# Patient Record
Sex: FEMALE | Race: WHITE | NOT HISPANIC OR LATINO | Employment: OTHER | URBAN - METROPOLITAN AREA
[De-identification: names, ages, dates, MRNs, and addresses within clinical notes are randomized per-mention and may not be internally consistent; named-entity substitution may affect disease eponyms.]

---

## 2017-03-15 ENCOUNTER — HOSPITAL ENCOUNTER (OUTPATIENT)
Dept: SLEEP MEDICINE | Facility: HOSPITAL | Age: 67
Discharge: HOME | End: 2017-03-15
Attending: INTERNAL MEDICINE | Admitting: INTERNAL MEDICINE

## 2017-04-20 ENCOUNTER — HOSPITAL ENCOUNTER (OUTPATIENT)
Dept: OTHER | Facility: HOSPITAL | Age: 67
Setting detail: SPECIMEN
Discharge: HOME | End: 2017-04-20
Attending: INTERNAL MEDICINE | Admitting: INTERNAL MEDICINE

## 2017-09-13 ENCOUNTER — HOSPITAL ENCOUNTER (OUTPATIENT)
Dept: SLEEP MEDICINE | Facility: HOSPITAL | Age: 67
Discharge: HOME | End: 2017-09-13
Attending: INTERNAL MEDICINE | Admitting: INTERNAL MEDICINE

## 2018-09-07 ENCOUNTER — HOSPITAL ENCOUNTER (OUTPATIENT)
Dept: HOME HEALTH SERVICES | Facility: HOME HEALTHCARE | Age: 68
Setting detail: SPECIMEN
Discharge: HOME | End: 2018-09-07
Attending: INTERNAL MEDICINE | Admitting: INTERNAL MEDICINE

## 2018-09-07 LAB
ALBUMIN SERPL-MCNC: 3.6 G/DL (ref 3.5–4.8)
ALBUMIN/GLOB SERPL: 1.2 {RATIO} (ref 1–1.7)
ALP SERPL-CCNC: 70 IU/L (ref 32–91)
ALT SERPL-CCNC: 12 IU/L (ref 14–54)
ANION GAP SERPL CALC-SCNC: 12.7 MMOL/L (ref 10–20)
AST SERPL-CCNC: 19 IU/L (ref 15–41)
BASOPHILS # BLD AUTO: 0 10*3/UL (ref 0–0.2)
BASOPHILS NFR BLD AUTO: 1 % (ref 0–2)
BILIRUB SERPL-MCNC: 0.5 MG/DL (ref 0.3–1.2)
BUN SERPL-MCNC: 8 MG/DL (ref 8–20)
BUN/CREAT SERPL: 10 (ref 5.4–26.2)
CALCIUM SERPL-MCNC: 9.1 MG/DL (ref 8.9–10.3)
CHLORIDE SERPL-SCNC: 104 MMOL/L (ref 101–111)
CONV CO2: 26 MMOL/L (ref 22–32)
CONV TOTAL PROTEIN: 6.7 G/DL (ref 6.1–7.9)
CREAT UR-MCNC: 0.8 MG/DL (ref 0.4–1)
DIFFERENTIAL METHOD BLD: (no result)
EOSINOPHIL # BLD AUTO: 0.1 10*3/UL (ref 0–0.3)
EOSINOPHIL # BLD AUTO: 2 % (ref 0–3)
ERYTHROCYTE [DISTWIDTH] IN BLOOD BY AUTOMATED COUNT: 16.5 % (ref 11.5–14.5)
GLOBULIN UR ELPH-MCNC: 3.1 G/DL (ref 2.5–3.8)
GLUCOSE SERPL-MCNC: 106 MG/DL (ref 65–99)
HCT VFR BLD AUTO: 36.4 % (ref 35–49)
HGB BLD-MCNC: 12 G/DL (ref 12–15)
LYMPHOCYTES # BLD AUTO: 3 10*3/UL (ref 0.8–4.8)
LYMPHOCYTES NFR BLD AUTO: 37 % (ref 18–42)
MAGNESIUM SERPL-MCNC: 1.6 MG/DL (ref 1.8–2.5)
MCH RBC QN AUTO: 28.5 PG (ref 26–32)
MCHC RBC AUTO-ENTMCNC: 33 G/DL (ref 32–36)
MCV RBC AUTO: 86.4 FL (ref 80–94)
MONOCYTES # BLD AUTO: 0.5 10*3/UL (ref 0.1–1.3)
MONOCYTES NFR BLD AUTO: 7 % (ref 2–11)
NEUTROPHILS # BLD AUTO: 4.5 10*3/UL (ref 2.3–8.6)
NEUTROPHILS NFR BLD AUTO: 53 % (ref 50–75)
NRBC BLD AUTO-RTO: 0 /100{WBCS}
NRBC/RBC NFR BLD MANUAL: 0 10*3/UL
PLATELET # BLD AUTO: 170 10*3/UL (ref 150–450)
PMV BLD AUTO: 8.1 FL (ref 7.4–10.4)
POTASSIUM SERPL-SCNC: 3.7 MMOL/L (ref 3.6–5.1)
RBC # BLD AUTO: 4.21 10*6/UL (ref 4–5.4)
SODIUM SERPL-SCNC: 139 MMOL/L (ref 136–144)
THEOPHYLLINE SERPL-MCNC: 5.4 UG/ML (ref 10–20)
WBC # BLD AUTO: 8.2 10*3/UL (ref 4.5–11.5)

## 2019-11-11 ENCOUNTER — APPOINTMENT (OUTPATIENT)
Dept: GENERAL RADIOLOGY | Facility: HOSPITAL | Age: 69
End: 2019-11-11

## 2019-11-11 ENCOUNTER — HOSPITAL ENCOUNTER (INPATIENT)
Facility: HOSPITAL | Age: 69
LOS: 7 days | Discharge: HOME HEALTH | End: 2019-11-18
Attending: EMERGENCY MEDICINE | Admitting: INTERNAL MEDICINE

## 2019-11-11 DIAGNOSIS — R50.9 FEVER AND CHILLS: ICD-10-CM

## 2019-11-11 DIAGNOSIS — J96.01 ACUTE RESPIRATORY FAILURE WITH HYPOXIA (HCC): Primary | ICD-10-CM

## 2019-11-11 DIAGNOSIS — A41.9 SEPSIS, DUE TO UNSPECIFIED ORGANISM, UNSPECIFIED WHETHER ACUTE ORGAN DYSFUNCTION PRESENT (HCC): ICD-10-CM

## 2019-11-11 LAB
ALBUMIN SERPL-MCNC: 4.2 G/DL (ref 3.5–5.2)
ALBUMIN/GLOB SERPL: 1.3 G/DL
ALP SERPL-CCNC: 74 U/L (ref 39–117)
ALT SERPL W P-5'-P-CCNC: 9 U/L (ref 1–33)
ANION GAP SERPL CALCULATED.3IONS-SCNC: 15 MMOL/L (ref 5–15)
APTT PPP: 24.6 SECONDS (ref 24–31)
ARTERIAL PATENCY WRIST A: POSITIVE
ARTERIAL PATENCY WRIST A: POSITIVE
AST SERPL-CCNC: 13 U/L (ref 1–32)
ATMOSPHERIC PRESS: ABNORMAL MM[HG]
ATMOSPHERIC PRESS: ABNORMAL MM[HG]
B PERT DNA SPEC QL NAA+PROBE: NOT DETECTED
BASE EXCESS BLDA CALC-SCNC: 2 MMOL/L (ref 0–3)
BASE EXCESS BLDA CALC-SCNC: 2.4 MMOL/L (ref 0–3)
BASOPHILS # BLD AUTO: 0.1 10*3/MM3 (ref 0–0.2)
BASOPHILS NFR BLD AUTO: 1.1 % (ref 0–1.5)
BDY SITE: ABNORMAL
BDY SITE: ABNORMAL
BILIRUB SERPL-MCNC: 0.2 MG/DL (ref 0.2–1.2)
BUN BLD-MCNC: 21 MG/DL (ref 8–23)
BUN/CREAT SERPL: 19.1 (ref 7–25)
C PNEUM DNA NPH QL NAA+NON-PROBE: NOT DETECTED
CALCIUM SPEC-SCNC: 9.7 MG/DL (ref 8.6–10.5)
CHLORIDE SERPL-SCNC: 98 MMOL/L (ref 98–107)
CO2 BLDA-SCNC: 32.1 MMOL/L (ref 22–29)
CO2 BLDA-SCNC: 33.9 MMOL/L (ref 22–29)
CO2 SERPL-SCNC: 25 MMOL/L (ref 22–29)
CREAT BLD-MCNC: 1.1 MG/DL (ref 0.57–1)
D-LACTATE SERPL-SCNC: 0.9 MMOL/L (ref 0.5–2)
DEPRECATED RDW RBC AUTO: 48.6 FL (ref 37–54)
EOSINOPHIL # BLD AUTO: 0 10*3/MM3 (ref 0–0.4)
EOSINOPHIL NFR BLD AUTO: 0.1 % (ref 0.3–6.2)
ERYTHROCYTE [DISTWIDTH] IN BLOOD BY AUTOMATED COUNT: 15.9 % (ref 12.3–15.4)
FLUAV H1 2009 PAND RNA NPH QL NAA+PROBE: NOT DETECTED
FLUAV H1 HA GENE NPH QL NAA+PROBE: NOT DETECTED
FLUAV H3 RNA NPH QL NAA+PROBE: NOT DETECTED
FLUAV SUBTYP SPEC NAA+PROBE: NOT DETECTED
FLUBV RNA ISLT QL NAA+PROBE: NOT DETECTED
GFR SERPL CREATININE-BSD FRML MDRD: 49 ML/MIN/1.73
GLOBULIN UR ELPH-MCNC: 3.2 GM/DL
GLUCOSE BLD-MCNC: 131 MG/DL (ref 65–99)
HADV DNA SPEC NAA+PROBE: NOT DETECTED
HCO3 BLDA-SCNC: 30.3 MMOL/L (ref 21–28)
HCO3 BLDA-SCNC: 31.8 MMOL/L (ref 21–28)
HCOV 229E RNA SPEC QL NAA+PROBE: NOT DETECTED
HCOV HKU1 RNA SPEC QL NAA+PROBE: NOT DETECTED
HCOV NL63 RNA SPEC QL NAA+PROBE: NOT DETECTED
HCOV OC43 RNA SPEC QL NAA+PROBE: NOT DETECTED
HCT VFR BLD AUTO: 42.4 % (ref 34–46.6)
HEMODILUTION: NO
HEMODILUTION: NO
HGB BLD-MCNC: 14 G/DL (ref 12–15.9)
HMPV RNA NPH QL NAA+NON-PROBE: NOT DETECTED
HOLD SPECIMEN: NORMAL
HOLD SPECIMEN: NORMAL
HOROWITZ INDEX BLD+IHG-RTO: 100 %
HOROWITZ INDEX BLD+IHG-RTO: 100 %
HPIV1 RNA SPEC QL NAA+PROBE: NOT DETECTED
HPIV2 RNA SPEC QL NAA+PROBE: NOT DETECTED
HPIV3 RNA NPH QL NAA+PROBE: NOT DETECTED
HPIV4 P GENE NPH QL NAA+PROBE: NOT DETECTED
INR PPP: 1.03 (ref 0.9–1.1)
LYMPHOCYTES # BLD AUTO: 2 10*3/MM3 (ref 0.7–3.1)
LYMPHOCYTES NFR BLD AUTO: 17.9 % (ref 19.6–45.3)
M PNEUMO IGG SER IA-ACNC: NOT DETECTED
MCH RBC QN AUTO: 28.8 PG (ref 26.6–33)
MCHC RBC AUTO-ENTMCNC: 33 G/DL (ref 31.5–35.7)
MCV RBC AUTO: 87.2 FL (ref 79–97)
MODALITY: ABNORMAL
MODALITY: ABNORMAL
MONOCYTES # BLD AUTO: 1.1 10*3/MM3 (ref 0.1–0.9)
MONOCYTES NFR BLD AUTO: 9.9 % (ref 5–12)
NEUTROPHILS # BLD AUTO: 8.1 10*3/MM3 (ref 1.7–7)
NEUTROPHILS NFR BLD AUTO: 71 % (ref 42.7–76)
NRBC BLD AUTO-RTO: 0.1 /100 WBC (ref 0–0.2)
NT-PROBNP SERPL-MCNC: 263.1 PG/ML (ref 5–900)
PCO2 BLDA: 61.1 MM HG (ref 35–48)
PCO2 BLDA: 67.7 MM HG (ref 35–48)
PEEP RESPIRATORY: 6 CM[H2O]
PH BLDA: 7.28 PH UNITS (ref 7.35–7.45)
PH BLDA: 7.3 PH UNITS (ref 7.35–7.45)
PLATELET # BLD AUTO: 142 10*3/MM3 (ref 140–450)
PMV BLD AUTO: 8.5 FL (ref 6–12)
PO2 BLDA: 115.9 MM HG (ref 83–108)
PO2 BLDA: 301.2 MM HG (ref 83–108)
POTASSIUM BLD-SCNC: 4.4 MMOL/L (ref 3.5–5.2)
PROCALCITONIN SERPL-MCNC: 0.09 NG/ML (ref 0.1–0.25)
PROT SERPL-MCNC: 7.4 G/DL (ref 6–8.5)
PROTHROMBIN TIME: 10.7 SECONDS (ref 9.6–11.7)
PSV: 6 CMH2O
RBC # BLD AUTO: 4.87 10*6/MM3 (ref 3.77–5.28)
RESPIRATORY RATE: 20
RHINOVIRUS RNA SPEC NAA+PROBE: NOT DETECTED
RSV RNA NPH QL NAA+NON-PROBE: DETECTED
SAO2 % BLDCOA: 97.9 % (ref 94–98)
SAO2 % BLDCOA: 99.9 % (ref 94–98)
SODIUM BLD-SCNC: 138 MMOL/L (ref 136–145)
TROPONIN T SERPL-MCNC: <0.01 NG/ML (ref 0–0.03)
VT ON VENT VENT: 600 ML
WBC NRBC COR # BLD: 11.4 10*3/MM3 (ref 3.4–10.8)
WHOLE BLOOD HOLD SPECIMEN: NORMAL
WHOLE BLOOD HOLD SPECIMEN: NORMAL

## 2019-11-11 PROCEDURE — 25010000002 FUROSEMIDE PER 20 MG: Performed by: INTERNAL MEDICINE

## 2019-11-11 PROCEDURE — 84484 ASSAY OF TROPONIN QUANT: CPT | Performed by: EMERGENCY MEDICINE

## 2019-11-11 PROCEDURE — 85610 PROTHROMBIN TIME: CPT | Performed by: EMERGENCY MEDICINE

## 2019-11-11 PROCEDURE — 93005 ELECTROCARDIOGRAM TRACING: CPT

## 2019-11-11 PROCEDURE — 83605 ASSAY OF LACTIC ACID: CPT

## 2019-11-11 PROCEDURE — 83880 ASSAY OF NATRIURETIC PEPTIDE: CPT | Performed by: EMERGENCY MEDICINE

## 2019-11-11 PROCEDURE — 25010000002 METHYLPREDNISOLONE PER 40 MG: Performed by: INTERNAL MEDICINE

## 2019-11-11 PROCEDURE — 84145 PROCALCITONIN (PCT): CPT | Performed by: INTERNAL MEDICINE

## 2019-11-11 PROCEDURE — 71045 X-RAY EXAM CHEST 1 VIEW: CPT

## 2019-11-11 PROCEDURE — 94799 UNLISTED PULMONARY SVC/PX: CPT

## 2019-11-11 PROCEDURE — 99285 EMERGENCY DEPT VISIT HI MDM: CPT

## 2019-11-11 PROCEDURE — 85025 COMPLETE CBC W/AUTO DIFF WBC: CPT | Performed by: EMERGENCY MEDICINE

## 2019-11-11 PROCEDURE — 25010000002 CEFTRIAXONE PER 250 MG: Performed by: EMERGENCY MEDICINE

## 2019-11-11 PROCEDURE — 81332 SERPINA1 GENE: CPT | Performed by: INTERNAL MEDICINE

## 2019-11-11 PROCEDURE — 25010000002 ACETAZOLAMIDE PER 500 MG: Performed by: INTERNAL MEDICINE

## 2019-11-11 PROCEDURE — 94760 N-INVAS EAR/PLS OXIMETRY 1: CPT

## 2019-11-11 PROCEDURE — 87040 BLOOD CULTURE FOR BACTERIA: CPT | Performed by: EMERGENCY MEDICINE

## 2019-11-11 PROCEDURE — 25010000002 LORAZEPAM PER 2 MG

## 2019-11-11 PROCEDURE — 94660 CPAP INITIATION&MGMT: CPT

## 2019-11-11 PROCEDURE — 80053 COMPREHEN METABOLIC PANEL: CPT | Performed by: EMERGENCY MEDICINE

## 2019-11-11 PROCEDURE — 82803 BLOOD GASES ANY COMBINATION: CPT

## 2019-11-11 PROCEDURE — 94640 AIRWAY INHALATION TREATMENT: CPT

## 2019-11-11 PROCEDURE — 93005 ELECTROCARDIOGRAM TRACING: CPT | Performed by: EMERGENCY MEDICINE

## 2019-11-11 PROCEDURE — 85730 THROMBOPLASTIN TIME PARTIAL: CPT | Performed by: EMERGENCY MEDICINE

## 2019-11-11 PROCEDURE — 0099U HC BIOFIRE FILMARRAY RESP PANEL 1: CPT | Performed by: INTERNAL MEDICINE

## 2019-11-11 PROCEDURE — 36600 WITHDRAWAL OF ARTERIAL BLOOD: CPT

## 2019-11-11 RX ORDER — LORAZEPAM 2 MG/ML
INJECTION INTRAMUSCULAR
Status: COMPLETED
Start: 2019-11-11 | End: 2019-11-11

## 2019-11-11 RX ORDER — ALBUTEROL SULFATE 2.5 MG/3ML
10 SOLUTION RESPIRATORY (INHALATION) CONTINUOUS
Status: ACTIVE | OUTPATIENT
Start: 2019-11-11 | End: 2019-11-11

## 2019-11-11 RX ORDER — ACETAMINOPHEN 500 MG
1000 TABLET ORAL ONCE
Status: COMPLETED | OUTPATIENT
Start: 2019-11-11 | End: 2019-11-11

## 2019-11-11 RX ORDER — POTASSIUM CHLORIDE 750 MG/1
10 TABLET, FILM COATED, EXTENDED RELEASE ORAL DAILY
COMMUNITY
End: 2020-01-23 | Stop reason: SDUPTHER

## 2019-11-11 RX ORDER — IPRATROPIUM BROMIDE AND ALBUTEROL SULFATE 2.5; .5 MG/3ML; MG/3ML
3 SOLUTION RESPIRATORY (INHALATION)
Status: DISCONTINUED | OUTPATIENT
Start: 2019-11-11 | End: 2019-11-18 | Stop reason: HOSPADM

## 2019-11-11 RX ORDER — MAGNESIUM 200 MG
1 TABLET ORAL DAILY
COMMUNITY
End: 2020-07-23

## 2019-11-11 RX ORDER — ESOMEPRAZOLE MAGNESIUM 40 MG/1
40 CAPSULE, DELAYED RELEASE ORAL
COMMUNITY
End: 2019-12-05 | Stop reason: SDUPTHER

## 2019-11-11 RX ORDER — IPRATROPIUM BROMIDE AND ALBUTEROL SULFATE 2.5; .5 MG/3ML; MG/3ML
3 SOLUTION RESPIRATORY (INHALATION) EVERY 4 HOURS PRN
Status: DISCONTINUED | OUTPATIENT
Start: 2019-11-11 | End: 2019-11-18 | Stop reason: HOSPADM

## 2019-11-11 RX ORDER — IPRATROPIUM BROMIDE AND ALBUTEROL SULFATE 2.5; .5 MG/3ML; MG/3ML
3 SOLUTION RESPIRATORY (INHALATION)
Status: DISCONTINUED | OUTPATIENT
Start: 2019-11-11 | End: 2019-11-17

## 2019-11-11 RX ORDER — BUDESONIDE 1 MG/2ML
1 INHALANT ORAL 2 TIMES DAILY
Status: ON HOLD | COMMUNITY
End: 2021-12-27

## 2019-11-11 RX ORDER — LORAZEPAM 2 MG/ML
0.5 INJECTION INTRAMUSCULAR ONCE
Status: COMPLETED | OUTPATIENT
Start: 2019-11-11 | End: 2019-11-11

## 2019-11-11 RX ORDER — MELATONIN
2000 2 TIMES DAILY
COMMUNITY
End: 2020-07-23

## 2019-11-11 RX ORDER — ROSUVASTATIN CALCIUM 20 MG/1
20 TABLET, COATED ORAL DAILY
COMMUNITY

## 2019-11-11 RX ORDER — FUROSEMIDE 10 MG/ML
40 INJECTION INTRAMUSCULAR; INTRAVENOUS
Status: DISCONTINUED | OUTPATIENT
Start: 2019-11-11 | End: 2019-11-12

## 2019-11-11 RX ORDER — FUROSEMIDE 40 MG/1
20 TABLET ORAL NIGHTLY
COMMUNITY
End: 2020-01-23

## 2019-11-11 RX ORDER — SODIUM CHLORIDE 0.9 % (FLUSH) 0.9 %
10 SYRINGE (ML) INJECTION AS NEEDED
Status: DISCONTINUED | OUTPATIENT
Start: 2019-11-11 | End: 2019-11-18 | Stop reason: HOSPADM

## 2019-11-11 RX ORDER — PHENOL 1.4 %
600 AEROSOL, SPRAY (ML) MUCOUS MEMBRANE 2 TIMES DAILY WITH MEALS
COMMUNITY
End: 2020-07-23

## 2019-11-11 RX ORDER — THEOPHYLLINE 400 MG/1
400 TABLET, EXTENDED RELEASE ORAL DAILY
COMMUNITY

## 2019-11-11 RX ORDER — GABAPENTIN 300 MG/1
900 CAPSULE ORAL NIGHTLY
COMMUNITY

## 2019-11-11 RX ORDER — LOSARTAN POTASSIUM 50 MG/1
50 TABLET ORAL DAILY
COMMUNITY
End: 2019-11-18 | Stop reason: HOSPADM

## 2019-11-11 RX ORDER — BUPROPION HYDROCHLORIDE 150 MG/1
150 TABLET, EXTENDED RELEASE ORAL 2 TIMES DAILY
COMMUNITY
End: 2020-07-23

## 2019-11-11 RX ORDER — ROPINIROLE 1 MG/1
1 TABLET, FILM COATED ORAL 2 TIMES DAILY
COMMUNITY

## 2019-11-11 RX ORDER — NITROGLYCERIN 20 MG/100ML
10-50 INJECTION INTRAVENOUS
Status: DISCONTINUED | OUTPATIENT
Start: 2019-11-11 | End: 2019-11-18 | Stop reason: HOSPADM

## 2019-11-11 RX ORDER — ASPIRIN 325 MG
325 TABLET ORAL ONCE
Status: COMPLETED | OUTPATIENT
Start: 2019-11-11 | End: 2019-11-11

## 2019-11-11 RX ORDER — IPRATROPIUM BROMIDE AND ALBUTEROL SULFATE 2.5; .5 MG/3ML; MG/3ML
3 SOLUTION RESPIRATORY (INHALATION) EVERY 4 HOURS PRN
COMMUNITY

## 2019-11-11 RX ORDER — BUDESONIDE 0.5 MG/2ML
0.5 INHALANT ORAL
Status: DISCONTINUED | OUTPATIENT
Start: 2019-11-11 | End: 2019-11-18 | Stop reason: HOSPADM

## 2019-11-11 RX ORDER — METHYLPREDNISOLONE SODIUM SUCCINATE 40 MG/ML
40 INJECTION, POWDER, LYOPHILIZED, FOR SOLUTION INTRAMUSCULAR; INTRAVENOUS EVERY 8 HOURS
Status: DISCONTINUED | OUTPATIENT
Start: 2019-11-11 | End: 2019-11-12

## 2019-11-11 RX ORDER — DOXYCYCLINE HYCLATE 100 MG/1
100 CAPSULE ORAL 2 TIMES DAILY
COMMUNITY
Start: 2019-11-04 | End: 2019-11-18 | Stop reason: HOSPADM

## 2019-11-11 RX ORDER — ACETAMINOPHEN 325 MG/1
TABLET ORAL
Status: COMPLETED
Start: 2019-11-11 | End: 2019-11-11

## 2019-11-11 RX ORDER — METOPROLOL SUCCINATE 50 MG/1
50 TABLET, EXTENDED RELEASE ORAL DAILY
COMMUNITY
End: 2020-07-23 | Stop reason: SDUPTHER

## 2019-11-11 RX ORDER — ASPIRIN 81 MG/1
81 TABLET, CHEWABLE ORAL DAILY
COMMUNITY

## 2019-11-11 RX ORDER — RISEDRONATE SODIUM 35 MG/1
35 TABLET, FILM COATED ORAL
COMMUNITY

## 2019-11-11 RX ORDER — ALBUTEROL SULFATE 90 UG/1
1 AEROSOL, METERED RESPIRATORY (INHALATION) EVERY 4 HOURS PRN
COMMUNITY

## 2019-11-11 RX ADMIN — METHYLPREDNISOLONE SODIUM SUCCINATE 40 MG: 40 INJECTION, POWDER, FOR SOLUTION INTRAMUSCULAR; INTRAVENOUS at 08:20

## 2019-11-11 RX ADMIN — Medication 650 MG: at 06:07

## 2019-11-11 RX ADMIN — IPRATROPIUM BROMIDE AND ALBUTEROL SULFATE 3 ML: .5; 3 SOLUTION RESPIRATORY (INHALATION) at 13:26

## 2019-11-11 RX ADMIN — FUROSEMIDE 40 MG: 10 INJECTION, SOLUTION INTRAMUSCULAR; INTRAVENOUS at 17:56

## 2019-11-11 RX ADMIN — BUDESONIDE 0.5 MG: 0.5 INHALANT RESPIRATORY (INHALATION) at 19:13

## 2019-11-11 RX ADMIN — ACETAMINOPHEN 650 MG: 325 TABLET ORAL at 06:07

## 2019-11-11 RX ADMIN — ALBUTEROL SULFATE 10 MG: 2.5 SOLUTION RESPIRATORY (INHALATION) at 05:55

## 2019-11-11 RX ADMIN — NITROGLYCERIN 5 MCG/MIN: 20 INJECTION INTRAVENOUS at 05:45

## 2019-11-11 RX ADMIN — CEFTRIAXONE SODIUM 2 G: 10 INJECTION, POWDER, FOR SOLUTION INTRAVENOUS at 05:49

## 2019-11-11 RX ADMIN — METHYLPREDNISOLONE SODIUM SUCCINATE 40 MG: 40 INJECTION, POWDER, FOR SOLUTION INTRAMUSCULAR; INTRAVENOUS at 16:16

## 2019-11-11 RX ADMIN — IPRATROPIUM BROMIDE AND ALBUTEROL SULFATE 3 ML: .5; 3 SOLUTION RESPIRATORY (INHALATION) at 23:29

## 2019-11-11 RX ADMIN — IPRATROPIUM BROMIDE AND ALBUTEROL SULFATE 3 ML: .5; 3 SOLUTION RESPIRATORY (INHALATION) at 23:31

## 2019-11-11 RX ADMIN — THEOPHYLLINE ANHYDROUS 300 MG: 300 CAPSULE, EXTENDED RELEASE ORAL at 16:16

## 2019-11-11 RX ADMIN — DOXYCYCLINE 100 MG: 100 INJECTION, POWDER, LYOPHILIZED, FOR SOLUTION INTRAVENOUS at 17:54

## 2019-11-11 RX ADMIN — LORAZEPAM 0.5 MG: 2 INJECTION, SOLUTION INTRAMUSCULAR; INTRAVENOUS at 06:59

## 2019-11-11 RX ADMIN — IPRATROPIUM BROMIDE AND ALBUTEROL SULFATE 3 ML: .5; 3 SOLUTION RESPIRATORY (INHALATION) at 19:13

## 2019-11-11 RX ADMIN — LORAZEPAM 0.5 MG: 2 INJECTION INTRAMUSCULAR at 06:59

## 2019-11-11 RX ADMIN — DOXYCYCLINE 100 MG: 100 INJECTION, POWDER, LYOPHILIZED, FOR SOLUTION INTRAVENOUS at 05:48

## 2019-11-11 RX ADMIN — ACETAZOLAMIDE 250 MG: 500 INJECTION, POWDER, LYOPHILIZED, FOR SOLUTION INTRAVENOUS at 09:57

## 2019-11-11 RX ADMIN — FUROSEMIDE 40 MG: 10 INJECTION, SOLUTION INTRAMUSCULAR; INTRAVENOUS at 08:20

## 2019-11-11 RX ADMIN — IPRATROPIUM BROMIDE AND ALBUTEROL SULFATE 3 ML: .5; 3 SOLUTION RESPIRATORY (INHALATION) at 10:56

## 2019-11-11 RX ADMIN — ASPIRIN 325 MG ORAL TABLET 325 MG: 325 PILL ORAL at 06:01

## 2019-11-11 NOTE — PROGRESS NOTES
Discharge Planning Assessment  [facility]     Patient Name: Michelle Francis  MRN: [id number]  Today's Date: 11/11/2019    Admit Date: 11/11/2019    Discharge Needs Assessment     Row Name 11/11/19 1435       Living Environment    Lives With  spouse    Current Living Arrangements  home/apartment/condo    Primary Care Provided by  self    Family Caregiver if Needed  none    Able to Return to Prior Arrangements  yes       Resource/Environmental Concerns    Resource/Environmental Concerns  none    Transportation Concerns  car, none       Transition Planning    Patient/Family Anticipates Transition to  home    Patient/Family Anticipated Services at Transition  none    Transportation Anticipated  family or friend will provide       Discharge Needs Assessment    Readmission Within the Last 30 Days  no previous admission in last 30 days    Concerns to be Addressed  no discharge needs identified    Equipment Currently Used at Home  oxygen;bipap/cpap;nebulizer Uses oxygen at 3 L cont from [employer]         Discharge Plan     Row Name 11/11/19 1437       Plan    Plan  Routine d/c to home               Demographic Summary     Row Name 11/11/19 1434       General Information    Admission Type  inpatient    Arrived From  emergency department    Referral Source  admission list    Reason for Consult  discharge planning    Preferred Language  English        Functional Status     Row Name 11/11/19 1435       Functional Status, IADL    Medications  assistive person    Meal Preparation  assistive person    Housekeeping  assistive person    Laundry  assistive person    Shopping  assistive person           DC Barriers - Patient requiring AVAP for respiratory distress     Payton Bateman RN, CM  Office Phone [phone]  Cell [phone]

## 2019-11-11 NOTE — ED NOTES
Dr Sifuentes at bedside.  Dr Sifuentes changed settings from Bipap to Avap.  Pt tolerating well.  Family at bedside. Awaiting bed.  Pt currently assigned to ICU, Dr Sifuentes states that pt may go to PCU if needed.  Will cont to monitor.      Ria Car RN  11/11/19 0873

## 2019-11-11 NOTE — ED PROVIDER NOTES
Subjective   Chief complaint shortness of breath.  This is a 69-year-old who presents with severe shortness of breath is progressive worsened since Friday.  The patient denies any fevers or chills she has had no nausea or vomiting she states any exertion at all worsens her shortness of breath the patient history is somewhat limited due to her severe respiratory distress        History provided by:  Patient  History limited by:  Severe respiratory distress      Review of Systems   Unable to perform ROS: Severe respiratory distress       Past Medical History:   Diagnosis Date   • Arthritis    • Colon polyps     Dr Bates   • COPD (chronic obstructive pulmonary disease) (CMS/[facility])    • History of emphysema    • Hyperlipidemia    • Hypertension    • Kidney disease    • Osteoporosis    • Restless leg syndrome     sees Dr Seipel   • Sleep apnea     npsg [facility] 2014, ahi 5.9, on auto bipap min 8 max, PS 2-8-Alcaraz's, nasal mask       No Known Allergies    Past Surgical History:   Procedure Laterality Date   • CATARACT EXTRACTION      Dr Moyer   • CEREBRAL ANEURYSM REPAIR      Brain Aneurysm approx 2009, treated with stents and coils, Dr. Fraga   • COLON SURGERY      partial colectomy-2013 Dr Valladares- due to multiple large polyps   • EXPLORATORY LAPAROTOMY      lysis of intra abdominal adhesions, primary ventral hernia repair 08/01/2018 Dr West   • WRIST SURGERY      wrist fracture - Dr Patton       Family History   Problem Relation Age of Onset   • Diabetes Mother    • Hypertension Mother    • Colon cancer Father    • Colon cancer Sister    • Arthritis Brother    • Hypertension Brother    • Allergies Other        Social History     Socioeconomic History   • Marital status:      Spouse name: Not on file   • Number of children: Not on file   • Years of education: Not on file   • Highest education level: Not on file   Tobacco Use   • Smoking status: Former Smoker   Substance and Sexual Activity   • Drug use: No                      Objective   Physical Exam   Constitutional: She is oriented to person, place, and time. She appears well-developed and well-nourished. No distress.   HENT:   Head: Normocephalic and atraumatic.   Right Ear: External ear normal.   Left Ear: External ear normal.   Nose: Nose normal.   Eyes: Conjunctivae and EOM are normal. Pupils are equal, round, and reactive to light.   Neck: Normal range of motion. Neck supple. No JVD present.   Cardiovascular: Regular rhythm, normal heart sounds and intact distal pulses. Tachycardia present.   Pulmonary/Chest: No stridor. Tachypnea noted. She is in respiratory distress. She has wheezes. She has no rales.   Abdominal: Soft. Bowel sounds are normal. She exhibits no mass. There is no tenderness. There is no rebound and no guarding. No hernia.   Musculoskeletal: Normal range of motion.   Lymphadenopathy:     She has no cervical adenopathy.   Neurological: She is alert and oriented to person, place, and time. No cranial nerve deficit.   Skin: Skin is warm and dry. Capillary refill takes less than 2 seconds. No rash noted.   Psychiatric: She has a normal mood and affect.   Nursing note and vitals reviewed.      Procedures           ED Course      XR Chest 1 View   ED Interpretation   COPD/right basilar infiltrate        Lab Results (last 72 hours)     Procedure Component Value Units Date/Time    Blood Culture - Blood, Hand, Left [[id number]] Collected:  11/11/19 0537    Specimen:  Blood from Hand, Left Updated:  11/11/19 0543    Blood Culture - Blood, Arm, Right [[id number]] Collected:  11/11/19 0537    Specimen:  Blood from Arm, Right Updated:  11/11/19 0543    POC Lactate [[id number]]  (Normal) Collected:  11/11/19 0537    Specimen:  Blood Updated:  11/11/19 0538     Lactate 0.9 mmol/L      Comment: Serial Number: [id number]:  [id number]       Protime-INR [[id number]]  (Normal) Collected:  11/11/19 0537    Specimen:  Blood Updated:  11/11/19 0554     Protime 10.7 Seconds       INR 1.03    aPTT [[id number]]  (Normal) Collected:  11/11/19 0537    Specimen:  Blood Updated:  11/11/19 0554     PTT 24.6 seconds     CBC & Differential [[id number]] Collected:  11/11/19 0538    Specimen:  Blood Updated:  11/11/19 0547    Narrative:       The following orders were created for panel order CBC & Differential.  Procedure                               Abnormality         Status                     ---------                               -----------         ------                     CBC Auto Differential[[id number]]        Abnormal            Final result                 Please view results for these tests on the individual orders.    Comprehensive Metabolic Panel [[id number]]  (Abnormal) Collected:  11/11/19 0538    Specimen:  Blood Updated:  11/11/19 0611     Glucose 131 mg/dL      BUN 21 mg/dL      Creatinine 1.10 mg/dL      Sodium 138 mmol/L      Potassium 4.4 mmol/L      Chloride 98 mmol/L      CO2 25.0 mmol/L      Calcium 9.7 mg/dL      Total Protein 7.4 g/dL      Albumin 4.20 g/dL      ALT (SGPT) 9 U/L      AST (SGOT) 13 U/L      Alkaline Phosphatase 74 U/L      Total Bilirubin 0.2 mg/dL      eGFR Non African Amer 49 mL/min/1.73      Globulin 3.2 gm/dL      A/G Ratio 1.3 g/dL      BUN/Creatinine Ratio 19.1     Anion Gap 15.0 mmol/L     Narrative:       GFR Normal >60  Chronic Kidney Disease <60  Kidney Failure <15    CBC Auto Differential [[id number]]  (Abnormal) Collected:  11/11/19 0538    Specimen:  Blood Updated:  11/11/19 0547     WBC 11.40 10*3/mm3      RBC 4.87 10*6/mm3      Hemoglobin 14.0 g/dL      Hematocrit 42.4 %      MCV 87.2 fL      MCH 28.8 pg      MCHC 33.0 g/dL      RDW 15.9 %      RDW-SD 48.6 fl      MPV 8.5 fL      Platelets 142 10*3/mm3      Neutrophil % 71.0 %      Lymphocyte % 17.9 %      Monocyte % 9.9 %      Eosinophil % 0.1 %      Basophil % 1.1 %      Neutrophils, Absolute 8.10 10*3/mm3      Lymphocytes, Absolute 2.00 10*3/mm3      Monocytes, Absolute 1.10 10*3/mm3       Eosinophils, Absolute 0.00 10*3/mm3      Basophils, Absolute 0.10 10*3/mm3      nRBC 0.1 /100 WBC     Troponin [[id number]]  (Normal) Collected:  11/11/19 0538    Specimen:  Blood Updated:  11/11/19 0628     Troponin T <0.010 ng/mL     Narrative:       Troponin T Reference Range:  <= 0.03 ng/mL-   Negative for AMI  >0.03 ng/mL-     Abnormal for myocardial necrosis.  Clinicians would have to utilize clinical acumen, EKG, Troponin and serial changes to determine if it is an Acute Myocardial Infarction or myocardial injury due to an underlying chronic condition.     BNP [[id number]]  (Normal) Collected:  11/11/19 0538    Specimen:  Blood Updated:  11/11/19 0628     proBNP 263.1 pg/mL     Narrative:       Among patients with dyspnea, NT-proBNP is highly sensitive for the detection of acute congestive heart failure. In addition NT-proBNP of <300 pg/ml effectively rules out acute congestive heart failure with 99% negative predictive value.    Blood Gas, Arterial [[id number]]  (Abnormal) Collected:  11/11/19 0546    Specimen:  Arterial Blood Updated:  11/11/19 0551     Site Right Radial     Emile's Test Positive     pH, Arterial 7.280 pH units      pCO2, Arterial 67.7 mm Hg      pO2, Arterial 301.2 mm Hg      HCO3, Arterial 31.8 mmol/L      Base Excess, Arterial 2.4 mmol/L      Comment: Serial Number: [id number]:  [id number]        O2 Saturation, Arterial 99.9 %      CO2 Content 33.9 mmol/L      Barometric Pressure for Blood Gas --     Comment: N/A        Modality NRB     FIO2 100 %      Hemodilution No    Blood Gas, Arterial [[id number]]  (Abnormal) Collected:  11/11/19 0639    Specimen:  Arterial Blood Updated:  11/11/19 0643     Site Left Radial     Emile's Test Positive     pH, Arterial 7.302 pH units      pCO2, Arterial 61.1 mm Hg      pO2, Arterial 115.9 mm Hg      HCO3, Arterial 30.3 mmol/L      Base Excess, Arterial 2.0 mmol/L      Comment: Serial Number: [id number]:  [id number]        O2 Saturation, Arterial 97.9 %       "CO2 Content 32.1 mmol/L      Barometric Pressure for Blood Gas --     Comment: N/A        Modality BiPap     FIO2 100 %      Set Tidal Volume 600     PEEP 6     PSV 6 cmH2O      Hemodilution No     Respiratory Rate 20        Medications   sodium chloride 0.9 % flush 10 mL (not administered)   sodium chloride 0.9 % flush 10 mL (not administered)   nitroglycerin 50 mg/250 mL (0.2 mg/mL) infusion (5 mcg/min Intravenous New Bag 11/11/19 0545)   albuterol (PROVENTIL) nebulizer solution 0.083% 2.5 mg/3mL (10 mg Nebulization New Bag 11/11/19 0555)   doxycycline (VIBRAMYCIN) 100 mg in sodium chloride 0.9 % 100 mL IVPB (100 mg Intravenous New Bag 11/11/19 0548)   aspirin tablet 325 mg (325 mg Oral Given 11/11/19 0601)   cefTRIAXone (ROCEPHIN) in SWFI 2 GRAMS/20ml IV PUSH syringe (2 g Intravenous Given 11/11/19 0549)   acetaminophen (TYLENOL) tablet 1,000 mg (650 mg Oral Given 11/11/19 0607)     /69   Pulse (!) 127   Temp (!) 101.3 °F (38.5 °C) (Rectal)   Resp (!) 29   Ht 167.6 cm (66\")   Wt 107 kg (235 lb)   SpO2 95%   BMI 37.93 kg/m²   Patient started on nitroglycerin drip as well as given continuous albuterol for 60 minutes and started on BiPAP.  The patient was also started on antibiotics for sepsis criteria.  Lactic acid was 0.9    EKG personally reviewed and interpreted by me shows:    Sinus tachycardia with nonspecific lateral T changes–possibly rate related.  There was significant baseline artifact. further information as documented on EKG.    I discussed results with the patient family as well as call discussed results with the patient's pulmonologist Dr. butcher who agreed to admit.  The patient did improve on nebulizer and BiPAP with pH improving to 7.3 however PCO2 remains significantly elevated at 61.  Patient will be admitted to the ICU for further evaluation treatment          MDM  Differential diagnosis; this does not constitute the entirety of considered causes:      Acute coronary syndrome, " pneumothorax, pneumonia, dissection, electrolyte abnormality, anemia, DVT, pulmonary embolus      Critical care time 40 minutes exclusive of any separately billable procedure time  Final diagnoses:   Acute respiratory failure with hypoxia (CMS/[facility])   Fever and chills   Sepsis, due to unspecified organism, unspecified whether acute organ dysfunction present (CMS/[facility])              Miguel Angel Perez MD  11/11/19 0457

## 2019-11-11 NOTE — H&P
Patient Care Team:  Aristeo Mauro MD as PCP - General (Internal Medicine)  Melquiades Devlin APRN (Nurse Practitioner)    Chief complaint hypercapnic respiratory failure        Assessment/Plan   Acute/chronic hypercapnic hypoxic respiratory failure   acute COPD exacerbation  acute bronchitis   pulmonary edema    hyperlipidemia  Hypertension  Chronic kidney disease  Restless leg syndrome  Obstructive sleep apnea, patient use home auto BiPAP  History of cerebral aneurysm repair  History of partial colectomy  Cataract extraction  Diabetes mellitus      Plan  Noninvasive ventilation utilizing AVAP  antibiotics Rocephin and doxycycline  Diuresis Lasix 40 mg IV every 12 as well as acetazolamide 250 mg IV once  Bronchodilators desonide and DuoNeb  Oxygen titration  DVT prophylaxis  GI prophylaxis  Glycemic control          History   69-year-old who presents with severe shortness of breath is progressive worsened since Friday.  The patient denies any fevers or chills she has had no nausea or vomiting she states any exertion at all worsens her shortness of breath the patient history is somewhat limited due to her severe respiratory distress      Acute respiratory failure with hypoxia (CMS/HCC)      Past Medical History:   Diagnosis Date   • Arthritis    • Colon polyps     Dr Bates   • COPD (chronic obstructive pulmonary disease) (CMS/HCC)    • History of emphysema    • Hyperlipidemia    • Hypertension    • Kidney disease    • Osteoporosis    • Restless leg syndrome     sees Dr Seipel   • Sleep apnea     npsg [facility] 2014, ahi 5.9, on auto bipap min 8 max, PS 2-8-Alcaraz's, nasal mask       Past Surgical History:   Procedure Laterality Date   • CATARACT EXTRACTION      Dr Moyer   • CEREBRAL ANEURYSM REPAIR      Brain Aneurysm approx 2009, treated with stents and coils, Dr. Fraga   • COLON SURGERY      partial colectomy-2013 Dr Valladares- due to multiple large polyps   • EXPLORATORY LAPAROTOMY      lysis of intra abdominal  adhesions, primary ventral hernia repair 08/01/2018 Dr West   • WRIST SURGERY      wrist fracture - Dr Patton       Family History   Problem Relation Age of Onset   • Diabetes Mother    • Hypertension Mother    • Colon cancer Father    • Colon cancer Sister    • Arthritis Brother    • Hypertension Brother    • Allergies Other        Social History     Socioeconomic History   • Marital status:      Spouse name: Not on file   • Number of children: Not on file   • Years of education: Not on file   • Highest education level: Not on file   Tobacco Use   • Smoking status: Former Smoker   Substance and Sexual Activity   • Drug use: No       Review of Systems  Review of Systems   HENT: Positive for congestion. Negative for rhinorrhea and sneezing.    Respiratory: Positive for cough, shortness of breath and wheezing. Negative for choking, chest tightness and stridor.    Cardiovascular: Positive for leg swelling.        Vital Signs  Temp:  [101.3 °F (38.5 °C)] 101.3 °F (38.5 °C)  Heart Rate:  [112-146] 112  Resp:  [23-40] 28  BP: (113-157)/() 124/64    Physical Exam:  Physical Exam   Cardiovascular:   Murmur heard.  Pulmonary/Chest: No respiratory distress. She has wheezes. She has rales. She exhibits no tenderness.   Abdominal: She exhibits no distension. There is no tenderness.   Musculoskeletal: She exhibits edema.         Radiology  Imaging Results (Last 24 Hours)     Procedure Component Value Units Date/Time    XR Chest 1 View [[id number]] Collected:  11/11/19 0712     Updated:  11/11/19 0714    Narrative:       Examination: XR CHEST 1 VW-     Date of Exam: 11/11/2019 6:05 AM     Indication: Shortness of breath; J96.01-Acute respiratory failure with  hypoxia; R50.9-Fever, unspecified; A41.9-Sepsis, unspecified organism.     Comparison: 08/02/2018.     Technique: Single radiographic view of the chest was obtained.     Findings:  The lungs appear hyperexpanded and there is apical cystic lucency and  basilar  interstitial prominence. There is no pneumothorax, pleural  effusion or focal airspace consolidation. Cardiomediastinal silhouette  is unremarkable. Pulmonary vasculature appears within normal limits.  Regional bones appear grossly intact.        Impression:       Findings suggestive of COPD/emphysema without acute cardiopulmonary  abnormality.     Electronically Signed By-Waylon Rod On:11/11/2019 7:12 AM  This report was finalized on [id number] by  Waylon Rod, .          Labs:  Results from last 7 days   Lab Units 11/11/19  0538   WBC 10*3/mm3 11.40*   HEMOGLOBIN g/dL 14.0   HEMATOCRIT % 42.4   PLATELETS 10*3/mm3 142     Results from last 7 days   Lab Units 11/11/19  0538   SODIUM mmol/L 138   POTASSIUM mmol/L 4.4   CHLORIDE mmol/L 98   CO2 mmol/L 25.0   BUN mg/dL 21   CREATININE mg/dL 1.10*   CALCIUM mg/dL 9.7   BILIRUBIN mg/dL 0.2   ALK PHOS U/L 74   ALT (SGPT) U/L 9   AST (SGOT) U/L 13   GLUCOSE mg/dL 131*     Results from last 7 days   Lab Units 11/11/19  0639   PH, ARTERIAL pH units 7.302*   PO2 ART mm Hg 115.9*   PCO2, ARTERIAL mm Hg 61.1*   HCO3 ART mmol/L 30.3*     Results from last 7 days   Lab Units 11/11/19  0538   ALBUMIN g/dL 4.20     Results from last 7 days   Lab Units 11/11/19  0538   TROPONIN T ng/mL <0.010             Results from last 7 days   Lab Units 11/11/19  0537   INR  1.03   APTT seconds 24.6               Meds:   SCHEDULE     Infusions    nitroglycerin 10-50 mcg/min Last Rate: 5 mcg/min (11/11/19 0545)     PRNs  sodium chloride  •  [COMPLETED] Insert peripheral IV **AND** sodium chloride      I discussed the patients findings and my recommendations with patient.     Hawa Sifuentes MD  11/11/19  8:02 AM    Time: Critical care 70 min

## 2019-11-12 PROCEDURE — 25010000002 ENOXAPARIN PER 10 MG: Performed by: INTERNAL MEDICINE

## 2019-11-12 PROCEDURE — 25010000002 MORPHINE PER 10 MG: Performed by: INTERNAL MEDICINE

## 2019-11-12 PROCEDURE — 97166 OT EVAL MOD COMPLEX 45 MIN: CPT

## 2019-11-12 PROCEDURE — 93005 ELECTROCARDIOGRAM TRACING: CPT | Performed by: INTERNAL MEDICINE

## 2019-11-12 PROCEDURE — 25010000002 CEFTRIAXONE PER 250 MG: Performed by: INTERNAL MEDICINE

## 2019-11-12 PROCEDURE — 97535 SELF CARE MNGMENT TRAINING: CPT

## 2019-11-12 PROCEDURE — 25010000002 FUROSEMIDE PER 20 MG: Performed by: INTERNAL MEDICINE

## 2019-11-12 PROCEDURE — 94799 UNLISTED PULMONARY SVC/PX: CPT

## 2019-11-12 PROCEDURE — 94660 CPAP INITIATION&MGMT: CPT

## 2019-11-12 PROCEDURE — 25010000002 ACETAZOLAMIDE PER 500 MG: Performed by: INTERNAL MEDICINE

## 2019-11-12 PROCEDURE — 25010000002 METHYLPREDNISOLONE PER 40 MG: Performed by: INTERNAL MEDICINE

## 2019-11-12 RX ORDER — MORPHINE SULFATE 4 MG/ML
2 INJECTION, SOLUTION INTRAMUSCULAR; INTRAVENOUS ONCE AS NEEDED
Status: COMPLETED | OUTPATIENT
Start: 2019-11-12 | End: 2019-11-12

## 2019-11-12 RX ORDER — BUPROPION HYDROCHLORIDE 150 MG/1
150 TABLET, EXTENDED RELEASE ORAL EVERY 12 HOURS SCHEDULED
Status: DISCONTINUED | OUTPATIENT
Start: 2019-11-12 | End: 2019-11-18 | Stop reason: HOSPADM

## 2019-11-12 RX ORDER — MONTELUKAST SODIUM 10 MG/1
10 TABLET ORAL NIGHTLY
Status: DISCONTINUED | OUTPATIENT
Start: 2019-11-12 | End: 2019-11-18 | Stop reason: HOSPADM

## 2019-11-12 RX ORDER — FUROSEMIDE 10 MG/ML
40 INJECTION INTRAMUSCULAR; INTRAVENOUS DAILY
Status: DISCONTINUED | OUTPATIENT
Start: 2019-11-13 | End: 2019-11-18 | Stop reason: HOSPADM

## 2019-11-12 RX ORDER — METHYLPREDNISOLONE SODIUM SUCCINATE 40 MG/ML
20 INJECTION, POWDER, LYOPHILIZED, FOR SOLUTION INTRAMUSCULAR; INTRAVENOUS EVERY 12 HOURS
Status: DISCONTINUED | OUTPATIENT
Start: 2019-11-12 | End: 2019-11-17

## 2019-11-12 RX ORDER — GUAIFENESIN 600 MG/1
600 TABLET, EXTENDED RELEASE ORAL EVERY 12 HOURS SCHEDULED
Status: DISCONTINUED | OUTPATIENT
Start: 2019-11-12 | End: 2019-11-18 | Stop reason: HOSPADM

## 2019-11-12 RX ADMIN — BUDESONIDE 0.5 MG: 0.5 INHALANT RESPIRATORY (INHALATION) at 19:25

## 2019-11-12 RX ADMIN — DOXYCYCLINE 100 MG: 100 INJECTION, POWDER, LYOPHILIZED, FOR SOLUTION INTRAVENOUS at 17:50

## 2019-11-12 RX ADMIN — ENOXAPARIN SODIUM 40 MG: 40 INJECTION SUBCUTANEOUS at 16:12

## 2019-11-12 RX ADMIN — CEFTRIAXONE SODIUM 1 G: 1 INJECTION, POWDER, FOR SOLUTION INTRAMUSCULAR; INTRAVENOUS at 05:21

## 2019-11-12 RX ADMIN — ACETAZOLAMIDE 250 MG: 500 INJECTION, POWDER, LYOPHILIZED, FOR SOLUTION INTRAVENOUS at 16:12

## 2019-11-12 RX ADMIN — THEOPHYLLINE ANHYDROUS 300 MG: 300 CAPSULE, EXTENDED RELEASE ORAL at 09:10

## 2019-11-12 RX ADMIN — DOXYCYCLINE 100 MG: 100 INJECTION, POWDER, LYOPHILIZED, FOR SOLUTION INTRAVENOUS at 06:18

## 2019-11-12 RX ADMIN — IPRATROPIUM BROMIDE AND ALBUTEROL SULFATE 3 ML: .5; 3 SOLUTION RESPIRATORY (INHALATION) at 03:59

## 2019-11-12 RX ADMIN — GUAIFENESIN 600 MG: 600 TABLET, EXTENDED RELEASE ORAL at 20:46

## 2019-11-12 RX ADMIN — GUAIFENESIN 600 MG: 600 TABLET, EXTENDED RELEASE ORAL at 12:33

## 2019-11-12 RX ADMIN — METHYLPREDNISOLONE SODIUM SUCCINATE 20 MG: 40 INJECTION, POWDER, FOR SOLUTION INTRAMUSCULAR; INTRAVENOUS at 20:46

## 2019-11-12 RX ADMIN — BUDESONIDE 0.5 MG: 0.5 INHALANT RESPIRATORY (INHALATION) at 07:17

## 2019-11-12 RX ADMIN — IPRATROPIUM BROMIDE AND ALBUTEROL SULFATE 3 ML: .5; 3 SOLUTION RESPIRATORY (INHALATION) at 07:17

## 2019-11-12 RX ADMIN — BUPROPION HYDROCHLORIDE 150 MG: 150 TABLET, FILM COATED, EXTENDED RELEASE ORAL at 12:33

## 2019-11-12 RX ADMIN — IPRATROPIUM BROMIDE AND ALBUTEROL SULFATE 3 ML: .5; 3 SOLUTION RESPIRATORY (INHALATION) at 10:13

## 2019-11-12 RX ADMIN — METHYLPREDNISOLONE SODIUM SUCCINATE 40 MG: 40 INJECTION, POWDER, FOR SOLUTION INTRAMUSCULAR; INTRAVENOUS at 09:10

## 2019-11-12 RX ADMIN — FUROSEMIDE 40 MG: 10 INJECTION, SOLUTION INTRAMUSCULAR; INTRAVENOUS at 09:10

## 2019-11-12 RX ADMIN — MORPHINE SULFATE 2 MG: 4 INJECTION INTRAVENOUS at 10:15

## 2019-11-12 RX ADMIN — MONTELUKAST SODIUM 10 MG: 10 TABLET, COATED ORAL at 20:46

## 2019-11-12 RX ADMIN — BUPROPION HYDROCHLORIDE 150 MG: 150 TABLET, FILM COATED, EXTENDED RELEASE ORAL at 20:46

## 2019-11-12 RX ADMIN — IPRATROPIUM BROMIDE AND ALBUTEROL SULFATE 3 ML: .5; 3 SOLUTION RESPIRATORY (INHALATION) at 19:21

## 2019-11-12 RX ADMIN — METHYLPREDNISOLONE SODIUM SUCCINATE 40 MG: 40 INJECTION, POWDER, FOR SOLUTION INTRAMUSCULAR; INTRAVENOUS at 00:31

## 2019-11-12 NOTE — THERAPY EVALUATION
Acute Care - Occupational Therapy Initial Evaluation   Basilio     Patient Name: Michelle Francis  : 1950  MRN: [id number]  Today's Date: 2019             Admit Date: 2019       ICD-10-CM ICD-9-CM   1. Acute respiratory failure with hypoxia (CMS/HCC) J96.01 518.81   2. Fever and chills R50.9 780.60   3. Sepsis, due to unspecified organism, unspecified whether acute organ dysfunction present (CMS/[facility]) A41.9 038.9     995.91     Patient Active Problem List   Diagnosis   • Acute respiratory failure with hypoxia (CMS/[facility])     Past Medical History:   Diagnosis Date   • Arthritis    • Colon polyps     Dr Bates   • COPD (chronic obstructive pulmonary disease) (CMS/[facility])    • History of emphysema    • Hyperlipidemia    • Hypertension    • Kidney disease    • Osteoporosis    • Restless leg syndrome     sees Dr Seipel   • Sleep apnea     npsg [facility] , ahi 5.9, on auto bipap min 8 max, PS 2-8-Alcaraz's, nasal mask     Past Surgical History:   Procedure Laterality Date   • CATARACT EXTRACTION      Dr Moyer   • CEREBRAL ANEURYSM REPAIR      Brain Aneurysm approx , treated with stents and coils, Dr. Fraga   • COLON SURGERY      partial colectomy- Dr Valladares- due to multiple large polyps   • EXPLORATORY LAPAROTOMY      lysis of intra abdominal adhesions, primary ventral hernia repair 2018 Dr West   • WRIST SURGERY      wrist fracture - Dr Patton          OT ASSESSMENT FLOWSHEET (last 12 hours)      Occupational Therapy Evaluation     Row Name 19 1500                   OT Evaluation Time/Intention    Subjective Information  complains of;fatigue  -ES        Document Type  evaluation  -ES        Mode of Treatment  occupational therapy  -ES        Patient Effort  fair  -ES           General Information    Patient Profile Reviewed?  yes  -ES        Patient Observations  alert;cooperative  -ES        Patient/Family Observations  Spouse present, supportive.  -ES        General Observations of Patient   Chair. CPAP.   -ES        Prior Level of Function  min assist:;ADL's primarily bathing  -ES        Equipment Currently Used at Home  oxygen  -ES        Pertinent History of Current Functional Problem  Pt is 70 yo female admited with acute-on-chronic hypercapnic/hypoxic respiratory failure. Dx wiht AE COPD and Sepsis.  -ES           Relationship/Environment    Primary Source of Support/Comfort  spouse  -ES        Lives With  spouse  -ES        Family Caregiver if Needed  spouse  -ES           Resource/Environmental Concerns    Current Living Arrangements  home/apartment/condo  -ES        Resource/Environmental Concerns  none  -ES        Transportation Concerns  car, none  -ES           Home Main Entrance    Number of Stairs, Main Entrance  two  -ES           Cognitive Assessment/Intervention- PT/OT    Orientation Status (Cognition)  oriented x 4  -ES           Bed Mobility Assessment/Treatment    Comment (Bed Mobility)  not tested, pt up in chair  -ES           Transfer Assessment/Treatment    Transfer Assessment/Treatment  stand-sit transfer  -ES           Stand-Sit Transfer    Stand-Sit [place] (Transfers)  supervision  -ES           ADL Assessment/Intervention    BADL Assessment/Intervention  lower body dressing;grooming  -ES           Lower Body Dressing Assessment/Training    Lower Body Dressing [place] Level  moderate assist (50% patient effort)  -ES           Grooming Assessment/Training    [place] Level (Grooming)  minimum assist (75% patient effort)  -ES           General ROM    GENERAL ROM COMMENTS  BUE WFL  -ES           MMT (Manual Muscle Testing)    General MMT Comments  BUE 4/5  -ES           Motor Assessment/Interventions    Additional Documentation  Balance (Group)  -ES           Balance    Balance  static standing balance;static sitting balance  -ES           Static Sitting Balance    Level of [place] (Unsupported Sitting, Static Balance)  conditional independence  -ES         "Sitting Position (Unsupported Sitting, Static Balance)  sitting in chair  -ES        Time Able to Maintain Position (Unsupported Sitting, Static Balance)  more than 5 minutes  -ES           Static Standing Balance    Level of [place] (Supported Standing, Static Balance)  supervision  -ES        Time Able to Maintain Position (Supported Standing, Static Balance)  1 to 2 minutes  -ES           Positioning and Restraints    Pre-Treatment Position  sitting in chair/recliner  -ES        Post Treatment Position  chair  -ES           Pain Assessment    Additional Documentation  Pain Scale: Numbers Pre/Post-Treatment (Group)  -ES           Pain Scale: Numbers Pre/Post-Treatment    Pain Scale: Numbers, Pretreatment  2/10  -ES        Pain Scale: Numbers, Post-Treatment  2/10  -ES        Pain Location  — soreness \"all over:  -ES           Health Promotion    Additional Documentation  Coping (Group)  -ES           Coping    Observed Emotional State  anxious;cooperative  -ES        Verbalized Emotional State  anxiety  -ES           Plan of Care Review    Plan of Care Reviewed With  patient;spouse  -ES           Clinical Impression (OT)    Therapy Frequency (OT Eval)  3 times/wk  -ES        Predicted Duration of Therapy Intervention (Therapy Eval)  until discharge  -ES        Anticipated Discharge Disposition (OT)  inpatient rehabilitation facility pulmonary rehab  -ES           Living Environment    Home Accessibility  stairs to enter home;tub/shower is not walk in  -ES          User Key  (r) = Recorded By, (t) = Taken By, (c) = Cosigned By    Initials Name Effective Dates    ES Lay Poole OT 03/01/19 -          Occupational Therapy Education     Title: PT OT SLP Therapies (In Progress)     Topic: Occupational Therapy (In Progress)     Point: ADL training (Done)     Description: Instruct learner(s) on proper safety adaptation and remediation techniques during self care or transfers.   Instruct in proper use of " assistive devices.    Learning Progress Summary           Patient Acceptance, E,TB, VU by PEARL at 11/12/2019  4:04 PM                               User Key     Initials Effective Dates Name Provider Type Discipline     03/01/19 -  Lay Poole OT Occupational Therapist OT                  OT Recommendation and Plan  Outcome Summary/Treatment Plan (OT)  Anticipated Discharge Disposition (OT): inpatient rehabilitation facility(pulmonary rehab)  Therapy Frequency (OT Eval): 3 times/wk  Plan of Care Review  Plan of Care Reviewed With: patient  Plan of Care Reviewed With: patient  Outcome Summary: Pt is 68 yo female admited with acute-on-chronic hypercapnic/hypoxic respiratory failure. Dx wiht AE COPD and Sepsis. Brief assessment completed as pt states fatigue this date. Pt demos fair strength but very poor actiivty tolerance, and states she has had gradual decline at baseline. Spouse has been providing more assistance at home. Appears pt would benefit from IP Pulmonary Rehab, though she states concerns she is unable to complete that level of therapy at this time. Cont to follow 3x/week at [facility].         Time Calculation:   Time Calculation- OT     Row Name 11/12/19 1604             Time Calculation- OT    OT Start Time  1530  -ES      OT Stop Time  1559  -ES      OT Time Calculation (min)  29 min  -ES      Total Timed Code Minutes- OT  18 minute(s)  -ES      OT Non-Billable Time (min)  11 min  -ES      OT Received On  11/12/19  -ES      OT - Next Appointment  11/14/19  -ES      OT Goal Re-Cert Due Date  11/26/19  -ES        User Key  (r) = Recorded By, (t) = Taken By, (c) = Cosigned By    Initials Name Provider Type    Lay Blanco OT Occupational Therapist        Therapy Charges for Today     Code Description Service Date Service Provider Modifiers Qty    80183086956  OT EVAL MOD COMPLEXITY 4 11/12/2019 Lay Poole OT GO 1    [id number]  OT SELF CARE/MGMT/TRAIN EA 15 MIN 11/12/2019 [place]  Lay GORDON, OT GO 1               Lay Poole OT  11/12/2019

## 2019-11-12 NOTE — PLAN OF CARE
Problem: Patient Care Overview  Goal: Plan of Care Review  Outcome: Ongoing (interventions implemented as appropriate)   11/12/19 5412   Coping/Psychosocial   Plan of Care Reviewed With patient   Plan of Care Review   Progress no change

## 2019-11-12 NOTE — PROGRESS NOTES
Daily Progress Note        Acute respiratory failure with hypoxia (CMS/HCC)      Assessment    Acute/chronic hypercapnic hypoxic respiratory failure   acute COPD exacerbation due to RSV virus  acute bronchitis   pulmonary edema    hyperlipidemia  Hypertension  Chronic kidney disease  Restless leg syndrome  Obstructive sleep apnea, patient use home auto BiPAP  History of cerebral aneurysm repair  History of partial colectomy  Cataract extraction  Diabetes mellitus      Plan   resume home Wellbutrin  Morphine 2 mg IV once  Noninvasive ventilation utilizing AVAP  antibiotics Rocephin and doxycycline  Diuresis Lasix 40 mg IV every 24hr as well as acetazolamide 250 mg IV daily  Bronchodilators desonide and DuoNeb  Oxygen titration  DVT prophylaxis  GI prophylaxis  Glycemic control       LOS: 1 day     Subjective         Objective     Vital signs for last 24 hours:  Vitals:    11/12/19 0447 11/12/19 0654 11/12/19 0717 11/12/19 0720   BP:  136/63     BP Location:  Right arm     Patient Position:  Lying     Pulse: 109 92 107    Resp: 19 21 20 20   Temp:  98.5 °F (36.9 °C)     TempSrc:  Oral     SpO2: 97% 96% 95%    Weight:       Height:           Intake/Output last 3 shifts:  I/O last 3 completed shifts:  In: 540 [P.O.:240; IV Piggyback:300]  Out: -   Intake/Output this shift:  No intake/output data recorded.      Radiology  Imaging Results (Last 24 Hours)     ** No results found for the last 24 hours. **          Labs:  Results from last 7 days   Lab Units 11/11/19  0538   WBC 10*3/mm3 11.40*   HEMOGLOBIN g/dL 14.0   HEMATOCRIT % 42.4   PLATELETS 10*3/mm3 142     Results from last 7 days   Lab Units 11/11/19  0538   SODIUM mmol/L 138   POTASSIUM mmol/L 4.4   CHLORIDE mmol/L 98   CO2 mmol/L 25.0   BUN mg/dL 21   CREATININE mg/dL 1.10*   CALCIUM mg/dL 9.7   BILIRUBIN mg/dL 0.2   ALK PHOS U/L 74   ALT (SGPT) U/L 9   AST (SGOT) U/L 13   GLUCOSE mg/dL 131*     Results from last 7 days   Lab Units 11/11/19  0639   PH, ARTERIAL  pH units 7.302*   PO2 ART mm Hg 115.9*   PCO2, ARTERIAL mm Hg 61.1*   HCO3 ART mmol/L 30.3*     Results from last 7 days   Lab Units 11/11/19  0538   ALBUMIN g/dL 4.20     Results from last 7 days   Lab Units 11/11/19  0538   TROPONIN T ng/mL <0.010             Results from last 7 days   Lab Units 11/11/19  0537   INR  1.03   APTT seconds 24.6               Meds:   SCHEDULE    acetaZOLAMIDE (DIAMOX) IVPB 250 mg Intravenous Daily   budesonide 0.5 mg Nebulization BID - RT   buPROPion  mg Oral Q12H   cefTRIAXone (ROCEPHIN) 1GM IVPB in 100 mL NS (MBP) 1 g Intravenous Q24H   doxycycline 100 mg Intravenous Q12H   enoxaparin 40 mg Subcutaneous Q24H   [START ON 11/13/2019] furosemide 40 mg Intravenous Daily   guaiFENesin 600 mg Oral Q12H   ipratropium-albuterol 3 mL Nebulization Q6H While Awake - RT   ipratropium-albuterol 3 mL Nebulization Q6H - RT   methylPREDNISolone sodium succinate 20 mg Intravenous Q12H   montelukast 10 mg Oral Nightly   theophylline 300 mg Oral Q24H     Infusions    nitroglycerin 10-50 mcg/min Last Rate: Stopped (11/11/19 1800)     PRNs  ipratropium-albuterol  •  ipratropium-albuterol  •  Morphine  •  sodium chloride  •  [COMPLETED] Insert peripheral IV **AND** sodium chloride    Physical Exam:  Physical Exam   Cardiovascular:   Murmur heard.  Pulmonary/Chest: No respiratory distress. She has wheezes. She has rales. She exhibits no tenderness.   Abdominal: She exhibits no distension. There is no tenderness.   Musculoskeletal: She exhibits edema.       ROS  Review of Systems   HENT: Positive for congestion, rhinorrhea and sneezing.    Respiratory: Positive for cough, shortness of breath and wheezing. Negative for apnea, choking, chest tightness and stridor.    Cardiovascular: Positive for leg swelling.             Total time spent with patient greater than: 1 Hour

## 2019-11-12 NOTE — NURSING NOTE
Nurse educated patient on the importance of wearing her BiPap, and went over ABG's, and elevated Co2 levels, patient agreed to use BiPap, but was fearful d/t claustrophobia, I empathized and provided encouragement, pt agreed, Bipap mask was placed on patient, and she tolerated about 2 minutes, and then demanded for nurse to take it off and place NC back on. RN will continue to educate/encourage pt.....

## 2019-11-12 NOTE — NURSING NOTE
Patient wore BiPap for 1 hour, and has just demanded for nurse to take it off and give her the nasal cannula, patient currently wearing 9L O2 on high flow nasal cannula.    After about 15 min, pt agreed to put BiPap back on, will cont to monitor.

## 2019-11-12 NOTE — NURSING NOTE
Patient got up from chair, removed BiPap mask herself, and walked around the bed, moved her chair, and then started yelling from the room, she needed help, would not listen to RN, and charge nurse, that O2 was working, she wouldn't let us put the BiPap mask back on......finally after 5-10 min she allowed RT to reapply mask.....    Patient was instructed to call for RN if she wanted to get up and to not removed mask herself,  Etc.......

## 2019-11-12 NOTE — PLAN OF CARE
Problem: Patient Care Overview  Goal: Plan of Care Review   11/12/19 1600   Coping/Psychosocial   Plan of Care Reviewed With patient   OTHER   Outcome Summary Pt is 70 yo female admited with acute-on-chronic hypercapnic/hypoxic respiratory failure. Dx wiht AE COPD and Sepsis. Brief assessment completed as pt states fatigue this date. Pt demos fair strength but very poor actiivty tolerance, and states she has had gradual decline at baseline. Spouse has been providing more assistance at home. Appears pt would benefit from IP Pulmonary Rehab, though she states concerns she is unable to complete that level of therapy at this time. Cont to follow 3x/week at [facility].

## 2019-11-13 PROBLEM — M81.0 OSTEOPOROSIS: Status: ACTIVE | Noted: 2019-11-13

## 2019-11-13 PROBLEM — J96.21 ACUTE ON CHRONIC RESPIRATORY FAILURE WITH HYPOXIA: Status: ACTIVE | Noted: 2019-11-11

## 2019-11-13 PROBLEM — M81.0 OSTEOPOROSIS: Chronic | Status: ACTIVE | Noted: 2019-11-13

## 2019-11-13 PROBLEM — E66.9 OBESITY (BMI 30-39.9): Chronic | Status: ACTIVE | Noted: 2019-11-13

## 2019-11-13 PROBLEM — I10 HYPERTENSION: Chronic | Status: ACTIVE | Noted: 2019-11-13

## 2019-11-13 PROBLEM — G47.30 SLEEP APNEA: Chronic | Status: ACTIVE | Noted: 2019-11-13

## 2019-11-13 PROBLEM — E78.5 HYPERLIPIDEMIA: Chronic | Status: ACTIVE | Noted: 2019-11-13

## 2019-11-13 PROBLEM — J81.0 ACUTE PULMONARY EDEMA: Status: ACTIVE | Noted: 2019-11-13

## 2019-11-13 PROBLEM — J44.9 COPD (CHRONIC OBSTRUCTIVE PULMONARY DISEASE) (HCC): Chronic | Status: ACTIVE | Noted: 2019-11-13

## 2019-11-13 PROBLEM — G25.81 RESTLESS LEG SYNDROME: Chronic | Status: ACTIVE | Noted: 2019-11-13

## 2019-11-13 PROBLEM — J20.5 ACUTE BRONCHITIS DUE TO RESPIRATORY SYNCYTIAL VIRUS (RSV): Status: ACTIVE | Noted: 2019-11-13

## 2019-11-13 PROBLEM — R00.0 TACHYCARDIA: Status: ACTIVE | Noted: 2019-11-13

## 2019-11-13 PROBLEM — M19.90 ARTHRITIS: Chronic | Status: ACTIVE | Noted: 2019-11-13

## 2019-11-13 PROBLEM — J44.1 COPD WITH ACUTE EXACERBATION: Status: ACTIVE | Noted: 2019-11-13

## 2019-11-13 PROBLEM — M19.90 ARTHRITIS: Status: ACTIVE | Noted: 2019-11-13

## 2019-11-13 LAB
ANION GAP SERPL CALCULATED.3IONS-SCNC: 20 MMOL/L (ref 5–15)
BUN BLD-MCNC: 37 MG/DL (ref 8–23)
BUN/CREAT SERPL: 34.9 (ref 7–25)
CALCIUM SPEC-SCNC: 9.9 MG/DL (ref 8.6–10.5)
CHLORIDE SERPL-SCNC: 95 MMOL/L (ref 98–107)
CO2 SERPL-SCNC: 25 MMOL/L (ref 22–29)
CREAT BLD-MCNC: 1.06 MG/DL (ref 0.57–1)
DEPRECATED RDW RBC AUTO: 50.8 FL (ref 37–54)
ERYTHROCYTE [DISTWIDTH] IN BLOOD BY AUTOMATED COUNT: 16.3 % (ref 12.3–15.4)
GFR SERPL CREATININE-BSD FRML MDRD: 51 ML/MIN/1.73
GLUCOSE BLD-MCNC: 135 MG/DL (ref 65–99)
HCT VFR BLD AUTO: 48.4 % (ref 34–46.6)
HGB BLD-MCNC: 16.3 G/DL (ref 12–15.9)
MAGNESIUM SERPL-MCNC: 2.6 MG/DL (ref 1.6–2.4)
MCH RBC QN AUTO: 29.6 PG (ref 26.6–33)
MCHC RBC AUTO-ENTMCNC: 33.8 G/DL (ref 31.5–35.7)
MCV RBC AUTO: 87.6 FL (ref 79–97)
NT-PROBNP SERPL-MCNC: 159.5 PG/ML (ref 5–900)
PLATELET # BLD AUTO: 144 10*3/MM3 (ref 140–450)
PMV BLD AUTO: 8.8 FL (ref 6–12)
POTASSIUM BLD-SCNC: 4.1 MMOL/L (ref 3.5–5.2)
RBC # BLD AUTO: 5.53 10*6/MM3 (ref 3.77–5.28)
SODIUM BLD-SCNC: 140 MMOL/L (ref 136–145)
TSH SERPL DL<=0.05 MIU/L-ACNC: 1.76 UIU/ML (ref 0.27–4.2)
WBC NRBC COR # BLD: 10 10*3/MM3 (ref 3.4–10.8)

## 2019-11-13 PROCEDURE — 99223 1ST HOSP IP/OBS HIGH 75: CPT | Performed by: HOSPITALIST

## 2019-11-13 PROCEDURE — 94799 UNLISTED PULMONARY SVC/PX: CPT

## 2019-11-13 PROCEDURE — 84443 ASSAY THYROID STIM HORMONE: CPT | Performed by: INTERNAL MEDICINE

## 2019-11-13 PROCEDURE — 83735 ASSAY OF MAGNESIUM: CPT | Performed by: INTERNAL MEDICINE

## 2019-11-13 PROCEDURE — 83880 ASSAY OF NATRIURETIC PEPTIDE: CPT | Performed by: INTERNAL MEDICINE

## 2019-11-13 PROCEDURE — 80048 BASIC METABOLIC PNL TOTAL CA: CPT | Performed by: NURSE PRACTITIONER

## 2019-11-13 PROCEDURE — 25010000002 ENOXAPARIN PER 10 MG: Performed by: INTERNAL MEDICINE

## 2019-11-13 PROCEDURE — 94660 CPAP INITIATION&MGMT: CPT

## 2019-11-13 PROCEDURE — 25010000002 CEFTRIAXONE PER 250 MG: Performed by: INTERNAL MEDICINE

## 2019-11-13 PROCEDURE — 25010000002 ACETAZOLAMIDE PER 500 MG: Performed by: INTERNAL MEDICINE

## 2019-11-13 PROCEDURE — 25010000002 METHYLPREDNISOLONE PER 40 MG: Performed by: INTERNAL MEDICINE

## 2019-11-13 PROCEDURE — 85027 COMPLETE CBC AUTOMATED: CPT | Performed by: INTERNAL MEDICINE

## 2019-11-13 PROCEDURE — 97162 PT EVAL MOD COMPLEX 30 MIN: CPT

## 2019-11-13 PROCEDURE — 99222 1ST HOSP IP/OBS MODERATE 55: CPT | Performed by: INTERNAL MEDICINE

## 2019-11-13 PROCEDURE — 25010000002 FUROSEMIDE PER 20 MG: Performed by: INTERNAL MEDICINE

## 2019-11-13 RX ORDER — GABAPENTIN 300 MG/1
900 CAPSULE ORAL NIGHTLY
Status: DISCONTINUED | OUTPATIENT
Start: 2019-11-13 | End: 2019-11-18 | Stop reason: HOSPADM

## 2019-11-13 RX ORDER — DILTIAZEM HYDROCHLORIDE 5 MG/ML
5 INJECTION INTRAVENOUS ONCE
Status: COMPLETED | OUTPATIENT
Start: 2019-11-13 | End: 2019-11-13

## 2019-11-13 RX ORDER — ROPINIROLE 1 MG/1
1 TABLET, FILM COATED ORAL 2 TIMES DAILY
Status: DISCONTINUED | OUTPATIENT
Start: 2019-11-13 | End: 2019-11-18 | Stop reason: HOSPADM

## 2019-11-13 RX ORDER — METOPROLOL SUCCINATE 50 MG/1
50 TABLET, EXTENDED RELEASE ORAL DAILY
Status: DISCONTINUED | OUTPATIENT
Start: 2019-11-13 | End: 2019-11-18 | Stop reason: HOSPADM

## 2019-11-13 RX ORDER — LANOLIN ALCOHOL/MO/W.PET/CERES
1000 CREAM (GRAM) TOPICAL DAILY
Status: DISCONTINUED | OUTPATIENT
Start: 2019-11-13 | End: 2019-11-18 | Stop reason: HOSPADM

## 2019-11-13 RX ORDER — DILTIAZEM HYDROCHLORIDE 120 MG/1
120 CAPSULE, COATED, EXTENDED RELEASE ORAL
Status: DISCONTINUED | OUTPATIENT
Start: 2019-11-13 | End: 2019-11-18 | Stop reason: HOSPADM

## 2019-11-13 RX ORDER — ASPIRIN 81 MG/1
81 TABLET, CHEWABLE ORAL DAILY
Status: DISCONTINUED | OUTPATIENT
Start: 2019-11-13 | End: 2019-11-18 | Stop reason: HOSPADM

## 2019-11-13 RX ORDER — ROSUVASTATIN CALCIUM 10 MG/1
10 TABLET, COATED ORAL DAILY
Status: DISCONTINUED | OUTPATIENT
Start: 2019-11-13 | End: 2019-11-18 | Stop reason: HOSPADM

## 2019-11-13 RX ORDER — PANTOPRAZOLE SODIUM 40 MG/1
40 TABLET, DELAYED RELEASE ORAL
Status: DISCONTINUED | OUTPATIENT
Start: 2019-11-13 | End: 2019-11-18 | Stop reason: HOSPADM

## 2019-11-13 RX ORDER — DILTIAZEM HCL IN NACL,ISO-OSM 125 MG/125
5-15 PLASTIC BAG, INJECTION (ML) INTRAVENOUS
Status: DISCONTINUED | OUTPATIENT
Start: 2019-11-13 | End: 2019-11-13

## 2019-11-13 RX ORDER — MELATONIN
2000 2 TIMES DAILY
Status: DISCONTINUED | OUTPATIENT
Start: 2019-11-13 | End: 2019-11-18 | Stop reason: HOSPADM

## 2019-11-13 RX ADMIN — BUDESONIDE 0.5 MG: 0.5 INHALANT RESPIRATORY (INHALATION) at 18:27

## 2019-11-13 RX ADMIN — DILTIAZEM HYDROCHLORIDE 10 MG/HR: 5 INJECTION INTRAVENOUS at 10:23

## 2019-11-13 RX ADMIN — MELATONIN 2000 UNITS: at 20:12

## 2019-11-13 RX ADMIN — IPRATROPIUM BROMIDE AND ALBUTEROL SULFATE 3 ML: .5; 3 SOLUTION RESPIRATORY (INHALATION) at 23:46

## 2019-11-13 RX ADMIN — METOPROLOL SUCCINATE 50 MG: 50 TABLET, EXTENDED RELEASE ORAL at 16:02

## 2019-11-13 RX ADMIN — ROSUVASTATIN CALCIUM 10 MG: 10 TABLET, FILM COATED ORAL at 16:02

## 2019-11-13 RX ADMIN — ACETAZOLAMIDE 250 MG: 500 INJECTION, POWDER, LYOPHILIZED, FOR SOLUTION INTRAVENOUS at 08:06

## 2019-11-13 RX ADMIN — ROPINIROLE 1 MG: 1 TABLET, FILM COATED ORAL at 20:12

## 2019-11-13 RX ADMIN — ASPIRIN 81 MG 81 MG: 81 TABLET ORAL at 16:02

## 2019-11-13 RX ADMIN — GUAIFENESIN 600 MG: 600 TABLET, EXTENDED RELEASE ORAL at 20:12

## 2019-11-13 RX ADMIN — DILTIAZEM HYDROCHLORIDE 120 MG: 120 CAPSULE, COATED, EXTENDED RELEASE ORAL at 19:02

## 2019-11-13 RX ADMIN — IPRATROPIUM BROMIDE AND ALBUTEROL SULFATE 3 ML: .5; 3 SOLUTION RESPIRATORY (INHALATION) at 18:27

## 2019-11-13 RX ADMIN — CYANOCOBALAMIN TAB 1000 MCG 1000 MCG: 1000 TAB at 16:02

## 2019-11-13 RX ADMIN — GABAPENTIN 900 MG: 300 CAPSULE ORAL at 20:12

## 2019-11-13 RX ADMIN — BUPROPION HYDROCHLORIDE 150 MG: 150 TABLET, FILM COATED, EXTENDED RELEASE ORAL at 20:12

## 2019-11-13 RX ADMIN — PANTOPRAZOLE SODIUM 40 MG: 40 TABLET, DELAYED RELEASE ORAL at 19:02

## 2019-11-13 RX ADMIN — BUPROPION HYDROCHLORIDE 150 MG: 150 TABLET, FILM COATED, EXTENDED RELEASE ORAL at 08:06

## 2019-11-13 RX ADMIN — IPRATROPIUM BROMIDE AND ALBUTEROL SULFATE 3 ML: .5; 3 SOLUTION RESPIRATORY (INHALATION) at 06:39

## 2019-11-13 RX ADMIN — IPRATROPIUM BROMIDE AND ALBUTEROL SULFATE 3 ML: .5; 3 SOLUTION RESPIRATORY (INHALATION) at 00:13

## 2019-11-13 RX ADMIN — THEOPHYLLINE ANHYDROUS 300 MG: 300 CAPSULE, EXTENDED RELEASE ORAL at 08:06

## 2019-11-13 RX ADMIN — ENOXAPARIN SODIUM 40 MG: 40 INJECTION SUBCUTANEOUS at 16:02

## 2019-11-13 RX ADMIN — MONTELUKAST SODIUM 10 MG: 10 TABLET, COATED ORAL at 20:12

## 2019-11-13 RX ADMIN — DOXYCYCLINE 100 MG: 100 INJECTION, POWDER, LYOPHILIZED, FOR SOLUTION INTRAVENOUS at 19:01

## 2019-11-13 RX ADMIN — GUAIFENESIN 600 MG: 600 TABLET, EXTENDED RELEASE ORAL at 08:06

## 2019-11-13 RX ADMIN — DOXYCYCLINE 100 MG: 100 INJECTION, POWDER, LYOPHILIZED, FOR SOLUTION INTRAVENOUS at 06:08

## 2019-11-13 RX ADMIN — CEFTRIAXONE SODIUM 1 G: 1 INJECTION, POWDER, FOR SOLUTION INTRAMUSCULAR; INTRAVENOUS at 05:30

## 2019-11-13 RX ADMIN — METHYLPREDNISOLONE SODIUM SUCCINATE 20 MG: 40 INJECTION, POWDER, FOR SOLUTION INTRAMUSCULAR; INTRAVENOUS at 08:06

## 2019-11-13 RX ADMIN — ROPINIROLE 1 MG: 1 TABLET, FILM COATED ORAL at 19:02

## 2019-11-13 RX ADMIN — FUROSEMIDE 40 MG: 10 INJECTION, SOLUTION INTRAMUSCULAR; INTRAVENOUS at 08:06

## 2019-11-13 RX ADMIN — DILTIAZEM HYDROCHLORIDE 5 MG: 5 INJECTION INTRAVENOUS at 10:22

## 2019-11-13 RX ADMIN — Medication 10 ML: at 20:13

## 2019-11-13 RX ADMIN — Medication 10 ML: at 08:07

## 2019-11-13 RX ADMIN — METHYLPREDNISOLONE SODIUM SUCCINATE 20 MG: 40 INJECTION, POWDER, FOR SOLUTION INTRAMUSCULAR; INTRAVENOUS at 20:11

## 2019-11-13 RX ADMIN — IPRATROPIUM BROMIDE AND ALBUTEROL SULFATE 3 ML: .5; 3 SOLUTION RESPIRATORY (INHALATION) at 11:18

## 2019-11-13 RX ADMIN — BUDESONIDE 0.5 MG: 0.5 INHALANT RESPIRATORY (INHALATION) at 06:40

## 2019-11-13 NOTE — THERAPY EVALUATION
Patient Name: Michelle Francis  : 1950    MRN: [id number]                              Today's Date: 2019       Admit Date: 2019    Visit Dx:     ICD-10-CM ICD-9-CM   1. Acute respiratory failure with hypoxia (CMS/[facility]) J96.01 518.81   2. Fever and chills R50.9 780.60   3. Sepsis, due to unspecified organism, unspecified whether acute organ dysfunction present (CMS/[facility]) A41.9 038.9     995.91     Patient Active Problem List   Diagnosis   • Acute respiratory failure with hypoxia (CMS/[facility])     Past Medical History:   Diagnosis Date   • Arthritis    • Colon polyps     Dr Bates   • COPD (chronic obstructive pulmonary disease) (CMS/[facility])    • History of emphysema    • Hyperlipidemia    • Hypertension    • Kidney disease    • Osteoporosis    • Restless leg syndrome     sees Dr Seipel   • [facility] ahi [facility], on auto bipap min 8 max, PS 2-8-Alcaraz's, nasal mask     Past Surgical History:   Procedure Laterality Date   • CATARACT EXTRACTION      Dr Moyer   • CEREBRAL ANEURYSM REPAIR      Brain Aneurysm approx , treated with stents and coils, Dr. Fraga   • COLON SURGERY      partial colectomy- Dr Valladares- due to multiple large polyps   • EXPLORATORY LAPAROTOMY      lysis of intra abdominal adhesions, primary ventral hernia repair 2018 Dr West   • WRIST SURGERY      wrist fracture - Dr Patton     General Information     Row Name 19 0851          PT Evaluation Time/Intention    Document Type  evaluation  -AO     Mode of Treatment  physical therapy  -AO     Row Name 19 0851          General Information    Patient Profile Reviewed?  yes  -AO     Prior Level of Function  — Pt's spouse reports pt was independent with household mobility with no AD (rarely leaves the home), and independent with bathing/dressing, however, fatigues quickly and requires increased time to complete ADLs with frequent rest breaks; denies falls  -AO     Existing Precautions/Restrictions  fall;oxygen  therapy device and L/min  -AO     Row Name 11/13/19 0851          Relationship/Environment    Lives With  spouse Pt lives w/  who is available to provide 24/7 assist and does all driving/grocery shopping, cooking, etc.  -AO     Row Name 11/13/19 0851          Resource/Environmental Concerns    Current Living Arrangements  home/apartment/condo  -AO     Row Name 11/13/19 0851          Home Main Entrance    Number of Stairs, Main Entrance  two  -AO     Stair Railings, Main Entrance  none  -AO     Row Name 11/13/19 0851          Stairs Within Home, Primary    Number of Stairs, Within Home, Primary  none  -AO     Row Name 11/13/19 0851          Cognitive Assessment/Intervention- PT/OT    Orientation Status (Cognition)  oriented x 4  -AO     Row Name 11/13/19 0851          Safety Issues, Functional Mobility    Impairments Affecting Function (Mobility)  endurance/activity tolerance;shortness of breath;strength  -AO       User Key  (r) = Recorded By, (t) = Taken By, (c) = Cosigned By    Initials Name Provider Type    Padmini Jerry PT Physical Therapist        Mobility     Row Name 11/13/19 0851          Bed Mobility Assessment/Treatment    Comment (Bed Mobility)  DNT: pt sitting in chair at start and end of session  -AO     Row Name 11/13/19 0851          Transfer Assessment/Treatment    Comment (Transfers)  Pt requests no transfer attempt this date, as pt already significantly SOA with MMT only and unable to tolerate transfers at this time  -AO       User Key  (r) = Recorded By, (t) = Taken By, (c) = Cosigned By    Initials Name Provider Type    Padmini Jerry PT Physical Therapist        Obj/Interventions     Row Name 11/13/19 1115          General ROM    GENERAL ROM COMMENTS  BUE/BLE AROM WFL  -AO     Row Name 11/13/19 1115          MMT (Manual Muscle Testing)    General MMT Comments  BUEs: 4/5; B hip flexion: 4-/5, B knee flexion/extension: 4+/5  -AO     Row Name 11/13/19 1115          Static Sitting  Balance    Level of [place] (Unsupported Sitting, Static Balance)  conditional independence  -AO     Sitting Position (Unsupported Sitting, Static Balance)  sitting in chair  -AO     Time Able to Maintain Position (Unsupported Sitting, Static Balance)  more than 5 minutes  -AO     Row Name 11/13/19 1115          Sensory Assessment/Intervention    Sensory General Assessment  no sensation deficits identified  -AO       User Key  (r) = Recorded By, (t) = Taken By, (c) = Cosigned By    Initials Name Provider Type    AO Padmini Serrano, PT Physical Therapist        Goals/Plan     Row Name 11/13/19 0851          Bed Mobility Goal 1 (PT)    Activity/Assistive Device (Bed Mobility Goal 1, PT)  bed mobility activities, all  -AO     [place] Level/Cues Needed (Bed Mobility Goal 1, PT)  contact guard assist  -AO     Time Frame (Bed Mobility Goal 1, PT)  2 weeks  -AO     Progress/Outcomes (Bed Mobility Goal 1, PT)  goal not met  -AO     Row Name 11/13/19 8764          Transfer Goal 1 (PT)    Activity/Assistive Device (Transfer Goal 1, PT)  transfers, all;walker, rolling  -AO     [place] Level/Cues Needed (Transfer Goal 1, PT)  contact guard assist  -AO     Time Frame (Transfer Goal 1, PT)  2 weeks  -AO     Progress/Outcome (Transfer Goal 1, PT)  goal not met  -AO     Row Name 11/13/19 2935          Gait Training Goal 1 (PT)    Activity/Assistive Device (Gait Training Goal 1, PT)  gait (walking locomotion)  -AO     [place] Level (Gait Training Goal 1, PT)  contact guard assist  -AO     Distance (Gait Goal 1, PT)  10 ft with Sp02 >90%  -AO     Time Frame (Gait Training Goal 1, PT)  2 weeks  -AO     Progress/Outcome (Gait Training Goal 1, PT)  goal not met  -AO       User Key  (r) = Recorded By, (t) = Taken By, (c) = Cosigned By    Initials Name Provider Type    Padmini Jerry, PT Physical Therapist        Clinical Impression     Row Name 11/13/19 8787          Pain Assessment    Additional Documentation   Pain Scale: Numbers Pre/Post-Treatment (Group)  -AO     Row Name 11/13/19 0851          Pain Scale: Numbers Pre/Post-Treatment    Pain Scale: Numbers, Pretreatment  0/10 - no pain  -AO     Pain Scale: Numbers, Post-Treatment  0/10 - no pain  -AO     Row Name 11/13/19 0851          Plan of Care Review    Plan of Care Reviewed With  patient;spouse  -AO     Row Name 11/13/19 0851          Physical Therapy Clinical Impression    Patient/Family Goals Statement (PT Clinical Impression)  Pt is a 70 y/o F with acute on chronic hypercapnic/hypoxic respiratory failure secondary to AE COPD, RSV, and acute bronchitis  -AO     Criteria for Skilled Interventions Met (PT Clinical Impression)  yes;treatment indicated  -AO     Rehab Potential (PT Clinical Summary)  good, to achieve stated therapy goals  -AO     Row Name 11/13/19 0851          Vital Signs    Recovery Time  HR 120bpm at rest sitting in recliner at start of session with Sp02 93% on AVAPs, RR 30bpm. HR elevated to 143bpm during MMT with significant increase in SOA/WOB/tachypnea and unable to tolerate transfers. HR recovers to 125bpm with ~2 min rest break (RN aware)  -AO     Row Name 11/13/19 0851          Positioning and Restraints    Pre-Treatment Position  sitting in chair/recliner  -AO     Post Treatment Position  chair  -AO     In Chair  notified nsg;sitting;call light within reach;encouraged to call for assist;with family/caregiver  -AO       User Key  (r) = Recorded By, (t) = Taken By, (c) = Cosigned By    Initials Name Provider Type    Padmini Jerry, PT Physical Therapist        Outcome Measures    No documentation.       Physical Therapy Education     Title: PT OT SLP Therapies (In Progress)     Topic: Physical Therapy (Done)     Point: Mobility training (Done)     Learning Progress Summary           Patient Acceptance, E,TB, VU by AO at 11/13/2019 11:38 AM    Comment:  Educated pt and spouse on POC and anticipated physical therapy needs at d/c.                                User Key     Initials Effective Dates Name Provider Type Discipline    AO 03/01/19 -  Padmini Serrano PT Physical Therapist PT              PT Recommendation and Plan  Planned Therapy Interventions (PT Eval): balance training, gait training, neuromuscular re-education, patient/family education, strengthening, transfer training  Outcome Summary/Treatment Plan (PT)  Anticipated Discharge Disposition (PT): inpatient rehabilitation facility  Plan of Care Reviewed With: patient, spouse  Progress: no change  Outcome Summary: Pt appears to be functioning significantly below baseline secondary to AE COPD/sepsis/RSV and with poor activity tolerance. Mobility very limited this session secondary to fatigue/SOA and tachycardic HR (HR elevated to 143bpm) after MMT sitting in chair and unable to tolerate further mobility this session. Recommending IP rehab at d/c and plan to see 3x/week at [facility] for progression to transfer/gait training with RW.     Time Calculation:   PT Charges     Row Name 11/13/19 1141             Time Calculation    Start Time  0851  -AO      Stop Time  0904  -AO      Time Calculation (min)  13 min  -AO      PT Received On  11/13/19  -AO      PT - Next Appointment  11/14/19  -AO      PT Goal Re-Cert Due Date  11/27/19  -AO         Time Calculation- PT    Total Timed Code Minutes- PT  0 minute(s)  -AO      TCU Minutes- PT  18 min  -AO        User Key  (r) = Recorded By, (t) = Taken By, (c) = Cosigned By    Initials Name Provider Type    AO Padmini Serrano, PT Physical Therapist        Therapy Charges for Today     Code Description Service Date Service Provider Modifiers Qty    [id number] HC PT EVAL MOD COMPLEXITY 4 11/13/2019 Padmini Serrano, PT GP 1               Padmini Serrano, PT  11/13/2019

## 2019-11-13 NOTE — DISCHARGE PLACEMENT REQUEST
"Michelle Francis (69 y.o. Female)     Date of Birth Social Security Number Address Home Phone MRN    [id number]  [id number]  [facility] [id number] [phone] [id number]    Faith Marital Status          None        Admission Date Admission Type Admitting Provider Attending Provider Department, Room/Bed    11/11/19 Emergency Hawa Sifuentes MD Draw, Azmi, MD [facility] NEURO HEART, 266/1    Discharge Date Discharge Disposition Discharge Destination                       Attending Provider:  Hawa Sifuentes MD    Allergies:  No Known Allergies    Isolation:  Contact Drop   Infection:  RSV (11/11/19)   Code Status:  No CPR    Ht:  167.6 cm (66\")   Wt:  107 kg (235 lb)    Admission Cmt:  None   Principal Problem:  Acute on chronic respiratory failure with hypoxia (CMS/HCC) [J96.21]                 Active Insurance as of 11/11/2019     Primary Coverage     Payor Plan Insurance Group Employer/Plan Group    HUMANA MEDICARE REPLACEMENT HUMANA MEDICARE REPL [id number]     Payor Plan Address Payor Plan Phone Number Payor Plan Fax Number Effective Dates    [address] [address] [phone]  1/1/2018 - None Entered    [facility] [address]       Subscriber Name Subscriber Birth Date Member ID       MICHELLE FRANCIS 1950 [id number]                 Emergency Contacts      (Rel.) Home Phone Work Phone Mobile Phone    CHAUNCEY FRANCIS (Spouse) [phone] -- [phone]        Ambulatory Referral to Home Health [[id number]] (Order [id number])   Order   Date: 11/13/2019 Department: [facility] NEURO HEART Ordering/Authorizing: Hawa Sifuentes MD   Lab and Collection     Ambulatory Referral to Home Health (Order: [id number]) - 11/13/2019   Order History   Outpatient   Date/Time Action Taken User Additional Information   11/13/19 1601 Sign Payton Bateman RN Ordering Mode: Verbal with readback   Order Details     Frequency Duration Priority Order Class   None None Routine Internal Referral   Start " Date/Time     Start Date   11/13/19   Order Questions     Question Answer Comment   Face to Face Visit Date: 11/13/2019    Follow-up provider for Plan of Care? I treated the patient in an acute care facility and will not continue treatment after discharge.    Follow-up provider: GLORIA CORBETT    Reason/Clinical Findings Weakness    Describe mobility limitations that make leaving home difficult Weakness    Nursing/Therapeutic Services Requested Other Eval and treat    Frequency: 1 Week 1           Verbal Order Info     Action Created on Order Mode Entered by Responsible Provider Signed by Signed on   Ordering 11/13/19 1601 Verbal with readback Payton Bateman RN Draw, Azmi, MD     Source Order Set / Preference List     Preference List   AMB FAM REFERRALS [[id number]]   Clinical Indications      ICD-10-CM ICD-9-CM   Acute respiratory failure with hypoxia (CMS/HCC)  - Primary     J96.01 518.81   Reprint Order Requisition     AMB REFERRAL TO HOME HEALTH (Order #[id number]) on 11/13/19   Encounter-Level Cardiology Documents:     There are no encounter-level cardiology documents.   Encounter     View Encounter       Order Provider Info         Office phone Pager E-mail   Ordering User Payton Bateman RN -- -- --   Authorizing Provider Hawa Sifuentes [phone] -- --   Attending Provider Hawa Sifuentes -[phone] -- --   Entered By Payton Bateman RN -- -- --   Ordering Provider Hawa Sifuentes [phone] -- --   Linked Charges     No charges linked to this procedure   Order Transmittal Tracking     AMB REFERRAL TO HOME HEALTH (Order #[id number]) on 11/13/19   Authorized by:  Hawa Sifuentes MD  (NPI: [id number])       Lab Component SmartPhrase Guide     AMB REFERRAL TO HOME HEALTH (Order #[id number]) on 11/13/19         Insurance Information                HUMANA MEDICARE REPLACEMENT/HUMANA MEDICARE REPL Phone: [phone]    Subscriber: Michelle Francis Subscriber#: [id number]    Group#: [id number] Precert#:               History & Physical      Draw, MD Hawa at 11/11/19 0802              Patient Care Team:  Aristeo Mauro MD as PCP - General (Internal Medicine)  Melquiades Devlin APRN (Nurse Practitioner)    Chief complaint hypercapnic respiratory failure        Assessment/Plan   Acute/chronic hypercapnic hypoxic respiratory failure   acute COPD exacerbation  acute bronchitis   pulmonary edema    hyperlipidemia  Hypertension  Chronic kidney disease  Restless leg syndrome  Obstructive sleep apnea, patient use home auto BiPAP  History of cerebral aneurysm repair  History of partial colectomy  Cataract extraction  Diabetes mellitus      Plan  Noninvasive ventilation utilizing AVAP  antibiotics Rocephin and doxycycline  Diuresis Lasix 40 mg IV every 12 as well as acetazolamide 250 mg IV once  Bronchodilators desonide and DuoNeb  Oxygen titration  DVT prophylaxis  GI prophylaxis  Glycemic control          History   69-year-old who presents with severe shortness of breath is progressive worsened since Friday.  The patient denies any fevers or chills she has had no nausea or vomiting she states any exertion at all worsens her shortness of breath the patient history is somewhat limited due to her severe respiratory distress      Acute respiratory failure with hypoxia (CMS/HCC)      Past Medical History:   Diagnosis Date   • Arthritis    • Colon polyps     Dr Bates   • COPD (chronic obstructive pulmonary disease) (CMS/HCC)    • History of emphysema    • Hyperlipidemia    • Hypertension    • Kidney disease    • Osteoporosis    • Restless leg syndrome     sees Dr Seipel   • Sleep apnea     npsg [facility] 2014, ahi 5.9, on auto bipap min 8 max, PS 2-8-Alcaraz's, nasal mask       Past Surgical History:   Procedure Laterality Date   • CATARACT EXTRACTION      Dr Moyer   • CEREBRAL ANEURYSM REPAIR      Brain Aneurysm approx 2009, treated with stents and coils, Dr. Fraga   • COLON SURGERY      partial colectomy-2013 Dr Valladares- due to multiple large  polyps   • EXPLORATORY LAPAROTOMY      lysis of intra abdominal adhesions, primary ventral hernia repair 08/01/2018 Dr West   • WRIST SURGERY      wrist fracture - Dr Patton       Family History   Problem Relation Age of Onset   • Diabetes Mother    • Hypertension Mother    • Colon cancer Father    • Colon cancer Sister    • Arthritis Brother    • Hypertension Brother    • Allergies Other        Social History     Socioeconomic History   • Marital status:      Spouse name: Not on file   • Number of children: Not on file   • Years of education: Not on file   • Highest education level: Not on file   Tobacco Use   • Smoking status: Former Smoker   Substance and Sexual Activity   • Drug use: No       Review of Systems  Review of Systems   HENT: Positive for congestion. Negative for rhinorrhea and sneezing.    Respiratory: Positive for cough, shortness of breath and wheezing. Negative for choking, chest tightness and stridor.    Cardiovascular: Positive for leg swelling.        Vital Signs  Temp:  [101.3 °F (38.5 °C)] 101.3 °F (38.5 °C)  Heart Rate:  [112-146] 112  Resp:  [23-40] 28  BP: (113-157)/() 124/64    Physical Exam:  Physical Exam   Cardiovascular:   Murmur heard.  Pulmonary/Chest: No respiratory distress. She has wheezes. She has rales. She exhibits no tenderness.   Abdominal: She exhibits no distension. There is no tenderness.   Musculoskeletal: She exhibits edema.         Radiology  Imaging Results (Last 24 Hours)     Procedure Component Value Units Date/Time    XR Chest 1 View [[id number]] Collected:  11/11/19 0712     Updated:  11/11/19 0714    Narrative:       Examination: XR CHEST 1 VW-     Date of Exam: 11/11/2019 6:05 AM     Indication: Shortness of breath; J96.01-Acute respiratory failure with  hypoxia; R50.9-Fever, unspecified; A41.9-Sepsis, unspecified organism.     Comparison: 08/02/2018.     Technique: Single radiographic view of the chest was obtained.     Findings:  The lungs appear  hyperexpanded and there is apical cystic lucency and  basilar interstitial prominence. There is no pneumothorax, pleural  effusion or focal airspace consolidation. Cardiomediastinal silhouette  is unremarkable. Pulmonary vasculature appears within normal limits.  Regional bones appear grossly intact.        Impression:       Findings suggestive of COPD/emphysema without acute cardiopulmonary  abnormality.     Electronically Signed By-Waylon Rod On:11/11/2019 7:12 AM  This report was finalized on [id number] by  Waylon Rod, .          Labs:  Results from last 7 days   Lab Units 11/11/19  0538   WBC 10*3/mm3 11.40*   HEMOGLOBIN g/dL 14.0   HEMATOCRIT % 42.4   PLATELETS 10*3/mm3 142     Results from last 7 days   Lab Units 11/11/19  0538   SODIUM mmol/L 138   POTASSIUM mmol/L 4.4   CHLORIDE mmol/L 98   CO2 mmol/L 25.0   BUN mg/dL 21   CREATININE mg/dL 1.10*   CALCIUM mg/dL 9.7   BILIRUBIN mg/dL 0.2   ALK PHOS U/L 74   ALT (SGPT) U/L 9   AST (SGOT) U/L 13   GLUCOSE mg/dL 131*     Results from last 7 days   Lab Units 11/11/19  0639   PH, ARTERIAL pH units 7.302*   PO2 ART mm Hg 115.9*   PCO2, ARTERIAL mm Hg 61.1*   HCO3 ART mmol/L 30.3*     Results from last 7 days   Lab Units 11/11/19  0538   ALBUMIN g/dL 4.20     Results from last 7 days   Lab Units 11/11/19  0538   TROPONIN T ng/mL <0.010             Results from last 7 days   Lab Units 11/11/19  0537   INR  1.03   APTT seconds 24.6               Meds:   SCHEDULE     Infusions    nitroglycerin 10-50 mcg/min Last Rate: 5 mcg/min (11/11/19 0545)     PRNs  sodium chloride  •  [COMPLETED] Insert peripheral IV **AND** sodium chloride      I discussed the patients findings and my recommendations with patient.     Hawa Sifuentes MD  11/11/19  8:02 AM    Time: Critical care 70 min    Electronically signed by Hawa Sifuentes MD at 11/11/19 2076       Physician Progress Notes (last 24 hours) (Notes from 11/12/19 1602 through 11/13/19 1602)     No notes of this type exist for  this encounter.

## 2019-11-13 NOTE — CONSULTS
Referring Provider: Dr. Mauro  Attending: Virgen Pascual MD    Reason for Consultation:    Shortness of breath  Tachycardia  Chest discomfort  Palpitation  COPD exacerbation  Hypertension  Hyperlipidemia    Chief complaint:    Shortness of breath  Palpitation  Chest discomfort       History of present illness:     Patient is 69 years old white female whose past medical history significant for hypertension, hyperlipidemia, COPD, who is admitted with COPD exacerbation.    Patient reports that from last few days she progressively was getting short of breath.  She was having cough without significant expectoration.  She denies any fever and chills.  She was admitted for COPD exacerbation.  Cardiology consultation is requested because of palpitation and chest discomfort.    Patient does complain of chest discomfort which is like heaviness in retrosternal region.  Patient was reporting orthopnea and PND.  No significant leg edema is noted.  Patient denies any syncope or presyncope.    Patient does not have previous history of CAD, PCI or MI.    Review of Systems  Review of Systems   Constitution: Negative for chills and fever.   HENT: Negative for ear discharge and nosebleeds.    Eyes: Negative for discharge and redness.   Cardiovascular: Positive for chest pain. Negative for orthopnea, palpitations, paroxysmal nocturnal dyspnea and syncope.   Respiratory: Positive for shortness of breath. Negative for cough and wheezing.    Endocrine: Negative for heat intolerance.   Skin: Negative for rash.   Musculoskeletal: Positive for arthritis and joint pain. Negative for myalgias.   Gastrointestinal: Negative for abdominal pain, melena, nausea and vomiting.   Genitourinary: Negative for dysuria and hematuria.   Neurological: Negative for dizziness, light-headedness, numbness and tremors.   Psychiatric/Behavioral: Negative for depression. The patient is not nervous/anxious.        Past Medical History  Past Medical History:    "  Diagnosis Date   • Arthritis    • Colon polyps     Dr Bates   • COPD (chronic obstructive pulmonary disease) (CMS/HCC)    • History of emphysema    • Hyperlipidemia    • Hypertension    • Kidney disease    • Osteoporosis    • Restless leg syndrome     sees Dr Seipel   • Sleep apnea     npsg [facility] 2014, ahi 5.9, on auto bipap min 8 max, PS 2-8-Alcaraz's, nasal mask    and Past Surgical History:   Procedure Laterality Date   • CATARACT EXTRACTION      Dr Moyer   • CEREBRAL ANEURYSM REPAIR      Brain Aneurysm approx 2009, treated with stents and coils, Dr. Fraga   • COLON SURGERY      partial colectomy-2013 Dr Valladares- due to multiple large polyps   • EXPLORATORY LAPAROTOMY      lysis of intra abdominal adhesions, primary ventral hernia repair 08/01/2018 Dr West   • WRIST SURGERY      wrist fracture - Dr Patton       Family History  Family History   Problem Relation Age of Onset   • Diabetes Mother    • Hypertension Mother    • Colon cancer Father    • Colon cancer Sister    • Arthritis Brother    • Hypertension Brother    • Allergies Other        Social History  Social History     Socioeconomic History   • Marital status:      Spouse name: Not on file   • Number of children: Not on file   • Years of education: Not on file   • Highest education level: Not on file   Tobacco Use   • Smoking status: Former Smoker   Substance and Sexual Activity   • Drug use: No       Objective     Physical Exam:    Vital Signs  Vitals:    11/13/19 1445 11/13/19 1500 11/13/19 1515 11/13/19 1553   BP: 117/89 125/80 116/79 125/59   BP Location:       Patient Position:       Pulse: (!) 132 (!) 147 (!) 141 (!) 134   Resp:    24   Temp:       TempSrc:       SpO2: 97% 96% (!) 87% (!) 89%   Weight:       Height:           Weight  Flowsheet Rows      First Filed Value   Admission Height  167.6 cm (66\") Documented at 11/11/2019 0530   Admission Weight  107 kg (235 lb) Documented at 11/11/2019 0530           Physical Exam   Constitutional: She is " oriented to person, place, and time. She appears well-developed and well-nourished.   HENT:   Head: Normocephalic and atraumatic.   Eyes: No scleral icterus.   Neck: No thyromegaly present.   Cardiovascular: Normal rate, regular rhythm and normal heart sounds. Exam reveals no gallop and no friction rub.   No murmur heard.  Pulmonary/Chest: Effort normal. No respiratory distress. She has wheezes. She has no rales.   Abdominal: There is no tenderness.   Musculoskeletal: She exhibits no edema.   Lymphadenopathy:     She has no cervical adenopathy.   Neurological: She is alert and oriented to person, place, and time.   Skin: No rash noted. No erythema.   Psychiatric: She has a normal mood and affect.       Results Review:  Lab Results (last 24 hours)     Procedure Component Value Units Date/Time    Basic Metabolic Panel [[id number]]  (Abnormal) Collected:  11/13/19 1313    Specimen:  Blood Updated:  11/13/19 1525     Glucose 135 mg/dL      BUN 37 mg/dL      Creatinine 1.06 mg/dL      Sodium 140 mmol/L      Potassium 4.1 mmol/L      Chloride 95 mmol/L      CO2 25.0 mmol/L      Calcium 9.9 mg/dL      eGFR Non African Amer 51 mL/min/1.73      BUN/Creatinine Ratio 34.9     Anion Gap 20.0 mmol/L     Narrative:       GFR Normal >60  Chronic Kidney Disease <60  Kidney Failure <15    BNP [[id number]]  (Normal) Collected:  11/13/19 1313    Specimen:  Blood Updated:  11/13/19 1400     proBNP 159.5 pg/mL     Narrative:       Among patients with dyspnea, NT-proBNP is highly sensitive for the detection of acute congestive heart failure. In addition NT-proBNP of <300 pg/ml effectively rules out acute congestive heart failure with 99% negative predictive value.    TSH [[id number]]  (Normal) Collected:  11/13/19 1313    Specimen:  Blood Updated:  11/13/19 1400     TSH 1.760 uIU/mL     Magnesium [[id number]]  (Abnormal) Collected:  11/13/19 1313    Specimen:  Blood Updated:  11/13/19 1341     Magnesium 2.6 mg/dL     CBC (No Diff)  [[id number]]  (Abnormal) Collected:  11/13/19 1313    Specimen:  Blood Updated:  11/13/19 1326     WBC 10.00 10*3/mm3      RBC 5.53 10*6/mm3      Hemoglobin 16.3 g/dL      Hematocrit 48.4 %      MCV 87.6 fL      MCH 29.6 pg      MCHC 33.8 g/dL      RDW 16.3 %      RDW-SD 50.8 fl      MPV 8.8 fL      Platelets 144 10*3/mm3     Blood Culture - Blood, Hand, Left [[id number]] Collected:  11/11/19 0537    Specimen:  Blood from Hand, Left Updated:  11/13/19 0546     Blood Culture No growth at 2 days    Blood Culture - Blood, Arm, Right [[id number]] Collected:  11/11/19 0537    Specimen:  Blood from Arm, Right Updated:  11/13/19 0546     Blood Culture No growth at 2 days        Imaging Results (Last 72 Hours)     Procedure Component Value Units Date/Time    XR Chest 1 View [[id number]] Collected:  11/11/19 0712     Updated:  11/11/19 0714    Narrative:       Examination: XR CHEST 1 VW-     Date of Exam: 11/11/2019 6:05 AM     Indication: Shortness of breath; J96.01-Acute respiratory failure with  hypoxia; R50.9-Fever, unspecified; A41.9-Sepsis, unspecified organism.     Comparison: 08/02/2018.     Technique: Single radiographic view of the chest was obtained.     Findings:  The lungs appear hyperexpanded and there is apical cystic lucency and  basilar interstitial prominence. There is no pneumothorax, pleural  effusion or focal airspace consolidation. Cardiomediastinal silhouette  is unremarkable. Pulmonary vasculature appears within normal limits.  Regional bones appear grossly intact.        Impression:       Findings suggestive of COPD/emphysema without acute cardiopulmonary  abnormality.     Electronically Signed By-Waylon Rod On:11/11/2019 7:12 AM  This report was finalized on [id number] by  Waylon Rod, .        ECG 12 Lead   Preliminary Result   HEART RATE= 115  bpm   RR Interval= 524  ms   NE Interval= 129  ms   P Horizontal Axis=   deg   P Front Axis= 59  deg   QRSD Interval= 84  ms   QT Interval= 323  ms      QRS Axis= 60  deg   T Wave Axis= 30  deg   - BORDERLINE ECG -   Sinus tachycardia   Probable left atrial enlargement   Electronically Signed By:    Date and Time of Study: 2019-11-12 23:42:56      ECG 12 Lead   Final Result   HEART RATE= 136  bpm   RR Interval= 442  ms   KS Interval= 157  ms   P Horizontal Axis= 10  deg   P Front Axis= 80  deg   QRSD Interval= 159  ms   QT Interval= 293  ms   QRS Axis= 107  deg   T Wave Axis= 106  deg   - ABNORMAL ECG -   Sinus tachycardia   Ventricular premature complex   Abnormal T, consider ischemia, lateral leads   When compared with ECG of 01-Aug-2018 8:38:14,   Significant repolarization change   Electronically Signed By: Miguel Angel Perez (Toro) 11-Nov-2019 06:56:58   Date and Time of Study: 2019-11-11 05:32:57        CBC    Results from last 7 days   Lab Units 11/13/19  1313 11/11/19  0538   WBC 10*3/mm3 10.00 11.40*   HEMOGLOBIN g/dL 16.3* 14.0   PLATELETS 10*3/mm3 144 142     BMP   Results from last 7 days   Lab Units 11/13/19  1313 11/11/19  0538   SODIUM mmol/L 140 138   POTASSIUM mmol/L 4.1 4.4   CHLORIDE mmol/L 95* 98   CO2 mmol/L 25.0 25.0   BUN mg/dL 37* 21   CREATININE mg/dL 1.06* 1.10*   GLUCOSE mg/dL 135* 131*   MAGNESIUM mg/dL 2.6*  --      CMP   Results from last 7 days   Lab Units 11/13/19  1313 11/11/19  0538   SODIUM mmol/L 140 138   POTASSIUM mmol/L 4.1 4.4   CHLORIDE mmol/L 95* 98   CO2 mmol/L 25.0 25.0   BUN mg/dL 37* 21   CREATININE mg/dL 1.06* 1.10*   GLUCOSE mg/dL 135* 131*   ALBUMIN g/dL  --  4.20   BILIRUBIN mg/dL  --  0.2   ALK PHOS U/L  --  74   AST (SGOT) U/L  --  13   ALT (SGPT) U/L  --  9     Medication Review  Scheduled Meds:  acetaZOLAMIDE (DIAMOX) IVPB 250 mg Intravenous Daily   aspirin 81 mg Oral Daily   budesonide 0.5 mg Nebulization BID - RT   buPROPion  mg Oral Q12H   cefTRIAXone (ROCEPHIN) 1GM IVPB in 100 mL NS (MBP) 1 g Intravenous Q24H   cholecalciferol 2,000 Units Oral BID   dilTIAZem  mg Oral Q24H   doxycycline 100 mg  Intravenous Q12H   enoxaparin 40 mg Subcutaneous Q24H   furosemide 40 mg Intravenous Daily   gabapentin 900 mg Oral Nightly   guaiFENesin 600 mg Oral Q12H   ipratropium-albuterol 3 mL Nebulization Q6H While Awake - RT   ipratropium-albuterol 3 mL Nebulization Q6H - RT   methylPREDNISolone sodium succinate 20 mg Intravenous Q12H   metoprolol succinate XL 50 mg Oral Daily   montelukast 10 mg Oral Nightly   pantoprazole 40 mg Oral BID AC   rOPINIRole 1 mg Oral BID   rosuvastatin 10 mg Oral Daily   theophylline 300 mg Oral Q24H   vitamin B-12 1,000 mcg Oral Daily     Continuous Infusions:  nitroglycerin 10-50 mcg/min Last Rate: Stopped (11/11/19 1800)     PRN Meds:.ipratropium-albuterol  •  ipratropium-albuterol  •  sodium chloride  •  [COMPLETED] Insert peripheral IV **AND** sodium chloride    Assessment:      Acute on chronic respiratory failure with hypoxia (CMS/HCC)    Hyperlipidemia    Essential hypertension    Sleep apnea    Restless leg syndrome    COPD with acute exacerbation (CMS/HCC)    Acute bronchitis due to respiratory syncytial virus (RSV)    Acute pulmonary edema (CMS/HCC)    Obesity (BMI 30-39.9)    Tachycardia        Plan:    Patient is admitted with symptom of shortness of breath and chest discomfort and was found to have COPD exacerbation.  Patient is being treated appropriately.  Cardiology consultation is requested because of chest discomfort and palpitation.  I strongly believe that she has chest discomfort because of COPD exacerbation.  However patient has not had any ischemic evaluation in the past.  I would consider stress test when she is stabilized.  Gentle diuresis would be continued.  I would discontinue intravenous Cardizem which was started for control of sinus tachycardia.  I would start Cardizem  mg daily.  Toprol-XL would be continued.  Echocardiogram would be done.    I discussed the patients findings and my recommendations with patient    Ryne Staton MD  11/13/19  5:52  PM

## 2019-11-13 NOTE — PROGRESS NOTES
Continued Stay Note  [facility]     Patient Name: Michelle Francis  MRN: [id number]  Today's Date: 11/13/2019    Admit Date: 11/11/2019      PT recommends IP Rehab. Spoke to patient and spouse and patient declined.States she is willing to have HHC. Had [facility] in the past and wants them again. Referral made to [facility]. Order placed and information sent  Per Epic.      Payton Bateman RN, CM  Office Phone [phone]  Cell [phone]

## 2019-11-13 NOTE — PLAN OF CARE
Problem: Patient Care Overview  Goal: Plan of Care Review  Outcome: Ongoing (interventions implemented as appropriate)   11/13/19 0783   OTHER   Outcome Summary Patient used to the Bipap on and off during the shift. If patient was not using Bipap, the patient was on 10L high flow. The patient's HR was elevated so patient was placed on a cardizem drip. The patient does not have any complaints at this time and did not have any new tests. Will continue to monitor.      Goal: Individualization and Mutuality  Outcome: Ongoing (interventions implemented as appropriate)    Goal: Discharge Needs Assessment  Outcome: Ongoing (interventions implemented as appropriate)    Goal: Interprofessional Rounds/Family Conf  Outcome: Ongoing (interventions implemented as appropriate)

## 2019-11-13 NOTE — CONSULTS
[facility]        Patient Name:  Michelle Francis  YOB: 1950  MRN:  [id number]  Admit Date:  11/11/2019    Patient Care Team:  Aristeo Mauro MD as PCP - General (Internal Medicine)  Melquiades Devlin APRN (Nurse Practitioner)          History Present Illness     Chief Complaint   Patient presents with   • Shortness of Breath       This is a 69-year-old  female with a history of COPD, chronic hypoxic respiratory failure on continuous home oxygen at 3 liters per nasal cannula, IRIS on BiPAP, HTN, HLD, and RLS who presented to the ED on 11/11/2019 with complaint of shortness of breath. Workup in the ER revealed acute respiratory failure and the patient was placed on BiPAP. She was also diagnosed with acute COPD exacerbation. Dr. Sifuentes was consulted and asked to admit the patient to ICU for close monitoring and further evaluation. While in the ER the patient was evaluated by Dr. Sifuentes and switched to continuous AVAP. The patient was started on empiric antibiotics, IV steroids, and bronchodilators.     Today (11/13) we were consulted for medical management as the patient is now in [place]. She remains on continuous AVAP. Overnight she reportedly had increased tachycardia with heart rate up to 140s. She was given Cardizem bolus and started on continuous drip. Cardiology was consulted. The patient is complaining of shortness of breath, but states it is better than when she first came to the hospital. She denies any chest pain or other complaint.       Review of Systems   Respiratory: Positive for shortness of breath.    All other systems reviewed and are negative.          Personal History     Past Medical History:   Diagnosis Date   • Arthritis    • Colon polyps     Dr Bates   • COPD (chronic obstructive pulmonary disease) (CMS/[facility])    • History of emphysema    • Hyperlipidemia    • Hypertension    • Kidney disease    • Osteoporosis    • Restless leg syndrome      sees Dr Seipel   • Sleep apnea     npsg [facility] 2014, ahi 5.9, on auto bipap min 8 max, PS 2-8-Alcaraz's, nasal mask     Past Surgical History:   Procedure Laterality Date   • CATARACT EXTRACTION      Dr Moyer   • CEREBRAL ANEURYSM REPAIR      Brain Aneurysm approx 2009, treated with stents and coils, Dr. Fraga   • COLON SURGERY      partial colectomy-2013 Dr Valladares- due to multiple large polyps   • EXPLORATORY LAPAROTOMY      lysis of intra abdominal adhesions, primary ventral hernia repair 08/01/2018 Dr West   • WRIST SURGERY      wrist fracture - Dr Patton     Family History   Problem Relation Age of Onset   • Diabetes Mother    • Hypertension Mother    • Colon cancer Father    • Colon cancer Sister    • Arthritis Brother    • Hypertension Brother    • Allergies Other      Social History     Tobacco Use   • Smoking status: Former Smoker   Substance Use Topics   • Alcohol use: Not on file   • Drug use: No     Prior to Admission medications    Medication Sig Start Date End Date Taking? Authorizing Provider   albuterol sulfate  (90 Base) MCG/ACT inhaler Inhale 1 puff 4 (Four) Times a Day.   Yes ProviderDwight MD   aspirin 81 MG chewable tablet Chew 81 mg Daily.   Yes Dwight Das MD   budesonide (PULMICORT) 1 MG/2ML nebulizer solution Take 1 mg by nebulization 2 (Two) Times a Day.   Yes Dwight Das MD   buPROPion SR (WELLBUTRIN SR) 150 MG 12 hr tablet Take 150 mg by mouth 2 (Two) Times a Day.   Yes Dwight Das MD   calcium carbonate (OS-BILLIE) 600 MG tablet Take 600 mg by mouth 2 (Two) Times a Day With Meals.   Yes Dwight Das MD   cholecalciferol (VITAMIN D3) 25 MCG (1000 UT) tablet Take 2,000 Units by mouth 2 (Two) Times a Day.   Yes Dwight Das MD   Cyanocobalamin (B-12) 1000 MCG sublingual tablet Place 1 tablet under the tongue Daily.   Yes Dwight Das MD   doxycycline (VIBRAMYCIN) 100 MG capsule Take 100 mg by mouth 2 (Two) Times a Day. 11/4/19  11/14/19 Yes Dwight Das MD   esomeprazole (nexIUM) 40 MG capsule Take 40 mg by mouth Every Morning Before Breakfast.   Yes Dwight Das MD   furosemide (LASIX) 40 MG tablet Take 20 mg by mouth Every Night.   Yes Dwight Das MD   gabapentin (NEURONTIN) 300 MG capsule Take 900 mg by mouth Every Night.   Yes Dwight Das MD   ipratropium-albuterol (DUO-NEB) 0.5-2.5 mg/3 ml nebulizer Take 3 mL by nebulization Every 4 (Four) Hours As Needed for Wheezing or Shortness of Air. Every 4-6h PRN   Yes Dwight Das MD   losartan (COZAAR) 50 MG tablet Take 50 mg by mouth Daily.   Yes Dwight Das MD   metoprolol succinate XL (TOPROL-XL) 50 MG 24 hr tablet Take 50 mg by mouth Daily.   Yes Dwight Das MD   mometasone-formoterol (DULERA 200) 200-5 MCG/ACT inhaler Inhale 2 puffs 2 (Two) Times a Day.   Yes Dwight Das MD   potassium chloride (K-DUR) 10 MEQ CR tablet Take 10 mEq by mouth Daily.   Yes Dwight Das MD   risedronate (ACTONEL) 35 MG tablet Take 35 mg by mouth Every 7 (Seven) Days. On Thursday   Yes Dwight Das MD   rOPINIRole (REQUIP) 1 MG tablet Take 1 mg by mouth 2 (Two) Times a Day. At 19:00 and 21:00   Yes Dwight Das MD   rosuvastatin (CRESTOR) 10 MG tablet Take 10 mg by mouth Daily.   Yes Dwight Das MD   theophylline (UNIPHYL) 400 MG 24 hr tablet Take 400 mg by mouth Daily.   Yes Dwight Das MD   tiotropium bromide-olodaterol (STIOLTO RESPIMAT) 2.5-2.5 MCG/ACT aerosol solution inhaler Inhale 2 puffs Daily.   Yes Dwight Das MD     Allergies:  No Known Allergies      Objective     Vital Signs  Temp:  [97.2 °F (36.2 °C)-98.6 °F (37 °C)] 97.8 °F (36.6 °C)  Heart Rate:  [103-132] 132  Resp:  [15-32] 26  BP: (109-179)/() 127/81  SpO2:  [94 %-99 %] 96 %  on  Flow (L/min):  [8] 8;   Device (Oxygen Therapy): NPPV/NIV  Body mass index is 37.93 kg/m².    Physical Exam   Constitutional:  She is oriented to person, place, and time. She appears well-developed.   obese   HENT:   Head: Normocephalic and atraumatic.   Mouth/Throat: Oropharynx is clear and moist.   Eyes: Conjunctivae and EOM are normal. Pupils are equal, round, and reactive to light.   Neck: Normal range of motion. Neck supple.   Cardiovascular: Regular rhythm, normal heart sounds and intact distal pulses. Tachycardia present.   Pulmonary/Chest: Tachypnea noted. She has decreased breath sounds.   on continuous AVAP   Abdominal: Soft. Bowel sounds are normal. She exhibits no distension. There is no tenderness.   Musculoskeletal: Normal range of motion. She exhibits no edema.   Neurological: She is alert and oriented to person, place, and time.   Skin: Skin is warm and dry. Capillary refill takes less than 2 seconds.   Psychiatric: She has a normal mood and affect. Her behavior is normal. Judgment and thought content normal.   Vitals reviewed.      Results Review:  I reviewed the patient's new clinical results.  I reviewed the patient's new imaging results and agree with the interpretation.  I reviewed the patient's other test results and agree with the interpretation  I personally viewed and interpreted the patient's EKG/Telemetry data  Discussed with ED provider.    Results from last 7 days   Lab Units 11/13/19  1313 11/11/19  0538   WBC 10*3/mm3 10.00 11.40*   HEMOGLOBIN g/dL 16.3* 14.0   HEMATOCRIT % 48.4* 42.4   PLATELETS 10*3/mm3 144 142     Results from last 7 days   Lab Units 11/11/19  0538   SODIUM mmol/L 138   POTASSIUM mmol/L 4.4   CHLORIDE mmol/L 98   CO2 mmol/L 25.0   BUN mg/dL 21   CREATININE mg/dL 1.10*   GLUCOSE mg/dL 131*   CALCIUM mg/dL 9.7     Results from last 7 days   Lab Units 11/11/19  0538   SODIUM mmol/L 138   POTASSIUM mmol/L 4.4   CHLORIDE mmol/L 98   CO2 mmol/L 25.0   BUN mg/dL 21   CREATININE mg/dL 1.10*   CALCIUM mg/dL 9.7   BILIRUBIN mg/dL 0.2   ALK PHOS U/L 74   ALT (SGPT) U/L 9   AST (SGOT) U/L 13   GLUCOSE  mg/dL 131*     Results from last 7 days   Lab Units 11/13/19  1313   MAGNESIUM mg/dL 2.6*     Lab Results   Component Value Date    CALCIUM 9.7 11/11/2019    PHOS 4.0 08/05/2018     No results found for: HGBA1C  No results found for: CHOL, CHLPL, TRIG, HDL, LDL, LDLDIRECT  Lab Results   Component Value Date    LIPASE 24 08/01/2018     Results from last 7 days   Lab Units 11/11/19  0639   PH, ARTERIAL pH units 7.302*   PO2 ART mm Hg 115.9*   PCO2, ARTERIAL mm Hg 61.1*   HCO3 ART mmol/L 30.3*         Microbiology Results (last 10 days)     Procedure Component Value - Date/Time    Respiratory Panel, PCR - Swab, Nasopharynx [[id number]]  (Abnormal) Collected:  11/11/19 2138    Lab Status:  Final result Specimen:  Swab from Nasopharynx Updated:  11/11/19 2315     ADENOVIRUS, PCR Not Detected     Coronavirus 229E Not Detected     Coronavirus HKU1 Not Detected     Coronavirus NL63 Not Detected     Coronavirus OC43 Not Detected     Human Metapneumovirus Not Detected     Human Rhinovirus/Enterovirus Not Detected     Influenza B PCR Not Detected     Parainfluenza Virus 1 Not Detected     Parainfluenza Virus 2 Not Detected     Parainfluenza Virus 3 Not Detected     Parainfluenza Virus 4 Not Detected     Bordetella pertussis pcr Not Detected     Influenza A H1 2009 PCR Not Detected     Chlamydophila pneumoniae PCR Not Detected     Mycoplasma pneumo by PCR Not Detected     Influenza A PCR Not Detected     Influenza A H3 Not Detected     Influenza A H1 Not Detected     RSV, PCR Detected    Blood Culture - Blood, Hand, Left [[id number]] Collected:  11/11/19 0537    Lab Status:  Preliminary result Specimen:  Blood from Hand, Left Updated:  11/13/19 0546     Blood Culture No growth at 2 days    Blood Culture - Blood, Arm, Right [[id number]] Collected:  11/11/19 0537    Lab Status:  Preliminary result Specimen:  Blood from Arm, Right Updated:  11/13/19 0546     Blood Culture No growth at 2 days          ECG/EMG Results (most recent)      Procedure Component Value Units Date/Time    ECG 12 Lead [[id number]] Collected:  11/11/19 0532     Updated:  11/11/19 0657    Narrative:       HEART RATE= 136  bpm  RR Interval= 442  ms  DC Interval= 157  ms  P Horizontal Axis= 10  deg  P Front Axis= 80  deg  QRSD Interval= 159  ms  QT Interval= 293  ms  QRS Axis= 107  deg  T Wave Axis= 106  deg  - ABNORMAL ECG -  Sinus tachycardia  Ventricular premature complex  Abnormal T, consider ischemia, lateral leads  When compared with ECG of 01-Aug-2018 8:38:14,  Significant repolarization change  Electronically Signed By: Miguel Angel Perez (Toro) 11-Nov-2019 06:56:58  Date and Time of Study: 2019-11-11 05:32:57    ECG 12 Lead [[id number]] Collected:  11/12/19 2342     Updated:  11/12/19 2344    Narrative:       HEART RATE= 115  bpm  RR Interval= 524  ms  DC Interval= 129  ms  P Horizontal Axis=   deg  P Front Axis= 59  deg  QRSD Interval= 84  ms  QT Interval= 323  ms  QRS Axis= 60  deg  T Wave Axis= 30  deg  - BORDERLINE ECG -  Sinus tachycardia  Probable left atrial enlargement  Electronically Signed By:   Date and Time of Study: 2019-11-12 23:42:56                    Xr Chest 1 View    Result Date: 11/11/2019  Findings suggestive of COPD/emphysema without acute cardiopulmonary abnormality.  Electronically Signed By-Waylon Rod On:11/11/2019 7:12 AM This report was finalized on [id number] by  Waylon Rod, .        Estimated Creatinine Clearance: 58.9 mL/min (A) (by C-G formula based on SCr of 1.1 mg/dL (H)).      Assessment/Plan     Active Hospital Problems    Diagnosis POA   • **Acute on chronic respiratory failure with hypoxia (CMS/HCC) [J96.21] Yes     Priority: High   • COPD with acute exacerbation (CMS/HCC) [J44.1] Yes     Priority: High   • Acute bronchitis due to respiratory syncytial virus (RSV) [J20.5] Yes     Priority: High   • Acute pulmonary edema (CMS/HCC) [J81.0] Yes     Priority: Medium   • Tachycardia [R00.0] Yes     Priority: Low   • Hyperlipidemia  [E78.5] Yes   • Essential hypertension [I10] Yes   • Sleep apnea [G47.30] Yes     npsg [facility] 2014, ahi 5.9, on auto bipap min 8 max, PS 2-8-Alcaraz's, nasal mask     • Restless leg syndrome [G25.81] Yes     sees Dr Seipel     • Obesity (BMI 30-39.9) [E66.9] Yes       Acute on chronic respiratory failure with hypoxia   -secondary to AECOPD, acute bronchitis, RSV, and acute pulmonary edema  -baseline O2 @ 3L per NC continuously  -currently on AVAP with 40% FiO2; titrate to keep sat 90-95%  -pulmonary following     COPD with acute exacerbation, former tobacco abuse  -on IV steroids and bronchodilators  -on empiric abx with ceftriaxone and doxycycline  -pulmonary following  -continue home Singulair and theophyline     Acute bronchitis due to respiratory syncytial virus (RSV)  -contact/droplet precautions  -treatment as above  -supportive care    Acute pulmonary edema   -on IV Lasix and Diamox  -monitor I&Os and daily weight    Tachycardia  -likely secondary to missed doses of home BB  -on Cardizem gtt, but rate remains uncontrolled  -cardiology consulted  -restart home Toprol XL  -continuous cardiac monitoring    Hyperlipidemia  -restart home statin    Essential hypertension, chronic, controlled  -on IV Lasix  -restart home BB  -hold losartan for now; awaiting BMP    Sleep apnea  -compliant with home nocturnal BiPAP  -currently on AVAP as above    Restless leg syndrome  -restart home Requip    Obesity  -BMI 37.93  -encourage lifestyle modifications     GERD  -restart PPI    Vitamin D deficiency  -restart home supplement    B12 deficiency   -restart home supplement    History of osteoporosis  -on calcium and weekly risedronate    VTE Prophylaxis - Lovenox      Code Status -  Code Status and Medical Interventions:   Ordered at: 11/11/19 0911     Level Of Support Discussed With:    Patient     Code Status:    No CPR     Medical Interventions (Level of Support Prior to Arrest):    Comfort Measures          Tamia Huddleston  [facility]ist Team  11/13/19  2:19 PM

## 2019-11-13 NOTE — PLAN OF CARE
Problem: Patient Care Overview  Goal: Plan of Care Review  Outcome: Ongoing (interventions implemented as appropriate)   11/13/19 8474   Coping/Psychosocial   Plan of Care Reviewed With patient   Plan of Care Review   Progress no change

## 2019-11-13 NOTE — PLAN OF CARE
Problem: Patient Care Overview  Goal: Plan of Care Review  Outcome: Ongoing (interventions implemented as appropriate)   11/13/19 4360   Plan of Care Review   Progress no change   OTHER   Outcome Summary Pt appears to be functioning significantly below baseline secondary to AE COPD/sepsis/RSV and with poor activity tolerance. Mobility very limited this session secondary to fatigue/SOA and tachycardic HR (HR elevated to 143bpm) after MMT sitting in chair and unable to tolerate further mobility this session. Recommending IP rehab at d/c and plan to see 3x/week at [facility] for progression to transfer/gait training with RW.

## 2019-11-14 ENCOUNTER — HOSPITAL ENCOUNTER (INPATIENT)
Dept: CARDIOLOGY | Facility: HOSPITAL | Age: 69
Discharge: HOME | End: 2019-11-14

## 2019-11-14 VITALS
HEIGHT: 66 IN | SYSTOLIC BLOOD PRESSURE: 107 MMHG | BODY MASS INDEX: 37.77 KG/M2 | DIASTOLIC BLOOD PRESSURE: 57 MMHG | WEIGHT: 235 LBS

## 2019-11-14 PROBLEM — N28.9 KIDNEY DISEASE: Status: ACTIVE | Noted: 2019-11-14

## 2019-11-14 PROBLEM — F32.A DEPRESSION: Status: ACTIVE | Noted: 2019-11-14

## 2019-11-14 LAB
ANION GAP SERPL CALCULATED.3IONS-SCNC: 14 MMOL/L (ref 5–15)
BASOPHILS # BLD AUTO: 0 10*3/MM3 (ref 0–0.2)
BASOPHILS NFR BLD AUTO: 0.4 % (ref 0–1.5)
BUN BLD-MCNC: 44 MG/DL (ref 8–23)
BUN/CREAT SERPL: 37.6 (ref 7–25)
CALCIUM SPEC-SCNC: 9.8 MG/DL (ref 8.6–10.5)
CHLORIDE SERPL-SCNC: 98 MMOL/L (ref 98–107)
CO2 SERPL-SCNC: 27 MMOL/L (ref 22–29)
CREAT BLD-MCNC: 1.17 MG/DL (ref 0.57–1)
DEPRECATED RDW RBC AUTO: 50.3 FL (ref 37–54)
EOSINOPHIL # BLD AUTO: 0 10*3/MM3 (ref 0–0.4)
EOSINOPHIL NFR BLD AUTO: 0 % (ref 0.3–6.2)
ERYTHROCYTE [DISTWIDTH] IN BLOOD BY AUTOMATED COUNT: 16.2 % (ref 12.3–15.4)
GFR SERPL CREATININE-BSD FRML MDRD: 46 ML/MIN/1.73
GLUCOSE BLD-MCNC: 155 MG/DL (ref 65–99)
HCT VFR BLD AUTO: 48.5 % (ref 34–46.6)
HGB BLD-MCNC: 15.1 G/DL (ref 12–15.9)
LYMPHOCYTES # BLD AUTO: 1.6 10*3/MM3 (ref 0.7–3.1)
LYMPHOCYTES NFR BLD AUTO: 17.4 % (ref 19.6–45.3)
MCH RBC QN AUTO: 27.4 PG (ref 26.6–33)
MCHC RBC AUTO-ENTMCNC: 31.1 G/DL (ref 31.5–35.7)
MCV RBC AUTO: 88.1 FL (ref 79–97)
MONOCYTES # BLD AUTO: 0.7 10*3/MM3 (ref 0.1–0.9)
MONOCYTES NFR BLD AUTO: 7.4 % (ref 5–12)
NEUTROPHILS # BLD AUTO: 7.1 10*3/MM3 (ref 1.7–7)
NEUTROPHILS NFR BLD AUTO: 74.8 % (ref 42.7–76)
NRBC BLD AUTO-RTO: 0 /100 WBC (ref 0–0.2)
PLATELET # BLD AUTO: 132 10*3/MM3 (ref 140–450)
PMV BLD AUTO: 8.8 FL (ref 6–12)
POTASSIUM BLD-SCNC: 4.7 MMOL/L (ref 3.5–5.2)
RBC # BLD AUTO: 5.5 10*6/MM3 (ref 3.77–5.28)
SODIUM BLD-SCNC: 139 MMOL/L (ref 136–145)
WBC NRBC COR # BLD: 9.5 10*3/MM3 (ref 3.4–10.8)

## 2019-11-14 PROCEDURE — 85025 COMPLETE CBC W/AUTO DIFF WBC: CPT | Performed by: NURSE PRACTITIONER

## 2019-11-14 PROCEDURE — 94799 UNLISTED PULMONARY SVC/PX: CPT

## 2019-11-14 PROCEDURE — 25010000002 ACETAZOLAMIDE PER 500 MG: Performed by: INTERNAL MEDICINE

## 2019-11-14 PROCEDURE — 99232 SBSQ HOSP IP/OBS MODERATE 35: CPT | Performed by: HOSPITALIST

## 2019-11-14 PROCEDURE — 80048 BASIC METABOLIC PNL TOTAL CA: CPT | Performed by: NURSE PRACTITIONER

## 2019-11-14 PROCEDURE — 25010000002 FUROSEMIDE PER 20 MG: Performed by: INTERNAL MEDICINE

## 2019-11-14 PROCEDURE — 25010000002 SULFUR HEXAFLUORIDE MICROSPH 60.7-25 MG RECONSTITUTED SUSPENSION: Performed by: INTERNAL MEDICINE

## 2019-11-14 PROCEDURE — 94660 CPAP INITIATION&MGMT: CPT

## 2019-11-14 PROCEDURE — 99232 SBSQ HOSP IP/OBS MODERATE 35: CPT | Performed by: INTERNAL MEDICINE

## 2019-11-14 PROCEDURE — 97530 THERAPEUTIC ACTIVITIES: CPT

## 2019-11-14 PROCEDURE — 25010000002 METHYLPREDNISOLONE PER 40 MG: Performed by: INTERNAL MEDICINE

## 2019-11-14 PROCEDURE — 25010000002 CEFTRIAXONE PER 250 MG: Performed by: INTERNAL MEDICINE

## 2019-11-14 PROCEDURE — 97110 THERAPEUTIC EXERCISES: CPT

## 2019-11-14 PROCEDURE — 25010000002 ENOXAPARIN PER 10 MG: Performed by: INTERNAL MEDICINE

## 2019-11-14 PROCEDURE — 93306 TTE W/DOPPLER COMPLETE: CPT

## 2019-11-14 RX ADMIN — IPRATROPIUM BROMIDE AND ALBUTEROL SULFATE 3 ML: .5; 3 SOLUTION RESPIRATORY (INHALATION) at 22:35

## 2019-11-14 RX ADMIN — IPRATROPIUM BROMIDE AND ALBUTEROL SULFATE 3 ML: .5; 3 SOLUTION RESPIRATORY (INHALATION) at 06:51

## 2019-11-14 RX ADMIN — ACETAZOLAMIDE 250 MG: 500 INJECTION, POWDER, LYOPHILIZED, FOR SOLUTION INTRAVENOUS at 09:39

## 2019-11-14 RX ADMIN — METHYLPREDNISOLONE SODIUM SUCCINATE 20 MG: 40 INJECTION, POWDER, FOR SOLUTION INTRAMUSCULAR; INTRAVENOUS at 21:01

## 2019-11-14 RX ADMIN — BUPROPION HYDROCHLORIDE 150 MG: 150 TABLET, FILM COATED, EXTENDED RELEASE ORAL at 21:01

## 2019-11-14 RX ADMIN — DOXYCYCLINE 100 MG: 100 INJECTION, POWDER, LYOPHILIZED, FOR SOLUTION INTRAVENOUS at 06:34

## 2019-11-14 RX ADMIN — METOPROLOL SUCCINATE 50 MG: 50 TABLET, EXTENDED RELEASE ORAL at 09:38

## 2019-11-14 RX ADMIN — GUAIFENESIN 600 MG: 600 TABLET, EXTENDED RELEASE ORAL at 21:01

## 2019-11-14 RX ADMIN — ASPIRIN 81 MG 81 MG: 81 TABLET ORAL at 09:38

## 2019-11-14 RX ADMIN — MONTELUKAST SODIUM 10 MG: 10 TABLET, COATED ORAL at 21:01

## 2019-11-14 RX ADMIN — DILTIAZEM HYDROCHLORIDE 120 MG: 120 CAPSULE, COATED, EXTENDED RELEASE ORAL at 09:38

## 2019-11-14 RX ADMIN — ROSUVASTATIN CALCIUM 10 MG: 10 TABLET, FILM COATED ORAL at 09:38

## 2019-11-14 RX ADMIN — DOXYCYCLINE 100 MG: 100 INJECTION, POWDER, LYOPHILIZED, FOR SOLUTION INTRAVENOUS at 17:32

## 2019-11-14 RX ADMIN — BUPROPION HYDROCHLORIDE 150 MG: 150 TABLET, FILM COATED, EXTENDED RELEASE ORAL at 09:38

## 2019-11-14 RX ADMIN — ROPINIROLE 1 MG: 1 TABLET, FILM COATED ORAL at 18:41

## 2019-11-14 RX ADMIN — SULFUR HEXAFLUORIDE 2 ML: KIT at 17:00

## 2019-11-14 RX ADMIN — GABAPENTIN 900 MG: 300 CAPSULE ORAL at 21:01

## 2019-11-14 RX ADMIN — ROPINIROLE 1 MG: 1 TABLET, FILM COATED ORAL at 21:01

## 2019-11-14 RX ADMIN — MELATONIN 2000 UNITS: at 21:01

## 2019-11-14 RX ADMIN — POLYETHYLENE GLYCOL 3350 17 G: 17 POWDER, FOR SOLUTION ORAL at 11:35

## 2019-11-14 RX ADMIN — MELATONIN 2000 UNITS: at 09:39

## 2019-11-14 RX ADMIN — IPRATROPIUM BROMIDE AND ALBUTEROL SULFATE 3 ML: .5; 3 SOLUTION RESPIRATORY (INHALATION) at 18:50

## 2019-11-14 RX ADMIN — BUDESONIDE 0.5 MG: 0.5 INHALANT RESPIRATORY (INHALATION) at 18:51

## 2019-11-14 RX ADMIN — METHYLPREDNISOLONE SODIUM SUCCINATE 20 MG: 40 INJECTION, POWDER, FOR SOLUTION INTRAMUSCULAR; INTRAVENOUS at 09:39

## 2019-11-14 RX ADMIN — PANTOPRAZOLE SODIUM 40 MG: 40 TABLET, DELAYED RELEASE ORAL at 07:00

## 2019-11-14 RX ADMIN — ENOXAPARIN SODIUM 40 MG: 40 INJECTION SUBCUTANEOUS at 15:13

## 2019-11-14 RX ADMIN — IPRATROPIUM BROMIDE AND ALBUTEROL SULFATE 3 ML: .5; 3 SOLUTION RESPIRATORY (INHALATION) at 11:07

## 2019-11-14 RX ADMIN — GUAIFENESIN 600 MG: 600 TABLET, EXTENDED RELEASE ORAL at 09:39

## 2019-11-14 RX ADMIN — Medication 10 ML: at 21:01

## 2019-11-14 RX ADMIN — THEOPHYLLINE ANHYDROUS 300 MG: 300 CAPSULE, EXTENDED RELEASE ORAL at 09:38

## 2019-11-14 RX ADMIN — BUDESONIDE 0.5 MG: 0.5 INHALANT RESPIRATORY (INHALATION) at 06:51

## 2019-11-14 RX ADMIN — FUROSEMIDE 40 MG: 10 INJECTION, SOLUTION INTRAMUSCULAR; INTRAVENOUS at 09:39

## 2019-11-14 RX ADMIN — CYANOCOBALAMIN TAB 1000 MCG 1000 MCG: 1000 TAB at 09:39

## 2019-11-14 RX ADMIN — IPRATROPIUM BROMIDE AND ALBUTEROL SULFATE 3 ML: .5; 3 SOLUTION RESPIRATORY (INHALATION) at 14:06

## 2019-11-14 RX ADMIN — PANTOPRAZOLE SODIUM 40 MG: 40 TABLET, DELAYED RELEASE ORAL at 17:32

## 2019-11-14 RX ADMIN — CEFTRIAXONE SODIUM 1 G: 1 INJECTION, POWDER, FOR SOLUTION INTRAMUSCULAR; INTRAVENOUS at 06:35

## 2019-11-14 NOTE — THERAPY TREATMENT NOTE
Acute Care - Occupational Therapy Treatment Note   [facility]     Patient Name: Michelle Francis  : 1950  MRN: [id number]  Today's Date: 2019             Admit Date: 2019       ICD-10-CM ICD-9-CM   1. Acute respiratory failure with hypoxia (CMS/[facility]) J96.01 518.81   2. Fever and chills R50.9 780.60   3. Sepsis, due to unspecified organism, unspecified whether acute organ dysfunction present (CMS/[facility]) A41.9 038.9     995.91     Patient Active Problem List   Diagnosis   • Acute on chronic respiratory failure with hypoxia (CMS/[facility])   • Hyperlipidemia   • Hypertension   • Sleep apnea   • Restless leg syndrome   • COPD (chronic obstructive pulmonary disease) (CMS/[facility])   • Arthritis   • Osteoporosis   • COPD with acute exacerbation (CMS/[facility])   • Acute bronchitis due to respiratory syncytial virus (RSV)   • Acute pulmonary edema (CMS/[facility])   • Obesity (BMI 30-39.9)   • Tachycardia   • Kidney disease   • Depression     Past Medical History:   Diagnosis Date   • Arthritis    • Colon polyps     Dr Bates   • COPD (chronic obstructive pulmonary disease) (CMS/[facility])    • History of emphysema    • Hyperlipidemia    • Hypertension    • Kidney disease    • Osteoporosis    • Restless leg syndrome     sees Dr Seipel   • Sleep apnea     npsg [facility] , ahi 5.9, on auto bipap min 8 max, PS 2-8-Alcaraz's, nasal mask     Past Surgical History:   Procedure Laterality Date   • CATARACT EXTRACTION      Dr Moyer   • CEREBRAL ANEURYSM REPAIR      Brain Aneurysm approx , treated with stents and coils, Dr. Fraga   • COLON SURGERY      partial colectomy- Dr Valladares- due to multiple large polyps   • EXPLORATORY LAPAROTOMY      lysis of intra abdominal adhesions, primary ventral hernia repair 2018 Dr West   • WRIST SURGERY      wrist fracture - Dr Patton       Therapy Treatment    Rehabilitation Treatment Summary     Row Name 19 1300             Treatment Time/Intention    Discipline  occupational therapist  -MP       Document Type  therapy note (daily note)  -MP      Subjective Information  complains of;fatigue  -MP      Mode of Treatment  occupational therapy  -MP      Patient/Family Observations  Pt. seated up in armchair, bipap  -MP      Recorded by [MP] Virgil Finney, OT 11/14/19 1316      Row Name 11/14/19 1300             Vital Signs    Pre SpO2 (%)  96  -MP      Intra SpO2 (%)  96  -MP      Post SpO2 (%)  97  -MP      Recorded by [MP] Virgil Finney OT 11/14/19 1316      Row Name 11/14/19 1300             Cognitive Assessment/Intervention- PT/OT    Orientation Status (Cognition)  oriented x 3  -MP      Recorded by [MP] Virgil Finney, OT 11/14/19 1316      Row Name 11/14/19 1300             Transfer Assessment/Treatment    Transfer Assessment/Treatment  sit-stand transfer;stand-sit transfer  -MP      Recorded by [MP] Virgil Finney, OT 11/14/19 1316      Row Name 11/14/19 1300             Sit-Stand Transfer    Sit-Stand [place] (Transfers)  supervision  -MP      Recorded by [MP] Virgil Finney OT 11/14/19 1316      Row Name 11/14/19 1300             Stand-Sit Transfer    Stand-Sit [place] (Transfers)  supervision  -MP      Recorded by [MP] Virgil Finney OT 11/14/19 1316      Row Name 11/14/19 1300             Motor Skills Assessment/Interventions    Additional Documentation  Therapeutic Exercise (Group)  -MP      Recorded by [MP] Virgil Finney OT 11/14/19 1316      Row Name 11/14/19 1300             Therapeutic Exercise    Upper Extremity (Therapeutic Exercise)  bicep curl, bilateral;tricep extension, bilateral;reverse fly away  -MP      Weight/Resistance (Therapeutic Exercise)  red  -MP      Exercise Type (Therapeutic Exercise)  resistive exercises  -MP      Position (Therapeutic Exercise)  seated  -MP      Sets/Reps (Therapeutic Exercise)  1/15  -MP      Recorded by [MP] Virgil Finney OT 11/14/19 1316      Row Name 11/14/19 1300             Balance    Balance  dynamic  standing balance  -MP      Recorded by [MP] Virgil Finney OT 11/14/19 1316      Row Name 11/14/19 1300             Static Standing Balance    Level of [place] (Supported Standing, Static Balance)  supervision  -MP      Time Able to Maintain Position (Supported Standing, Static Balance)  4 to 5 minutes  -MP      Recorded by [MP] Virgil Finney OT 11/14/19 1316      Row Name 11/14/19 1300             Dynamic Standing Balance    Level of [place], Reaches Outside Midline (Standing, Dynamic Balance)  contact guard assist;1 person assist  -MP      Recorded by [MP] Virgil Finney, OT 11/14/19 1316      Row Name 11/14/19 1300             Positioning and Restraints    Pre-Treatment Position  sitting in chair/recliner  -MP      Post Treatment Position  chair  -MP      In Chair  call light within reach;encouraged to call for assist;exit alarm on  -MP      Recorded by [MP] Virgil Finney OT 11/14/19 1316      Row Name 11/14/19 1300             Pain Scale: Numbers Pre/Post-Treatment    Pain Scale: Numbers, Pretreatment  0/10 - no pain  -MP      Pain Scale: Numbers, Post-Treatment  0/10 - no pain  -MP      Recorded by [MP] Virgil Finney OT 11/14/19 1316      Row Name 11/14/19 1300             Plan of Care Review    Plan of Care Reviewed With  patient  -MP      Recorded by [MP] Virgil Finney OT 11/14/19 1316      Row Name 11/14/19 1300             Outcome Summary/Treatment Plan (OT)    Daily Summary of Progress (OT)  progress toward functional goals is good  -MP      Anticipated Discharge Disposition (OT)  inpatient rehabilitation facility pulmonary rehab  -MP      Recorded by [MP] Virgil Finney OT 11/14/19 1316        User Key  (r) = Recorded By, (t) = Taken By, (c) = Cosigned By    Initials Name Effective Dates Discipline    Virgil Torres OT 03/01/19 -  OT             Occupational Therapy Education     Title: PT OT SLP Therapies (In Progress)     Topic: Occupational  Therapy (In Progress)     Point: ADL training (Done)     Description: Instruct learner(s) on proper safety adaptation and remediation techniques during self care or transfers.   Instruct in proper use of assistive devices.    Learning Progress Summary           Patient Acceptance, E,TB, VU by PEARL at 11/12/2019  4:04 PM                   Point: Home exercise program (Done)     Description: Instruct learner(s) on appropriate technique for monitoring, assisting and/or progressing therapeutic exercises/activities.    Learning Progress Summary           Patient Acceptance, E,TB, VU by DUYEN at 11/14/2019  1:18 PM                   Point: Precautions (Done)     Description: Instruct learner(s) on prescribed precautions during self-care and functional transfers.    Learning Progress Summary           Patient Acceptance, E,TB, VU by DEB at 11/14/2019 12:40 AM                               User Key     Initials Effective Dates Name Provider Type Discipline    DUYEN 03/01/19 -  Virgil Finney OT Occupational Therapist OT    ES 03/01/19 -  Lay Poole OT Occupational Therapist OT    DEB 03/01/19 -  No Gautam RN Registered Nurse Nurse                OT Recommendation and Plan  Outcome Summary/Treatment Plan (OT)  Daily Summary of Progress (OT): progress toward functional goals is good  Anticipated Discharge Disposition (OT): inpatient rehabilitation facility(pulmonary rehab)  Daily Summary of Progress (OT): progress toward functional goals is good  Plan of Care Review  Plan of Care Reviewed With: patient  Plan of Care Reviewed With: patient  Outcome Summary: Pt. demonstrates progress w/ BUE HEP this date, albeit pt. progress limited secondary to continued use of Bipap and decreased activity tolerance. Pt. not safe to d/c home and will require pulmonary rehab at d/c.       Time Calculation:   Time Calculation- OT     Row Name 11/14/19 1319             Time Calculation- OT    OT Start Time  1030  -      OT Stop Time  1050   -MP      OT Time Calculation (min)  20 min  -      Total Timed Code Minutes- OT  20 minute(s)  -MP      OT Received On  11/14/19  -      OT - Next Appointment  11/18/19  -        User Key  (r) = Recorded By, (t) = Taken By, (c) = Cosigned By    Initials Name Provider Type    Virgil Torres OT Occupational Therapist        Therapy Charges for Today     Code Description Service Date Service Provider Modifiers Qty    [id number]  OT THERAPEUTIC ACT EA 15 MIN 11/14/2019 Virgil Finney OT GO 1    [id number]  OT THER PROC EA 15 MIN 11/14/2019 Virgil Finney OT GO 1               Virgil Finney OT  11/14/2019

## 2019-11-14 NOTE — PLAN OF CARE
Problem: Patient Care Overview  Goal: Plan of Care Review  Outcome: Ongoing (interventions implemented as appropriate)   11/14/19 9386   Coping/Psychosocial   Plan of Care Reviewed With patient   Plan of Care Review   Progress improving   OTHER   Outcome Summary Pt. demonstrates progress w/ BUE HEP this date, albeit pt. progress limited secondary to continued use of Bipap and decreased activity tolerance. Pt. not safe to d/c home and will require pulmonary rehab at d/c.

## 2019-11-14 NOTE — PROGRESS NOTES
[facility] Medicine Services Daily Progress Note      Hospitalist Team  LOS 3 days      Patient Care Team:  Aristeo Mauro MD as PCP - General (Internal Medicine)  Melquiades Devlin APRN (Nurse Practitioner)        Chief Complaint / Subjective  Chief Complaint   Patient presents with   • Shortness of Breath     Patient reports feeling better today.  She is still on the BiPAP.  She reports some mild constipation and requests medication to help that.    Brief Synopsis of Hospital Course/HPI  Patient is a 69-year-old female with a history of COPD and chronic hypoxic respiratory failure, IRIS on BiPAP, HTN, HLD and RLS who to the emergency room 11/11/2019 complaining of shortness of breath and was placed on BiPAP and  then AVAPS for respiratory failure and acute COPD exacerbation.  The patient's respiratory virus panel came back positive for RSV PCR.  She also had evidence of pulmonary edema and responded well to IV Lasix.  BNP was normal.  She developed some tachycardia which was felt to be partly related to her illness and to beta-blocker withdrawal having been off of her home metoprolol.  Cardizem was not controlling her heart rate and metoprolol was added back with improvement.  Cardiology following.      Review of systems   12 point review of systems was reviewed and was negative except as above.      Family History   Problem Relation Age of Onset   • Diabetes Mother    • Hypertension Mother    • Colon cancer Father    • Colon cancer Sister    • Arthritis Brother    • Hypertension Brother    • Allergies Other        Past Medical History:   Diagnosis Date   • Arthritis    • Colon polyps     Dr Bates   • COPD (chronic obstructive pulmonary disease) (CMS/[facility])    • History of emphysema    • Hyperlipidemia    • Hypertension    • Kidney disease    • Osteoporosis    • Restless leg syndrome     sees Dr Seipel   • Sleep apnea     npsg [facility] 2014, ahi 5.9, on auto bipap min 8 max, PS 2-8-Alcaraz's, nasal  "mask       Social History     Socioeconomic History   • Marital status:      Spouse name: Not on file   • Number of children: Not on file   • Years of education: Not on file   • Highest education level: Not on file   Tobacco Use   • Smoking status: Former Smoker   Substance and Sexual Activity   • Drug use: No           Objective      Vital Signs  Temp:  [96.4 °F (35.8 °C)-97.9 °F (36.6 °C)] 97.9 °F (36.6 °C)  Heart Rate:  [] 92  Resp:  [22-31] 31  BP: ()/() 97/74  Oxygen Therapy  SpO2: 94 %  Pulse Oximetry Type: Continuous  Device (Oxygen Therapy): NPPV/NIV  Flow (L/min): 40  Oxygen Concentration (%): 40  Flowsheet Rows      First Filed Value   Admission Height  167.6 cm (66\") Documented at 11/11/2019 0530   Admission Weight  107 kg (235 lb) Documented at 11/11/2019 0530        Intake & Output (last 3 days)       11/11 0701 - 11/12 0700 11/12 0701 - 11/13 0700 11/13 0701 - 11/14 0700 11/14 0701 - 11/15 0700    P.O. 240  444     IV Piggyback 300  200     Total Intake(mL/kg) 540 (5)  644 (6)     Urine (mL/kg/hr)  200 (0.1)      Total Output  200      Net +540 -200 +644                 Lines, Drains & Airways    Active LDAs     Name:   Placement date:   Placement time:   Site:   Days:    Peripheral IV 11/12/19 2244 Left Antecubital   11/12/19 2244    Antecubital   1                  Physical Exam:   Well-developed over nourished female sitting up in the chair on BiPAP in no acute distress; lungs with decreased air entry but no adventitial noises bilaterally; CV regular rate and rhythm; abdomen soft and nontender; extremities with no edema or calf tenderness; no Huertas catheter.      Procedures:              Results Review:     I reviewed the patient's new clinical results.    Results from last 7 days   Lab Units 11/14/19  0445 11/13/19  1313 11/11/19  0538   WBC 10*3/mm3 9.50 10.00 11.40*   HEMOGLOBIN g/dL 15.1 16.3* 14.0   HEMATOCRIT % 48.5* 48.4* 42.4   PLATELETS 10*3/mm3 132* 144 142     "     Results from last 7 days   Lab Units 11/14/19  0445 11/13/19  1313 11/11/19  0538   SODIUM mmol/L 139 140 138   POTASSIUM mmol/L 4.7 4.1 4.4   CHLORIDE mmol/L 98 95* 98   CO2 mmol/L 27.0 25.0 25.0   BUN mg/dL 44* 37* 21   CREATININE mg/dL 1.17* 1.06* 1.10*   CALCIUM mg/dL 9.8 9.9 9.7   BILIRUBIN mg/dL  --   --  0.2   ALK PHOS U/L  --   --  74   ALT (SGPT) U/L  --   --  9   AST (SGOT) U/L  --   --  13   GLUCOSE mg/dL 155* 135* 131*     Results from last 7 days   Lab Units 11/13/19  1313   MAGNESIUM mg/dL 2.6*     Lab Results   Component Value Date    CALCIUM 9.8 11/14/2019    PHOS 4.0 08/05/2018     No results found for: HGBA1C  Results from last 7 days   Lab Units 11/11/19  0537   INR  1.03       Results from last 7 days   Lab Units 11/11/19  0639   PH, ARTERIAL pH units 7.302*   PO2 ART mm Hg 115.9*   PCO2, ARTERIAL mm Hg 61.1*   HCO3 ART mmol/L 30.3*         Microbiology Results (last 10 days)     Procedure Component Value - Date/Time    Respiratory Panel, PCR - Swab, Nasopharynx [[id number]]  (Abnormal) Collected:  11/11/19 2138    Lab Status:  Final result Specimen:  Swab from Nasopharynx Updated:  11/11/19 2315     ADENOVIRUS, PCR Not Detected     Coronavirus 229E Not Detected     Coronavirus HKU1 Not Detected     Coronavirus NL63 Not Detected     Coronavirus OC43 Not Detected     Human Metapneumovirus Not Detected     Human Rhinovirus/Enterovirus Not Detected     Influenza B PCR Not Detected     Parainfluenza Virus 1 Not Detected     Parainfluenza Virus 2 Not Detected     Parainfluenza Virus 3 Not Detected     Parainfluenza Virus 4 Not Detected     Bordetella pertussis pcr Not Detected     Influenza A H1 2009 PCR Not Detected     Chlamydophila pneumoniae PCR Not Detected     Mycoplasma pneumo by PCR Not Detected     Influenza A PCR Not Detected     Influenza A H3 Not Detected     Influenza A H1 Not Detected     RSV, PCR Detected    Blood Culture - Blood, Hand, Left [[id number]] Collected:  11/11/19 3888     Lab Status:  Preliminary result Specimen:  Blood from Hand, Left Updated:  11/14/19 0546     Blood Culture No growth at 3 days    Blood Culture - Blood, Arm, Right [[id number]] Collected:  11/11/19 0537    Lab Status:  Preliminary result Specimen:  Blood from Arm, Right Updated:  11/14/19 0546     Blood Culture No growth at 3 days          ECG/EMG Results (most recent)     Procedure Component Value Units Date/Time    ECG 12 Lead [[id number]] Collected:  11/11/19 0532     Updated:  11/11/19 0657    Narrative:       HEART RATE= 136  bpm  RR Interval= 442  ms  AK Interval= 157  ms  P Horizontal Axis= 10  deg  P Front Axis= 80  deg  QRSD Interval= 159  ms  QT Interval= 293  ms  QRS Axis= 107  deg  T Wave Axis= 106  deg  - ABNORMAL ECG -  Sinus tachycardia  Ventricular premature complex  Abnormal T, consider ischemia, lateral leads  When compared with ECG of 01-Aug-2018 8:38:14,  Significant repolarization change  Electronically Signed By: Miguel Angel Perez ([place]oro) 11-Nov-2019 06:56:58  Date and Time of Study: 2019-11-11 05:32:57    ECG 12 Lead [[id number]] Collected:  11/12/19 2342     Updated:  11/12/19 2344    Narrative:       HEART RATE= 115  bpm  RR Interval= 524  ms  AK Interval= 129  ms  P Horizontal Axis=   deg  P Front Axis= 59  deg  QRSD Interval= 84  ms  QT Interval= 323  ms  QRS Axis= 60  deg  T Wave Axis= 30  deg  - BORDERLINE ECG -  Sinus tachycardia  Probable left atrial enlargement  Electronically Signed By:   Date and Time of Study: 2019-11-12 23:42:56                    Xr Chest 1 View    Result Date: 11/11/2019  Findings suggestive of COPD/emphysema without acute cardiopulmonary abnormality.  Electronically Signed By-Waylon Rod On:11/11/2019 7:12 AM This report was finalized on [id number] by  Waylon Rod, .      Xrays, labs reviewed personally by physician.    Medication Review:   I have reviewed the patient's current medication list      Scheduled Meds    acetaZOLAMIDE (DIAMOX) IVPB 250 mg  Intravenous Daily   aspirin 81 mg Oral Daily   budesonide 0.5 mg Nebulization BID - RT   buPROPion  mg Oral Q12H   cefTRIAXone (ROCEPHIN) 1GM IVPB in 100 mL NS (MBP) 1 g Intravenous Q24H   cholecalciferol 2,000 Units Oral BID   dilTIAZem  mg Oral Q24H   doxycycline 100 mg Intravenous Q12H   enoxaparin 40 mg Subcutaneous Q24H   furosemide 40 mg Intravenous Daily   gabapentin 900 mg Oral Nightly   guaiFENesin 600 mg Oral Q12H   ipratropium-albuterol 3 mL Nebulization Q6H While Awake - RT   ipratropium-albuterol 3 mL Nebulization Q6H - RT   methylPREDNISolone sodium succinate 20 mg Intravenous Q12H   metoprolol succinate XL 50 mg Oral Daily   montelukast 10 mg Oral Nightly   pantoprazole 40 mg Oral BID AC   rOPINIRole 1 mg Oral BID   rosuvastatin 10 mg Oral Daily   theophylline 300 mg Oral Q24H   vitamin B-12 1,000 mcg Oral Daily       Meds Infusions    nitroglycerin 10-50 mcg/min Last Rate: Stopped (11/11/19 1800)       Meds PRN  ipratropium-albuterol  •  ipratropium-albuterol  •  sodium chloride  •  [COMPLETED] Insert peripheral IV **AND** sodium chloride        Assessment / Plan    Active Hospital Problems    Diagnosis  POA   • **Acute on chronic respiratory failure with hypoxia (CMS/HCC) [J96.21]  Yes     Priority: High   • Sleep apnea [G47.30]  Yes     Priority: High   • COPD with acute exacerbation (CMS/HCC) [J44.1]  Yes     Priority: High   • Acute bronchitis due to respiratory syncytial virus (RSV) [J20.5]  Yes     Priority: High   • Acute pulmonary edema (CMS/HCC) [J81.0]  Yes     Priority: High   • Depression [F32.9]  Yes     Priority: Medium   • Hypertension [I10]  Yes     Priority: Medium   • Tachycardia [R00.0]  Yes     Priority: Medium   • Hyperlipidemia [E78.5]  Yes     Priority: Low   • Restless leg syndrome [G25.81]  Yes     Priority: Low   • Kidney disease [N28.9]  Yes   • Obesity (BMI 30-39.9) [E66.9]  Yes      Resolved Hospital Problems   No resolved problems to display.     Plan:  1.   "Continue Diamox, Pulmicort, duo nebs, guaifenesin, Solu-Medrol, Rocephin, doxycycline, Singulair, Lasix and theophylline per Dr. Sifuentes.  2.  DVT prophylaxis: Lovenox  3.  Acute constipation: MiraLAX  4.  Continue aspirin, metoprolol and Cardizem.  5.  Cardiology following and further cardiac work-up will depend on their recommendations, but will occur after the patient improves from her pulmonary illness.  6.  She reports that she has been on Wellbutrin for depression for the past 6 months and she had to cut back from twice a day to once a day because she started out seeing things.\"  She plans to talk to her PCP about going off of it versus changing it to something else because she does not feel that it is working.  7.  Patient is triggering positive sepsis screening but she has no evidence of sepsis at this time.  I discussed the case with Dr. Sifuentes 11/14/19.    Virgen Pascual MD  11/14/19  9:15 AM      "

## 2019-11-14 NOTE — PROGRESS NOTES
Daily Progress Note        Acute on chronic respiratory failure with hypoxia (CMS/HCC)    Hyperlipidemia    Essential hypertension    Sleep apnea    Restless leg syndrome    COPD with acute exacerbation (CMS/HCC)    Acute bronchitis due to respiratory syncytial virus (RSV)    Acute pulmonary edema (CMS/HCC)    Obesity (BMI 30-39.9)    Tachycardia      Assessment  Tachycardia  Acute/chronic hypercapnic hypoxic respiratory failure   acute COPD exacerbation due to RSV virus  acute bronchitis   pulmonary edema    hyperlipidemia  Hypertension  Chronic kidney disease  Restless leg syndrome  Obstructive sleep apnea, patient use home auto BiPAP  History of cerebral aneurysm repair  History of partial colectomy  Cataract extraction  Diabetes mellitus      Plan  Heart rate controll  BiPAP   resume home Wellbutrin  antibiotics Rocephin and doxycycline  Diuresis   Bronchodilators desonide and DuoNeb  Oxygen titration  DVT prophylaxis  GI prophylaxis  Glycemic control       LOS: 2 days     Subjective         Objective     Vital signs for last 24 hours:  Vitals:    11/13/19 1817 11/13/19 1827 11/13/19 1834 11/13/19 2243   BP:   (!) 151/14 112/67   BP Location:    Right arm   Patient Position:    Lying   Pulse:  115 116 96   Resp: 28 24 (!) 29 23   Temp: 97.5 °F (36.4 °C)   97.3 °F (36.3 °C)   TempSrc: Axillary   Oral   SpO2:  98% 98% 97%   Weight:       Height:           Intake/Output last 3 shifts:  I/O last 3 completed shifts:  In: 444 [P.O.:444]  Out: 200 [Urine:200]  Intake/Output this shift:  No intake/output data recorded.      Radiology  Imaging Results (Last 24 Hours)     ** No results found for the last 24 hours. **          Labs:  Results from last 7 days   Lab Units 11/13/19  1313   WBC 10*3/mm3 10.00   HEMOGLOBIN g/dL 16.3*   HEMATOCRIT % 48.4*   PLATELETS 10*3/mm3 144     Results from last 7 days   Lab Units 11/13/19  1313 11/11/19  0538   SODIUM mmol/L 140 138   POTASSIUM mmol/L 4.1 4.4   CHLORIDE mmol/L 95* 98    CO2 mmol/L 25.0 25.0   BUN mg/dL 37* 21   CREATININE mg/dL 1.06* 1.10*   CALCIUM mg/dL 9.9 9.7   BILIRUBIN mg/dL  --  0.2   ALK PHOS U/L  --  74   ALT (SGPT) U/L  --  9   AST (SGOT) U/L  --  13   GLUCOSE mg/dL 135* 131*     Results from last 7 days   Lab Units 11/11/19  0639   PH, ARTERIAL pH units 7.302*   PO2 ART mm Hg 115.9*   PCO2, ARTERIAL mm Hg 61.1*   HCO3 ART mmol/L 30.3*     Results from last 7 days   Lab Units 11/11/19  0538   ALBUMIN g/dL 4.20     Results from last 7 days   Lab Units 11/11/19  0538   TROPONIN T ng/mL <0.010         Results from last 7 days   Lab Units 11/13/19  1313   MAGNESIUM mg/dL 2.6*     Results from last 7 days   Lab Units 11/11/19  0537   INR  1.03   APTT seconds 24.6     Results from last 7 days   Lab Units 11/13/19  1313   TSH uIU/mL 1.760           Meds:   SCHEDULE    acetaZOLAMIDE (DIAMOX) IVPB 250 mg Intravenous Daily   aspirin 81 mg Oral Daily   budesonide 0.5 mg Nebulization BID - RT   buPROPion  mg Oral Q12H   cefTRIAXone (ROCEPHIN) 1GM IVPB in 100 mL NS (MBP) 1 g Intravenous Q24H   cholecalciferol 2,000 Units Oral BID   dilTIAZem  mg Oral Q24H   doxycycline 100 mg Intravenous Q12H   enoxaparin 40 mg Subcutaneous Q24H   furosemide 40 mg Intravenous Daily   gabapentin 900 mg Oral Nightly   guaiFENesin 600 mg Oral Q12H   ipratropium-albuterol 3 mL Nebulization Q6H While Awake - RT   ipratropium-albuterol 3 mL Nebulization Q6H - RT   methylPREDNISolone sodium succinate 20 mg Intravenous Q12H   metoprolol succinate XL 50 mg Oral Daily   montelukast 10 mg Oral Nightly   pantoprazole 40 mg Oral BID AC   rOPINIRole 1 mg Oral BID   rosuvastatin 10 mg Oral Daily   theophylline 300 mg Oral Q24H   vitamin B-12 1,000 mcg Oral Daily     Infusions    nitroglycerin 10-50 mcg/min Last Rate: Stopped (11/11/19 1800)     PRNs  ipratropium-albuterol  •  ipratropium-albuterol  •  sodium chloride  •  [COMPLETED] Insert peripheral IV **AND** sodium chloride    Physical  Exam:  Physical Exam   Cardiovascular:   Murmur heard.  Pulmonary/Chest: No respiratory distress. She has wheezes. She has rales. She exhibits no tenderness.   Abdominal: She exhibits no distension. There is no tenderness.   Musculoskeletal: She exhibits edema.       ROS  Review of Systems   HENT: Positive for congestion, rhinorrhea and sneezing.    Respiratory: Positive for cough, shortness of breath and wheezing. Negative for apnea, choking, chest tightness and stridor.    Cardiovascular: Positive for leg swelling.             Total time spent with patient greater than: 1 Hour

## 2019-11-14 NOTE — PROGRESS NOTES
Continued Stay Note  [facility]     Patient Name: Michelle Francis  MRN: [id number]  Today's Date: 11/14/2019    Admit Date: 11/11/2019      [facility] States patient's PCP uses another C. [facility]enita from [facility] informed.       Payton Bateman RN, CM  Office Phone [phone]  Cell [phone]

## 2019-11-14 NOTE — PLAN OF CARE
Problem: Breathing Pattern Ineffective (Adult)  Goal: Effective Oxygenation/Ventilation  Outcome: Ongoing (interventions implemented as appropriate)  Patient oxygen saturation is 96% on 40% oxygen with the AVAP machine.   11/14/19 1140   Breathing Pattern Ineffective (Adult)   Effective Oxygenation/Ventilation making progress toward outcome     Goal: Anxiety/Fear Reduction   11/14/19 1140   Breathing Pattern Ineffective (Adult)   Anxiety/Fear Reduction making progress toward outcome

## 2019-11-14 NOTE — PLAN OF CARE
Problem: Fall Risk (Adult)  Goal: Absence of Fall  Outcome: Ongoing (interventions implemented as appropriate)   11/14/19 1139   Fall Risk (Adult)   Absence of Fall making progress toward outcome

## 2019-11-14 NOTE — PROGRESS NOTES
LOS: 3 days   Admiting Physician- Virgen Pascual MD    Reason For Followup:    Sinus tachycardia  Chest discomfort  COPD exacerbation  Hypertension    Subjective     Patient is sitting up in chair.  She was eating breakfast.  Shortness of breath is better.    Objective     Shortness of breath is improving    Review of Systems:   Review of Systems   Constitution: Negative for chills and fever.   HENT: Negative for ear discharge and nosebleeds.    Eyes: Negative for discharge and redness.   Cardiovascular: Negative for chest pain, orthopnea, palpitations, paroxysmal nocturnal dyspnea and syncope.   Respiratory: Positive for shortness of breath. Negative for cough and wheezing.    Endocrine: Negative for heat intolerance.   Skin: Negative for rash.   Musculoskeletal: Negative for arthritis and myalgias.   Gastrointestinal: Negative for abdominal pain, melena, nausea and vomiting.   Genitourinary: Negative for dysuria and hematuria.   Neurological: Negative for dizziness, light-headedness, numbness and tremors.   Psychiatric/Behavioral: Negative for depression. The patient is not nervous/anxious.          Vital Signs  Vitals:    11/14/19 0259 11/14/19 0629 11/14/19 0651 11/14/19 0705   BP: 109/62 97/74     BP Location: Right arm Right arm     Patient Position: Lying Lying     Pulse: 89 108 94 92   Resp: 22 28 27 (!) 31   Temp: 96.4 °F (35.8 °C) 97.9 °F (36.6 °C)     TempSrc: Axillary Axillary     SpO2: 95% 98% 100% 94%   Weight:       Height:         Wt Readings from Last 1 Encounters:   11/11/19 107 kg (235 lb)       Intake/Output Summary (Last 24 hours) at 11/14/2019 0841  Last data filed at 11/14/2019 0635  Gross per 24 hour   Intake 644 ml   Output —   Net 644 ml     Physical Exam:  Physical Exam   Constitutional: She is oriented to person, place, and time. She appears well-developed and well-nourished.   HENT:   Head: Normocephalic and atraumatic.   Eyes: No scleral icterus.   Neck: No thyromegaly present.      Cardiovascular: Normal rate, regular rhythm and normal heart sounds. Exam reveals no gallop and no friction rub.   No murmur heard.  Pulmonary/Chest: Effort normal. No respiratory distress. She has wheezes. She has rales.   Abdominal: There is no tenderness.   Musculoskeletal: She exhibits no edema.   Lymphadenopathy:     She has no cervical adenopathy.   Neurological: She is alert and oriented to person, place, and time.   Skin: No rash noted. No erythema.   Psychiatric: She has a normal mood and affect.       Results Review:   Lab Results (last 24 hours)     Procedure Component Value Units Date/Time    Blood Culture - Blood, Hand, Left [[id number]] Collected:  11/11/19 0537    Specimen:  Blood from Hand, Left Updated:  11/14/19 0546     Blood Culture No growth at 3 days    Blood Culture - Blood, Arm, Right [[id number]] Collected:  11/11/19 0537    Specimen:  Blood from Arm, Right Updated:  11/14/19 0546     Blood Culture No growth at 3 days    Basic Metabolic Panel [[id number]]  (Abnormal) Collected:  11/14/19 0445    Specimen:  Blood Updated:  11/14/19 0526     Glucose 155 mg/dL      BUN 44 mg/dL      Creatinine 1.17 mg/dL      Sodium 139 mmol/L      Potassium 4.7 mmol/L      Chloride 98 mmol/L      CO2 27.0 mmol/L      Calcium 9.8 mg/dL      eGFR Non African Amer 46 mL/min/1.73      BUN/Creatinine Ratio 37.6     Anion Gap 14.0 mmol/L     Narrative:       GFR Normal >60  Chronic Kidney Disease <60  Kidney Failure <15    CBC & Differential [[id number]] Collected:  11/14/19 0445    Specimen:  Blood Updated:  11/14/19 0500    Narrative:       The following orders were created for panel order CBC & Differential.  Procedure                               Abnormality         Status                     ---------                               -----------         ------                     CBC Auto Differential[[id number]]        Abnormal            Final result                 Please view results for these tests on the  individual orders.    CBC Auto Differential [[id number]]  (Abnormal) Collected:  11/14/19 0445    Specimen:  Blood Updated:  11/14/19 0500     WBC 9.50 10*3/mm3      RBC 5.50 10*6/mm3      Hemoglobin 15.1 g/dL      Hematocrit 48.5 %      MCV 88.1 fL      MCH 27.4 pg      Comment: Result checked         MCHC 31.1 g/dL      Comment: Result checked         RDW 16.2 %      RDW-SD 50.3 fl      MPV 8.8 fL      Platelets 132 10*3/mm3      Neutrophil % 74.8 %      Lymphocyte % 17.4 %      Monocyte % 7.4 %      Eosinophil % 0.0 %      Basophil % 0.4 %      Neutrophils, Absolute 7.10 10*3/mm3      Lymphocytes, Absolute 1.60 10*3/mm3      Monocytes, Absolute 0.70 10*3/mm3      Eosinophils, Absolute 0.00 10*3/mm3      Basophils, Absolute 0.00 10*3/mm3      nRBC 0.0 /100 WBC     Basic Metabolic Panel [[id number]]  (Abnormal) Collected:  11/13/19 1313    Specimen:  Blood Updated:  11/13/19 1525     Glucose 135 mg/dL      BUN 37 mg/dL      Creatinine 1.06 mg/dL      Sodium 140 mmol/L      Potassium 4.1 mmol/L      Chloride 95 mmol/L      CO2 25.0 mmol/L      Calcium 9.9 mg/dL      eGFR Non African Amer 51 mL/min/1.73      BUN/Creatinine Ratio 34.9     Anion Gap 20.0 mmol/L     Narrative:       GFR Normal >60  Chronic Kidney Disease <60  Kidney Failure <15    BNP [[id number]]  (Normal) Collected:  11/13/19 1313    Specimen:  Blood Updated:  11/13/19 1400     proBNP 159.5 pg/mL     Narrative:       Among patients with dyspnea, NT-proBNP is highly sensitive for the detection of acute congestive heart failure. In addition NT-proBNP of <300 pg/ml effectively rules out acute congestive heart failure with 99% negative predictive value.    TSH [[id number]]  (Normal) Collected:  11/13/19 1313    Specimen:  Blood Updated:  11/13/19 1400     TSH 1.760 uIU/mL     Magnesium [[id number]]  (Abnormal) Collected:  11/13/19 1313    Specimen:  Blood Updated:  11/13/19 1341     Magnesium 2.6 mg/dL     CBC (No Diff) [[id number]]  (Abnormal) Collected:   11/13/19 1313    Specimen:  Blood Updated:  11/13/19 1326     WBC 10.00 10*3/mm3      RBC 5.53 10*6/mm3      Hemoglobin 16.3 g/dL      Hematocrit 48.4 %      MCV 87.6 fL      MCH 29.6 pg      MCHC 33.8 g/dL      RDW 16.3 %      RDW-SD 50.8 fl      MPV 8.8 fL      Platelets 144 10*3/mm3         Imaging Results (Last 72 Hours)     ** No results found for the last 72 hours. **        ECG/EMG Results (most recent)     Procedure Component Value Units Date/Time    ECG 12 Lead [[id number]] Collected:  11/11/19 0532     Updated:  11/11/19 0657    Narrative:       HEART RATE= 136  bpm  RR Interval= 442  ms  IN Interval= 157  ms  P Horizontal Axis= 10  deg  P Front Axis= 80  deg  QRSD Interval= 159  ms  QT Interval= 293  ms  QRS Axis= 107  deg  T Wave Axis= 106  deg  - ABNORMAL ECG -  Sinus tachycardia  Ventricular premature complex  Abnormal T, consider ischemia, lateral leads  When compared with ECG of 01-Aug-2018 8:38:14,  Significant repolarization change  Electronically Signed By: Miguel Angel Perez ([place]) 11-Nov-2019 06:56:58  Date and Time of Study: 2019-11-11 05:32:57    ECG 12 Lead [[id number]] Collected:  11/12/19 2342     Updated:  11/12/19 2344    Narrative:       HEART RATE= 115  bpm  RR Interval= 524  ms  IN Interval= 129  ms  P Horizontal Axis=   deg  P Front Axis= 59  deg  QRSD Interval= 84  ms  QT Interval= 323  ms  QRS Axis= 60  deg  T Wave Axis= 30  deg  - BORDERLINE ECG -  Sinus tachycardia  Probable left atrial enlargement  Electronically Signed By:   Date and Time of Study: 2019-11-12 23:42:56        CBC    Results from last 7 days   Lab Units 11/14/19  0445 11/13/19  1313 11/11/19  0538   WBC 10*3/mm3 9.50 10.00 11.40*   HEMOGLOBIN g/dL 15.1 16.3* 14.0   PLATELETS 10*3/mm3 132* 144 142     BMP   Results from last 7 days   Lab Units 11/14/19  0445 11/13/19  1313 11/11/19  0538   SODIUM mmol/L 139 140 138   POTASSIUM mmol/L 4.7 4.1 4.4   CHLORIDE mmol/L 98 95* 98   CO2 mmol/L 27.0 25.0 25.0   BUN mg/dL 44* 37*  21   CREATININE mg/dL 1.17* 1.06* 1.10*   GLUCOSE mg/dL 155* 135* 131*   MAGNESIUM mg/dL  --  2.6*  --      CMP   Results from last 7 days   Lab Units 11/14/19  0445 11/13/19  1313 11/11/19  0538   SODIUM mmol/L 139 140 138   POTASSIUM mmol/L 4.7 4.1 4.4   CHLORIDE mmol/L 98 95* 98   CO2 mmol/L 27.0 25.0 25.0   BUN mg/dL 44* 37* 21   CREATININE mg/dL 1.17* 1.06* 1.10*   GLUCOSE mg/dL 155* 135* 131*   ALBUMIN g/dL  --   --  4.20   BILIRUBIN mg/dL  --   --  0.2   ALK PHOS U/L  --   --  74   AST (SGOT) U/L  --   --  13   ALT (SGPT) U/L  --   --  9     Cardiac Studies:  Echo-    Stress Myoview-  Cath-      Medication Review:   Scheduled Meds:  acetaZOLAMIDE (DIAMOX) IVPB 250 mg Intravenous Daily   aspirin 81 mg Oral Daily   budesonide 0.5 mg Nebulization BID - RT   buPROPion  mg Oral Q12H   cefTRIAXone (ROCEPHIN) 1GM IVPB in 100 mL NS (MBP) 1 g Intravenous Q24H   cholecalciferol 2,000 Units Oral BID   dilTIAZem  mg Oral Q24H   doxycycline 100 mg Intravenous Q12H   enoxaparin 40 mg Subcutaneous Q24H   furosemide 40 mg Intravenous Daily   gabapentin 900 mg Oral Nightly   guaiFENesin 600 mg Oral Q12H   ipratropium-albuterol 3 mL Nebulization Q6H While Awake - RT   ipratropium-albuterol 3 mL Nebulization Q6H - RT   methylPREDNISolone sodium succinate 20 mg Intravenous Q12H   metoprolol succinate XL 50 mg Oral Daily   montelukast 10 mg Oral Nightly   pantoprazole 40 mg Oral BID AC   rOPINIRole 1 mg Oral BID   rosuvastatin 10 mg Oral Daily   theophylline 300 mg Oral Q24H   vitamin B-12 1,000 mcg Oral Daily     Continuous Infusions:  nitroglycerin 10-50 mcg/min Last Rate: Stopped (11/11/19 1800)     PRN Meds:.ipratropium-albuterol  •  ipratropium-albuterol  •  sodium chloride  •  [COMPLETED] Insert peripheral IV **AND** sodium chloride      Assessment/Plan   Patient Active Problem List   Diagnosis   • Acute on chronic respiratory failure with hypoxia (CMS/HCC)   • Hyperlipidemia   • Essential hypertension   • Sleep  apnea   • Restless leg syndrome   • COPD (chronic obstructive pulmonary disease) (CMS/HCC)   • Arthritis   • Osteoporosis   • COPD with acute exacerbation (CMS/HCC)   • Acute bronchitis due to respiratory syncytial virus (RSV)   • Acute pulmonary edema (CMS/HCC)   • Obesity (BMI 30-39.9)   • Tachycardia     Patient heart rate is improved.  I would continue beta-blocker and Cardizem.  Blood pressure is on borderline but sometimes elevated.  Monitor electrolytes and hemodynamics.  Once the patient is stabilized she would need ischemic evaluation.  I would recommend echocardiogram.  We shall follow    Ryne Staton MD  11/14/19  8:41 AM

## 2019-11-15 LAB
BH CV ECHO MEAS - % IVS THICK: 50.6 %
BH CV ECHO MEAS - % LVPW THICK: 68 %
BH CV ECHO MEAS - ACS: 1.8 CM
BH CV ECHO MEAS - AO MAX PG (FULL): -0.38 MMHG
BH CV ECHO MEAS - AO MAX PG: 4.2 MMHG
BH CV ECHO MEAS - AO MEAN PG (FULL): -0.55 MMHG
BH CV ECHO MEAS - AO MEAN PG: 2.4 MMHG
BH CV ECHO MEAS - AO ROOT AREA (BSA CORRECTED): 1.4
BH CV ECHO MEAS - AO ROOT AREA: 6.9 CM^2
BH CV ECHO MEAS - AO ROOT DIAM: 3 CM
BH CV ECHO MEAS - AO V2 MAX: 102.2 CM/SEC
BH CV ECHO MEAS - AO V2 MEAN: 73.8 CM/SEC
BH CV ECHO MEAS - AO V2 VTI: 15.1 CM
BH CV ECHO MEAS - AVA(I,A): 4.1 CM^2
BH CV ECHO MEAS - AVA(I,D): 4.1 CM^2
BH CV ECHO MEAS - AVA(V,A): 3.3 CM^2
BH CV ECHO MEAS - AVA(V,D): 3.3 CM^2
BH CV ECHO MEAS - BSA(HAYCOCK): 2.3 M^2
BH CV ECHO MEAS - BSA: 2.1 M^2
BH CV ECHO MEAS - BZI_BMI: 37.9 KILOGRAMS/M^2
BH CV ECHO MEAS - BZI_METRIC_HEIGHT: 167.6 CM
BH CV ECHO MEAS - BZI_METRIC_WEIGHT: 106.6 KG
BH CV ECHO MEAS - EDV(CUBED): 68.8 ML
BH CV ECHO MEAS - EDV(MOD-SP2): 49.8 ML
BH CV ECHO MEAS - EDV(MOD-SP4): 55.5 ML
BH CV ECHO MEAS - EDV(TEICH): 74.1 ML
BH CV ECHO MEAS - EF(CUBED): 82.6 %
BH CV ECHO MEAS - EF(MOD-BP): 70 %
BH CV ECHO MEAS - EF(MOD-SP2): 74.7 %
BH CV ECHO MEAS - EF(MOD-SP4): 64.2 %
BH CV ECHO MEAS - EF(TEICH): 75.9 %
BH CV ECHO MEAS - ESV(CUBED): 12 ML
BH CV ECHO MEAS - ESV(MOD-SP2): 12.6 ML
BH CV ECHO MEAS - ESV(MOD-SP4): 19.8 ML
BH CV ECHO MEAS - ESV(TEICH): 17.9 ML
BH CV ECHO MEAS - FS: 44.2 %
BH CV ECHO MEAS - IVS/LVPW: 1
BH CV ECHO MEAS - IVSD: 0.94 CM
BH CV ECHO MEAS - IVSS: 1.4 CM
BH CV ECHO MEAS - LA DIMENSION(2D): 2.9 CM
BH CV ECHO MEAS - LV DIASTOLIC VOL/BSA (35-75): 25.9 ML/M^2
BH CV ECHO MEAS - LV MASS(C)D: 119.2 GRAMS
BH CV ECHO MEAS - LV MASS(C)DI: 55.7 GRAMS/M^2
BH CV ECHO MEAS - LV MASS(C)S: 111 GRAMS
BH CV ECHO MEAS - LV MASS(C)SI: 51.8 GRAMS/M^2
BH CV ECHO MEAS - LV MAX PG: 4.6 MMHG
BH CV ECHO MEAS - LV MEAN PG: 2.9 MMHG
BH CV ECHO MEAS - LV SYSTOLIC VOL/BSA (12-30): 9.3 ML/M^2
BH CV ECHO MEAS - LV V1 MAX: 106.8 CM/SEC
BH CV ECHO MEAS - LV V1 MEAN: 82.8 CM/SEC
BH CV ECHO MEAS - LV V1 VTI: 19.8 CM
BH CV ECHO MEAS - LVIDD: 4.1 CM
BH CV ECHO MEAS - LVIDS: 2.3 CM
BH CV ECHO MEAS - LVOT AREA: 3.1 CM^2
BH CV ECHO MEAS - LVOT DIAM: 2 CM
BH CV ECHO MEAS - LVPWD: 0.92 CM
BH CV ECHO MEAS - LVPWS: 1.6 CM
BH CV ECHO MEAS - MV A MAX VEL: 80.5 CM/SEC
BH CV ECHO MEAS - MV DEC SLOPE: 346.1 CM/SEC^2
BH CV ECHO MEAS - MV DEC TIME: 0.2 SEC
BH CV ECHO MEAS - MV E MAX VEL: 70.7 CM/SEC
BH CV ECHO MEAS - MV E/A: 0.88
BH CV ECHO MEAS - MV MAX PG: 2.1 MMHG
BH CV ECHO MEAS - MV MEAN PG: 1.1 MMHG
BH CV ECHO MEAS - MV V2 MAX: 73.2 CM/SEC
BH CV ECHO MEAS - MV V2 MEAN: 50.1 CM/SEC
BH CV ECHO MEAS - MV V2 VTI: 16 CM
BH CV ECHO MEAS - MVA(VTI): 3.9 CM^2
BH CV ECHO MEAS - PA ACC TIME: 0.08 SEC
BH CV ECHO MEAS - PA MAX PG (FULL): 2.4 MMHG
BH CV ECHO MEAS - PA MAX PG: 5.1 MMHG
BH CV ECHO MEAS - PA MEAN PG (FULL): 1.4 MMHG
BH CV ECHO MEAS - PA MEAN PG: 3 MMHG
BH CV ECHO MEAS - PA PR(ACCEL): 44.7 MMHG
BH CV ECHO MEAS - PA V2 MAX: 112.8 CM/SEC
BH CV ECHO MEAS - PA V2 MEAN: 82 CM/SEC
BH CV ECHO MEAS - PA V2 VTI: 16.7 CM
BH CV ECHO MEAS - RAP SYSTOLE: 3 MMHG
BH CV ECHO MEAS - RV MAX PG: 2.6 MMHG
BH CV ECHO MEAS - RV MEAN PG: 1.5 MMHG
BH CV ECHO MEAS - RV V1 MAX: 81.3 CM/SEC
BH CV ECHO MEAS - RV V1 MEAN: 59.5 CM/SEC
BH CV ECHO MEAS - RV V1 VTI: 11.5 CM
BH CV ECHO MEAS - RVDD: 1.6 CM
BH CV ECHO MEAS - RVSP: 37.8 MMHG
BH CV ECHO MEAS - SI(AO): 48.8 ML/M^2
BH CV ECHO MEAS - SI(CUBED): 26.5 ML/M^2
BH CV ECHO MEAS - SI(LVOT): 28.8 ML/M^2
BH CV ECHO MEAS - SI(MOD-SP2): 17.4 ML/M^2
BH CV ECHO MEAS - SI(MOD-SP4): 16.6 ML/M^2
BH CV ECHO MEAS - SI(TEICH): 26.3 ML/M^2
BH CV ECHO MEAS - SV(AO): 104.6 ML
BH CV ECHO MEAS - SV(CUBED): 56.8 ML
BH CV ECHO MEAS - SV(LVOT): 61.7 ML
BH CV ECHO MEAS - SV(MOD-SP2): 37.2 ML
BH CV ECHO MEAS - SV(MOD-SP4): 35.6 ML
BH CV ECHO MEAS - SV(TEICH): 56.2 ML
BH CV ECHO MEAS - TR MAX VEL: 295.2 CM/SEC
MAXIMAL PREDICTED HEART RATE: 150 BPM
STRESS TARGET HR: 128 BPM

## 2019-11-15 PROCEDURE — 97116 GAIT TRAINING THERAPY: CPT

## 2019-11-15 PROCEDURE — 25010000002 FUROSEMIDE PER 20 MG: Performed by: INTERNAL MEDICINE

## 2019-11-15 PROCEDURE — 25010000002 ACETAZOLAMIDE PER 500 MG: Performed by: INTERNAL MEDICINE

## 2019-11-15 PROCEDURE — 25010000002 METHYLPREDNISOLONE PER 40 MG: Performed by: INTERNAL MEDICINE

## 2019-11-15 PROCEDURE — 94660 CPAP INITIATION&MGMT: CPT

## 2019-11-15 PROCEDURE — 94799 UNLISTED PULMONARY SVC/PX: CPT

## 2019-11-15 PROCEDURE — 25010000002 CEFTRIAXONE PER 250 MG: Performed by: INTERNAL MEDICINE

## 2019-11-15 PROCEDURE — 25010000002 ENOXAPARIN PER 10 MG: Performed by: INTERNAL MEDICINE

## 2019-11-15 PROCEDURE — 93306 TTE W/DOPPLER COMPLETE: CPT | Performed by: INTERNAL MEDICINE

## 2019-11-15 PROCEDURE — 97530 THERAPEUTIC ACTIVITIES: CPT

## 2019-11-15 PROCEDURE — 94760 N-INVAS EAR/PLS OXIMETRY 1: CPT

## 2019-11-15 PROCEDURE — 99232 SBSQ HOSP IP/OBS MODERATE 35: CPT | Performed by: HOSPITALIST

## 2019-11-15 RX ADMIN — METHYLPREDNISOLONE SODIUM SUCCINATE 20 MG: 40 INJECTION, POWDER, FOR SOLUTION INTRAMUSCULAR; INTRAVENOUS at 21:35

## 2019-11-15 RX ADMIN — MONTELUKAST SODIUM 10 MG: 10 TABLET, COATED ORAL at 21:36

## 2019-11-15 RX ADMIN — BUDESONIDE 0.5 MG: 0.5 INHALANT RESPIRATORY (INHALATION) at 19:10

## 2019-11-15 RX ADMIN — ROPINIROLE 1 MG: 1 TABLET, FILM COATED ORAL at 18:47

## 2019-11-15 RX ADMIN — ROSUVASTATIN CALCIUM 10 MG: 10 TABLET, FILM COATED ORAL at 09:57

## 2019-11-15 RX ADMIN — GUAIFENESIN 600 MG: 600 TABLET, EXTENDED RELEASE ORAL at 21:36

## 2019-11-15 RX ADMIN — DOXYCYCLINE 100 MG: 100 INJECTION, POWDER, LYOPHILIZED, FOR SOLUTION INTRAVENOUS at 05:40

## 2019-11-15 RX ADMIN — POLYETHYLENE GLYCOL 3350 17 G: 17 POWDER, FOR SOLUTION ORAL at 09:58

## 2019-11-15 RX ADMIN — IPRATROPIUM BROMIDE AND ALBUTEROL SULFATE 3 ML: .5; 3 SOLUTION RESPIRATORY (INHALATION) at 22:14

## 2019-11-15 RX ADMIN — ROPINIROLE 1 MG: 1 TABLET, FILM COATED ORAL at 21:36

## 2019-11-15 RX ADMIN — MELATONIN 2000 UNITS: at 09:57

## 2019-11-15 RX ADMIN — BUDESONIDE 0.5 MG: 0.5 INHALANT RESPIRATORY (INHALATION) at 06:47

## 2019-11-15 RX ADMIN — IPRATROPIUM BROMIDE AND ALBUTEROL SULFATE 3 ML: .5; 3 SOLUTION RESPIRATORY (INHALATION) at 14:47

## 2019-11-15 RX ADMIN — BUPROPION HYDROCHLORIDE 150 MG: 150 TABLET, FILM COATED, EXTENDED RELEASE ORAL at 21:35

## 2019-11-15 RX ADMIN — GUAIFENESIN 600 MG: 600 TABLET, EXTENDED RELEASE ORAL at 09:57

## 2019-11-15 RX ADMIN — BUPROPION HYDROCHLORIDE 150 MG: 150 TABLET, FILM COATED, EXTENDED RELEASE ORAL at 09:57

## 2019-11-15 RX ADMIN — ACETAZOLAMIDE 250 MG: 500 INJECTION, POWDER, LYOPHILIZED, FOR SOLUTION INTRAVENOUS at 09:56

## 2019-11-15 RX ADMIN — IPRATROPIUM BROMIDE AND ALBUTEROL SULFATE 3 ML: .5; 3 SOLUTION RESPIRATORY (INHALATION) at 10:45

## 2019-11-15 RX ADMIN — Medication 10 ML: at 21:36

## 2019-11-15 RX ADMIN — DOXYCYCLINE 100 MG: 100 INJECTION, POWDER, LYOPHILIZED, FOR SOLUTION INTRAVENOUS at 17:52

## 2019-11-15 RX ADMIN — METOPROLOL SUCCINATE 50 MG: 50 TABLET, EXTENDED RELEASE ORAL at 09:57

## 2019-11-15 RX ADMIN — METHYLPREDNISOLONE SODIUM SUCCINATE 20 MG: 40 INJECTION, POWDER, FOR SOLUTION INTRAMUSCULAR; INTRAVENOUS at 09:56

## 2019-11-15 RX ADMIN — CYANOCOBALAMIN TAB 1000 MCG 1000 MCG: 1000 TAB at 09:57

## 2019-11-15 RX ADMIN — IPRATROPIUM BROMIDE AND ALBUTEROL SULFATE 3 ML: .5; 3 SOLUTION RESPIRATORY (INHALATION) at 02:53

## 2019-11-15 RX ADMIN — PANTOPRAZOLE SODIUM 40 MG: 40 TABLET, DELAYED RELEASE ORAL at 09:57

## 2019-11-15 RX ADMIN — CEFTRIAXONE SODIUM 1 G: 1 INJECTION, POWDER, FOR SOLUTION INTRAMUSCULAR; INTRAVENOUS at 05:24

## 2019-11-15 RX ADMIN — MELATONIN 2000 UNITS: at 21:36

## 2019-11-15 RX ADMIN — GABAPENTIN 900 MG: 300 CAPSULE ORAL at 21:36

## 2019-11-15 RX ADMIN — DILTIAZEM HYDROCHLORIDE 120 MG: 120 CAPSULE, COATED, EXTENDED RELEASE ORAL at 09:57

## 2019-11-15 RX ADMIN — IPRATROPIUM BROMIDE AND ALBUTEROL SULFATE 3 ML: .5; 3 SOLUTION RESPIRATORY (INHALATION) at 06:47

## 2019-11-15 RX ADMIN — IPRATROPIUM BROMIDE AND ALBUTEROL SULFATE 3 ML: .5; 3 SOLUTION RESPIRATORY (INHALATION) at 19:11

## 2019-11-15 RX ADMIN — PANTOPRAZOLE SODIUM 40 MG: 40 TABLET, DELAYED RELEASE ORAL at 17:51

## 2019-11-15 RX ADMIN — ASPIRIN 81 MG 81 MG: 81 TABLET ORAL at 09:57

## 2019-11-15 RX ADMIN — ENOXAPARIN SODIUM 40 MG: 40 INJECTION SUBCUTANEOUS at 15:48

## 2019-11-15 RX ADMIN — FUROSEMIDE 40 MG: 10 INJECTION, SOLUTION INTRAMUSCULAR; INTRAVENOUS at 09:57

## 2019-11-15 RX ADMIN — THEOPHYLLINE ANHYDROUS 300 MG: 300 CAPSULE, EXTENDED RELEASE ORAL at 09:57

## 2019-11-15 NOTE — PROGRESS NOTES
Daily Progress Note        Acute on chronic respiratory failure with hypoxia (CMS/HCC)    Hyperlipidemia    Hypertension    Sleep apnea    Restless leg syndrome    COPD with acute exacerbation (CMS/HCC)    Acute bronchitis due to respiratory syncytial virus (RSV)    Acute pulmonary edema (CMS/HCC)    Obesity (BMI 30-39.9)    Tachycardia    Kidney disease    Depression      Assessment  Tachycardia  Acute/chronic hypercapnic hypoxic respiratory failure   acute COPD exacerbation due to RSV virus  acute bronchitis   pulmonary edema    hyperlipidemia  Hypertension  Chronic kidney disease  Restless leg syndrome  Obstructive sleep apnea, patient use home auto BiPAP  History of cerebral aneurysm repair  History of partial colectomy  Cataract extraction  Diabetes mellitus      Plan  Heart rate controll  BiPAP   resume home Wellbutrin  antibiotics Rocephin and doxycycline  Diuresis   Bronchodilators desonide and DuoNeb  Oxygen titration  DVT prophylaxis  GI prophylaxis  Glycemic control       LOS: 3 days     Subjective         Objective     Vital signs for last 24 hours:  Vitals:    11/14/19 1900 11/14/19 2235 11/14/19 2241 11/14/19 2300   BP:    123/62   BP Location:    Left arm   Patient Position:    Sitting   Pulse: 84 87 86 84   Resp: 26 25 25 17   Temp:    97.5 °F (36.4 °C)   TempSrc:    Axillary   SpO2: 98% 94% 94% 97%   Weight:       Height:           Intake/Output last 3 shifts:  I/O last 3 completed shifts:  In: 1244 [P.O.:1044; IV Piggyback:200]  Out: -   Intake/Output this shift:  I/O this shift:  In: 240 [P.O.:240]  Out: -       Radiology  Imaging Results (Last 24 Hours)     ** No results found for the last 24 hours. **          Labs:  Results from last 7 days   Lab Units 11/14/19  0445   WBC 10*3/mm3 9.50   HEMOGLOBIN g/dL 15.1   HEMATOCRIT % 48.5*   PLATELETS 10*3/mm3 132*     Results from last 7 days   Lab Units 11/14/19  0445  11/11/19  0538   SODIUM mmol/L 139   < > 138   POTASSIUM mmol/L 4.7   < > 4.4    CHLORIDE mmol/L 98   < > 98   CO2 mmol/L 27.0   < > 25.0   BUN mg/dL 44*   < > 21   CREATININE mg/dL 1.17*   < > 1.10*   CALCIUM mg/dL 9.8   < > 9.7   BILIRUBIN mg/dL  --   --  0.2   ALK PHOS U/L  --   --  74   ALT (SGPT) U/L  --   --  9   AST (SGOT) U/L  --   --  13   GLUCOSE mg/dL 155*   < > 131*    < > = values in this interval not displayed.     Results from last 7 days   Lab Units 11/11/19  0639   PH, ARTERIAL pH units 7.302*   PO2 ART mm Hg 115.9*   PCO2, ARTERIAL mm Hg 61.1*   HCO3 ART mmol/L 30.3*     Results from last 7 days   Lab Units 11/11/19  0538   ALBUMIN g/dL 4.20     Results from last 7 days   Lab Units 11/11/19  0538   TROPONIN T ng/mL <0.010         Results from last 7 days   Lab Units 11/13/19  1313   MAGNESIUM mg/dL 2.6*     Results from last 7 days   Lab Units 11/11/19  0537   INR  1.03   APTT seconds 24.6     Results from last 7 days   Lab Units 11/13/19  1313   TSH uIU/mL 1.760           Meds:   SCHEDULE    acetaZOLAMIDE (DIAMOX) IVPB 250 mg Intravenous Daily   aspirin 81 mg Oral Daily   budesonide 0.5 mg Nebulization BID - RT   buPROPion  mg Oral Q12H   cefTRIAXone (ROCEPHIN) 1GM IVPB in 100 mL NS (MBP) 1 g Intravenous Q24H   cholecalciferol 2,000 Units Oral BID   dilTIAZem  mg Oral Q24H   doxycycline 100 mg Intravenous Q12H   enoxaparin 40 mg Subcutaneous Q24H   furosemide 40 mg Intravenous Daily   gabapentin 900 mg Oral Nightly   guaiFENesin 600 mg Oral Q12H   ipratropium-albuterol 3 mL Nebulization Q6H While Awake - RT   ipratropium-albuterol 3 mL Nebulization Q6H - RT   methylPREDNISolone sodium succinate 20 mg Intravenous Q12H   metoprolol succinate XL 50 mg Oral Daily   montelukast 10 mg Oral Nightly   pantoprazole 40 mg Oral BID AC   polyethylene glycol 17 g Oral Daily   rOPINIRole 1 mg Oral BID   rosuvastatin 10 mg Oral Daily   theophylline 300 mg Oral Q24H   vitamin B-12 1,000 mcg Oral Daily     Infusions    nitroglycerin 10-50 mcg/min Last Rate: Stopped (11/11/19  1800)     PRNs  ipratropium-albuterol  •  ipratropium-albuterol  •  sodium chloride  •  [COMPLETED] Insert peripheral IV **AND** sodium chloride    Physical Exam:  Physical Exam   Cardiovascular:   Murmur heard.  Pulmonary/Chest: No respiratory distress. She has wheezes. She has rales. She exhibits no tenderness.   Abdominal: She exhibits no distension. There is no tenderness.   Musculoskeletal: She exhibits edema.       ROS  Review of Systems   HENT: Positive for congestion, rhinorrhea and sneezing.    Respiratory: Positive for cough, shortness of breath and wheezing. Negative for apnea, choking, chest tightness and stridor.    Cardiovascular: Positive for leg swelling.             Total time spent with patient greater than: 1 Hour

## 2019-11-15 NOTE — THERAPY TREATMENT NOTE
Patient Name: Michelle Francis  : 1950    MRN: [id number]                              Today's Date: 11/15/2019       Admit Date: 2019    Visit Dx:     ICD-10-CM ICD-9-CM   1. Acute respiratory failure with hypoxia (CMS/[facility]) J96.01 518.81   2. Fever and chills R50.9 780.60   3. Sepsis, due to unspecified organism, unspecified whether acute organ dysfunction present (CMS/HCC) A41.9 038.9     995.91     Patient Active Problem List   Diagnosis   • Acute on chronic respiratory failure with hypoxia (CMS/[facility])   • Hyperlipidemia   • Hypertension   • Sleep apnea   • Restless leg syndrome   • COPD (chronic obstructive pulmonary disease) (CMS/HCC)   • Arthritis   • Osteoporosis   • COPD with acute exacerbation (CMS/HCC)   • Acute bronchitis due to respiratory syncytial virus (RSV)   • Acute pulmonary edema (CMS/[facility])   • Obesity (BMI 30-39.9)   • Tachycardia   • Kidney disease   • Depression     Past Medical History:   Diagnosis Date   • Arthritis    • Colon polyps     Dr Bates   • COPD (chronic obstructive pulmonary disease) (CMS/[facility])    • History of emphysema    • Hyperlipidemia    • Hypertension    • Kidney disease    • Osteoporosis    • Restless leg syndrome     sees Dr Seipel   • Sleep apnea     npsg [facility] , ahi 5.9, on auto bipap min 8 max, PS 2-8-Alcaraz's, nasal mask     Past Surgical History:   Procedure Laterality Date   • CATARACT EXTRACTION      Dr Moyer   • CEREBRAL ANEURYSM REPAIR      Brain Aneurysm approx , treated with stents and coils, Dr. Fraga   • COLON SURGERY      partial colectomy- Dr Valladares- due to multiple large polyps   • EXPLORATORY LAPAROTOMY      lysis of intra abdominal adhesions, primary ventral hernia repair 2018 Dr West   • WRIST SURGERY      wrist fracture - Dr Patton     General Information     Row Name 11/15/19 1513          PT Evaluation Time/Intention    Document Type  therapy note (daily note)  -AO     Mode of Treatment  physical therapy  -AO     Row Name 11/15/19  1513          General Information    Patient Profile Reviewed?  yes  -AO     Existing Precautions/Restrictions  oxygen therapy device and L/min  -AO     Barriers to Rehab  none identified  -AO     Row Name 11/15/19 1513          Cognitive Assessment/Intervention- PT/OT    Orientation Status (Cognition)  oriented x 4  -AO     Row Name 11/15/19 1513          Safety Issues, Functional Mobility    Impairments Affecting Function (Mobility)  shortness of breath;endurance/activity tolerance;strength;balance  -AO       User Key  (r) = Recorded By, (t) = Taken By, (c) = Cosigned By    Initials Name Provider Type    Padmini Jerry, PT Physical Therapist        Mobility     Row Name 11/15/19 1513          Bed Mobility Assessment/Treatment    Comment (Bed Mobility)  DNT: pt sitting in chair at start and end of session  -AO     Row Name 11/15/19 1513          Sit-Stand Transfer    Sit-Stand [place] (Transfers)  supervision  -AO     Row Name 11/15/19 1513          Gait/Stairs Assessment/Training    [place] Level (Gait)  contact guard  -AO     Assistive Device (Gait)  — RUE HHA, then progressed to no UE support  -AO     Distance in Feet (Gait)  80 ft  -AO     Pattern (Gait)  step-through  -AO     Comment (Gait/Stairs)  No significant gait deficits noted  -AO       User Key  (r) = Recorded By, (t) = Taken By, (c) = Cosigned By    Initials Name Provider Type    Padmini Jerry PT Physical Therapist        Obj/Interventions     Row Name 11/15/19 1513          Static Sitting Balance    Level of [place] (Unsupported Sitting, Static Balance)  independent  -AO     Sitting Position (Unsupported Sitting, Static Balance)  sitting in chair  -AO     Time Able to Maintain Position (Unsupported Sitting, Static Balance)  more than 5 minutes  -AO     Row Name 11/15/19 1513          Static Standing Balance    Level of [place] (Supported Standing, Static Balance)  supervision  -AO     Time Able to Maintain Position  (Supported Standing, Static Balance)  1 to 2 minutes  -AO     Row Name 11/15/19 1513          Sensory Assessment/Intervention    Sensory General Assessment  no sensation deficits identified  -AO       User Key  (r) = Recorded By, (t) = Taken By, (c) = Cosigned By    Initials Name Provider Type    AO Padmini Serrano R, PT Physical Therapist        Goals/Plan     Row Name 11/15/19 1513          Bed Mobility Goal 1 (PT)    [place] Level/Cues Needed (Bed Mobility Goal 1, PT)  contact guard assist  -AO     Row Name 11/15/19 1513          Transfer Goal 1 (PT)    Activity/Assistive Device (Transfer Goal 1, PT)  transfers, all;walker, rolling  -AO     [place] Level/Cues Needed (Transfer Goal 1, PT)  contact guard assist  -AO     Time Frame (Transfer Goal 1, PT)  2 weeks  -AO     Progress/Outcome (Transfer Goal 1, PT)  goal met  -AO     Row Name 11/15/19 1513          Gait Training Goal 1 (PT)    Activity/Assistive Device (Gait Training Goal 1, PT)  gait (walking locomotion)  -AO     [place] Level (Gait Training Goal 1, PT)  contact guard assist  -AO     Distance (Gait Goal 1, PT)  80 ft  -AO     Time Frame (Gait Training Goal 1, PT)  2 weeks  -AO     Progress/Outcome (Gait Training Goal 1, PT)  goal met  -AO       User Key  (r) = Recorded By, (t) = Taken By, (c) = Cosigned By    Initials Name Provider Type    AO Padmini Serrano R, PT Physical Therapist        Clinical Impression     Row Name 11/15/19 1538          Pain Assessment    Additional Documentation  Pain Scale: Numbers Pre/Post-Treatment (Group)  -AO     Row Name 11/15/19 1535          Pain Scale: Numbers Pre/Post-Treatment    Pain Scale: Numbers, Pretreatment  0/10 - no pain  -AO     Pain Scale: Numbers, Post-Treatment  0/10 - no pain  -AO     Row Name 11/15/19 7609          Plan of Care Review    Plan of Care Reviewed With  patient  -AO     Row Name 11/15/19 1533          Physical Therapy Clinical Impression    Criteria for Skilled Interventions Met  (PT Clinical Impression)  yes;treatment indicated  -AO     Rehab Potential (PT Clinical Summary)  good, to achieve stated therapy goals  -AO     Row Name 11/15/19 1538          Vital Signs    Recovery Time  HR 81 bpm at start of session in recliner with Sp02 95% on AVAPs. Transfered pt to 4L high flow 02 via nasal cannula for gait training. Sp02 desaturated to 89% during gait training, and continued to desaturate (briefly) to 86% after seated rest break. Required ~25-30 second seated rest with education on Tripod positioing and PLB and recovers to 93%. Pt remained on 4L 02 (RN notified)  -AO     Row Name 11/15/19 1538          Positioning and Restraints    Pre-Treatment Position  sitting in chair/recliner  -AO     Post Treatment Position  chair  -AO     In Chair  sitting;call light within reach;encouraged to call for assist;with family/caregiver  -AO       User Key  (r) = Recorded By, (t) = Taken By, (c) = Cosigned By    Initials Name Provider Type    AO Padmini Serrano, PT Physical Therapist        Outcome Measures    No documentation.       Physical Therapy Education     Title: PT OT SLP Therapies (Done)     Topic: Physical Therapy (Done)     Point: Mobility training (Done)     Learning Progress Summary           Patient Acceptance, E,TB, VU by AO at 11/15/2019  3:43 PM    Comment:  Educated pt on Tripod positioning and PLB. Educated pt on safe mobility.    Acceptance, E, VU by MM at 11/15/2019  3:46 AM    Acceptance, E,TB, VU by AO at 11/13/2019 11:38 AM    Comment:  Educated pt and spouse on POC and anticipated physical therapy needs at d/c.                               User Key     Initials Effective Dates Name Provider Type Discipline    AO 03/01/19 -  Padmini Serrano, PT Physical Therapist PT    MM 03/01/19 -  Cheri Collazo, RN Registered Nurse Nurse              PT Recommendation and Plan  Planned Therapy Interventions (PT Eval): balance training, bed mobility training, gait training, patient/family  education, strengthening, transfer training  Outcome Summary/Treatment Plan (PT)  Anticipated Discharge Disposition (PT): inpatient rehabilitation facility(Pulmonary rehab)  Plan of Care Reviewed With: patient  Progress: improving  Outcome Summary: Pt demonstrated significant improvement in mobility/activity tolerance this session and with stable vital sign response. Pt completed transfers/gait training 80ft with no AD requiring CGA. Pt briefly desaturates to 86% on 4L high flow 02, but quickly recovers with seated rest break and education on Tripod positioning/PLB. Recommending IP Pulmonary Rehab to improve endurance/activity tolerance and continue seeing 3x/week at [facility] for progression of mobility.     Time Calculation:   PT Charges     Row Name 11/15/19 1545             Time Calculation    Start Time  1513  -AO      Stop Time  1534  -AO      Time Calculation (min)  21 min  -AO      PT Received On  11/15/19  -AO      PT - Next Appointment  11/18/19  -AO         Time Calculation- PT    Total Timed Code Minutes- PT  21 minute(s)  -AO        User Key  (r) = Recorded By, (t) = Taken By, (c) = Cosigned By    Initials Name Provider Type    AO Padmini Serrano, PT Physical Therapist        Therapy Charges for Today     Code Description Service Date Service Provider Modifiers Qty    99608344228 HC GAIT TRAINING EA 15 MIN 11/15/2019 Padmini Serrano, PT GP 1    49072567323 HC PT THERAPEUTIC ACT EA 15 MIN 11/15/2019 Padmini Serrano, PT GP 1               Padmini Serrano PT  11/15/2019

## 2019-11-15 NOTE — PROGRESS NOTES
LOS: 4 days   Admiting Physician- Virgen Pascual MD    Reason For Followup:    Sinus tachycardia  Chest discomfort  COPD exacerbation  Hypertension    Subjective     Patient is sitting up in chair.  She was eating breakfast.  Shortness of breath is better.    Objective     Shortness of breath is improving    Review of Systems:   Review of Systems   Constitution: Negative for chills and fever.   HENT: Negative for ear discharge and nosebleeds.    Eyes: Negative for discharge and redness.   Cardiovascular: Negative for chest pain, orthopnea, palpitations, paroxysmal nocturnal dyspnea and syncope.   Respiratory: Positive for shortness of breath. Negative for cough and wheezing.    Endocrine: Negative for heat intolerance.   Skin: Negative for rash.   Musculoskeletal: Negative for arthritis and myalgias.   Gastrointestinal: Negative for abdominal pain, melena, nausea and vomiting.   Genitourinary: Negative for dysuria and hematuria.   Neurological: Negative for dizziness, light-headedness, numbness and tremors.   Psychiatric/Behavioral: Negative for depression. The patient is not nervous/anxious.          Vital Signs  Vitals:    11/15/19 1045 11/15/19 1047 11/15/19 1447 11/15/19 1449   BP:       BP Location:       Patient Position:       Pulse: 97 94 82 82   Resp: (!) 30 26 18 20   Temp:       TempSrc:       SpO2: 97%      Weight:       Height:         Wt Readings from Last 1 Encounters:   11/11/19 107 kg (235 lb)       Intake/Output Summary (Last 24 hours) at 11/15/2019 1520  Last data filed at 11/15/2019 1215  Gross per 24 hour   Intake 1080 ml   Output 500 ml   Net 580 ml     Physical Exam:  Physical Exam   Constitutional: She is oriented to person, place, and time. She appears well-developed and well-nourished.   HENT:   Head: Normocephalic and atraumatic.   Eyes: No scleral icterus.   Neck: No thyromegaly present.   Cardiovascular: Normal rate, regular rhythm and normal heart sounds. Exam reveals no gallop and  no friction rub.   No murmur heard.  Pulmonary/Chest: Effort normal. No respiratory distress. She has wheezes. She has rales.   Abdominal: There is no tenderness.   Musculoskeletal: She exhibits no edema.   Lymphadenopathy:     She has no cervical adenopathy.   Neurological: She is alert and oriented to person, place, and time.   Skin: No rash noted. No erythema.   Psychiatric: She has a normal mood and affect.       Results Review:   Lab Results (last 24 hours)     Procedure Component Value Units Date/Time    Blood Culture - Blood, Hand, Left [[id number]] Collected:  11/11/19 0537    Specimen:  Blood from Hand, Left Updated:  11/15/19 0546     Blood Culture No growth at 4 days    Blood Culture - Blood, Arm, Right [[id number]] Collected:  11/11/19 0537    Specimen:  Blood from Arm, Right Updated:  11/15/19 0546     Blood Culture No growth at 4 days        Imaging Results (Last 72 Hours)     ** No results found for the last 72 hours. **        ECG/EMG Results (most recent)     Procedure Component Value Units Date/Time    ECG 12 Lead [[id number]] Collected:  11/11/19 0532     Updated:  11/11/19 0657    Narrative:       HEART RATE= 136  bpm  RR Interval= 442  ms  UT Interval= 157  ms  P Horizontal Axis= 10  deg  P Front Axis= 80  deg  QRSD Interval= 159  ms  QT Interval= 293  ms  QRS Axis= 107  deg  T Wave Axis= 106  deg  - ABNORMAL ECG -  Sinus tachycardia  Ventricular premature complex  Abnormal T, consider ischemia, lateral leads  When compared with ECG of 01-Aug-2018 8:38:14,  Significant repolarization change  Electronically Signed By: Miguel Angel Perez (Toro) 11-Nov-2019 06:56:58  Date and Time of Study: 2019-11-11 05:32:57    ECG 12 Lead [[id number]] Collected:  11/12/19 2342     Updated:  11/12/19 2344    Narrative:       HEART RATE= 115  bpm  RR Interval= 524  ms  UT Interval= 129  ms  P Horizontal Axis=   deg  P Front Axis= 59  deg  QRSD Interval= 84  ms  QT Interval= 323  ms  QRS Axis= 60  deg  T Wave Axis= 30   deg  - BORDERLINE ECG -  Sinus tachycardia  Probable left atrial enlargement  Electronically Signed By:   Date and Time of Study: 2019-11-12 23:42:56    Adult Transthoracic Echo Complete W/ Cont if Necessary Per Protocol [[id number]] Collected:  11/14/19 1420     Updated:  11/14/19 1606     BSA 2.1 m^2       CV ECHO CHELSEA - RVDD 1.6 cm      IVSd 0.94 cm      IVSs 1.4 cm      LVIDd 4.1 cm      LVIDs 2.3 cm      LVPWd 0.92 cm       CV ECHO CHELSEA - LVPWS 1.6 cm      IVS/LVPW 1.0     FS 44.2 %      EDV(Teich) 74.1 ml      ESV(Teich) 17.9 ml      EF(Teich) 75.9 %      EDV(cubed) 68.8 ml      ESV(cubed) 12.0 ml      EF(cubed) 82.6 %      % IVS thick 50.6 %      % LVPW thick 68.0 %      LV mass(C)d 119.2 grams      LV mass(C)dI 55.7 grams/m^2      LV mass(C)s 111.0 grams      LV mass(C)sI 51.8 grams/m^2      SV(Teich) 56.2 ml      SI(Teich) 26.3 ml/m^2      SV(cubed) 56.8 ml      SI(cubed) 26.5 ml/m^2      Ao root diam 3.0 cm      Ao root area 6.9 cm^2      ACS 1.8 cm      LVOT diam 2.0 cm      LVOT area 3.1 cm^2      EDV(MOD-sp4) 55.5 ml      ESV(MOD-sp4) 19.8 ml      EF(MOD-sp4) 64.2 %      EDV(MOD-sp2) 49.8 ml      ESV(MOD-sp2) 12.6 ml      EF(MOD-sp2) 74.7 %      SV(MOD-sp4) 35.6 ml      SI(MOD-sp4) 16.6 ml/m^2      SV(MOD-sp2) 37.2 ml      SI(MOD-sp2) 17.4 ml/m^2      Ao root area (BSA corrected) 1.4     LV [place] Vol (BSA corrected) 25.9 ml/m^2      LV Sys Vol (BSA corrected) 9.3 ml/m^2      MV E max michael 70.7 cm/sec      MV A max michael 80.5 cm/sec      MV E/A 0.88     MV V2 max 73.2 cm/sec      MV max PG 2.1 mmHg      MV V2 mean 50.1 cm/sec      MV mean PG 1.1 mmHg      MV V2 VTI 16.0 cm      MVA(VTI) 3.9 cm^2      MV dec slope 346.1 cm/sec^2      MV dec time 0.2 sec      Ao pk michael 102.2 cm/sec      Ao max PG 4.2 mmHg      Ao max PG (full) -0.38 mmHg      Ao V2 mean 73.8 cm/sec      Ao mean PG 2.4 mmHg      Ao mean PG (full) -0.55 mmHg      Ao V2 VTI 15.1 cm      MIKE(I,A) 4.1 cm^2      MIKE(I,D) 4.1 cm^2      MIKE(V,A)  3.3 cm^2      MIKE(V,D) 3.3 cm^2      LV V1 max PG 4.6 mmHg      LV V1 mean PG 2.9 mmHg      LV V1 max 106.8 cm/sec      LV V1 mean 82.8 cm/sec      LV V1 VTI 19.8 cm      SV(Ao) 104.6 ml      SI(Ao) 48.8 ml/m^2      SV(LVOT) 61.7 ml      SI(LVOT) 28.8 ml/m^2      PA V2 max 112.8 cm/sec      PA max PG 5.1 mmHg      PA max PG (full) 2.4 mmHg      PA V2 mean 82.0 cm/sec      PA mean PG 3.0 mmHg      PA mean PG (full) 1.4 mmHg      PA V2 VTI 16.7 cm      PA acc time 0.08 sec      RV V1 max PG 2.6 mmHg      RV V1 mean PG 1.5 mmHg      RV V1 max 81.3 cm/sec      RV V1 mean 59.5 cm/sec      RV V1 VTI 11.5 cm      TR max michael 295.2 cm/sec      RVSP(TR) 37.8 mmHg      RAP systole 3.0 mmHg      PA pr(Accel) 44.7 mmHg       CV ECHO CHELSEA - BZI_BMI 37.9 kilograms/m^2       CV ECHO CHELSEA - BSA(HAYCOCK) 2.3 m^2       CV ECHO CHELSEA - BZI_METRIC_WEIGHT 106.6 kg       CV ECHO CHELSEA - BZI_METRIC_HEIGHT 167.6 cm      Target HR (85%) 128 bpm      Max. Pred. HR (100%) 150 bpm      EF(MOD-bp) 70.0 %      LA dimension(2D) 2.9 cm         CBC    Results from last 7 days   Lab Units 11/14/19  0445 11/13/19  1313 11/11/19  0538   WBC 10*3/mm3 9.50 10.00 11.40*   HEMOGLOBIN g/dL 15.1 16.3* 14.0   PLATELETS 10*3/mm3 132* 144 142     BMP   Results from last 7 days   Lab Units 11/14/19  0445 11/13/19  1313 11/11/19  0538   SODIUM mmol/L 139 140 138   POTASSIUM mmol/L 4.7 4.1 4.4   CHLORIDE mmol/L 98 95* 98   CO2 mmol/L 27.0 25.0 25.0   BUN mg/dL 44* 37* 21   CREATININE mg/dL 1.17* 1.06* 1.10*   GLUCOSE mg/dL 155* 135* 131*   MAGNESIUM mg/dL  --  2.6*  --      CMP   Results from last 7 days   Lab Units 11/14/19  0445 11/13/19  1313 11/11/19  0538   SODIUM mmol/L 139 140 138   POTASSIUM mmol/L 4.7 4.1 4.4   CHLORIDE mmol/L 98 95* 98   CO2 mmol/L 27.0 25.0 25.0   BUN mg/dL 44* 37* 21   CREATININE mg/dL 1.17* 1.06* 1.10*   GLUCOSE mg/dL 155* 135* 131*   ALBUMIN g/dL  --   --  4.20   BILIRUBIN mg/dL  --   --  0.2   ALK PHOS U/L  --   --  74   AST  (SGOT) U/L  --   --  13   ALT (SGPT) U/L  --   --  9     Cardiac Studies:  Echo-    Stress Myoview-  Cath-      Medication Review:   Scheduled Meds:    acetaZOLAMIDE (DIAMOX) IVPB 250 mg Intravenous Daily   aspirin 81 mg Oral Daily   budesonide 0.5 mg Nebulization BID - RT   buPROPion  mg Oral Q12H   cefTRIAXone (ROCEPHIN) 1GM IVPB in 100 mL NS (MBP) 1 g Intravenous Q24H   cholecalciferol 2,000 Units Oral BID   dilTIAZem  mg Oral Q24H   doxycycline 100 mg Intravenous Q12H   enoxaparin 40 mg Subcutaneous Q24H   furosemide 40 mg Intravenous Daily   gabapentin 900 mg Oral Nightly   guaiFENesin 600 mg Oral Q12H   ipratropium-albuterol 3 mL Nebulization Q6H While Awake - RT   ipratropium-albuterol 3 mL Nebulization Q6H - RT   methylPREDNISolone sodium succinate 20 mg Intravenous Q12H   metoprolol succinate XL 50 mg Oral Daily   montelukast 10 mg Oral Nightly   pantoprazole 40 mg Oral BID AC   polyethylene glycol 17 g Oral Daily   rOPINIRole 1 mg Oral BID   rosuvastatin 10 mg Oral Daily   theophylline 300 mg Oral Q24H   vitamin B-12 1,000 mcg Oral Daily     Continuous Infusions:    nitroglycerin 10-50 mcg/min Last Rate: Stopped (11/11/19 1800)     PRN Meds:.ipratropium-albuterol  •  ipratropium-albuterol  •  sodium chloride  •  [COMPLETED] Insert peripheral IV **AND** sodium chloride      Assessment/Plan   Patient Active Problem List   Diagnosis   • Acute on chronic respiratory failure with hypoxia (CMS/HCC)   • Hyperlipidemia   • Hypertension   • Sleep apnea   • Restless leg syndrome   • COPD (chronic obstructive pulmonary disease) (CMS/HCC)   • Arthritis   • Osteoporosis   • COPD with acute exacerbation (CMS/HCC)   • Acute bronchitis due to respiratory syncytial virus (RSV)   • Acute pulmonary edema (CMS/HCC)   • Obesity (BMI 30-39.9)   • Tachycardia   • Kidney disease   • Depression         Continue BB/CCB  Eventual ischemic evaluation  Dr. Staton to review echocardiogram   Additional recommendations per   Shemar Villalobos, APRN  11/15/19  3:20 PM

## 2019-11-15 NOTE — PLAN OF CARE
Problem: Patient Care Overview  Goal: Plan of Care Review  Outcome: Ongoing (interventions implemented as appropriate)   11/15/19 5966   Coping/Psychosocial   Plan of Care Reviewed With patient   Plan of Care Review   Progress improving   OTHER   Outcome Summary Pt demonstrated significant improvement in mobility/activity tolerance this session and with stable vital sign response. Pt completed transfers/gait training 80ft with no AD requiring CGA. Pt briefly desaturates to 86% on 4L high flow 02, but quickly recovers with seated rest break and education on Tripod positioning/PLB. Recommending IP Pulmonary Rehab to improve endurance/activity tolerance and continue seeing 3x/week at [facility] for progression of mobility.

## 2019-11-15 NOTE — PLAN OF CARE
"Problem: Fall Risk (Adult)  Goal: Absence of Fall  Outcome: Ongoing (interventions implemented as appropriate)  I am taking the patient off of fall precautions this morning.  She seems more stable, does not use any walkers at home.  Also this may help with getting her bowels moving, since she has not had a BM since 11/10, though being on the AVAP  machine restricts her ability to move around the room much.   11/15/19 0735   Fall Risk (Adult)   Absence of Fall making progress toward outcome       Problem: Breathing Pattern Ineffective (Adult)  Goal: Effective Oxygenation/Ventilation  Outcome: Ongoing (interventions implemented as appropriate)  Patient states she feels like it is \"less work\"  Breathing this morning.   11/15/19 2735   Breathing Pattern Ineffective (Adult)   Effective Oxygenation/Ventilation making progress toward outcome         "

## 2019-11-15 NOTE — PLAN OF CARE
Problem: Patient Care Overview  Goal: Plan of Care Review  Outcome: Ongoing (interventions implemented as appropriate)   11/15/19 6381   Coping/Psychosocial   Plan of Care Reviewed With patient   OTHER   Outcome Summary Alert with no resp. distress. Using continuous Bipap. Droplet/Contact precautions maintained.       Problem: Fall Risk (Adult)  Goal: Identify Related Risk Factors and Signs and Symptoms  Outcome: Ongoing (interventions implemented as appropriate)    Goal: Absence of Fall  Outcome: Ongoing (interventions implemented as appropriate)      Problem: Breathing Pattern Ineffective (Adult)  Goal: Effective Oxygenation/Ventilation  Outcome: Ongoing (interventions implemented as appropriate)    Goal: Anxiety/Fear Reduction  Outcome: Ongoing (interventions implemented as appropriate)

## 2019-11-16 LAB
BACTERIA SPEC AEROBE CULT: NORMAL
BACTERIA SPEC AEROBE CULT: NORMAL

## 2019-11-16 PROCEDURE — 25010000002 ACETAZOLAMIDE PER 500 MG: Performed by: INTERNAL MEDICINE

## 2019-11-16 PROCEDURE — 99232 SBSQ HOSP IP/OBS MODERATE 35: CPT | Performed by: INTERNAL MEDICINE

## 2019-11-16 PROCEDURE — 94799 UNLISTED PULMONARY SVC/PX: CPT

## 2019-11-16 PROCEDURE — 25010000002 CEFTRIAXONE PER 250 MG: Performed by: INTERNAL MEDICINE

## 2019-11-16 PROCEDURE — 25010000002 METHYLPREDNISOLONE PER 40 MG: Performed by: INTERNAL MEDICINE

## 2019-11-16 PROCEDURE — 25010000002 FUROSEMIDE PER 20 MG: Performed by: INTERNAL MEDICINE

## 2019-11-16 PROCEDURE — 25010000002 ENOXAPARIN PER 10 MG: Performed by: INTERNAL MEDICINE

## 2019-11-16 PROCEDURE — 94660 CPAP INITIATION&MGMT: CPT

## 2019-11-16 PROCEDURE — 99232 SBSQ HOSP IP/OBS MODERATE 35: CPT | Performed by: HOSPITALIST

## 2019-11-16 RX ORDER — DOXYCYCLINE 100 MG/1
100 TABLET ORAL 2 TIMES DAILY WITH MEALS
Status: DISCONTINUED | OUTPATIENT
Start: 2019-11-16 | End: 2019-11-18 | Stop reason: HOSPADM

## 2019-11-16 RX ADMIN — THEOPHYLLINE ANHYDROUS 300 MG: 300 CAPSULE, EXTENDED RELEASE ORAL at 09:52

## 2019-11-16 RX ADMIN — IPRATROPIUM BROMIDE AND ALBUTEROL SULFATE 3 ML: .5; 3 SOLUTION RESPIRATORY (INHALATION) at 20:20

## 2019-11-16 RX ADMIN — IPRATROPIUM BROMIDE AND ALBUTEROL SULFATE 3 ML: .5; 3 SOLUTION RESPIRATORY (INHALATION) at 23:58

## 2019-11-16 RX ADMIN — GUAIFENESIN 600 MG: 600 TABLET, EXTENDED RELEASE ORAL at 21:17

## 2019-11-16 RX ADMIN — FUROSEMIDE 40 MG: 10 INJECTION, SOLUTION INTRAMUSCULAR; INTRAVENOUS at 09:52

## 2019-11-16 RX ADMIN — METHYLPREDNISOLONE SODIUM SUCCINATE 20 MG: 40 INJECTION, POWDER, FOR SOLUTION INTRAMUSCULAR; INTRAVENOUS at 21:18

## 2019-11-16 RX ADMIN — POLYETHYLENE GLYCOL 3350 17 G: 17 POWDER, FOR SOLUTION ORAL at 09:51

## 2019-11-16 RX ADMIN — BUDESONIDE 0.5 MG: 0.5 INHALANT RESPIRATORY (INHALATION) at 06:35

## 2019-11-16 RX ADMIN — CYANOCOBALAMIN TAB 1000 MCG 1000 MCG: 1000 TAB at 09:52

## 2019-11-16 RX ADMIN — BUPROPION HYDROCHLORIDE 150 MG: 150 TABLET, FILM COATED, EXTENDED RELEASE ORAL at 21:16

## 2019-11-16 RX ADMIN — GUAIFENESIN 600 MG: 600 TABLET, EXTENDED RELEASE ORAL at 09:52

## 2019-11-16 RX ADMIN — IPRATROPIUM BROMIDE AND ALBUTEROL SULFATE 3 ML: .5; 3 SOLUTION RESPIRATORY (INHALATION) at 11:01

## 2019-11-16 RX ADMIN — METOPROLOL SUCCINATE 50 MG: 50 TABLET, EXTENDED RELEASE ORAL at 09:52

## 2019-11-16 RX ADMIN — DOXYCYCLINE 100 MG: 100 TABLET, FILM COATED ORAL at 18:17

## 2019-11-16 RX ADMIN — ROSUVASTATIN CALCIUM 10 MG: 10 TABLET, FILM COATED ORAL at 09:52

## 2019-11-16 RX ADMIN — CEFTRIAXONE SODIUM 1 G: 1 INJECTION, POWDER, FOR SOLUTION INTRAMUSCULAR; INTRAVENOUS at 05:10

## 2019-11-16 RX ADMIN — PANTOPRAZOLE SODIUM 40 MG: 40 TABLET, DELAYED RELEASE ORAL at 18:17

## 2019-11-16 RX ADMIN — BUPROPION HYDROCHLORIDE 150 MG: 150 TABLET, FILM COATED, EXTENDED RELEASE ORAL at 09:51

## 2019-11-16 RX ADMIN — ACETAZOLAMIDE 250 MG: 500 INJECTION, POWDER, LYOPHILIZED, FOR SOLUTION INTRAVENOUS at 09:51

## 2019-11-16 RX ADMIN — ASPIRIN 81 MG 81 MG: 81 TABLET ORAL at 09:52

## 2019-11-16 RX ADMIN — GABAPENTIN 900 MG: 300 CAPSULE ORAL at 21:16

## 2019-11-16 RX ADMIN — ROPINIROLE 1 MG: 1 TABLET, FILM COATED ORAL at 21:17

## 2019-11-16 RX ADMIN — DILTIAZEM HYDROCHLORIDE 120 MG: 120 CAPSULE, COATED, EXTENDED RELEASE ORAL at 09:52

## 2019-11-16 RX ADMIN — Medication 10 ML: at 09:53

## 2019-11-16 RX ADMIN — MELATONIN 2000 UNITS: at 21:17

## 2019-11-16 RX ADMIN — IPRATROPIUM BROMIDE AND ALBUTEROL SULFATE 3 ML: .5; 3 SOLUTION RESPIRATORY (INHALATION) at 06:35

## 2019-11-16 RX ADMIN — PANTOPRAZOLE SODIUM 40 MG: 40 TABLET, DELAYED RELEASE ORAL at 09:52

## 2019-11-16 RX ADMIN — ENOXAPARIN SODIUM 40 MG: 40 INJECTION SUBCUTANEOUS at 16:07

## 2019-11-16 RX ADMIN — BUDESONIDE 0.5 MG: 0.5 INHALANT RESPIRATORY (INHALATION) at 20:20

## 2019-11-16 RX ADMIN — MONTELUKAST SODIUM 10 MG: 10 TABLET, COATED ORAL at 21:16

## 2019-11-16 RX ADMIN — ROPINIROLE 1 MG: 1 TABLET, FILM COATED ORAL at 18:17

## 2019-11-16 RX ADMIN — METHYLPREDNISOLONE SODIUM SUCCINATE 20 MG: 40 INJECTION, POWDER, FOR SOLUTION INTRAMUSCULAR; INTRAVENOUS at 09:51

## 2019-11-16 RX ADMIN — DOXYCYCLINE 100 MG: 100 INJECTION, POWDER, LYOPHILIZED, FOR SOLUTION INTRAVENOUS at 05:11

## 2019-11-16 NOTE — PROGRESS NOTES
Daily Progress Note        Acute on chronic respiratory failure with hypoxia (CMS/HCC)    Hyperlipidemia    Hypertension    Sleep apnea    Restless leg syndrome    COPD with acute exacerbation (CMS/HCC)    Acute bronchitis due to respiratory syncytial virus (RSV)    Acute pulmonary edema (CMS/HCC)    Obesity (BMI 30-39.9)    Tachycardia    Kidney disease    Depression      Assessment  Tachycardia  Acute/chronic hypercapnic hypoxic respiratory failure   acute COPD exacerbation due to RSV virus  acute bronchitis   pulmonary edema    hyperlipidemia  Hypertension  Chronic kidney disease  Restless leg syndrome  Obstructive sleep apnea, patient use home auto BiPAP  History of cerebral aneurysm repair  History of partial colectomy  Cataract extraction  Diabetes mellitus      Plan    BiPAP during sleep and as tolerated during the day   resume home Wellbutrin  antibiotics Rocephin and doxycycline  Diuresis   Bronchodilators desonide and DuoNeb  Oxygen titration  DVT prophylaxis  GI prophylaxis  Glycemic control       LOS: 4 days     Subjective         Objective     Vital signs for last 24 hours:  Vitals:    11/15/19 1916 11/15/19 2214 11/15/19 2222 11/15/19 2237   BP:    137/61   BP Location:    Right arm   Patient Position:    Sitting   Pulse:   79 77   Resp: 24 24 24 25   Temp:    96.9 °F (36.1 °C)   TempSrc:    Axillary   SpO2:   97% 97%   Weight:       Height:           Intake/Output last 3 shifts:  I/O last 3 completed shifts:  In: 2160 [P.O.:1860; IV Piggyback:300]  Out: 2300 [Urine:2300]  Intake/Output this shift:  No intake/output data recorded.      Radiology  Imaging Results (Last 24 Hours)     ** No results found for the last 24 hours. **          Labs:  Results from last 7 days   Lab Units 11/14/19  0445   WBC 10*3/mm3 9.50   HEMOGLOBIN g/dL 15.1   HEMATOCRIT % 48.5*   PLATELETS 10*3/mm3 132*     Results from last 7 days   Lab Units 11/14/19  0445  11/11/19  0538   SODIUM mmol/L 139   < > 138   POTASSIUM mmol/L  4.7   < > 4.4   CHLORIDE mmol/L 98   < > 98   CO2 mmol/L 27.0   < > 25.0   BUN mg/dL 44*   < > 21   CREATININE mg/dL 1.17*   < > 1.10*   CALCIUM mg/dL 9.8   < > 9.7   BILIRUBIN mg/dL  --   --  0.2   ALK PHOS U/L  --   --  74   ALT (SGPT) U/L  --   --  9   AST (SGOT) U/L  --   --  13   GLUCOSE mg/dL 155*   < > 131*    < > = values in this interval not displayed.     Results from last 7 days   Lab Units 11/11/19  0639   PH, ARTERIAL pH units 7.302*   PO2 ART mm Hg 115.9*   PCO2, ARTERIAL mm Hg 61.1*   HCO3 ART mmol/L 30.3*     Results from last 7 days   Lab Units 11/11/19  0538   ALBUMIN g/dL 4.20     Results from last 7 days   Lab Units 11/11/19  0538   TROPONIN T ng/mL <0.010         Results from last 7 days   Lab Units 11/13/19  1313   MAGNESIUM mg/dL 2.6*     Results from last 7 days   Lab Units 11/11/19  0537   INR  1.03   APTT seconds 24.6     Results from last 7 days   Lab Units 11/13/19  1313   TSH uIU/mL 1.760           Meds:   SCHEDULE    acetaZOLAMIDE (DIAMOX) IVPB 250 mg Intravenous Daily   aspirin 81 mg Oral Daily   budesonide 0.5 mg Nebulization BID - RT   buPROPion  mg Oral Q12H   cefTRIAXone (ROCEPHIN) 1GM IVPB in 100 mL NS (MBP) 1 g Intravenous Q24H   cholecalciferol 2,000 Units Oral BID   dilTIAZem  mg Oral Q24H   doxycycline 100 mg Intravenous Q12H   enoxaparin 40 mg Subcutaneous Q24H   furosemide 40 mg Intravenous Daily   gabapentin 900 mg Oral Nightly   guaiFENesin 600 mg Oral Q12H   ipratropium-albuterol 3 mL Nebulization Q6H While Awake - RT   ipratropium-albuterol 3 mL Nebulization Q6H - RT   methylPREDNISolone sodium succinate 20 mg Intravenous Q12H   metoprolol succinate XL 50 mg Oral Daily   montelukast 10 mg Oral Nightly   pantoprazole 40 mg Oral BID AC   polyethylene glycol 17 g Oral Daily   rOPINIRole 1 mg Oral BID   rosuvastatin 10 mg Oral Daily   theophylline 300 mg Oral Q24H   vitamin B-12 1,000 mcg Oral Daily     Infusions    nitroglycerin 10-50 mcg/min Last Rate: Stopped  (11/11/19 1800)     PRNs  ipratropium-albuterol  •  ipratropium-albuterol  •  sodium chloride  •  [COMPLETED] Insert peripheral IV **AND** sodium chloride    Physical Exam:  Physical Exam   Cardiovascular:   Murmur heard.  Pulmonary/Chest: No respiratory distress. She has wheezes. She has rales. She exhibits no tenderness.   Abdominal: She exhibits no distension. There is no tenderness.   Musculoskeletal: She exhibits edema.       ROS  Review of Systems   HENT: Positive for congestion, rhinorrhea and sneezing.    Respiratory: Positive for cough, shortness of breath and wheezing. Negative for apnea, choking, chest tightness and stridor.    Cardiovascular: Positive for leg swelling.             Total time spent with patient greater than: 1 Hour

## 2019-11-16 NOTE — PROGRESS NOTES
LOS: 5 days   Admiting Physician- Virgen Pascual MD    Reason For Followup:    Sinus tachycardia  Chest discomfort  COPD exacerbation  Hypertension    Subjective     Patient is feeling much better.  Shortness of breath is significantly improved    Objective     No obvious dyspnea    Review of Systems:   Review of Systems   Constitution: Negative for chills and fever.   HENT: Negative for ear discharge and nosebleeds.    Eyes: Negative for discharge and redness.   Cardiovascular: Negative for chest pain, orthopnea, palpitations, paroxysmal nocturnal dyspnea and syncope.   Respiratory: Positive for shortness of breath. Negative for cough and wheezing.    Endocrine: Negative for heat intolerance.   Skin: Negative for rash.   Musculoskeletal: Negative for arthritis and myalgias.   Gastrointestinal: Negative for abdominal pain, melena, nausea and vomiting.   Genitourinary: Negative for dysuria and hematuria.   Neurological: Negative for dizziness, light-headedness, numbness and tremors.   Psychiatric/Behavioral: Negative for depression. The patient is not nervous/anxious.          Vital Signs  Vitals:    11/16/19 0642 11/16/19 1013 11/16/19 1101 11/16/19 1104   BP: 133/77 159/66     BP Location: Right arm Right arm     Patient Position: Lying Sitting     Pulse: 89 89 75 74   Resp: 15 16 16 16   Temp: 97.9 °F (36.6 °C) 97.6 °F (36.4 °C)     TempSrc: Oral Oral     SpO2: 93% 92% 95%    Weight: 97.5 kg (214 lb 15.2 oz)      Height:         Wt Readings from Last 1 Encounters:   11/16/19 97.5 kg (214 lb 15.2 oz)       Intake/Output Summary (Last 24 hours) at 11/16/2019 1432  Last data filed at 11/16/2019 0900  Gross per 24 hour   Intake 920 ml   Output 1800 ml   Net -880 ml     Physical Exam:  Physical Exam   Constitutional: She is oriented to person, place, and time. She appears well-developed and well-nourished.   HENT:   Head: Normocephalic and atraumatic.   Eyes: No scleral icterus.   Neck: No thyromegaly present.    Cardiovascular: Normal rate, regular rhythm and normal heart sounds. Exam reveals no gallop and no friction rub.   No murmur heard.  Pulmonary/Chest: Effort normal. No respiratory distress. She has wheezes. She has rales.   Abdominal: There is no tenderness.   Musculoskeletal: She exhibits no edema.   Lymphadenopathy:     She has no cervical adenopathy.   Neurological: She is alert and oriented to person, place, and time.   Skin: No rash noted. No erythema.   Psychiatric: She has a normal mood and affect.       Results Review:   Lab Results (last 24 hours)     Procedure Component Value Units Date/Time    Blood Culture - Blood, Hand, Left [[id number]] Collected:  11/11/19 0537    Specimen:  Blood from Hand, Left Updated:  11/16/19 0546     Blood Culture No growth at 5 days    Blood Culture - Blood, Arm, Right [[id number]] Collected:  11/11/19 0537    Specimen:  Blood from Arm, Right Updated:  11/16/19 0546     Blood Culture No growth at 5 days        Imaging Results (Last 72 Hours)     ** No results found for the last 72 hours. **        ECG/EMG Results (most recent)     Procedure Component Value Units Date/Time    ECG 12 Lead [[id number]] Collected:  11/11/19 0532     Updated:  11/11/19 0657    Narrative:       HEART RATE= 136  bpm  RR Interval= 442  ms  FL Interval= 157  ms  P Horizontal Axis= 10  deg  P Front Axis= 80  deg  QRSD Interval= 159  ms  QT Interval= 293  ms  QRS Axis= 107  deg  T Wave Axis= 106  deg  - ABNORMAL ECG -  Sinus tachycardia  Ventricular premature complex  Abnormal T, consider ischemia, lateral leads  When compared with ECG of 01-Aug-2018 8:38:14,  Significant repolarization change  Electronically Signed By: Miguel Angel Perez (Toro) 11-Nov-2019 06:56:58  Date and Time of Study: 2019-11-11 05:32:57    ECG 12 Lead [[id number]] Collected:  11/12/19 2342     Updated:  11/12/19 2344    Narrative:       HEART RATE= 115  bpm  RR Interval= 524  ms  FL Interval= 129  ms  P Horizontal Axis=   deg  P Front  Axis= 59  deg  QRSD Interval= 84  ms  QT Interval= 323  ms  QRS Axis= 60  deg  T Wave Axis= 30  deg  - BORDERLINE ECG -  Sinus tachycardia  Probable left atrial enlargement  Electronically Signed By:   Date and Time of Study: 2019-11-12 23:42:56    Adult Transthoracic Echo Complete W/ Cont if Necessary Per Protocol [[id number]] Collected:  11/14/19 1420     Updated:  11/15/19 1628     BSA 2.1 m^2      BH CV ECHO CHELSEA - RVDD 1.6 cm      IVSd 0.94 cm      IVSs 1.4 cm      LVIDd 4.1 cm      LVIDs 2.3 cm      LVPWd 0.92 cm      BH CV ECHO CHELSEA - LVPWS 1.6 cm      IVS/LVPW 1.0     FS 44.2 %      EDV(Teich) 74.1 ml      ESV(Teich) 17.9 ml      EF(Teich) 75.9 %      EDV(cubed) 68.8 ml      ESV(cubed) 12.0 ml      EF(cubed) 82.6 %      % IVS thick 50.6 %      % LVPW thick 68.0 %      LV mass(C)d 119.2 grams      LV mass(C)dI 55.7 grams/m^2      LV mass(C)s 111.0 grams      LV mass(C)sI 51.8 grams/m^2      SV(Teich) 56.2 ml      SI(Teich) 26.3 ml/m^2      SV(cubed) 56.8 ml      SI(cubed) 26.5 ml/m^2      Ao root diam 3.0 cm      Ao root area 6.9 cm^2      ACS 1.8 cm      LVOT diam 2.0 cm      LVOT area 3.1 cm^2      EDV(MOD-sp4) 55.5 ml      ESV(MOD-sp4) 19.8 ml      EF(MOD-sp4) 64.2 %      EDV(MOD-sp2) 49.8 ml      ESV(MOD-sp2) 12.6 ml      EF(MOD-sp2) 74.7 %      SV(MOD-sp4) 35.6 ml      SI(MOD-sp4) 16.6 ml/m^2      SV(MOD-sp2) 37.2 ml      SI(MOD-sp2) 17.4 ml/m^2      Ao root area (BSA corrected) 1.4     LV Conway Vol (BSA corrected) 25.9 ml/m^2      LV Sys Vol (BSA corrected) 9.3 ml/m^2      MV E max michael 70.7 cm/sec      MV A max michael 80.5 cm/sec      MV E/A 0.88     MV V2 max 73.2 cm/sec      MV max PG 2.1 mmHg      MV V2 mean 50.1 cm/sec      MV mean PG 1.1 mmHg      MV V2 VTI 16.0 cm      MVA(VTI) 3.9 cm^2      MV dec slope 346.1 cm/sec^2      MV dec time 0.2 sec      Ao pk michael 102.2 cm/sec      Ao max PG 4.2 mmHg      Ao max PG (full) -0.38 mmHg      Ao V2 mean 73.8 cm/sec      Ao mean PG 2.4 mmHg      Ao mean PG (full)  -0.55 mmHg      Ao V2 VTI 15.1 cm      MIKE(I,A) 4.1 cm^2      MIKE(I,D) 4.1 cm^2      MIKE(V,A) 3.3 cm^2      MIKE(V,D) 3.3 cm^2      LV V1 max PG 4.6 mmHg      LV V1 mean PG 2.9 mmHg      LV V1 max 106.8 cm/sec      LV V1 mean 82.8 cm/sec      LV V1 VTI 19.8 cm      SV(Ao) 104.6 ml      SI(Ao) 48.8 ml/m^2      SV(LVOT) 61.7 ml      SI(LVOT) 28.8 ml/m^2      PA V2 max 112.8 cm/sec      PA max PG 5.1 mmHg      PA max PG (full) 2.4 mmHg      PA V2 mean 82.0 cm/sec      PA mean PG 3.0 mmHg      PA mean PG (full) 1.4 mmHg      PA V2 VTI 16.7 cm      PA acc time 0.08 sec      RV V1 max PG 2.6 mmHg      RV V1 mean PG 1.5 mmHg      RV V1 max 81.3 cm/sec      RV V1 mean 59.5 cm/sec      RV V1 VTI 11.5 cm      TR max michael 295.2 cm/sec      RVSP(TR) 37.8 mmHg      RAP systole 3.0 mmHg      PA pr(Accel) 44.7 mmHg       CV ECHO CHELSEA - BZI_BMI 37.9 kilograms/m^2       CV ECHO CEHLSEA - BSA(HAYCOCK) 2.3 m^2       CV ECHO CHELSEA - BZI_METRIC_WEIGHT 106.6 kg       CV ECHO CHELSEA - BZI_METRIC_HEIGHT 167.6 cm      Target HR (85%) 128 bpm      Max. Pred. HR (100%) 150 bpm      EF(MOD-bp) 70.0 %      LA dimension(2D) 2.9 cm     Narrative:       · Left ventricular systolic function is normal.  · Estimated EF appears to be in the range of 61 - 65%.           CBC    Results from last 7 days   Lab Units 11/14/19  0445 11/13/19  1313 11/11/19  0538   WBC 10*3/mm3 9.50 10.00 11.40*   HEMOGLOBIN g/dL 15.1 16.3* 14.0   PLATELETS 10*3/mm3 132* 144 142     BMP   Results from last 7 days   Lab Units 11/14/19  0445 11/13/19  1313 11/11/19  0538   SODIUM mmol/L 139 140 138   POTASSIUM mmol/L 4.7 4.1 4.4   CHLORIDE mmol/L 98 95* 98   CO2 mmol/L 27.0 25.0 25.0   BUN mg/dL 44* 37* 21   CREATININE mg/dL 1.17* 1.06* 1.10*   GLUCOSE mg/dL 155* 135* 131*   MAGNESIUM mg/dL  --  2.6*  --      CMP   Results from last 7 days   Lab Units 11/14/19  0445 11/13/19  1313 11/11/19  0538   SODIUM mmol/L 139 140 138   POTASSIUM mmol/L 4.7 4.1 4.4   CHLORIDE mmol/L 98  95* 98   CO2 mmol/L 27.0 25.0 25.0   BUN mg/dL 44* 37* 21   CREATININE mg/dL 1.17* 1.06* 1.10*   GLUCOSE mg/dL 155* 135* 131*   ALBUMIN g/dL  --   --  4.20   BILIRUBIN mg/dL  --   --  0.2   ALK PHOS U/L  --   --  74   AST (SGOT) U/L  --   --  13   ALT (SGPT) U/L  --   --  9     Cardiac Studies:  Echo-   Results for orders placed during the hospital encounter of 11/11/19   Adult Transthoracic Echo Complete W/ Cont if Necessary Per Protocol    Narrative · Left ventricular systolic function is normal.  · Estimated EF appears to be in the range of 61 - 65%.        Stress Myoview-  Cath-      Medication Review:   Scheduled Meds:    acetaZOLAMIDE (DIAMOX) IVPB 250 mg Intravenous Daily   aspirin 81 mg Oral Daily   budesonide 0.5 mg Nebulization BID - RT   buPROPion  mg Oral Q12H   cefTRIAXone (ROCEPHIN) 1GM IVPB in 100 mL NS (MBP) 1 g Intravenous Q24H   cholecalciferol 2,000 Units Oral BID   dilTIAZem  mg Oral Q24H   doxycycline 100 mg Intravenous Q12H   enoxaparin 40 mg Subcutaneous Q24H   furosemide 40 mg Intravenous Daily   gabapentin 900 mg Oral Nightly   guaiFENesin 600 mg Oral Q12H   ipratropium-albuterol 3 mL Nebulization Q6H While Awake - RT   ipratropium-albuterol 3 mL Nebulization Q6H - RT   methylPREDNISolone sodium succinate 20 mg Intravenous Q12H   metoprolol succinate XL 50 mg Oral Daily   montelukast 10 mg Oral Nightly   pantoprazole 40 mg Oral BID AC   polyethylene glycol 17 g Oral Daily   rOPINIRole 1 mg Oral BID   rosuvastatin 10 mg Oral Daily   theophylline 300 mg Oral Q24H   vitamin B-12 1,000 mcg Oral Daily     Continuous Infusions:    nitroglycerin 10-50 mcg/min Last Rate: Stopped (11/11/19 1800)     PRN Meds:.ipratropium-albuterol  •  ipratropium-albuterol  •  sodium chloride  •  [COMPLETED] Insert peripheral IV **AND** sodium chloride      Assessment/Plan   Patient Active Problem List   Diagnosis   • Acute on chronic respiratory failure with hypoxia (CMS/HCC)   • Hyperlipidemia   •  Hypertension   • Sleep apnea   • Restless leg syndrome   • COPD (chronic obstructive pulmonary disease) (CMS/HCC)   • Arthritis   • Osteoporosis   • COPD with acute exacerbation (CMS/HCC)   • Acute bronchitis due to respiratory syncytial virus (RSV)   • Acute pulmonary edema (CMS/HCC)   • Obesity (BMI 30-39.9)   • Tachycardia   • Kidney disease   • Depression     Patient is seen and examined and findings are verified.  Her tachycardia has improved significantly.  Continue Cardizem CD and Toprol.  Blood pressure is reasonably well controlled.  I would consider ischemic evaluation as a outpatient once she is fully recovered from this exacerbation of COPD.  Continue current therapy.  Echocardiogram results are reviewed.  Resolved left ventricular function noted    Ryne Staton MD  11/16/19  2:32 PM

## 2019-11-16 NOTE — PLAN OF CARE
Problem: Patient Care Overview  Goal: Plan of Care Review  Outcome: Ongoing (interventions implemented as appropriate)   11/16/19 3815   Coping/Psychosocial   Plan of Care Reviewed With patient   OTHER   Outcome Summary patient up in chair, prefers to sleep in chair. No BM reported, pt. refused further intervention at this time stating she will cont to use Miralax. BIPAP in place all shift. Denies resp. distress.        Problem: Fall Risk (Adult)  Goal: Identify Related Risk Factors and Signs and Symptoms  Outcome: Ongoing (interventions implemented as appropriate)    Goal: Absence of Fall  Outcome: Ongoing (interventions implemented as appropriate)      Problem: Breathing Pattern Ineffective (Adult)  Goal: Effective Oxygenation/Ventilation  Outcome: Ongoing (interventions implemented as appropriate)    Goal: Anxiety/Fear Reduction  Outcome: Ongoing (interventions implemented as appropriate)

## 2019-11-16 NOTE — PROGRESS NOTES
[facility] Medicine Services Daily Progress Note      Hospitalist Team  LOS 4 days      Patient Care Team:  Aristeo Mauro MD as PCP - General (Internal Medicine)  Melquiades Devlin APRN (Nurse Practitioner)        Chief Complaint / Subjective  Chief Complaint   Patient presents with   • Shortness of Breath     11/14: Patient reports feeling better today.  She is still on the BiPAP.  She reports some mild constipation and requests medication to help that.  11/15: The patient still has not had a bowel movement.  She still using BiPAP intermittently.  She reports feeling better today.  She is taking MiraLAX.    Brief Synopsis of Hospital Course/HPI  Patient is a 69-year-old female with a history of COPD and chronic hypoxic respiratory failure, IRIS on BiPAP, HTN, HLD and RLS who to the emergency room 11/11/2019 complaining of shortness of breath and was placed on BiPAP and  then AVAPS for respiratory failure and acute COPD exacerbation.  The patient's respiratory virus panel came back positive for RSV PCR.  She also had evidence of pulmonary edema and responded well to IV Lasix.  BNP was normal.  She developed some tachycardia which was felt to be partly related to her illness and to beta-blocker withdrawal having been off of her home metoprolol.  Cardizem was not controlling her heart rate and metoprolol was added back with improvement.  Cardiology following.      Review of systems   12 point review of systems was reviewed and was negative except as above.      Family History   Problem Relation Age of Onset   • Diabetes Mother    • Hypertension Mother    • Colon cancer Father    • Colon cancer Sister    • Arthritis Brother    • Hypertension Brother    • Allergies Other        Past Medical History:   Diagnosis Date   • Arthritis    • Colon polyps     Dr Bates   • COPD (chronic obstructive pulmonary disease) (CMS/[facility])    • History of emphysema    • Hyperlipidemia    • Hypertension    • Kidney  "disease    • Osteoporosis    • Restless leg syndrome     sees Dr Seipel   • Sleep apnea     npsg [facility] 2014, ahi 5.9, on auto bipap min 8 max, PS 2-8-Alcaraz's, nasal mask       Social History     Socioeconomic History   • Marital status:      Spouse name: Not on file   • Number of children: Not on file   • Years of education: Not on file   • Highest education level: Not on file   Tobacco Use   • Smoking status: Former Smoker   Substance and Sexual Activity   • Drug use: No           Objective      Vital Signs  Temp:  [96.7 °F (35.9 °C)-97.8 °F (36.6 °C)] 97.1 °F (36.2 °C)  Heart Rate:  [79-97] 79  Resp:  [17-30] 24  BP: (112-123)/(62-74) 116/65  Oxygen Therapy  SpO2: 92 %  Pulse Oximetry Type: Continuous  Device (Oxygen Therapy): NPPV/NIV  Flow (L/min): 4  Oxygen Concentration (%): 40  Flowsheet Rows      First Filed Value   Admission Height  167.6 cm (66\") Documented at 11/11/2019 0530   Admission Weight  107 kg (235 lb) Documented at 11/11/2019 0530        Intake & Output (last 3 days)       11/13 0701 - 11/14 0700 11/14 0701 - 11/15 0700 11/15 0701 - 11/16 0700    P.O.     IV Piggyback 200 300     Total Intake(mL/kg) 644 (6) 1140 (10.7) 1020 (9.5)    Urine (mL/kg/hr)  500 (0.2) 1800 (1.3)    Total Output  500 1800    Net +644 +640 -780               Lines, Drains & Airways    Active LDAs     Name:   Placement date:   Placement time:   Site:   Days:    Peripheral IV 11/12/19 2244 Left Antecubital   11/12/19 2244    Antecubital   1                  Physical Exam:   Well-developed over nourished female sitting up in the chair awake and alert in no acute distress; she has BiPAP in place currently; lungs clear to auscultation but decreased air entry bilaterally; CV regular rate and rhythm; abdomen soft nontender; extremities with no edema or calf tenderness; no Huertas catheter.    Procedures:              Results Review:     I reviewed the patient's new clinical results.    Results from last 7 days   "   Lab Units 11/14/19  0445 11/13/19  1313 11/11/19  0538   WBC 10*3/mm3 9.50 10.00 11.40*   HEMOGLOBIN g/dL 15.1 16.3* 14.0   HEMATOCRIT % 48.5* 48.4* 42.4   PLATELETS 10*3/mm3 132* 144 142     Results from last 7 days   Lab Units 11/14/19  0445 11/13/19  1313 11/11/19  0538   SODIUM mmol/L 139 140 138   POTASSIUM mmol/L 4.7 4.1 4.4   CHLORIDE mmol/L 98 95* 98   CO2 mmol/L 27.0 25.0 25.0   BUN mg/dL 44* 37* 21   CREATININE mg/dL 1.17* 1.06* 1.10*   CALCIUM mg/dL 9.8 9.9 9.7   BILIRUBIN mg/dL  --   --  0.2   ALK PHOS U/L  --   --  74   ALT (SGPT) U/L  --   --  9   AST (SGOT) U/L  --   --  13   GLUCOSE mg/dL 155* 135* 131*     Results from last 7 days   Lab Units 11/13/19  1313   MAGNESIUM mg/dL 2.6*     Lab Results   Component Value Date    CALCIUM 9.8 11/14/2019    PHOS 4.0 08/05/2018     No results found for: HGBA1C  Results from last 7 days   Lab Units 11/11/19  0537   INR  1.03       Results from last 7 days   Lab Units 11/11/19  0639   PH, ARTERIAL pH units 7.302*   PO2 ART mm Hg 115.9*   PCO2, ARTERIAL mm Hg 61.1*   HCO3 ART mmol/L 30.3*         Microbiology Results (last 10 days)     Procedure Component Value - Date/Time    Respiratory Panel, PCR - Swab, Nasopharynx [[id number]]  (Abnormal) Collected:  11/11/19 2138    Lab Status:  Final result Specimen:  Swab from Nasopharynx Updated:  11/11/19 2315     ADENOVIRUS, PCR Not Detected     Coronavirus 229E Not Detected     Coronavirus HKU1 Not Detected     Coronavirus NL63 Not Detected     Coronavirus OC43 Not Detected     Human Metapneumovirus Not Detected     Human Rhinovirus/Enterovirus Not Detected     Influenza B PCR Not Detected     Parainfluenza Virus 1 Not Detected     Parainfluenza Virus 2 Not Detected     Parainfluenza Virus 3 Not Detected     Parainfluenza Virus 4 Not Detected     Bordetella pertussis pcr Not Detected     Influenza A H1 2009 PCR Not Detected     Chlamydophila pneumoniae PCR Not Detected     Mycoplasma pneumo by PCR Not Detected      Influenza A PCR Not Detected     Influenza A H3 Not Detected     Influenza A H1 Not Detected     RSV, PCR Detected    Blood Culture - Blood, Hand, Left [[id number]] Collected:  11/11/19 0537    Lab Status:  Preliminary result Specimen:  Blood from Hand, Left Updated:  11/15/19 0546     Blood Culture No growth at 4 days    Blood Culture - Blood, Arm, Right [[id number]] Collected:  11/11/19 0537    Lab Status:  Preliminary result Specimen:  Blood from Arm, Right Updated:  11/15/19 0546     Blood Culture No growth at 4 days          ECG/EMG Results (most recent)     Procedure Component Value Units Date/Time    ECG 12 Lead [[id number]] Collected:  11/11/19 0532     Updated:  11/11/19 0657    Narrative:       HEART RATE= 136  bpm  RR Interval= 442  ms  MD Interval= 157  ms  P Horizontal Axis= 10  deg  P Front Axis= 80  deg  QRSD Interval= 159  ms  QT Interval= 293  ms  QRS Axis= 107  deg  T Wave Axis= 106  deg  - ABNORMAL ECG -  Sinus tachycardia  Ventricular premature complex  Abnormal T, consider ischemia, lateral leads  When compared with ECG of 01-Aug-2018 8:38:14,  Significant repolarization change  Electronically Signed By: Miguel Angel Perez (Toro) 11-Nov-2019 06:56:58  Date and Time of Study: 2019-11-11 05:32:57    ECG 12 Lead [[id number]] Collected:  11/12/19 2342     Updated:  11/12/19 2344    Narrative:       HEART RATE= 115  bpm  RR Interval= 524  ms  MD Interval= 129  ms  P Horizontal Axis=   deg  P Front Axis= 59  deg  QRSD Interval= 84  ms  QT Interval= 323  ms  QRS Axis= 60  deg  T Wave Axis= 30  deg  - BORDERLINE ECG -  Sinus tachycardia  Probable left atrial enlargement  Electronically Signed By:   Date and Time of Study: 2019-11-12 23:42:56    Adult Transthoracic Echo Complete W/ Cont if Necessary Per Protocol [[id number]] Collected:  11/14/19 1420     Updated:  11/15/19 1628     BSA 2.1 m^2      BH CV ECHO CHELSEA - RVDD 1.6 cm      IVSd 0.94 cm      IVSs 1.4 cm      LVIDd 4.1 cm      LVIDs 2.3 cm      LVPWd 0.92  cm      BH CV ECHO CHELSEA - LVPWS 1.6 cm      IVS/LVPW 1.0     FS 44.2 %      EDV(Teich) 74.1 ml      ESV(Teich) 17.9 ml      EF(Teich) 75.9 %      EDV(cubed) 68.8 ml      ESV(cubed) 12.0 ml      EF(cubed) 82.6 %      % IVS thick 50.6 %      % LVPW thick 68.0 %      LV mass(C)d 119.2 grams      LV mass(C)dI 55.7 grams/m^2      LV mass(C)s 111.0 grams      LV mass(C)sI 51.8 grams/m^2      SV(Teich) 56.2 ml      SI(Teich) 26.3 ml/m^2      SV(cubed) 56.8 ml      SI(cubed) 26.5 ml/m^2      Ao root diam 3.0 cm      Ao root area 6.9 cm^2      ACS 1.8 cm      LVOT diam 2.0 cm      LVOT area 3.1 cm^2      EDV(MOD-sp4) 55.5 ml      ESV(MOD-sp4) 19.8 ml      EF(MOD-sp4) 64.2 %      EDV(MOD-sp2) 49.8 ml      ESV(MOD-sp2) 12.6 ml      EF(MOD-sp2) 74.7 %      SV(MOD-sp4) 35.6 ml      SI(MOD-sp4) 16.6 ml/m^2      SV(MOD-sp2) 37.2 ml      SI(MOD-sp2) 17.4 ml/m^2      Ao root area (BSA corrected) 1.4     LV Conway Vol (BSA corrected) 25.9 ml/m^2      LV Sys Vol (BSA corrected) 9.3 ml/m^2      MV E max michael 70.7 cm/sec      MV A max michael 80.5 cm/sec      MV E/A 0.88     MV V2 max 73.2 cm/sec      MV max PG 2.1 mmHg      MV V2 mean 50.1 cm/sec      MV mean PG 1.1 mmHg      MV V2 VTI 16.0 cm      MVA(VTI) 3.9 cm^2      MV dec slope 346.1 cm/sec^2      MV dec time 0.2 sec      Ao pk michael 102.2 cm/sec      Ao max PG 4.2 mmHg      Ao max PG (full) -0.38 mmHg      Ao V2 mean 73.8 cm/sec      Ao mean PG 2.4 mmHg      Ao mean PG (full) -0.55 mmHg      Ao V2 VTI 15.1 cm      MIKE(I,A) 4.1 cm^2      MIKE(I,D) 4.1 cm^2      MIKE(V,A) 3.3 cm^2      MIKE(V,D) 3.3 cm^2      LV V1 max PG 4.6 mmHg      LV V1 mean PG 2.9 mmHg      LV V1 max 106.8 cm/sec      LV V1 mean 82.8 cm/sec      LV V1 VTI 19.8 cm      SV(Ao) 104.6 ml      SI(Ao) 48.8 ml/m^2      SV(LVOT) 61.7 ml      SI(LVOT) 28.8 ml/m^2      PA V2 max 112.8 cm/sec      PA max PG 5.1 mmHg      PA max PG (full) 2.4 mmHg      PA V2 mean 82.0 cm/sec      PA mean PG 3.0 mmHg      PA mean PG (full) 1.4 mmHg       PA V2 VTI 16.7 cm      PA acc time 0.08 sec      RV V1 max PG 2.6 mmHg      RV V1 mean PG 1.5 mmHg      RV V1 max 81.3 cm/sec      RV V1 mean 59.5 cm/sec      RV V1 VTI 11.5 cm      TR max michael 295.2 cm/sec      RVSP(TR) 37.8 mmHg      RAP systole 3.0 mmHg      PA pr(Accel) 44.7 mmHg       CV ECHO CHELSEA - BZI_BMI 37.9 kilograms/m^2       CV ECHO CHELSEA - BSA(HAYCOCK) 2.3 m^2       CV ECHO CHELSEA - BZI_METRIC_WEIGHT 106.6 kg       CV ECHO CHELSEA - BZI_METRIC_HEIGHT 167.6 cm      Target HR (85%) 128 bpm      Max. Pred. HR (100%) 150 bpm      EF(MOD-bp) 70.0 %      LA dimension(2D) 2.9 cm     Narrative:       · Left ventricular systolic function is normal.  · Estimated EF appears to be in the range of 61 - 65%.                  Results for orders placed during the hospital encounter of 11/11/19   Adult Transthoracic Echo Complete W/ Cont if Necessary Per Protocol    Narrative · Left ventricular systolic function is normal.  · Estimated EF appears to be in the range of 61 - 65%.          Xr Chest 1 View    Result Date: 11/11/2019  Findings suggestive of COPD/emphysema without acute cardiopulmonary abnormality.  Electronically Signed By-Waylon Rod On:11/11/2019 7:12 AM This report was finalized on [id number] by  Waylon Rod, .      Xrays, labs reviewed personally by physician.    Medication Review:   I have reviewed the patient's current medication list      Scheduled Meds    acetaZOLAMIDE (DIAMOX) IVPB 250 mg Intravenous Daily   aspirin 81 mg Oral Daily   budesonide 0.5 mg Nebulization BID - RT   buPROPion  mg Oral Q12H   cefTRIAXone (ROCEPHIN) 1GM IVPB in 100 mL NS (MBP) 1 g Intravenous Q24H   cholecalciferol 2,000 Units Oral BID   dilTIAZem  mg Oral Q24H   doxycycline 100 mg Intravenous Q12H   enoxaparin 40 mg Subcutaneous Q24H   furosemide 40 mg Intravenous Daily   gabapentin 900 mg Oral Nightly   guaiFENesin 600 mg Oral Q12H   ipratropium-albuterol 3 mL Nebulization Q6H While Awake - RT      ipratropium-albuterol 3 mL Nebulization Q6H - RT   methylPREDNISolone sodium succinate 20 mg Intravenous Q12H   metoprolol succinate XL 50 mg Oral Daily   montelukast 10 mg Oral Nightly   pantoprazole 40 mg Oral BID AC   polyethylene glycol 17 g Oral Daily   rOPINIRole 1 mg Oral BID   rosuvastatin 10 mg Oral Daily   theophylline 300 mg Oral Q24H   vitamin B-12 1,000 mcg Oral Daily       Meds Infusions    nitroglycerin 10-50 mcg/min Last Rate: Stopped (11/11/19 1800)       Meds PRN  ipratropium-albuterol  •  ipratropium-albuterol  •  sodium chloride  •  [COMPLETED] Insert peripheral IV **AND** sodium chloride        Assessment / Plan    Active Hospital Problems    Diagnosis  POA   • **Acute on chronic respiratory failure with hypoxia (CMS/HCC) [J96.21]  Yes     Priority: High   • Sleep apnea [G47.30]  Yes     Priority: High   • COPD with acute exacerbation (CMS/HCC) [J44.1]  Yes     Priority: High   • Acute bronchitis due to respiratory syncytial virus (RSV) [J20.5]  Yes     Priority: High   • Acute pulmonary edema (CMS/HCC) [J81.0]  Yes     Priority: High   • Depression [F32.9]  Yes     Priority: Medium   • Hypertension [I10]  Yes     Priority: Medium   • Tachycardia [R00.0]  Yes     Priority: Medium   • Hyperlipidemia [E78.5]  Yes     Priority: Low   • Restless leg syndrome [G25.81]  Yes     Priority: Low   • Kidney disease [N28.9]  Yes   • Obesity (BMI 30-39.9) [E66.9]  Yes      Resolved Hospital Problems   No resolved problems to display.     Plan:  1.  Continue Diamox, Pulmicort, duo nebs, guaifenesin, Solu-Medrol, Rocephin, doxycycline, Singulair, Lasix and theophylline per Dr. Sifuentes.  2.  DVT prophylaxis: Lovenox  3.  Acute constipation: Continue MiraLAX  4.  Continue aspirin, metoprolol and Cardizem.  5.  Cardiology following and further cardiac work-up will depend on their recommendations, but will occur after the patient improves from her pulmonary illness.  6.  She reports that she has been on Wellbutrin  "for depression for the past 6 months and she had to cut back from twice a day to once a day because she started \"seeing things.\"  She plans to talk to her PCP about going off of it versus changing it to something else because she does not feel that it is working.  7.  Patient triggered positive sepsis screening but she has no evidence of sepsis at this time.  I discussed the case with Dr. Sifuentes 11/14/19.    Virgen Pascual MD  11/15/19  7:57 PM      "

## 2019-11-16 NOTE — PROGRESS NOTES
[facility] Medicine Services Daily Progress Note      Hospitalist Team  LOS 5 days      Patient Care Team:  Aristeo Mauro MD as PCP - General (Internal Medicine)  Melquiades Devlin APRN (Nurse Practitioner)        Chief Complaint / Subjective  Chief Complaint   Patient presents with   • Shortness of Breath     11/14: Patient reports feeling better today.  She is still on the BiPAP.  She reports some mild constipation and requests medication to help that.  11/15: The patient still has not had a bowel movement.  She still using BiPAP intermittently.  She reports feeling better today.  She is taking MiraLAX.  11/16: The patient reports feeling better today.  She has been on the high flow nasal cannula O2 all day without needing the BiPAP.  Patient still has not had a bowel movement but she does not feel like she is bloated or constipated.  She is only just recently starting to eat.    Brief Synopsis of Hospital Course/HPI  Patient is a 69-year-old female with a history of COPD and chronic hypoxic respiratory failure, IRIS on BiPAP, HTN, HLD and RLS who to the emergency room 11/11/2019 complaining of shortness of breath and was placed on BiPAP and  then AVAPS for respiratory failure and acute COPD exacerbation.  The patient's respiratory virus panel came back positive for RSV PCR.  She also had evidence of pulmonary edema and responded well to IV Lasix.  BNP was normal.  She developed some tachycardia which was felt to be partly related to her illness and to beta-blocker withdrawal having been off of her home metoprolol.  Cardizem was not controlling her heart rate and metoprolol was added back with improvement.  Cardiology following.      Review of systems   12 point review of systems was reviewed and was negative except as above.      Family History   Problem Relation Age of Onset   • Diabetes Mother    • Hypertension Mother    • Colon cancer Father    • Colon cancer Sister    • Arthritis  "Brother    • Hypertension Brother    • Allergies Other        Past Medical History:   Diagnosis Date   • Arthritis    • Colon polyps     Dr Bates   • COPD (chronic obstructive pulmonary disease) (CMS/[facility])    • History of emphysema    • Hyperlipidemia    • Hypertension    • Kidney disease    • Osteoporosis    • Restless leg syndrome     sees Dr Seipel   • Sleep apnea     [facility] 2014, ahi 5.9, on auto bipap min 8 max, PS 2-8-Alcaraz's, nasal mask       Social History     Socioeconomic History   • Marital status:      Spouse name: Not on file   • Number of children: Not on file   • Years of education: Not on file   • Highest education level: Not on file   Tobacco Use   • Smoking status: Former Smoker   Substance and Sexual Activity   • Drug use: No           Objective      Vital Signs  Temp:  [96.9 °F (36.1 °C)-97.9 °F (36.6 °C)] 97.6 °F (36.4 °C)  Heart Rate:  [67-89] 74  Resp:  [15-25] 16  BP: (116-159)/(59-77) 159/66  Oxygen Therapy  SpO2: 95 %  Pulse Oximetry Type: Continuous  Device (Oxygen Therapy): nasal cannula  Flow (L/min): 4  Oxygen Concentration (%): 40  Flowsheet Rows      First Filed Value   Admission Height  167.6 cm (66\") Documented at 11/11/2019 0530   Admission Weight  107 kg (235 lb) Documented at 11/11/2019 0530        Intake & Output (last 3 days)       11/13 0701 - 11/14 0700 11/14 0701 - 11/15 0700 11/15 0701 - 11/16 0700 11/16 0701 - 11/17 0700    P.O.  480    IV Piggyback 200 300 200     Total Intake(mL/kg) 644 (6) 1140 (10.7) 1220 (12.5) 480 (4.9)    Urine (mL/kg/hr)  500 (0.2) 1800 (0.8)     Total Output  500 1800     Net +644 +640 -580 +480                Lines, Drains & Airways    Active LDAs     Name:   Placement date:   Placement time:   Site:   Days:    Peripheral IV 11/12/19 2244 Left Antecubital   11/12/19 2244    Antecubital   1                  Physical Exam:   Well-developed over nourished female sitting up in the chair comfortable on nasal cannula O2 in no " acute distress; mucous membranes moist and oral cavity clear; lungs clear to auscultation with decreased air entry bilaterally; CV regular rate and rhythm; abdomen soft and nontender nondistended; extremities with no edema or calf tenderness.    Procedures:              Results Review:     I reviewed the patient's new clinical results.    Results from last 7 days   Lab Units 11/14/19 0445 11/13/19  1313 11/11/19  0538   WBC 10*3/mm3 9.50 10.00 11.40*   HEMOGLOBIN g/dL 15.1 16.3* 14.0   HEMATOCRIT % 48.5* 48.4* 42.4   PLATELETS 10*3/mm3 132* 144 142     Results from last 7 days   Lab Units 11/14/19 0445 11/13/19  1313 11/11/19  0538   SODIUM mmol/L 139 140 138   POTASSIUM mmol/L 4.7 4.1 4.4   CHLORIDE mmol/L 98 95* 98   CO2 mmol/L 27.0 25.0 25.0   BUN mg/dL 44* 37* 21   CREATININE mg/dL 1.17* 1.06* 1.10*   CALCIUM mg/dL 9.8 9.9 9.7   BILIRUBIN mg/dL  --   --  0.2   ALK PHOS U/L  --   --  74   ALT (SGPT) U/L  --   --  9   AST (SGOT) U/L  --   --  13   GLUCOSE mg/dL 155* 135* 131*     Results from last 7 days   Lab Units 11/13/19  1313   MAGNESIUM mg/dL 2.6*     Lab Results   Component Value Date    CALCIUM 9.8 11/14/2019    PHOS 4.0 08/05/2018     No results found for: HGBA1C  Results from last 7 days   Lab Units 11/11/19  0537   INR  1.03       Results from last 7 days   Lab Units 11/11/19  0639   PH, ARTERIAL pH units 7.302*   PO2 ART mm Hg 115.9*   PCO2, ARTERIAL mm Hg 61.1*   HCO3 ART mmol/L 30.3*         Microbiology Results (last 10 days)     Procedure Component Value - Date/Time    Respiratory Panel, PCR - Swab, Nasopharynx [[id number]]  (Abnormal) Collected:  11/11/19 2138    Lab Status:  Final result Specimen:  Swab from Nasopharynx Updated:  11/11/19 2315     ADENOVIRUS, PCR Not Detected     Coronavirus 229E Not Detected     Coronavirus HKU1 Not Detected     Coronavirus NL63 Not Detected     Coronavirus OC43 Not Detected     Human Metapneumovirus Not Detected     Human Rhinovirus/Enterovirus Not Detected      Influenza B PCR Not Detected     Parainfluenza Virus 1 Not Detected     Parainfluenza Virus 2 Not Detected     Parainfluenza Virus 3 Not Detected     Parainfluenza Virus 4 Not Detected     Bordetella pertussis pcr Not Detected     Influenza A H1 2009 PCR Not Detected     Chlamydophila pneumoniae PCR Not Detected     Mycoplasma pneumo by PCR Not Detected     Influenza A PCR Not Detected     Influenza A H3 Not Detected     Influenza A H1 Not Detected     RSV, PCR Detected    Blood Culture - Blood, Hand, Left [[id number]] Collected:  11/11/19 0537    Lab Status:  Final result Specimen:  Blood from Hand, Left Updated:  11/16/19 0546     Blood Culture No growth at 5 days    Blood Culture - Blood, Arm, Right [[id number]] Collected:  11/11/19 0537    Lab Status:  Final result Specimen:  Blood from Arm, Right Updated:  11/16/19 0546     Blood Culture No growth at 5 days          ECG/EMG Results (most recent)     Procedure Component Value Units Date/Time    ECG 12 Lead [[id number]] Collected:  11/11/19 0532     Updated:  11/11/19 0657    Narrative:       HEART RATE= 136  bpm  RR Interval= 442  ms  TN Interval= 157  ms  P Horizontal Axis= 10  deg  P Front Axis= 80  deg  QRSD Interval= 159  ms  QT Interval= 293  ms  QRS Axis= 107  deg  T Wave Axis= 106  deg  - ABNORMAL ECG -  Sinus tachycardia  Ventricular premature complex  Abnormal T, consider ischemia, lateral leads  When compared with ECG of 01-Aug-2018 8:38:14,  Significant repolarization change  Electronically Signed By: Miguel Angel Perez (Toro) 11-Nov-2019 06:56:58  Date and Time of Study: 2019-11-11 05:32:57    ECG 12 Lead [[id number]] Collected:  11/12/19 2342     Updated:  11/12/19 2344    Narrative:       HEART RATE= 115  bpm  RR Interval= 524  ms  TN Interval= 129  ms  P Horizontal Axis=   deg  P Front Axis= 59  deg  QRSD Interval= 84  ms  QT Interval= 323  ms  QRS Axis= 60  deg  T Wave Axis= 30  deg  - BORDERLINE ECG -  Sinus tachycardia  Probable left atrial  enlargement  Electronically Signed By:   Date and Time of Study: 2019-11-12 23:42:56    Adult Transthoracic Echo Complete W/ Cont if Necessary Per Protocol [[id number]] Collected:  11/14/19 1420     Updated:  11/15/19 1628     BSA 2.1 m^2      BH CV ECHO CHELSEA - RVDD 1.6 cm      IVSd 0.94 cm      IVSs 1.4 cm      LVIDd 4.1 cm      LVIDs 2.3 cm      LVPWd 0.92 cm      BH CV ECHO CHELSEA - LVPWS 1.6 cm      IVS/LVPW 1.0     FS 44.2 %      EDV(Teich) 74.1 ml      ESV(Teich) 17.9 ml      EF(Teich) 75.9 %      EDV(cubed) 68.8 ml      ESV(cubed) 12.0 ml      EF(cubed) 82.6 %      % IVS thick 50.6 %      % LVPW thick 68.0 %      LV mass(C)d 119.2 grams      LV mass(C)dI 55.7 grams/m^2      LV mass(C)s 111.0 grams      LV mass(C)sI 51.8 grams/m^2      SV(Teich) 56.2 ml      SI(Teich) 26.3 ml/m^2      SV(cubed) 56.8 ml      SI(cubed) 26.5 ml/m^2      Ao root diam 3.0 cm      Ao root area 6.9 cm^2      ACS 1.8 cm      LVOT diam 2.0 cm      LVOT area 3.1 cm^2      EDV(MOD-sp4) 55.5 ml      ESV(MOD-sp4) 19.8 ml      EF(MOD-sp4) 64.2 %      EDV(MOD-sp2) 49.8 ml      ESV(MOD-sp2) 12.6 ml      EF(MOD-sp2) 74.7 %      SV(MOD-sp4) 35.6 ml      SI(MOD-sp4) 16.6 ml/m^2      SV(MOD-sp2) 37.2 ml      SI(MOD-sp2) 17.4 ml/m^2      Ao root area (BSA corrected) 1.4     LV Conway Vol (BSA corrected) 25.9 ml/m^2      LV Sys Vol (BSA corrected) 9.3 ml/m^2      MV E max michael 70.7 cm/sec      MV A max michael 80.5 cm/sec      MV E/A 0.88     MV V2 max 73.2 cm/sec      MV max PG 2.1 mmHg      MV V2 mean 50.1 cm/sec      MV mean PG 1.1 mmHg      MV V2 VTI 16.0 cm      MVA(VTI) 3.9 cm^2      MV dec slope 346.1 cm/sec^2      MV dec time 0.2 sec      Ao pk michael 102.2 cm/sec      Ao max PG 4.2 mmHg      Ao max PG (full) -0.38 mmHg      Ao V2 mean 73.8 cm/sec      Ao mean PG 2.4 mmHg      Ao mean PG (full) -0.55 mmHg      Ao V2 VTI 15.1 cm      MIKE(I,A) 4.1 cm^2      MIKE(I,D) 4.1 cm^2      IMKE(V,A) 3.3 cm^2      MIKE(V,D) 3.3 cm^2      LV V1 max PG 4.6 mmHg      LV  V1 mean PG 2.9 mmHg      LV V1 max 106.8 cm/sec      LV V1 mean 82.8 cm/sec      LV V1 VTI 19.8 cm      SV(Ao) 104.6 ml      SI(Ao) 48.8 ml/m^2      SV(LVOT) 61.7 ml      SI(LVOT) 28.8 ml/m^2      PA V2 max 112.8 cm/sec      PA max PG 5.1 mmHg      PA max PG (full) 2.4 mmHg      PA V2 mean 82.0 cm/sec      PA mean PG 3.0 mmHg      PA mean PG (full) 1.4 mmHg      PA V2 VTI 16.7 cm      PA acc time 0.08 sec      RV V1 max PG 2.6 mmHg      RV V1 mean PG 1.5 mmHg      RV V1 max 81.3 cm/sec      RV V1 mean 59.5 cm/sec      RV V1 VTI 11.5 cm      TR max michael 295.2 cm/sec      RVSP(TR) 37.8 mmHg      RAP systole 3.0 mmHg      PA pr(Accel) 44.7 mmHg       CV ECHO CHELSEA - BZI_BMI 37.9 kilograms/m^2       CV ECHO CHELSEA - BSA(HAYCOCK) 2.3 m^2       CV ECHO CHELSEA - BZI_METRIC_WEIGHT 106.6 kg       CV ECHO CHELSEA - BZI_METRIC_HEIGHT 167.6 cm      Target HR (85%) 128 bpm      Max. Pred. HR (100%) 150 bpm      EF(MOD-bp) 70.0 %      LA dimension(2D) 2.9 cm     Narrative:       · Left ventricular systolic function is normal.  · Estimated EF appears to be in the range of 61 - 65%.                  Results for orders placed during the hospital encounter of 11/11/19   Adult Transthoracic Echo Complete W/ Cont if Necessary Per Protocol    Narrative · Left ventricular systolic function is normal.  · Estimated EF appears to be in the range of 61 - 65%.          Xr Chest 1 View    Result Date: 11/11/2019  Findings suggestive of COPD/emphysema without acute cardiopulmonary abnormality.  Electronically Signed By-Waylon Rod On:11/11/2019 7:12 AM This report was finalized on [id number] by  Waylon Rod, .      Xrays, labs reviewed personally by physician.    Medication Review:   I have reviewed the patient's current medication list      Scheduled Meds    acetaZOLAMIDE (DIAMOX) IVPB 250 mg Intravenous Daily   aspirin 81 mg Oral Daily   budesonide 0.5 mg Nebulization BID - RT   buPROPion  mg Oral Q12H   cefTRIAXone (ROCEPHIN)  1GM IVPB in 100 mL NS (MBP) 1 g Intravenous Q24H   cholecalciferol 2,000 Units Oral BID   dilTIAZem  mg Oral Q24H   doxycycline 100 mg Oral BID With Meals   enoxaparin 40 mg Subcutaneous Q24H   furosemide 40 mg Intravenous Daily   gabapentin 900 mg Oral Nightly   guaiFENesin 600 mg Oral Q12H   ipratropium-albuterol 3 mL Nebulization Q6H While Awake - RT   ipratropium-albuterol 3 mL Nebulization Q6H - RT   methylPREDNISolone sodium succinate 20 mg Intravenous Q12H   metoprolol succinate XL 50 mg Oral Daily   montelukast 10 mg Oral Nightly   pantoprazole 40 mg Oral BID AC   polyethylene glycol 17 g Oral Daily   rOPINIRole 1 mg Oral BID   rosuvastatin 10 mg Oral Daily   theophylline 300 mg Oral Q24H   vitamin B-12 1,000 mcg Oral Daily       Meds Infusions    nitroglycerin 10-50 mcg/min Last Rate: Stopped (11/11/19 1800)       Meds PRN  ipratropium-albuterol  •  ipratropium-albuterol  •  sodium chloride  •  [COMPLETED] Insert peripheral IV **AND** sodium chloride        Assessment / Plan    Active Hospital Problems    Diagnosis  POA   • **Acute on chronic respiratory failure with hypoxia (CMS/HCC) [J96.21]  Yes     Priority: High   • Sleep apnea [G47.30]  Yes     Priority: High   • COPD with acute exacerbation (CMS/HCC) [J44.1]  Yes     Priority: High   • Acute bronchitis due to respiratory syncytial virus (RSV) [J20.5]  Yes     Priority: High   • Acute pulmonary edema (CMS/HCC) [J81.0]  Yes     Priority: High   • Depression [F32.9]  Yes     Priority: Medium   • Hypertension [I10]  Yes     Priority: Medium   • Tachycardia [R00.0]  Yes     Priority: Medium   • Hyperlipidemia [E78.5]  Yes     Priority: Low   • Restless leg syndrome [G25.81]  Yes     Priority: Low   • Kidney disease [N28.9]  Yes   • Obesity (BMI 30-39.9) [E66.9]  Yes      Resolved Hospital Problems   No resolved problems to display.     Plan:  1.  Continue Diamox, Pulmicort, duo nebs, guaifenesin, Solu-Medrol, Rocephin, doxycycline, Singulair, Lasix  "and theophylline per Dr. Sifuentes.  2.  Wean O2 as tolerated.  She is currently on 4 L high flow O2.  3.  Acute constipation: Continue MiraLAX  4.  Continue aspirin, metoprolol and Cardizem.  5.  Cardiology following and they recommend stress testing after the patient recovers from her acute COPD exacerbation.  6.  She reports that she has been on Wellbutrin for depression for the past 6 months and she had to cut back from twice a day to once a day because she started \"seeing things.\"  She plans to talk to her PCP about going off of it versus changing it to something else because she does not feel that it is working.  7.  DVT prophylaxis: Lovenox.    .Virgen Pascual MD  11/16/19  4:25 PM      "

## 2019-11-17 PROCEDURE — 94660 CPAP INITIATION&MGMT: CPT

## 2019-11-17 PROCEDURE — 99232 SBSQ HOSP IP/OBS MODERATE 35: CPT | Performed by: INTERNAL MEDICINE

## 2019-11-17 PROCEDURE — 94799 UNLISTED PULMONARY SVC/PX: CPT

## 2019-11-17 PROCEDURE — 25010000002 CEFTRIAXONE PER 250 MG: Performed by: INTERNAL MEDICINE

## 2019-11-17 PROCEDURE — 94760 N-INVAS EAR/PLS OXIMETRY 1: CPT

## 2019-11-17 PROCEDURE — 99232 SBSQ HOSP IP/OBS MODERATE 35: CPT | Performed by: HOSPITALIST

## 2019-11-17 PROCEDURE — 25010000002 ENOXAPARIN PER 10 MG: Performed by: INTERNAL MEDICINE

## 2019-11-17 PROCEDURE — 25010000002 ACETAZOLAMIDE PER 500 MG: Performed by: INTERNAL MEDICINE

## 2019-11-17 PROCEDURE — 63710000001 PREDNISONE PER 1 MG: Performed by: INTERNAL MEDICINE

## 2019-11-17 PROCEDURE — 25010000002 FUROSEMIDE PER 20 MG: Performed by: INTERNAL MEDICINE

## 2019-11-17 PROCEDURE — 25010000002 METHYLPREDNISOLONE PER 40 MG: Performed by: INTERNAL MEDICINE

## 2019-11-17 RX ORDER — PREDNISONE 20 MG/1
20 TABLET ORAL 2 TIMES DAILY WITH MEALS
Status: DISCONTINUED | OUTPATIENT
Start: 2019-11-17 | End: 2019-11-18 | Stop reason: HOSPADM

## 2019-11-17 RX ADMIN — BUPROPION HYDROCHLORIDE 150 MG: 150 TABLET, FILM COATED, EXTENDED RELEASE ORAL at 10:19

## 2019-11-17 RX ADMIN — IPRATROPIUM BROMIDE AND ALBUTEROL SULFATE 3 ML: .5; 3 SOLUTION RESPIRATORY (INHALATION) at 11:45

## 2019-11-17 RX ADMIN — GABAPENTIN 900 MG: 300 CAPSULE ORAL at 20:30

## 2019-11-17 RX ADMIN — DILTIAZEM HYDROCHLORIDE 120 MG: 120 CAPSULE, COATED, EXTENDED RELEASE ORAL at 10:18

## 2019-11-17 RX ADMIN — MELATONIN 2000 UNITS: at 20:30

## 2019-11-17 RX ADMIN — MONTELUKAST SODIUM 10 MG: 10 TABLET, COATED ORAL at 20:30

## 2019-11-17 RX ADMIN — GUAIFENESIN 600 MG: 600 TABLET, EXTENDED RELEASE ORAL at 20:30

## 2019-11-17 RX ADMIN — ROPINIROLE 1 MG: 1 TABLET, FILM COATED ORAL at 19:13

## 2019-11-17 RX ADMIN — PANTOPRAZOLE SODIUM 40 MG: 40 TABLET, DELAYED RELEASE ORAL at 17:07

## 2019-11-17 RX ADMIN — BUPROPION HYDROCHLORIDE 150 MG: 150 TABLET, FILM COATED, EXTENDED RELEASE ORAL at 20:30

## 2019-11-17 RX ADMIN — METOPROLOL SUCCINATE 50 MG: 50 TABLET, EXTENDED RELEASE ORAL at 10:18

## 2019-11-17 RX ADMIN — ASPIRIN 81 MG 81 MG: 81 TABLET ORAL at 10:18

## 2019-11-17 RX ADMIN — CYANOCOBALAMIN TAB 1000 MCG 1000 MCG: 1000 TAB at 10:18

## 2019-11-17 RX ADMIN — IPRATROPIUM BROMIDE AND ALBUTEROL SULFATE 3 ML: .5; 3 SOLUTION RESPIRATORY (INHALATION) at 12:58

## 2019-11-17 RX ADMIN — THEOPHYLLINE ANHYDROUS 300 MG: 300 CAPSULE, EXTENDED RELEASE ORAL at 10:17

## 2019-11-17 RX ADMIN — MELATONIN 2000 UNITS: at 10:17

## 2019-11-17 RX ADMIN — CEFTRIAXONE SODIUM 1 G: 1 INJECTION, POWDER, FOR SOLUTION INTRAMUSCULAR; INTRAVENOUS at 05:42

## 2019-11-17 RX ADMIN — IPRATROPIUM BROMIDE AND ALBUTEROL SULFATE 3 ML: .5; 3 SOLUTION RESPIRATORY (INHALATION) at 06:34

## 2019-11-17 RX ADMIN — IPRATROPIUM BROMIDE AND ALBUTEROL SULFATE 3 ML: .5; 3 SOLUTION RESPIRATORY (INHALATION) at 23:29

## 2019-11-17 RX ADMIN — ROSUVASTATIN CALCIUM 10 MG: 10 TABLET, FILM COATED ORAL at 10:19

## 2019-11-17 RX ADMIN — DOXYCYCLINE 100 MG: 100 TABLET, FILM COATED ORAL at 10:19

## 2019-11-17 RX ADMIN — DOXYCYCLINE 100 MG: 100 TABLET, FILM COATED ORAL at 17:07

## 2019-11-17 RX ADMIN — BUDESONIDE 0.5 MG: 0.5 INHALANT RESPIRATORY (INHALATION) at 06:34

## 2019-11-17 RX ADMIN — IPRATROPIUM BROMIDE AND ALBUTEROL SULFATE 3 ML: .5; 3 SOLUTION RESPIRATORY (INHALATION) at 18:50

## 2019-11-17 RX ADMIN — ACETAZOLAMIDE 250 MG: 500 INJECTION, POWDER, LYOPHILIZED, FOR SOLUTION INTRAVENOUS at 10:52

## 2019-11-17 RX ADMIN — PANTOPRAZOLE SODIUM 40 MG: 40 TABLET, DELAYED RELEASE ORAL at 10:19

## 2019-11-17 RX ADMIN — FUROSEMIDE 40 MG: 10 INJECTION, SOLUTION INTRAMUSCULAR; INTRAVENOUS at 10:19

## 2019-11-17 RX ADMIN — METHYLPREDNISOLONE SODIUM SUCCINATE 20 MG: 40 INJECTION, POWDER, FOR SOLUTION INTRAMUSCULAR; INTRAVENOUS at 10:17

## 2019-11-17 RX ADMIN — ROPINIROLE 1 MG: 1 TABLET, FILM COATED ORAL at 20:30

## 2019-11-17 RX ADMIN — ENOXAPARIN SODIUM 40 MG: 40 INJECTION SUBCUTANEOUS at 17:07

## 2019-11-17 RX ADMIN — GUAIFENESIN 600 MG: 600 TABLET, EXTENDED RELEASE ORAL at 10:18

## 2019-11-17 RX ADMIN — PREDNISONE 20 MG: 20 TABLET ORAL at 17:07

## 2019-11-17 RX ADMIN — POLYETHYLENE GLYCOL 3350 17 G: 17 POWDER, FOR SOLUTION ORAL at 10:18

## 2019-11-17 RX ADMIN — BUDESONIDE 0.5 MG: 0.5 INHALANT RESPIRATORY (INHALATION) at 18:53

## 2019-11-17 NOTE — PLAN OF CARE
Problem: Patient Care Overview  Goal: Plan of Care Review  Outcome: Ongoing (interventions implemented as appropriate)    Goal: Individualization and Mutuality  Outcome: Ongoing (interventions implemented as appropriate)    Goal: Discharge Needs Assessment  Outcome: Ongoing (interventions implemented as appropriate)    Goal: Interprofessional Rounds/Family Conf  Outcome: Ongoing (interventions implemented as appropriate)      Problem: Fall Risk (Adult)  Goal: Identify Related Risk Factors and Signs and Symptoms  Outcome: Ongoing (interventions implemented as appropriate)    Goal: Absence of Fall  Outcome: Ongoing (interventions implemented as appropriate)      Problem: Breathing Pattern Ineffective (Adult)  Goal: Effective Oxygenation/Ventilation  Outcome: Ongoing (interventions implemented as appropriate)

## 2019-11-17 NOTE — PROGRESS NOTES
Daily Progress Note        Acute on chronic respiratory failure with hypoxia (CMS/HCC)    Hyperlipidemia    Hypertension    Sleep apnea    Restless leg syndrome    COPD with acute exacerbation (CMS/HCC)    Acute bronchitis due to respiratory syncytial virus (RSV)    Acute pulmonary edema (CMS/HCC)    Obesity (BMI 30-39.9)    Tachycardia    Kidney disease    Depression      Assessment  Overall improving and back to her baseline   tachycardia improved currently on metoprolol 50 mg XL and Cardizem CD  Acute/chronic hypercapnic hypoxic respiratory failure   acute COPD exacerbation due to RSV virus  acute bronchitis   pulmonary edema    hyperlipidemia  Hypertension  Chronic kidney disease  Restless leg syndrome  Obstructive sleep apnea, patient use home auto BiPAP  History of cerebral aneurysm repair  History of partial colectomy  Cataract extraction  Diabetes mellitus      Plan  Steroids weaning will DC IV Solu-Medrol start p.o. prednisone 20 mg p.o. twice daily  DC IV Diamox  BiPAP during sleep and as tolerated during the day  home Wellbutrin  antibiotics allow Rocephin to  and continue p.o. doxycycline for additional 5 days  Diuresis as needed  Bronchodilators desonide and DuoNeb  Oxygen titration  DVT prophylaxis  GI prophylaxis  Glycemic control       LOS: 6 days     Subjective         Objective     Vital signs for last 24 hours:  Vitals:    19 1239 19 1258 19 1301 19 1422   BP: 129/56   121/54   BP Location: Right arm   Right arm   Patient Position: Sitting   Sitting   Pulse: 83 82  80   Resp: 20 20 24 21   Temp:    97.6 °F (36.4 °C)   TempSrc:    Axillary   SpO2: 97% 96%  98%   Weight:       Height:           Intake/Output last 3 shifts:  I/O last 3 completed shifts:  In: 1380 [P.O.:1080; IV Piggyback:300]  Out: -   Intake/Output this shift:  I/O this shift:  In: 240 [P.O.:240]  Out: -       Radiology  Imaging Results (Last 24 Hours)     ** No results found for the last 24 hours. **           Labs:  Results from last 7 days   Lab Units 11/14/19  0445   WBC 10*3/mm3 9.50   HEMOGLOBIN g/dL 15.1   HEMATOCRIT % 48.5*   PLATELETS 10*3/mm3 132*     Results from last 7 days   Lab Units 11/14/19  0445  11/11/19  0538   SODIUM mmol/L 139   < > 138   POTASSIUM mmol/L 4.7   < > 4.4   CHLORIDE mmol/L 98   < > 98   CO2 mmol/L 27.0   < > 25.0   BUN mg/dL 44*   < > 21   CREATININE mg/dL 1.17*   < > 1.10*   CALCIUM mg/dL 9.8   < > 9.7   BILIRUBIN mg/dL  --   --  0.2   ALK PHOS U/L  --   --  74   ALT (SGPT) U/L  --   --  9   AST (SGOT) U/L  --   --  13   GLUCOSE mg/dL 155*   < > 131*    < > = values in this interval not displayed.     Results from last 7 days   Lab Units 11/11/19  0639   PH, ARTERIAL pH units 7.302*   PO2 ART mm Hg 115.9*   PCO2, ARTERIAL mm Hg 61.1*   HCO3 ART mmol/L 30.3*     Results from last 7 days   Lab Units 11/11/19  0538   ALBUMIN g/dL 4.20     Results from last 7 days   Lab Units 11/11/19  0538   TROPONIN T ng/mL <0.010         Results from last 7 days   Lab Units 11/13/19  1313   MAGNESIUM mg/dL 2.6*     Results from last 7 days   Lab Units 11/11/19  0537   INR  1.03   APTT seconds 24.6     Results from last 7 days   Lab Units 11/13/19  1313   TSH uIU/mL 1.760           Meds:   SCHEDULE    aspirin 81 mg Oral Daily   budesonide 0.5 mg Nebulization BID - RT   buPROPion  mg Oral Q12H   cefTRIAXone (ROCEPHIN) 1GM IVPB in 100 mL NS (MBP) 1 g Intravenous Q24H   cholecalciferol 2,000 Units Oral BID   dilTIAZem  mg Oral Q24H   doxycycline 100 mg Oral BID With Meals   enoxaparin 40 mg Subcutaneous Q24H   furosemide 40 mg Intravenous Daily   gabapentin 900 mg Oral Nightly   guaiFENesin 600 mg Oral Q12H   ipratropium-albuterol 3 mL Nebulization Q6H While Awake - RT   ipratropium-albuterol 3 mL Nebulization Q6H - RT   metoprolol succinate XL 50 mg Oral Daily   montelukast 10 mg Oral Nightly   pantoprazole 40 mg Oral BID AC   polyethylene glycol 17 g Oral Daily   predniSONE 20 mg Oral  BID With Meals   rOPINIRole 1 mg Oral BID   rosuvastatin 10 mg Oral Daily   theophylline 300 mg Oral Q24H   vitamin B-12 1,000 mcg Oral Daily     Infusions    nitroglycerin 10-50 mcg/min Last Rate: Stopped (11/11/19 1800)     PRNs  ipratropium-albuterol  •  ipratropium-albuterol  •  sodium chloride  •  [COMPLETED] Insert peripheral IV **AND** sodium chloride    Physical Exam:  Physical Exam   Cardiovascular:   Murmur heard.  Pulmonary/Chest: No respiratory distress. She has wheezes. She has rales. She exhibits no tenderness.   Abdominal: She exhibits no distension. There is no tenderness.   Musculoskeletal: She exhibits edema.       ROS  Review of Systems   HENT: Positive for congestion, rhinorrhea and sneezing.    Respiratory: Positive for cough, shortness of breath and wheezing. Negative for apnea, choking, chest tightness and stridor.    Cardiovascular: Positive for leg swelling.             Total time spent with patient greater than: 1 Hour

## 2019-11-17 NOTE — PROGRESS NOTES
Daily Progress Note        Acute on chronic respiratory failure with hypoxia (CMS/HCC)    Hyperlipidemia    Hypertension    Sleep apnea    Restless leg syndrome    COPD with acute exacerbation (CMS/HCC)    Acute bronchitis due to respiratory syncytial virus (RSV)    Acute pulmonary edema (CMS/HCC)    Obesity (BMI 30-39.9)    Tachycardia    Kidney disease    Depression      Assessment  Tachycardia  Acute/chronic hypercapnic hypoxic respiratory failure   acute COPD exacerbation due to RSV virus  acute bronchitis   pulmonary edema    hyperlipidemia  Hypertension  Chronic kidney disease  Restless leg syndrome  Obstructive sleep apnea, patient use home auto BiPAP  History of cerebral aneurysm repair  History of partial colectomy  Cataract extraction  Diabetes mellitus      Plan    BiPAP during sleep and as tolerated during the day   resume home Wellbutrin  antibiotics Rocephin and doxycycline  Diuresis   Bronchodilators desonide and DuoNeb  Oxygen titration  DVT prophylaxis  GI prophylaxis  Glycemic control       LOS: 6 days     Subjective         Objective     Vital signs for last 24 hours:  Vitals:    11/17/19 1239 11/17/19 1258 11/17/19 1301 11/17/19 1422   BP: 129/56   121/54   BP Location: Right arm   Right arm   Patient Position: Sitting   Sitting   Pulse: 83 82  80   Resp: 20 20 24 21   Temp:    97.6 °F (36.4 °C)   TempSrc:    Axillary   SpO2: 97% 96%  98%   Weight:       Height:           Intake/Output last 3 shifts:  I/O last 3 completed shifts:  In: 1380 [P.O.:1080; IV Piggyback:300]  Out: -   Intake/Output this shift:  I/O this shift:  In: 240 [P.O.:240]  Out: -       Radiology  Imaging Results (Last 24 Hours)     ** No results found for the last 24 hours. **          Labs:  Results from last 7 days   Lab Units 11/14/19  0445   WBC 10*3/mm3 9.50   HEMOGLOBIN g/dL 15.1   HEMATOCRIT % 48.5*   PLATELETS 10*3/mm3 132*     Results from last 7 days   Lab Units 11/14/19  0445  11/11/19  0538   SODIUM mmol/L 139   < >  138   POTASSIUM mmol/L 4.7   < > 4.4   CHLORIDE mmol/L 98   < > 98   CO2 mmol/L 27.0   < > 25.0   BUN mg/dL 44*   < > 21   CREATININE mg/dL 1.17*   < > 1.10*   CALCIUM mg/dL 9.8   < > 9.7   BILIRUBIN mg/dL  --   --  0.2   ALK PHOS U/L  --   --  74   ALT (SGPT) U/L  --   --  9   AST (SGOT) U/L  --   --  13   GLUCOSE mg/dL 155*   < > 131*    < > = values in this interval not displayed.     Results from last 7 days   Lab Units 11/11/19  0639   PH, ARTERIAL pH units 7.302*   PO2 ART mm Hg 115.9*   PCO2, ARTERIAL mm Hg 61.1*   HCO3 ART mmol/L 30.3*     Results from last 7 days   Lab Units 11/11/19  0538   ALBUMIN g/dL 4.20     Results from last 7 days   Lab Units 11/11/19  0538   TROPONIN T ng/mL <0.010         Results from last 7 days   Lab Units 11/13/19  1313   MAGNESIUM mg/dL 2.6*     Results from last 7 days   Lab Units 11/11/19  0537   INR  1.03   APTT seconds 24.6     Results from last 7 days   Lab Units 11/13/19  1313   TSH uIU/mL 1.760           Meds:   SCHEDULE    aspirin 81 mg Oral Daily   budesonide 0.5 mg Nebulization BID - RT   buPROPion  mg Oral Q12H   cefTRIAXone (ROCEPHIN) 1GM IVPB in 100 mL NS (MBP) 1 g Intravenous Q24H   cholecalciferol 2,000 Units Oral BID   dilTIAZem  mg Oral Q24H   doxycycline 100 mg Oral BID With Meals   enoxaparin 40 mg Subcutaneous Q24H   furosemide 40 mg Intravenous Daily   gabapentin 900 mg Oral Nightly   guaiFENesin 600 mg Oral Q12H   ipratropium-albuterol 3 mL Nebulization Q6H While Awake - RT   ipratropium-albuterol 3 mL Nebulization Q6H - RT   metoprolol succinate XL 50 mg Oral Daily   montelukast 10 mg Oral Nightly   pantoprazole 40 mg Oral BID AC   polyethylene glycol 17 g Oral Daily   predniSONE 20 mg Oral BID With Meals   rOPINIRole 1 mg Oral BID   rosuvastatin 10 mg Oral Daily   theophylline 300 mg Oral Q24H   vitamin B-12 1,000 mcg Oral Daily     Infusions    nitroglycerin 10-50 mcg/min Last Rate: Stopped (11/11/19 1800)     PRNs  ipratropium-albuterol  •   ipratropium-albuterol  •  sodium chloride  •  [COMPLETED] Insert peripheral IV **AND** sodium chloride    Physical Exam:  Physical Exam   Cardiovascular:   Murmur heard.  Pulmonary/Chest: No respiratory distress. She has wheezes. She has rales. She exhibits no tenderness.   Abdominal: She exhibits no distension. There is no tenderness.   Musculoskeletal: She exhibits edema.       ROS  Review of Systems   HENT: Positive for congestion, rhinorrhea and sneezing.    Respiratory: Positive for cough, shortness of breath and wheezing. Negative for apnea, choking, chest tightness and stridor.    Cardiovascular: Positive for leg swelling.             Total time spent with patient greater than: 1 Hour

## 2019-11-17 NOTE — PROGRESS NOTES
LOS: 6 days   Admiting Physician- Virgen Pascual MD    Reason For Followup:    Sinus tachycardia  Chest discomfort  COPD exacerbation  Hypertension    Subjective     No complaints.    Objective     No obvious dyspnea    Review of Systems:   Review of Systems   Constitution: Negative for chills and fever.   HENT: Negative for ear discharge and nosebleeds.    Eyes: Negative for discharge and redness.   Cardiovascular: Negative for chest pain, orthopnea, palpitations, paroxysmal nocturnal dyspnea and syncope.   Respiratory: Positive for shortness of breath. Negative for cough and wheezing.    Endocrine: Negative for heat intolerance.   Skin: Negative for rash.   Musculoskeletal: Negative for arthritis and myalgias.   Gastrointestinal: Negative for abdominal pain, melena, nausea and vomiting.   Genitourinary: Negative for dysuria and hematuria.   Neurological: Negative for dizziness, light-headedness, numbness and tremors.   Psychiatric/Behavioral: Negative for depression. The patient is not nervous/anxious.          Vital Signs  Vitals:    11/17/19 0614 11/17/19 0623 11/17/19 0634 11/17/19 0636   BP: 120/61      BP Location: Right arm      Patient Position: Sitting      Pulse: 70  75 77   Resp: 21  18 18   Temp: 96.7 °F (35.9 °C)      TempSrc: Axillary      SpO2: 92%  96%    Weight:  98 kg (216 lb 0.8 oz)     Height:         Wt Readings from Last 1 Encounters:   11/17/19 98 kg (216 lb 0.8 oz)       Intake/Output Summary (Last 24 hours) at 11/17/2019 1135  Last data filed at 11/17/2019 0700  Gross per 24 hour   Intake 940 ml   Output —   Net 940 ml     Physical Exam:  Physical Exam   Constitutional: She is oriented to person, place, and time. She appears well-developed and well-nourished.   HENT:   Head: Normocephalic and atraumatic.   Eyes: No scleral icterus.   Neck: No thyromegaly present.   Cardiovascular: Normal rate, regular rhythm and normal heart sounds. Exam reveals no gallop and no friction rub.   No murmur  heard.  Pulmonary/Chest: Effort normal. No respiratory distress. She has wheezes. She has no rales.   Abdominal: There is no tenderness.   Musculoskeletal: She exhibits no edema.   Lymphadenopathy:     She has no cervical adenopathy.   Neurological: She is alert and oriented to person, place, and time.   Skin: No rash noted. No erythema.   Psychiatric: She has a normal mood and affect.       Results Review:   Lab Results (last 24 hours)     ** No results found for the last 24 hours. **        Imaging Results (Last 72 Hours)     ** No results found for the last 72 hours. **        ECG/EMG Results (most recent)     Procedure Component Value Units Date/Time    ECG 12 Lead [[id number]] Collected:  11/11/19 0532     Updated:  11/11/19 0657    Narrative:       HEART RATE= 136  bpm  RR Interval= 442  ms  MT Interval= 157  ms  P Horizontal Axis= 10  deg  P Front Axis= 80  deg  QRSD Interval= 159  ms  QT Interval= 293  ms  QRS Axis= 107  deg  T Wave Axis= 106  deg  - ABNORMAL ECG -  Sinus tachycardia  Ventricular premature complex  Abnormal T, consider ischemia, lateral leads  When compared with ECG of 01-Aug-2018 8:38:14,  Significant repolarization change  Electronically Signed By: Miguel Angel Perez (Toro) 11-Nov-2019 06:56:58  Date and Time of Study: 2019-11-11 05:32:57    ECG 12 Lead [[id number]] Collected:  11/12/19 2342     Updated:  11/12/19 2344    Narrative:       HEART RATE= 115  bpm  RR Interval= 524  ms  MT Interval= 129  ms  P Horizontal Axis=   deg  P Front Axis= 59  deg  QRSD Interval= 84  ms  QT Interval= 323  ms  QRS Axis= 60  deg  T Wave Axis= 30  deg  - BORDERLINE ECG -  Sinus tachycardia  Probable left atrial enlargement  Electronically Signed By:   Date and Time of Study: 2019-11-12 23:42:56    Adult Transthoracic Echo Complete W/ Cont if Necessary Per Protocol [[id number]] Collected:  11/14/19 1420     Updated:  11/15/19 1628     BSA 2.1 m^2      BH CV ECHO CHELSEA - RVDD 1.6 cm      IVSd 0.94 cm      IVSs 1.4  cm      LVIDd 4.1 cm      LVIDs 2.3 cm      LVPWd 0.92 cm      BH CV ECHO CHELSEA - LVPWS 1.6 cm      IVS/LVPW 1.0     FS 44.2 %      EDV(Teich) 74.1 ml      ESV(Teich) 17.9 ml      EF(Teich) 75.9 %      EDV(cubed) 68.8 ml      ESV(cubed) 12.0 ml      EF(cubed) 82.6 %      % IVS thick 50.6 %      % LVPW thick 68.0 %      LV mass(C)d 119.2 grams      LV mass(C)dI 55.7 grams/m^2      LV mass(C)s 111.0 grams      LV mass(C)sI 51.8 grams/m^2      SV(Teich) 56.2 ml      SI(Teich) 26.3 ml/m^2      SV(cubed) 56.8 ml      SI(cubed) 26.5 ml/m^2      Ao root diam 3.0 cm      Ao root area 6.9 cm^2      ACS 1.8 cm      LVOT diam 2.0 cm      LVOT area 3.1 cm^2      EDV(MOD-sp4) 55.5 ml      ESV(MOD-sp4) 19.8 ml      EF(MOD-sp4) 64.2 %      EDV(MOD-sp2) 49.8 ml      ESV(MOD-sp2) 12.6 ml      EF(MOD-sp2) 74.7 %      SV(MOD-sp4) 35.6 ml      SI(MOD-sp4) 16.6 ml/m^2      SV(MOD-sp2) 37.2 ml      SI(MOD-sp2) 17.4 ml/m^2      Ao root area (BSA corrected) 1.4     LV Conway Vol (BSA corrected) 25.9 ml/m^2      LV Sys Vol (BSA corrected) 9.3 ml/m^2      MV E max michael 70.7 cm/sec      MV A max michael 80.5 cm/sec      MV E/A 0.88     MV V2 max 73.2 cm/sec      MV max PG 2.1 mmHg      MV V2 mean 50.1 cm/sec      MV mean PG 1.1 mmHg      MV V2 VTI 16.0 cm      MVA(VTI) 3.9 cm^2      MV dec slope 346.1 cm/sec^2      MV dec time 0.2 sec      Ao pk michael 102.2 cm/sec      Ao max PG 4.2 mmHg      Ao max PG (full) -0.38 mmHg      Ao V2 mean 73.8 cm/sec      Ao mean PG 2.4 mmHg      Ao mean PG (full) -0.55 mmHg      Ao V2 VTI 15.1 cm      MIKE(I,A) 4.1 cm^2      MIKE(I,D) 4.1 cm^2      MIKE(V,A) 3.3 cm^2      MIKE(V,D) 3.3 cm^2      LV V1 max PG 4.6 mmHg      LV V1 mean PG 2.9 mmHg      LV V1 max 106.8 cm/sec      LV V1 mean 82.8 cm/sec      LV V1 VTI 19.8 cm      SV(Ao) 104.6 ml      SI(Ao) 48.8 ml/m^2      SV(LVOT) 61.7 ml      SI(LVOT) 28.8 ml/m^2      PA V2 max 112.8 cm/sec      PA max PG 5.1 mmHg      PA max PG (full) 2.4 mmHg      PA V2 mean 82.0 cm/sec       PA mean PG 3.0 mmHg      PA mean PG (full) 1.4 mmHg      PA V2 VTI 16.7 cm      PA acc time 0.08 sec      RV V1 max PG 2.6 mmHg      RV V1 mean PG 1.5 mmHg      RV V1 max 81.3 cm/sec      RV V1 mean 59.5 cm/sec      RV V1 VTI 11.5 cm      TR max michael 295.2 cm/sec      RVSP(TR) 37.8 mmHg      RAP systole 3.0 mmHg      PA pr(Accel) 44.7 mmHg       CV ECHO CHELSEA - BZI_BMI 37.9 kilograms/m^2       CV ECHO CHELSEA - BSA(HAYCOCK) 2.3 m^2       CV ECHO CHELSEA - BZI_METRIC_WEIGHT 106.6 kg       CV ECHO CHELSEA - BZI_METRIC_HEIGHT 167.6 cm      Target HR (85%) 128 bpm      Max. Pred. HR (100%) 150 bpm      EF(MOD-bp) 70.0 %      LA dimension(2D) 2.9 cm     Narrative:       · Left ventricular systolic function is normal.  · Estimated EF appears to be in the range of 61 - 65%.           CBC    Results from last 7 days   Lab Units 11/14/19 0445 11/13/19 1313 11/11/19  0538   WBC 10*3/mm3 9.50 10.00 11.40*   HEMOGLOBIN g/dL 15.1 16.3* 14.0   PLATELETS 10*3/mm3 132* 144 142     BMP   Results from last 7 days   Lab Units 11/14/19 0445 11/13/19  1313 11/11/19  0538   SODIUM mmol/L 139 140 138   POTASSIUM mmol/L 4.7 4.1 4.4   CHLORIDE mmol/L 98 95* 98   CO2 mmol/L 27.0 25.0 25.0   BUN mg/dL 44* 37* 21   CREATININE mg/dL 1.17* 1.06* 1.10*   GLUCOSE mg/dL 155* 135* 131*   MAGNESIUM mg/dL  --  2.6*  --      CMP   Results from last 7 days   Lab Units 11/14/19 0445 11/13/19  1313 11/11/19  0538   SODIUM mmol/L 139 140 138   POTASSIUM mmol/L 4.7 4.1 4.4   CHLORIDE mmol/L 98 95* 98   CO2 mmol/L 27.0 25.0 25.0   BUN mg/dL 44* 37* 21   CREATININE mg/dL 1.17* 1.06* 1.10*   GLUCOSE mg/dL 155* 135* 131*   ALBUMIN g/dL  --   --  4.20   BILIRUBIN mg/dL  --   --  0.2   ALK PHOS U/L  --   --  74   AST (SGOT) U/L  --   --  13   ALT (SGPT) U/L  --   --  9     Cardiac Studies:  Echo-   Results for orders placed during the hospital encounter of 11/11/19   Adult Transthoracic Echo Complete W/ Cont if Necessary Per Protocol    Narrative · Left  ventricular systolic function is normal.  · Estimated EF appears to be in the range of 61 - 65%.        Stress Myoview-  Cath-      Medication Review:   Scheduled Meds:    acetaZOLAMIDE (DIAMOX) IVPB 250 mg Intravenous Daily   aspirin 81 mg Oral Daily   budesonide 0.5 mg Nebulization BID - RT   buPROPion  mg Oral Q12H   cefTRIAXone (ROCEPHIN) 1GM IVPB in 100 mL NS (MBP) 1 g Intravenous Q24H   cholecalciferol 2,000 Units Oral BID   dilTIAZem  mg Oral Q24H   doxycycline 100 mg Oral BID With Meals   enoxaparin 40 mg Subcutaneous Q24H   furosemide 40 mg Intravenous Daily   gabapentin 900 mg Oral Nightly   guaiFENesin 600 mg Oral Q12H   ipratropium-albuterol 3 mL Nebulization Q6H While Awake - RT   ipratropium-albuterol 3 mL Nebulization Q6H - RT   methylPREDNISolone sodium succinate 20 mg Intravenous Q12H   metoprolol succinate XL 50 mg Oral Daily   montelukast 10 mg Oral Nightly   pantoprazole 40 mg Oral BID AC   polyethylene glycol 17 g Oral Daily   rOPINIRole 1 mg Oral BID   rosuvastatin 10 mg Oral Daily   theophylline 300 mg Oral Q24H   vitamin B-12 1,000 mcg Oral Daily     Continuous Infusions:    nitroglycerin 10-50 mcg/min Last Rate: Stopped (11/11/19 1800)     PRN Meds:.ipratropium-albuterol  •  ipratropium-albuterol  •  sodium chloride  •  [COMPLETED] Insert peripheral IV **AND** sodium chloride      Assessment/Plan   Patient Active Problem List   Diagnosis   • Acute on chronic respiratory failure with hypoxia (CMS/HCC)   • Hyperlipidemia   • Hypertension   • Sleep apnea   • Restless leg syndrome   • COPD (chronic obstructive pulmonary disease) (CMS/HCC)   • Arthritis   • Osteoporosis   • COPD with acute exacerbation (CMS/HCC)   • Acute bronchitis due to respiratory syncytial virus (RSV)   • Acute pulmonary edema (CMS/HCC)   • Obesity (BMI 30-39.9)   • Tachycardia   • Kidney disease   • Depression     Patient is much better but still has expiratory rhonchi.  Continue current treatment.  Blood  pressure is very well controlled.  Heart rate is desirable.  Echocardiogram showed preserved left ventricular function.  Patient would need ischemic evaluation which would be deferred for outpatient.  I shall follow    Ryne Staton MD  11/17/19  11:35 AM

## 2019-11-17 NOTE — PLAN OF CARE
Problem: Patient Care Overview  Goal: Plan of Care Review  Outcome: Ongoing (interventions implemented as appropriate)   11/17/19 3742   Coping/Psychosocial   Plan of Care Reviewed With patient   OTHER   Outcome Summary patient slipped out of recliner because it was not locked when she sat down. she states she did not actually fall and she states she is not hurt. I made her a high falls risk, charted vital signs at the time of incident, and call placed to dr albert to notify her of incident. incident report filled out as well.

## 2019-11-17 NOTE — PLAN OF CARE
"Problem: Patient Care Overview  Goal: Plan of Care Review  Outcome: Ongoing (interventions implemented as appropriate)   11/17/19 1691   Coping/Psychosocial   Plan of Care Reviewed With patient;spouse   OTHER   Outcome Summary patient is feeling worse today. she has been more short of air and requiring bipap for relief. vital signs are stable but she states she  \"does not feel as well today\". dr glaser and dr albert aware. still no bowel movement and patient does not want any intervention.         "

## 2019-11-18 ENCOUNTER — TELEPHONE (OUTPATIENT)
Dept: CARDIOLOGY | Facility: CLINIC | Age: 69
End: 2019-11-18

## 2019-11-18 VITALS
BODY MASS INDEX: 34.86 KG/M2 | TEMPERATURE: 98.2 F | RESPIRATION RATE: 26 BRPM | HEIGHT: 66 IN | OXYGEN SATURATION: 97 % | WEIGHT: 216.93 LBS | SYSTOLIC BLOOD PRESSURE: 120 MMHG | HEART RATE: 83 BPM | DIASTOLIC BLOOD PRESSURE: 54 MMHG

## 2019-11-18 PROCEDURE — 99231 SBSQ HOSP IP/OBS SF/LOW 25: CPT | Performed by: NURSE PRACTITIONER

## 2019-11-18 PROCEDURE — 63710000001 PREDNISONE PER 1 MG: Performed by: INTERNAL MEDICINE

## 2019-11-18 PROCEDURE — 97530 THERAPEUTIC ACTIVITIES: CPT

## 2019-11-18 PROCEDURE — 99239 HOSP IP/OBS DSCHRG MGMT >30: CPT | Performed by: INTERNAL MEDICINE

## 2019-11-18 PROCEDURE — 94799 UNLISTED PULMONARY SVC/PX: CPT

## 2019-11-18 PROCEDURE — 25010000002 CEFTRIAXONE PER 250 MG: Performed by: INTERNAL MEDICINE

## 2019-11-18 PROCEDURE — 94660 CPAP INITIATION&MGMT: CPT

## 2019-11-18 PROCEDURE — 97116 GAIT TRAINING THERAPY: CPT

## 2019-11-18 RX ORDER — MONTELUKAST SODIUM 10 MG/1
10 TABLET ORAL NIGHTLY
Qty: 30 TABLET | Refills: 0 | Status: SHIPPED | OUTPATIENT
Start: 2019-11-18

## 2019-11-18 RX ORDER — DILTIAZEM HYDROCHLORIDE 120 MG/1
120 CAPSULE, COATED, EXTENDED RELEASE ORAL
Qty: 30 CAPSULE | Refills: 0 | Status: SHIPPED | OUTPATIENT
Start: 2019-11-19 | End: 2019-12-19

## 2019-11-18 RX ORDER — GUAIFENESIN 600 MG/1
600 TABLET, EXTENDED RELEASE ORAL EVERY 12 HOURS SCHEDULED
Qty: 10 TABLET | Refills: 0 | Status: SHIPPED | OUTPATIENT
Start: 2019-11-18 | End: 2020-07-23

## 2019-11-18 RX ORDER — FUROSEMIDE 20 MG/1
20 TABLET ORAL DAILY
Status: DISCONTINUED | OUTPATIENT
Start: 2019-11-18 | End: 2019-11-18 | Stop reason: HOSPADM

## 2019-11-18 RX ORDER — PREDNISONE 20 MG/1
20 TABLET ORAL 2 TIMES DAILY WITH MEALS
Qty: 14 TABLET | Refills: 0 | Status: SHIPPED | OUTPATIENT
Start: 2019-11-18 | End: 2019-11-25

## 2019-11-18 RX ORDER — DOXYCYCLINE 100 MG/1
100 TABLET ORAL 2 TIMES DAILY WITH MEALS
Qty: 8 TABLET | Refills: 0 | Status: SHIPPED | OUTPATIENT
Start: 2019-11-18 | End: 2019-11-22

## 2019-11-18 RX ADMIN — THEOPHYLLINE ANHYDROUS 300 MG: 300 CAPSULE, EXTENDED RELEASE ORAL at 09:00

## 2019-11-18 RX ADMIN — IPRATROPIUM BROMIDE AND ALBUTEROL SULFATE 3 ML: .5; 3 SOLUTION RESPIRATORY (INHALATION) at 10:45

## 2019-11-18 RX ADMIN — PANTOPRAZOLE SODIUM 40 MG: 40 TABLET, DELAYED RELEASE ORAL at 09:00

## 2019-11-18 RX ADMIN — Medication 10 ML: at 09:02

## 2019-11-18 RX ADMIN — ROSUVASTATIN CALCIUM 10 MG: 10 TABLET, FILM COATED ORAL at 09:00

## 2019-11-18 RX ADMIN — DOXYCYCLINE 100 MG: 100 TABLET, FILM COATED ORAL at 09:00

## 2019-11-18 RX ADMIN — PREDNISONE 20 MG: 20 TABLET ORAL at 09:00

## 2019-11-18 RX ADMIN — ASPIRIN 81 MG 81 MG: 81 TABLET ORAL at 09:01

## 2019-11-18 RX ADMIN — CYANOCOBALAMIN TAB 1000 MCG 1000 MCG: 1000 TAB at 09:00

## 2019-11-18 RX ADMIN — IPRATROPIUM BROMIDE AND ALBUTEROL SULFATE 3 ML: .5; 3 SOLUTION RESPIRATORY (INHALATION) at 07:53

## 2019-11-18 RX ADMIN — FUROSEMIDE 20 MG: 20 TABLET ORAL at 09:07

## 2019-11-18 RX ADMIN — METOPROLOL SUCCINATE 50 MG: 50 TABLET, EXTENDED RELEASE ORAL at 09:00

## 2019-11-18 RX ADMIN — GUAIFENESIN 600 MG: 600 TABLET, EXTENDED RELEASE ORAL at 09:00

## 2019-11-18 RX ADMIN — BUPROPION HYDROCHLORIDE 150 MG: 150 TABLET, FILM COATED, EXTENDED RELEASE ORAL at 09:00

## 2019-11-18 RX ADMIN — IPRATROPIUM BROMIDE AND ALBUTEROL SULFATE 3 ML: .5; 3 SOLUTION RESPIRATORY (INHALATION) at 03:37

## 2019-11-18 RX ADMIN — BUDESONIDE 0.5 MG: 0.5 INHALANT RESPIRATORY (INHALATION) at 07:53

## 2019-11-18 RX ADMIN — POLYETHYLENE GLYCOL 3350 17 G: 17 POWDER, FOR SOLUTION ORAL at 08:59

## 2019-11-18 RX ADMIN — MELATONIN 2000 UNITS: at 09:00

## 2019-11-18 RX ADMIN — CEFTRIAXONE SODIUM 1 G: 1 INJECTION, POWDER, FOR SOLUTION INTRAMUSCULAR; INTRAVENOUS at 05:46

## 2019-11-18 RX ADMIN — DILTIAZEM HYDROCHLORIDE 120 MG: 120 CAPSULE, COATED, EXTENDED RELEASE ORAL at 09:00

## 2019-11-18 NOTE — PROGRESS NOTES
[facility] Daily Progress Note      Hospitalist Team  LOS 6 days      Patient Care Team:  Aristeo Mauro MD as PCP - General (Internal Medicine)  Melquiades Devlin APRN (Nurse Practitioner)        Chief Complaint / Subjective  Chief Complaint   Patient presents with   • Shortness of Breath     11/14: Patient reports feeling better today.  She is still on the BiPAP.  She reports some mild constipation and requests medication to help that.  11/15: The patient still has not had a bowel movement.  She still using BiPAP intermittently.  She reports feeling better today.  She is taking MiraLAX.  11/16: The patient reports feeling better today.  She has been on the high flow nasal cannula O2 all day without needing the BiPAP.  Patient still has not had a bowel movement but she does not feel like she is bloated or constipated.  She is only just recently starting to eat.  11/17: The nursing staff reports that the patient's been much more short of breath today, tripoding and requiring her BiPAP.     Brief Synopsis of Hospital Course/HPI  Patient is a 69-year-old female with a history of COPD and chronic hypoxic respiratory failure, IRIS on BiPAP, HTN, HLD and RLS who to the emergency room 11/11/2019 complaining of shortness of breath and was placed on BiPAP and  then AVAPS for respiratory failure and acute COPD exacerbation.  The patient's respiratory virus panel came back positive for RSV PCR.  She also had evidence of pulmonary edema and responded well to IV Lasix.  BNP was normal.  She developed some tachycardia which was felt to be partly related to her illness and to beta-blocker withdrawal having been off of her home metoprolol.  Cardizem was not controlling her heart rate and metoprolol was added back with improvement.  Cardiology following and plans to do ischemic work-up as an outpatient once respiratory status improves.      Review of systems   12 point review of systems  "was reviewed and was negative except as above.      Family History   Problem Relation Age of Onset   • Diabetes Mother    • Hypertension Mother    • Colon cancer Father    • Colon cancer Sister    • Arthritis Brother    • Hypertension Brother    • Allergies Other        Past Medical History:   Diagnosis Date   • Arthritis    • Colon polyps     Dr Bates   • COPD (chronic obstructive pulmonary disease) (CMS/[facility])    • History of emphysema    • Hyperlipidemia    • Hypertension    • Kidney disease    • Osteoporosis    • Restless leg syndrome     sees Dr Seipel   • Sleep apnea     npsg [facility] 2014, ahi 5.9, on auto bipap min 8 max, PS 2-8-Alcaraz's, nasal mask       Social History     Socioeconomic History   • Marital status:      Spouse name: Not on file   • Number of children: Not on file   • Years of education: Not on file   • Highest education level: Not on file   Tobacco Use   • Smoking status: Former Smoker   Substance and Sexual Activity   • Drug use: No           Objective      Vital Signs  Temp:  [96.7 °F (35.9 °C)-97.6 °F (36.4 °C)] 97.6 °F (36.4 °C)  Heart Rate:  [] 85  Resp:  [18-25] 25  BP: (120-158)/() 139/67  Oxygen Therapy  SpO2: 96 %  Pulse Oximetry Type: Continuous  Device (Oxygen Therapy): NPPV/NIV  Flow (L/min): 4  Oxygen Concentration (%): 40  Flowsheet Rows      First Filed Value   Admission Height  167.6 cm (66\") Documented at 11/11/2019 0530   Admission Weight  107 kg (235 lb) Documented at 11/11/2019 0530        Intake & Output (last 3 days)       11/15 0701 - 11/16 0700 11/16 0701 - 11/17 0700 11/17 0701 - 11/18 0700    P.O. 1020 1080 480    IV Piggyback 200 100     Total Intake(mL/kg) 1220 (12.5) 1180 (12) 480 (4.9)    Urine (mL/kg/hr) 1800 (0.8)      Total Output 1800      Net -580 +1180 +480               Lines, Drains & Airways    Active LDAs     Name:   Placement date:   Placement time:   Site:   Days:    Peripheral IV 11/12/19 2244 Left Antecubital   11/12/19 2244    " Antecubital   1                  Physical Exam:   Well-developed over nourished female sitting up in the chair comfortable on nasal cannula O2 in no acute distress; mucous membranes moist and oral cavity clear; lungs clear to auscultation with decreased air entry bilaterally; CV regular rate and rhythm; abdomen soft and nontender nondistended; extremities with no edema or calf tenderness.    Procedures:              Results Review:     I reviewed the patient's new clinical results.    Results from last 7 days   Lab Units 11/14/19 0445 11/13/19 1313 11/11/19  0538   WBC 10*3/mm3 9.50 10.00 11.40*   HEMOGLOBIN g/dL 15.1 16.3* 14.0   HEMATOCRIT % 48.5* 48.4* 42.4   PLATELETS 10*3/mm3 132* 144 142     Results from last 7 days   Lab Units 11/14/19 0445 11/13/19 1313 11/11/19 0538   SODIUM mmol/L 139 140 138   POTASSIUM mmol/L 4.7 4.1 4.4   CHLORIDE mmol/L 98 95* 98   CO2 mmol/L 27.0 25.0 25.0   BUN mg/dL 44* 37* 21   CREATININE mg/dL 1.17* 1.06* 1.10*   CALCIUM mg/dL 9.8 9.9 9.7   BILIRUBIN mg/dL  --   --  0.2   ALK PHOS U/L  --   --  74   ALT (SGPT) U/L  --   --  9   AST (SGOT) U/L  --   --  13   GLUCOSE mg/dL 155* 135* 131*     Results from last 7 days   Lab Units 11/13/19  1313   MAGNESIUM mg/dL 2.6*     Lab Results   Component Value Date    CALCIUM 9.8 11/14/2019    PHOS 4.0 08/05/2018     No results found for: HGBA1C  Results from last 7 days   Lab Units 11/11/19  0537   INR  1.03       Results from last 7 days   Lab Units 11/11/19  0639   PH, ARTERIAL pH units 7.302*   PO2 ART mm Hg 115.9*   PCO2, ARTERIAL mm Hg 61.1*   HCO3 ART mmol/L 30.3*         Microbiology Results (last 10 days)     Procedure Component Value - Date/Time    Respiratory Panel, PCR - Swab, Nasopharynx [[id number]]  (Abnormal) Collected:  11/11/19 2138    Lab Status:  Final result Specimen:  Swab from Nasopharynx Updated:  11/11/19 8272     ADENOVIRUS, PCR Not Detected     Coronavirus 229E Not Detected     Coronavirus HKU1 Not Detected      Coronavirus NL63 Not Detected     Coronavirus OC43 Not Detected     Human Metapneumovirus Not Detected     Human Rhinovirus/Enterovirus Not Detected     Influenza B PCR Not Detected     Parainfluenza Virus 1 Not Detected     Parainfluenza Virus 2 Not Detected     Parainfluenza Virus 3 Not Detected     Parainfluenza Virus 4 Not Detected     Bordetella pertussis pcr Not Detected     Influenza A H1 2009 PCR Not Detected     Chlamydophila pneumoniae PCR Not Detected     Mycoplasma pneumo by PCR Not Detected     Influenza A PCR Not Detected     Influenza A H3 Not Detected     Influenza A H1 Not Detected     RSV, PCR Detected    Blood Culture - Blood, Hand, Left [[id number]] Collected:  11/11/19 0537    Lab Status:  Final result Specimen:  Blood from Hand, Left Updated:  11/16/19 0546     Blood Culture No growth at 5 days    Blood Culture - Blood, Arm, Right [[id number]] Collected:  11/11/19 0537    Lab Status:  Final result Specimen:  Blood from Arm, Right Updated:  11/16/19 0546     Blood Culture No growth at 5 days          ECG/EMG Results (most recent)     Procedure Component Value Units Date/Time    ECG 12 Lead [[id number]] Collected:  11/11/19 0532     Updated:  11/11/19 0657    Narrative:       HEART RATE= 136  bpm  RR Interval= 442  ms  DC Interval= 157  ms  P Horizontal Axis= 10  deg  P Front Axis= 80  deg  QRSD Interval= 159  ms  QT Interval= 293  ms  QRS Axis= 107  deg  T Wave Axis= 106  deg  - ABNORMAL ECG -  Sinus tachycardia  Ventricular premature complex  Abnormal T, consider ischemia, lateral leads  When compared with ECG of 01-Aug-2018 8:38:14,  Significant repolarization change  Electronically Signed By: Miguel Angel Perez (Toro) 11-Nov-2019 06:56:58  Date and Time of Study: 2019-11-11 05:32:57    ECG 12 Lead [[id number]] Collected:  11/12/19 2342     Updated:  11/12/19 2344    Narrative:       HEART RATE= 115  bpm  RR Interval= 524  ms  DC Interval= 129  ms  P Horizontal Axis=   deg  P Front Axis= 59  deg  QRSD  Interval= 84  ms  QT Interval= 323  ms  QRS Axis= 60  deg  T Wave Axis= 30  deg  - BORDERLINE ECG -  Sinus tachycardia  Probable left atrial enlargement  Electronically Signed By:   Date and Time of Study: 2019-11-12 23:42:56    Adult Transthoracic Echo Complete W/ Cont if Necessary Per Protocol [[id number]] Collected:  11/14/19 1420     Updated:  11/15/19 1628     BSA 2.1 m^2      BH CV ECHO CHELSEA - RVDD 1.6 cm      IVSd 0.94 cm      IVSs 1.4 cm      LVIDd 4.1 cm      LVIDs 2.3 cm      LVPWd 0.92 cm      BH CV ECHO CHELSEA - LVPWS 1.6 cm      IVS/LVPW 1.0     FS 44.2 %      EDV(Teich) 74.1 ml      ESV(Teich) 17.9 ml      EF(Teich) 75.9 %      EDV(cubed) 68.8 ml      ESV(cubed) 12.0 ml      EF(cubed) 82.6 %      % IVS thick 50.6 %      % LVPW thick 68.0 %      LV mass(C)d 119.2 grams      LV mass(C)dI 55.7 grams/m^2      LV mass(C)s 111.0 grams      LV mass(C)sI 51.8 grams/m^2      SV(Teich) 56.2 ml      SI(Teich) 26.3 ml/m^2      SV(cubed) 56.8 ml      SI(cubed) 26.5 ml/m^2      Ao root diam 3.0 cm      Ao root area 6.9 cm^2      ACS 1.8 cm      LVOT diam 2.0 cm      LVOT area 3.1 cm^2      EDV(MOD-sp4) 55.5 ml      ESV(MOD-sp4) 19.8 ml      EF(MOD-sp4) 64.2 %      EDV(MOD-sp2) 49.8 ml      ESV(MOD-sp2) 12.6 ml      EF(MOD-sp2) 74.7 %      SV(MOD-sp4) 35.6 ml      SI(MOD-sp4) 16.6 ml/m^2      SV(MOD-sp2) 37.2 ml      SI(MOD-sp2) 17.4 ml/m^2      Ao root area (BSA corrected) 1.4     LV Conway Vol (BSA corrected) 25.9 ml/m^2      LV Sys Vol (BSA corrected) 9.3 ml/m^2      MV E max michael 70.7 cm/sec      MV A max michael 80.5 cm/sec      MV E/A 0.88     MV V2 max 73.2 cm/sec      MV max PG 2.1 mmHg      MV V2 mean 50.1 cm/sec      MV mean PG 1.1 mmHg      MV V2 VTI 16.0 cm      MVA(VTI) 3.9 cm^2      MV dec slope 346.1 cm/sec^2      MV dec time 0.2 sec      Ao pk michael 102.2 cm/sec      Ao max PG 4.2 mmHg      Ao max PG (full) -0.38 mmHg      Ao V2 mean 73.8 cm/sec      Ao mean PG 2.4 mmHg      Ao mean PG (full) -0.55 mmHg      Ao  V2 VTI 15.1 cm      MIKE(I,A) 4.1 cm^2      MIKE(I,D) 4.1 cm^2      MIKE(V,A) 3.3 cm^2      MIKE(V,D) 3.3 cm^2      LV V1 max PG 4.6 mmHg      LV V1 mean PG 2.9 mmHg      LV V1 max 106.8 cm/sec      LV V1 mean 82.8 cm/sec      LV V1 VTI 19.8 cm      SV(Ao) 104.6 ml      SI(Ao) 48.8 ml/m^2      SV(LVOT) 61.7 ml      SI(LVOT) 28.8 ml/m^2      PA V2 max 112.8 cm/sec      PA max PG 5.1 mmHg      PA max PG (full) 2.4 mmHg      PA V2 mean 82.0 cm/sec      PA mean PG 3.0 mmHg      PA mean PG (full) 1.4 mmHg      PA V2 VTI 16.7 cm      PA acc time 0.08 sec      RV V1 max PG 2.6 mmHg      RV V1 mean PG 1.5 mmHg      RV V1 max 81.3 cm/sec      RV V1 mean 59.5 cm/sec      RV V1 VTI 11.5 cm      TR max michael 295.2 cm/sec      RVSP(TR) 37.8 mmHg      RAP systole 3.0 mmHg      PA pr(Accel) 44.7 mmHg       CV ECHO CHELSEA - BZI_BMI 37.9 kilograms/m^2       CV ECHO CHELSEA - BSA(HAYCOCK) 2.3 m^2       CV ECHO CHELSEA - BZI_METRIC_WEIGHT 106.6 kg       CV ECHO CHELSEA - BZI_METRIC_HEIGHT 167.6 cm      Target HR (85%) 128 bpm      Max. Pred. HR (100%) 150 bpm      EF(MOD-bp) 70.0 %      LA dimension(2D) 2.9 cm     Narrative:       · Left ventricular systolic function is normal.  · Estimated EF appears to be in the range of 61 - 65%.                  Results for orders placed during the hospital encounter of 11/11/19   Adult Transthoracic Echo Complete W/ Cont if Necessary Per Protocol    Narrative · Left ventricular systolic function is normal.  · Estimated EF appears to be in the range of 61 - 65%.          Xr Chest 1 View    Result Date: 11/11/2019  Findings suggestive of COPD/emphysema without acute cardiopulmonary abnormality.  Electronically Signed By-Waylon Rod On:11/11/2019 7:12 AM This report was finalized on [id number] by  Waylon Rod .      Xrays, labs reviewed personally by physician.    Medication Review:   I have reviewed the patient's current medication list      Scheduled Meds    aspirin 81 mg Oral Daily   budesonide  0.5 mg Nebulization BID - RT   buPROPion  mg Oral Q12H   cefTRIAXone (ROCEPHIN) 1GM IVPB in 100 mL NS (MBP) 1 g Intravenous Q24H   cholecalciferol 2,000 Units Oral BID   dilTIAZem  mg Oral Q24H   doxycycline 100 mg Oral BID With Meals   enoxaparin 40 mg Subcutaneous Q24H   furosemide 40 mg Intravenous Daily   gabapentin 900 mg Oral Nightly   guaiFENesin 600 mg Oral Q12H   ipratropium-albuterol 3 mL Nebulization Q6H - RT   metoprolol succinate XL 50 mg Oral Daily   montelukast 10 mg Oral Nightly   pantoprazole 40 mg Oral BID AC   polyethylene glycol 17 g Oral Daily   predniSONE 20 mg Oral BID With Meals   rOPINIRole 1 mg Oral BID   rosuvastatin 10 mg Oral Daily   theophylline 300 mg Oral Q24H   vitamin B-12 1,000 mcg Oral Daily       Meds Infusions    nitroglycerin 10-50 mcg/min Last Rate: Stopped (11/11/19 1800)       Meds PRN  ipratropium-albuterol  •  ipratropium-albuterol  •  sodium chloride  •  [COMPLETED] Insert peripheral IV **AND** sodium chloride        Assessment / Plan    Active Hospital Problems    Diagnosis  POA   • **Acute on chronic respiratory failure with hypoxia (CMS/HCC) [J96.21]  Yes     Priority: High   • Sleep apnea [G47.30]  Yes     Priority: High   • COPD with acute exacerbation (CMS/HCC) [J44.1]  Yes     Priority: High   • Acute bronchitis due to respiratory syncytial virus (RSV) [J20.5]  Yes     Priority: High   • Acute pulmonary edema (CMS/HCC) [J81.0]  Yes     Priority: High   • Depression [F32.9]  Yes     Priority: Medium   • Hypertension [I10]  Yes     Priority: Medium   • Tachycardia [R00.0]  Yes     Priority: Medium   • Hyperlipidemia [E78.5]  Yes     Priority: Low   • Restless leg syndrome [G25.81]  Yes     Priority: Low   • Kidney disease [N28.9]  Yes   • Obesity (BMI 30-39.9) [E66.9]  Yes      Resolved Hospital Problems   No resolved problems to display.     Plan:  1.  Continue , Pulmicort, duo nebs, guaifenesin, Solu-Medrol, Rocephin, doxycycline, Singulair, Lasix and  "theophylline. Dr. Sifuentes discontinued Diamox and Solu-Medrol  and started prednisone.  He recommends allowing Rocephin to  and continuing p.o. doxycycline for an additional 5 days.  2.  Wean O2 as tolerated.    3.  Acute constipation: Continue MiraLAX  4.  Continue aspirin, metoprolol and Cardizem.  5.  Cardiology following and they recommend stress testing after the patient recovers from her current respiratory illness.  6.  She reports that she has been on Wellbutrin for depression for the past 6 months and she had to cut back from twice a day to once a day because she started \"seeing things.\"  She plans to talk to her PCP about going off of it versus changing it to something else because she does not feel that it is working.  7.  DVT prophylaxis: Lovenox.    Disposition: PT evaluated on 11/15 and said that she would need inpatient (pulmonary) rehab.  On  the patient was felt to be back to her baseline per Dr. Sifuentes.  Patient reports that she wants to go home with home health care possibly as soon as 2019.  Will need PT to determine if she is safe for discharge.      .Virgen Pascual MD  19  7:45 PM      "

## 2019-11-18 NOTE — PROGRESS NOTES
LOS: 7 days   Admiting Physician- Lee Mosquera DO    Reason For Followup:    Sinus tachycardia  Chest discomfort  COPD exacerbation  Hypertension    Subjective     No complaints.    Objective     No obvious dyspnea    Review of Systems:   Review of Systems   Constitution: Negative for chills and fever.   HENT: Negative for ear discharge and nosebleeds.    Eyes: Negative for discharge and redness.   Cardiovascular: Negative for chest pain, orthopnea, palpitations, paroxysmal nocturnal dyspnea and syncope.   Respiratory: Positive for shortness of breath. Negative for cough and wheezing.    Endocrine: Negative for heat intolerance.   Skin: Negative for rash.   Musculoskeletal: Negative for arthritis and myalgias.   Gastrointestinal: Negative for abdominal pain, melena, nausea and vomiting.   Genitourinary: Negative for dysuria and hematuria.   Neurological: Negative for dizziness, light-headedness, numbness and tremors.   Psychiatric/Behavioral: Negative for depression. The patient is not nervous/anxious.          Vital Signs  Vitals:    11/18/19 0756 11/18/19 1045 11/18/19 1048 11/18/19 1055   BP:    132/65   BP Location:    Right arm   Patient Position:    Sitting   Pulse: 84 89 86 84   Resp: 22 20 20 22   Temp:    97.6 °F (36.4 °C)   TempSrc:    Oral   SpO2: 94% 96% 98% 93%   Weight:       Height:         Wt Readings from Last 1 Encounters:   11/18/19 98.4 kg (216 lb 14.9 oz)       Intake/Output Summary (Last 24 hours) at 11/18/2019 1211  Last data filed at 11/18/2019 1055  Gross per 24 hour   Intake 830 ml   Output —   Net 830 ml     Physical Exam:  Physical Exam   Constitutional: She is oriented to person, place, and time. She appears well-developed and well-nourished.   HENT:   Head: Normocephalic and atraumatic.   Eyes: No scleral icterus.   Neck: No thyromegaly present.   Cardiovascular: Normal rate, regular rhythm and normal heart sounds. Exam reveals no gallop and no friction rub.   No murmur  heard.  Pulmonary/Chest: Effort normal. No respiratory distress. She has wheezes. She has no rales.   Abdominal: There is no tenderness.   Musculoskeletal: She exhibits no edema.   Lymphadenopathy:     She has no cervical adenopathy.   Neurological: She is alert and oriented to person, place, and time.   Skin: No rash noted. No erythema.   Psychiatric: She has a normal mood and affect.       Results Review:   Lab Results (last 24 hours)     ** No results found for the last 24 hours. **        Imaging Results (Last 72 Hours)     ** No results found for the last 72 hours. **        ECG/EMG Results (most recent)     Procedure Component Value Units Date/Time    ECG 12 Lead [[id number]] Collected:  11/11/19 0532     Updated:  11/11/19 0657    Narrative:       HEART RATE= 136  bpm  RR Interval= 442  ms  DC Interval= 157  ms  P Horizontal Axis= 10  deg  P Front Axis= 80  deg  QRSD Interval= 159  ms  QT Interval= 293  ms  QRS Axis= 107  deg  T Wave Axis= 106  deg  - ABNORMAL ECG -  Sinus tachycardia  Ventricular premature complex  Abnormal T, consider ischemia, lateral leads  When compared with ECG of 01-Aug-2018 8:38:14,  Significant repolarization change  Electronically Signed By: Miguel Angel Perez (Toro) 11-Nov-2019 06:56:58  Date and Time of Study: 2019-11-11 05:32:57    ECG 12 Lead [[id number]] Collected:  11/12/19 2342     Updated:  11/12/19 2344    Narrative:       HEART RATE= 115  bpm  RR Interval= 524  ms  DC Interval= 129  ms  P Horizontal Axis=   deg  P Front Axis= 59  deg  QRSD Interval= 84  ms  QT Interval= 323  ms  QRS Axis= 60  deg  T Wave Axis= 30  deg  - BORDERLINE ECG -  Sinus tachycardia  Probable left atrial enlargement  Electronically Signed By:   Date and Time of Study: 2019-11-12 23:42:56    Adult Transthoracic Echo Complete W/ Cont if Necessary Per Protocol [[id number]] Collected:  11/14/19 1420     Updated:  11/15/19 1628     BSA 2.1 m^2      BH CV ECHO CHELSEA - RVDD 1.6 cm      IVSd 0.94 cm      IVSs 1.4  cm      LVIDd 4.1 cm      LVIDs 2.3 cm      LVPWd 0.92 cm      BH CV ECHO CHELSEA - LVPWS 1.6 cm      IVS/LVPW 1.0     FS 44.2 %      EDV(Teich) 74.1 ml      ESV(Teich) 17.9 ml      EF(Teich) 75.9 %      EDV(cubed) 68.8 ml      ESV(cubed) 12.0 ml      EF(cubed) 82.6 %      % IVS thick 50.6 %      % LVPW thick 68.0 %      LV mass(C)d 119.2 grams      LV mass(C)dI 55.7 grams/m^2      LV mass(C)s 111.0 grams      LV mass(C)sI 51.8 grams/m^2      SV(Teich) 56.2 ml      SI(Teich) 26.3 ml/m^2      SV(cubed) 56.8 ml      SI(cubed) 26.5 ml/m^2      Ao root diam 3.0 cm      Ao root area 6.9 cm^2      ACS 1.8 cm      LVOT diam 2.0 cm      LVOT area 3.1 cm^2      EDV(MOD-sp4) 55.5 ml      ESV(MOD-sp4) 19.8 ml      EF(MOD-sp4) 64.2 %      EDV(MOD-sp2) 49.8 ml      ESV(MOD-sp2) 12.6 ml      EF(MOD-sp2) 74.7 %      SV(MOD-sp4) 35.6 ml      SI(MOD-sp4) 16.6 ml/m^2      SV(MOD-sp2) 37.2 ml      SI(MOD-sp2) 17.4 ml/m^2      Ao root area (BSA corrected) 1.4     LV Conway Vol (BSA corrected) 25.9 ml/m^2      LV Sys Vol (BSA corrected) 9.3 ml/m^2      MV E max michael 70.7 cm/sec      MV A max michael 80.5 cm/sec      MV E/A 0.88     MV V2 max 73.2 cm/sec      MV max PG 2.1 mmHg      MV V2 mean 50.1 cm/sec      MV mean PG 1.1 mmHg      MV V2 VTI 16.0 cm      MVA(VTI) 3.9 cm^2      MV dec slope 346.1 cm/sec^2      MV dec time 0.2 sec      Ao pk michael 102.2 cm/sec      Ao max PG 4.2 mmHg      Ao max PG (full) -0.38 mmHg      Ao V2 mean 73.8 cm/sec      Ao mean PG 2.4 mmHg      Ao mean PG (full) -0.55 mmHg      Ao V2 VTI 15.1 cm      MIKE(I,A) 4.1 cm^2      MIKE(I,D) 4.1 cm^2      MIKE(V,A) 3.3 cm^2      MIKE(V,D) 3.3 cm^2      LV V1 max PG 4.6 mmHg      LV V1 mean PG 2.9 mmHg      LV V1 max 106.8 cm/sec      LV V1 mean 82.8 cm/sec      LV V1 VTI 19.8 cm      SV(Ao) 104.6 ml      SI(Ao) 48.8 ml/m^2      SV(LVOT) 61.7 ml      SI(LVOT) 28.8 ml/m^2      PA V2 max 112.8 cm/sec      PA max PG 5.1 mmHg      PA max PG (full) 2.4 mmHg      PA V2 mean 82.0 cm/sec       PA mean PG 3.0 mmHg      PA mean PG (full) 1.4 mmHg      PA V2 VTI 16.7 cm      PA acc time 0.08 sec      RV V1 max PG 2.6 mmHg      RV V1 mean PG 1.5 mmHg      RV V1 max 81.3 cm/sec      RV V1 mean 59.5 cm/sec      RV V1 VTI 11.5 cm      TR max michael 295.2 cm/sec      RVSP(TR) 37.8 mmHg      RAP systole 3.0 mmHg      PA pr(Accel) 44.7 mmHg       CV ECHO CHELSEA - BZI_BMI 37.9 kilograms/m^2       CV ECHO CHELSEA - BSA(HAYCOCK) 2.3 m^2       CV ECHO CHELSEA - BZI_METRIC_WEIGHT 106.6 kg       CV ECHO CHELSEA - BZI_METRIC_HEIGHT 167.6 cm      Target HR (85%) 128 bpm      Max. Pred. HR (100%) 150 bpm      EF(MOD-bp) 70.0 %      LA dimension(2D) 2.9 cm     Narrative:       · Left ventricular systolic function is normal.  · Estimated EF appears to be in the range of 61 - 65%.           CBC    Results from last 7 days   Lab Units 11/14/19  0445 11/13/19  1313   WBC 10*3/mm3 9.50 10.00   HEMOGLOBIN g/dL 15.1 16.3*   PLATELETS 10*3/mm3 132* 144     BMP   Results from last 7 days   Lab Units 11/14/19  0445 11/13/19  1313   SODIUM mmol/L 139 140   POTASSIUM mmol/L 4.7 4.1   CHLORIDE mmol/L 98 95*   CO2 mmol/L 27.0 25.0   BUN mg/dL 44* 37*   CREATININE mg/dL 1.17* 1.06*   GLUCOSE mg/dL 155* 135*   MAGNESIUM mg/dL  --  2.6*     CMP   Results from last 7 days   Lab Units 11/14/19  0445 11/13/19  1313   SODIUM mmol/L 139 140   POTASSIUM mmol/L 4.7 4.1   CHLORIDE mmol/L 98 95*   CO2 mmol/L 27.0 25.0   BUN mg/dL 44* 37*   CREATININE mg/dL 1.17* 1.06*   GLUCOSE mg/dL 155* 135*     Cardiac Studies:  Echo-   Results for orders placed during the hospital encounter of 11/11/19   Adult Transthoracic Echo Complete W/ Cont if Necessary Per Protocol    Narrative · Left ventricular systolic function is normal.  · Estimated EF appears to be in the range of 61 - 65%.        Stress Myoview-  Cath-      Medication Review:   Scheduled Meds:    aspirin 81 mg Oral Daily   budesonide 0.5 mg Nebulization BID - RT   buPROPion  mg Oral Q12H    cholecalciferol 2,000 Units Oral BID   dilTIAZem  mg Oral Q24H   doxycycline 100 mg Oral BID With Meals   enoxaparin 40 mg Subcutaneous Q24H   furosemide 40 mg Intravenous Daily   furosemide 20 mg Oral Daily   gabapentin 900 mg Oral Nightly   guaiFENesin 600 mg Oral Q12H   ipratropium-albuterol 3 mL Nebulization Q6H - RT   metoprolol succinate XL 50 mg Oral Daily   montelukast 10 mg Oral Nightly   pantoprazole 40 mg Oral BID AC   polyethylene glycol 17 g Oral Daily   predniSONE 20 mg Oral BID With Meals   rOPINIRole 1 mg Oral BID   rosuvastatin 10 mg Oral Daily   theophylline 300 mg Oral Q24H   vitamin B-12 1,000 mcg Oral Daily     Continuous Infusions:    nitroglycerin 10-50 mcg/min Last Rate: Stopped (11/11/19 1800)     PRN Meds:.ipratropium-albuterol  •  ipratropium-albuterol  •  sodium chloride  •  [COMPLETED] Insert peripheral IV **AND** sodium chloride      Assessment/Plan   Patient Active Problem List   Diagnosis   • Acute on chronic respiratory failure with hypoxia (CMS/HCC)   • Hyperlipidemia   • Hypertension   • Sleep apnea   • Restless leg syndrome   • COPD (chronic obstructive pulmonary disease) (CMS/HCC)   • Arthritis   • Osteoporosis   • COPD with acute exacerbation (CMS/HCC)   • Acute bronchitis due to respiratory syncytial virus (RSV)   • Acute pulmonary edema (CMS/HCC)   • Obesity (BMI 30-39.9)   • Tachycardia   • 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    We will sign off.  Please call if new cardiac issues arise.      Akanksha Villalobos, BERKLEY  11/18/19  12:11 PM

## 2019-11-18 NOTE — SIGNIFICANT NOTE
Referring Provider:   Reason for Consultation:    Patient consulted today for possible out patient Pulmonary Rehab. I received a referral from case management today. Will contact Dr. Sifuentes for order so can get a PFT scheduled and follow back up with patient.

## 2019-11-18 NOTE — PLAN OF CARE
Problem: Patient Care Overview  Goal: Plan of Care Review  Outcome: Ongoing (interventions implemented as appropriate)   11/18/19 3385   Coping/Psychosocial   Plan of Care Reviewed With patient   Plan of Care Review   Progress improving   OTHER   Outcome Summary Pt continues to desaturate with mobility (87% on 4L 02 during gait training) with increased SOA/WOB and would benefit from IP Pulmonary rehab to address deficits, however, pt and spouse are eager/adamant about returning home with HH PT. Pt requires SBA/CGA for transfers/gait training with no AD at this time and anticipate safe d/c home with HH PT to follow. Recommending transition to OP Pulmonary rehab as able and continue seeing 3x/week at [facility] for progression of mobility/endurance.

## 2019-11-18 NOTE — PROGRESS NOTES
Continued Stay Note  [facility] [place]     Patient Name: Michelle Francis  MRN: [id number]  Today's Date: 11/18/2019    Admit Date: 11/11/2019    Patient needing OP Pulmonary Rehab = Called Ext [phone] and spoke to Margaret - Patient information given. She will call patient to schedule.     Expected Discharge Date and Time     Expected Discharge Date Expected Discharge Time    Nov 18, 2019         Payton Bateman RN, CM  Office Phone [phone]  Cell [phone]

## 2019-11-18 NOTE — PLAN OF CARE
Problem: Patient Care Overview  Goal: Plan of Care Review  Outcome: Ongoing (interventions implemented as appropriate)   11/18/19 1238   Coping/Psychosocial   Plan of Care Reviewed With patient   Plan of Care Review   Progress improving   OTHER   Outcome Summary Patient discharging home with home health today.     Goal: Interprofessional Rounds/Family Conf  Outcome: Ongoing (interventions implemented as appropriate)   11/18/19 1238   Interdisciplinary Rounds/Family Conf   Summary Patient possible discharge to home today   Participants ;social work/services;nursing;speech language pathology;pharmacy       Problem: Fall Risk (Adult)  Goal: Identify Related Risk Factors and Signs and Symptoms  Outcome: Ongoing (interventions implemented as appropriate)   11/18/19 1238   Fall Risk (Adult)   Related Risk Factors (Fall Risk) gait/mobility problems;environment unfamiliar   Signs and Symptoms (Fall Risk) presence of risk factors     Goal: Absence of Fall  Outcome: Ongoing (interventions implemented as appropriate)   11/18/19 1238   Fall Risk (Adult)   Absence of Fall making progress toward outcome       Problem: Breathing Pattern Ineffective (Adult)  Goal: Effective Oxygenation/Ventilation  Outcome: Ongoing (interventions implemented as appropriate)   11/18/19 1238   Breathing Pattern Ineffective (Adult)   Effective Oxygenation/Ventilation making progress toward outcome     Goal: Anxiety/Fear Reduction  Outcome: Ongoing (interventions implemented as appropriate)   11/18/19 1238   Breathing Pattern Ineffective (Adult)   Anxiety/Fear Reduction making progress toward outcome

## 2019-11-18 NOTE — THERAPY TREATMENT NOTE
Patient Name: Michelle Francis  : 1950    MRN: [id number]                              Today's Date: 2019       Admit Date: 2019    Visit Dx:     ICD-10-CM ICD-9-CM   1. Acute respiratory failure with hypoxia (CMS/HCC) J96.01 518.81   2. Fever and chills R50.9 780.60   3. Sepsis, due to unspecified organism, unspecified whether acute organ dysfunction present (CMS/HCC) A41.9 038.9     995.91     Patient Active Problem List   Diagnosis   • Acute on chronic respiratory failure with hypoxia (CMS/[facility])   • Hyperlipidemia   • Hypertension   • Sleep apnea   • Restless leg syndrome   • COPD (chronic obstructive pulmonary disease) (CMS/HCC)   • Arthritis   • Osteoporosis   • COPD with acute exacerbation (CMS/HCC)   • Acute bronchitis due to respiratory syncytial virus (RSV)   • Acute pulmonary edema (CMS/HCC)   • Obesity (BMI 30-39.9)   • Tachycardia   • 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 with baseline creatinine 1.1   • Depression     Past Medical History:   Diagnosis Date   • Arthritis    • Colon polyps     Dr Bates   • COPD (chronic obstructive pulmonary disease) (CMS/[facility])    • History of emphysema    • Hyperlipidemia    • Hypertension    • Kidney disease    • Osteoporosis    • Restless leg syndrome     sees Dr Seipel   • Sleep apnea     npsg [facility] , ahi 5.9, on auto bipap min 8 max, PS 2-8-Alcaraz's, nasal mask     Past Surgical History:   Procedure Laterality Date   • CATARACT EXTRACTION      Dr Moyer   • CEREBRAL ANEURYSM REPAIR      Brain Aneurysm approx , treated with stents and coils, Dr. Fraga   • COLON SURGERY      partial colectomy- Dr Valladares- due to multiple large polyps   • EXPLORATORY LAPAROTOMY      lysis of intra abdominal adhesions, primary ventral hernia repair 2018 Dr West   • WRIST SURGERY      wrist fracture - Dr Patton     General Information     Row Name 19 0930          PT Evaluation Time/Intention    Document Type  therapy note (daily note)   -AO     Mode of Treatment  physical therapy  -AO     Row Name 11/18/19 0930          General Information    Patient Profile Reviewed?  yes  -AO     Existing Precautions/Restrictions  oxygen therapy device and L/min  -AO     Barriers to Rehab  none identified  -AO     Row Name 11/18/19 0930          Cognitive Assessment/Intervention- PT/OT    Orientation Status (Cognition)  oriented x 4  -AO     Row Name 11/18/19 0930          Safety Issues, Functional Mobility    Impairments Affecting Function (Mobility)  shortness of breath;balance;endurance/activity tolerance;strength  -AO       User Key  (r) = Recorded By, (t) = Taken By, (c) = Cosigned By    Initials Name Provider Type    AO Padmini Serrano, PT Physical Therapist        Mobility     Row Name 11/18/19 0930          Bed Mobility Assessment/Treatment    Comment (Bed Mobility)  DNT: pt sitting in chair at start and end of session  -AO     Row Name 11/18/19 0930          Sit-Stand Transfer    Sit-Stand [place] (Transfers)  conditional independence  -AO     Row Name 11/18/19 0930          Gait/Stairs Assessment/Training    Gait/Stairs Assessment/Training  distance ambulated  -AO     [place] Level (Gait)  supervision  -AO     Distance in Feet (Gait)  80 ft  -AO     Pattern (Gait)  step-through  -AO     Comment (Gait/Stairs)  Increased R lateral trunk sway, decreased stance time LLE; decreased marcus/guarded  -AO       User Key  (r) = Recorded By, (t) = Taken By, (c) = Cosigned By    Initials Name Provider Type    Padmini Jerry PT Physical Therapist        Obj/Interventions     Row Name 11/18/19 0930          Therapeutic Exercise    Comment (Therapeutic Exercise)  Pt completed 30 Second Sit to/from Stand test for 6 reps with increased SOA and c/o fatigue at end of bout  -AO     Row Name 11/18/19 0930          Static Sitting Balance    Level of [place] (Unsupported Sitting, Static Balance)  independent  -AO     Sitting Position (Unsupported  Sitting, Static Balance)  sitting in chair  -AO     Time Able to Maintain Position (Unsupported Sitting, Static Balance)  more than 5 minutes  -AO     Row Name 11/18/19 0930          Static Standing Balance    Level of [place] (Supported Standing, Static Balance)  supervision  -AO     Time Able to Maintain Position (Supported Standing, Static Balance)  1 to 2 minutes  -AO     Row Name 11/18/19 0930          Dynamic Standing Balance    Level of [place], Reaches Outside Midline (Standing, Dynamic Balance)  contact guard assist  -AO     Row Name 11/18/19 0930          Sensory Assessment/Intervention    Sensory General Assessment  no sensation deficits identified  -AO       User Key  (r) = Recorded By, (t) = Taken By, (c) = Cosigned By    Initials Name Provider Type    Padmini Jerry, PT Physical Therapist        Goals/Plan     Row Name 11/18/19 0930          Transfer Goal 1 (PT)    Activity/Assistive Device (Transfer Goal 1, PT)  transfers, all  -AO     [place] Level/Cues Needed (Transfer Goal 1, PT)  contact guard assist  -AO     Time Frame (Transfer Goal 1, PT)  2 weeks  -AO     Progress/Outcome (Transfer Goal 1, PT)  goal met  -AO     Row Name 11/18/19 0930          Gait Training Goal 1 (PT)    Activity/Assistive Device (Gait Training Goal 1, PT)  gait (walking locomotion)  -AO     [place] Level (Gait Training Goal 1, PT)  contact guard assist  -AO     Distance (Gait Goal 1, PT)  80 ft  -AO     Time Frame (Gait Training Goal 1, PT)  2 weeks  -AO     Progress/Outcome (Gait Training Goal 1, PT)  goal met  -AO       User Key  (r) = Recorded By, (t) = Taken By, (c) = Cosigned By    Initials Name Provider Type    Padmini Jerry PT Physical Therapist        Clinical Impression     Row Name 11/18/19 0930          Pain Assessment    Additional Documentation  Pain Scale: Numbers Pre/Post-Treatment (Group)  -AO     Row Name 11/18/19 0930          Pain Scale: Numbers Pre/Post-Treatment    Pain  Scale: Numbers, Pretreatment  0/10 - no pain  -AO     Pain Scale: Numbers, Post-Treatment  0/10 - no pain  -AO     Row Name 11/18/19 0930          Plan of Care Review    Plan of Care Reviewed With  patient;spouse  -AO     Row Name 11/18/19 0930          Physical Therapy Clinical Impression    Criteria for Skilled Interventions Met (PT Clinical Impression)  yes;treatment indicated  -AO     Rehab Potential (PT Clinical Summary)  good, to achieve stated therapy goals  -AO     Row Name 11/18/19 0930          Vital Signs    Recovery Time  Sp02 100% at start of session on 4L 02 (typically on 3L home 02 but can increase as needed); desaturates to 87% sp02 during gait training with reports of 7/10 RPE and with HR elevated to 113bpm. Recovers to >90% with ~25 second seated rest break.  -AO     Row Name 11/18/19 0930          Positioning and Restraints    Pre-Treatment Position  sitting in chair/recliner  -AO     Post Treatment Position  chair  -AO     In Chair  notified nsg;sitting;call light within reach;encouraged to call for assist;exit alarm on;with family/caregiver  -AO       User Key  (r) = Recorded By, (t) = Taken By, (c) = Cosigned By    Initials Name Provider Type    Padmini Jerry, PT Physical Therapist        Outcome Measures    No documentation.       Physical Therapy Education     Title: PT OT SLP Therapies (Done)     Topic: Physical Therapy (Done)     Point: Mobility training (Done)     Learning Progress Summary           Patient Acceptance, E,TB, VU by AO at 11/18/2019 11:53 AM    Comment:  Educated pt and spouse on d/c plans with recommendation for HH PT with transition to OP Pulmonary Rehab as able.    Acceptance, E,TB, VU by JUAN at 11/18/2019 10:26 AM    Acceptance, E, VU by MM at 11/15/2019 11:30 PM    Acceptance, E,TB, VU by AO at 11/15/2019  3:43 PM    Comment:  Educated pt on Tripod positioning and PLB. Educated pt on safe mobility.    Acceptance, E, VU by MM at 11/15/2019  3:46 AM    Acceptance,  E,TB, VU by AO at 11/13/2019 11:38 AM    Comment:  Educated pt and spouse on POC and anticipated physical therapy needs at d/c.                               User Key     Initials Effective Dates Name Provider Type Discipline    AO 03/01/19 -  Padmini Serrano, PT Physical Therapist PT    MM 03/01/19 -  Cheri Clolazo, RN Registered Nurse Nurse    JUAN 03/22/19 -  Malika Marquez, RN Registered Nurse Nurse              PT Recommendation and Plan  Planned Therapy Interventions (PT Eval): balance training, bed mobility training, gait training, neuromuscular re-education, patient/family education, stair training, strengthening, transfer training  Outcome Summary/Treatment Plan (PT)  Anticipated Discharge Disposition (PT): home with assist, home with home health(Assist from , HH PT, transition to OP Pulmonary as able)  Plan of Care Reviewed With: patient  Progress: improving  Outcome Summary: Pt continues to desaturate with mobility (87% on 4L 02 during gait training) with increased SOA/WOB and would benefit from IP Pulmonary rehab to address deficits, however, pt and spouse are eager/adamant about returning home with HH PT. Pt requires SBA/CGA for transfers/gait training with no AD at this time and anticipate safe d/c home with HH PT to follow. Recommending transition to OP Pulmonary rehab as able and continue seeing 3x/week at [facility] for progression of mobility/endurance.     Time Calculation:   PT Charges     Row Name 11/18/19 1156             Time Calculation    Start Time  0930  -AO      Stop Time  0946  -AO      Time Calculation (min)  16 min  -AO      PT Received On  11/18/19  -AO      PT - Next Appointment  11/20/19  -AO         Time Calculation- PT    Total Timed Code Minutes- PT  16 minute(s)  -AO      TCU Minutes- PT  16 min  -AO        User Key  (r) = Recorded By, (t) = Taken By, (c) = Cosigned By    Initials Name Provider Type    AO Padmini Serrano, PT Physical Therapist        Therapy Charges for Today      Code Description Service Date Service Provider Modifiers Qty    [id number]  GAIT TRAINING EA 15 MIN 11/18/2019 Padmini Serrano, PT GP 1    [id number]  PT THERAPEUTIC ACT EA 15 MIN 11/18/2019 Padmini Serrano, PT GP 1               Padmini Serrano, PT  11/18/2019

## 2019-11-19 ENCOUNTER — READMISSION MANAGEMENT (OUTPATIENT)
Dept: CALL CENTER | Facility: HOSPITAL | Age: 69
End: 2019-11-19

## 2019-11-19 PROCEDURE — 93010 ELECTROCARDIOGRAM REPORT: CPT | Performed by: INTERNAL MEDICINE

## 2019-11-19 NOTE — DISCHARGE SUMMARY
Date of Admission: 11/11/2019    Date of Discharge:  11/18/2019    Length of stay:  LOS: 7 days     Admission Diagnosis:   Acute/chronic hypercapnic hypoxic respiratory failure   acute COPD exacerbation  acute bronchitis   pulmonary edema     Discharge Diagnosis:   Acute on chronic respiratory failure with hypoxia (CMS/HCC) [J96.21]   Yes        Priority: High   • Sleep apnea [G47.30]   Yes       Priority: High   • COPD with acute exacerbation (CMS/HCC) [J44.1]   Yes       Priority: High   • Acute bronchitis due to respiratory syncytial virus (RSV) [J20.5]   Yes       Priority: High   • Acute pulmonary edema (CMS/HCC) [J81.0]   Yes       Priority: High   • Depression [F32.9]   Yes       Priority: Medium   • Hypertension [I10]   Yes       Priority: Medium   • Tachycardia [R00.0]   Yes       Priority: Medium   • Hyperlipidemia [E78.5]   Yes       Priority: Low   • Restless leg syndrome [G25.81]   Yes       Priority: Low   • Kidney disease [N28.9]   Yes   • Obesity (BMI 30-39.9) [E66.9]   Yes         Hospital Course:  Patient is a 69 y.o. female presented to the ED on 11/11/2019 with complaint of shortness of breath. Workup in the ER revealed acute respiratory failure and the patient was placed on BiPAP. She was also diagnosed with acute COPD exacerbation. Dr. Sifuentes was consulted and asked to admit the patient to ICU for close monitoring and further evaluation. While in the ER the patient was evaluated by Dr. Sifuentes and switched to continuous AVAP. The patient was started on empiric antibiotics, IV steroids, and bronchodilators.      Today (11/13) we were consulted for medical management as the patient is now in [facility]. She remains on continuous AVAP. Overnight she reportedly had increased tachycardia with heart rate up to 140s. She was given Cardizem bolus and started on continuous drip. Cardiology was consulted. The patient is complaining of shortness of breath, but states it is better than when she first came to the  hospital. She denies any chest pain or other complaint.     Patient will be continued on antibiotics and steroida. She will need outpatient follow up with cardiology for ischemic evaluation. Patient refused inpatient rehab and wants to go home with home health. She will be discharged home in stable but guarded condition.    Procedures Performed:         Consults:   Consults     Date and Time Order Name Status Description    11/13/2019 1259 Inpatient Internal Medicine Consult Completed     11/12/2019 4648 Inpatient Cardiology Consult Completed     11/11/2019 0558 Pulmonology (on-call MD unless specified) Completed           Vital Signs:  Temp:  [97.4 °F (36.3 °C)-98.2 °F (36.8 °C)] 98.2 °F (36.8 °C)  Heart Rate:  [75-89] 83  Resp:  [20-26] 26  BP: (115-132)/(54-65) 120/54      Physical Exam:  Physical Exam   Constitutional: She is oriented to person, place, and time.   Obese female in NAD   Cardiovascular: Normal rate.   Pulmonary/Chest: She is in respiratory distress.   diminished aeration throughout, no wheezing    Neurological: She is alert and oriented to person, place, and time.   Skin: Skin is warm and dry.   Psychiatric: She has a normal mood and affect. Her behavior is normal.             Pertinent Test Results:   Lab Results (last 72 hours)     Procedure Component Value Units Date/Time    Blood Culture - Blood, Hand, Left [[id number]] Collected:  11/11/19 0537    Specimen:  Blood from Hand, Left Updated:  11/16/19 0546     Blood Culture No growth at 5 days    Blood Culture - Blood, Arm, Right [[id number]] Collected:  11/11/19 0537    Specimen:  Blood from Arm, Right Updated:  11/16/19 0546     Blood Culture No growth at 5 days              Results for orders placed during the hospital encounter of 11/11/19   Adult Transthoracic Echo Complete W/ Cont if Necessary Per Protocol    Narrative · Left ventricular systolic function is normal.  · Estimated EF appears to be in the range of 61 - 65%.          Imaging  Results (All)     Procedure Component Value Units Date/Time    XR Chest 1 View [[id number]] Collected:  11/11/19 0712     Updated:  11/11/19 0714    Narrative:       Examination: XR CHEST 1 VW-     Date of Exam: 11/11/2019 6:05 AM     Indication: Shortness of breath; J96.01-Acute respiratory failure with  hypoxia; R50.9-Fever, unspecified; A41.9-Sepsis, unspecified organism.     Comparison: 08/02/2018.     Technique: Single radiographic view of the chest was obtained.     Findings:  The lungs appear hyperexpanded and there is apical cystic lucency and  basilar interstitial prominence. There is no pneumothorax, pleural  effusion or focal airspace consolidation. Cardiomediastinal silhouette  is unremarkable. Pulmonary vasculature appears within normal limits.  Regional bones appear grossly intact.        Impression:       Findings suggestive of COPD/emphysema without acute cardiopulmonary  abnormality.     Electronically Signed By-Waylon Rod On:11/11/2019 7:12 AM  This report was finalized on [id number] by  Waylon Rod, .                Discharge Disposition:  Home-Health Care [facility] [place]    Discharge Medications:     Discharge Medications      New Medications      Instructions Start Date   dilTIAZem  MG 24 hr capsule  Commonly known as:  CARDIZEM CD   120 mg, Oral, Every 24 Hours Scheduled   Start Date:  11/19/2019     doxycycline 100 MG tablet  Commonly known as:  ADOXA   100 mg, Oral, 2 Times Daily With Meals      guaiFENesin 600 MG 12 hr tablet  Commonly known as:  MUCINEX   600 mg, Oral, Every 12 Hours Scheduled      montelukast 10 MG tablet  Commonly known as:  SINGULAIR   10 mg, Oral, Nightly      predniSONE 20 MG tablet  Commonly known as:  DELTASONE   20 mg, Oral, 2 Times Daily With Meals         Continue These Medications      Instructions Start Date   albuterol sulfate  (90 Base) MCG/ACT inhaler  Commonly known as:  PROVENTIL HFA;VENTOLIN HFA;PROAIR HFA   1 puff, Inhalation, 4 Times Daily -  RT      aspirin 81 MG chewable tablet   81 mg, Oral, Daily      B-12 1000 MCG sublingual tablet   1 tablet, Sublingual, Daily      budesonide 1 MG/2ML nebulizer solution  Commonly known as:  PULMICORT   1 mg, Nebulization, 2 Times Daily      buPROPion  MG 12 hr tablet  Commonly known as:  WELLBUTRIN SR   150 mg, Oral, 2 Times Daily      calcium carbonate 600 MG tablet  Commonly known as:  OS-BILLIE   600 mg, Oral, 2 Times Daily With Meals      cholecalciferol 25 MCG (1000 UT) tablet  Commonly known as:  VITAMIN D3   2,000 Units, Oral, 2 Times Daily      esomeprazole 40 MG capsule  Commonly known as:  nexIUM   40 mg, Oral, Every Morning Before Breakfast      furosemide 40 MG tablet  Commonly known as:  LASIX   20 mg, Oral, Nightly      gabapentin 300 MG capsule  Commonly known as:  NEURONTIN   900 mg, Oral, Nightly      ipratropium-albuterol 0.5-2.5 mg/3 ml nebulizer  Commonly known as:  DUO-NEB   3 mL, Nebulization, Every 4 Hours PRN, Every 4-6h PRN       metoprolol succinate XL 50 MG 24 hr tablet  Commonly known as:  TOPROL-XL   50 mg, Oral, Daily      mometasone-formoterol 200-5 MCG/ACT inhaler  Commonly known as:  DULERA 200   2 puffs, Inhalation, 2 Times Daily - RT      potassium chloride 10 MEQ CR tablet  Commonly known as:  K-DUR   10 mEq, Oral, Daily      risedronate 35 MG tablet  Commonly known as:  ACTONEL   35 mg, Oral, Every 7 Days, On Thursday       rOPINIRole 1 MG tablet  Commonly known as:  REQUIP   1 mg, Oral, 2 Times Daily, At 19:00 and 21:00       rosuvastatin 10 MG tablet  Commonly known as:  CRESTOR   10 mg, Oral, Daily      STIOLTO RESPIMAT 2.5-2.5 MCG/ACT aerosol solution inhaler  Generic drug:  tiotropium bromide-olodaterol   2 puffs, Inhalation, Daily - RT      theophylline 400 MG 24 hr tablet  Commonly known as:  UNIPHYL   400 mg, Oral, Daily         Stop These Medications    doxycycline 100 MG capsule  Commonly known as:  VIBRAMYCIN     losartan 50 MG tablet  Commonly known as:  COZAAR             Discharge Diet:   Diet Instructions     Diet: Cardiac      Discharge Diet:  Cardiac          Activity at Discharge:   Activity Instructions     Activity as Tolerated            Follow-up Appointments:  No future appointments.      Test Results Pending at Discharge:  none    Condition on Discharge:    Stable but guarded      Risk for Readmission (LACE): Score: 10 (11/18/2019  6:00 AM)          Lee Mosquera DO  11/18/19  8:20 PM    Time: Discharge 34 min with face-to-face history/exam, writing all prescriptions, and documenting discharge data including care coordination

## 2019-11-20 ENCOUNTER — READMISSION MANAGEMENT (OUTPATIENT)
Dept: CALL CENTER | Facility: HOSPITAL | Age: 69
End: 2019-11-20

## 2019-11-20 LAB
PATHOLOGIST NAME: NORMAL
SERPINA1 GENE MUT ANL BLD/T: NORMAL
SERPINA1 GENE MUT TESTED BLD/T: NORMAL

## 2019-11-20 NOTE — OUTREACH NOTE
COPD/PN Week 1 Survey      Responses   Facility patient discharged from?  [facility]   Does the patient have one of the following disease processes/diagnoses(primary or secondary)?  COPD/Pneumonia   Is there a successful TCM telephone encounter documented?  No   Was the primary reason for admission:  COPD exacerbation   Week 1 attempt successful?  No   Unsuccessful attempts  Attempt 1 [NO ANSWER, LEFT VM]          Georgette Shah LPN

## 2019-11-20 NOTE — OUTREACH NOTE
Prep Survey      Responses   Facility patient discharged from?  [facility]   Is patient eligible?  Yes   Discharge diagnosis  acute COPD exacerbation  Acute pulmonary edema    Does the patient have one of the following disease processes/diagnoses(primary or secondary)?  COPD/Pneumonia   Does the patient have Home health ordered?  Yes   What is the Home health agency?   [facility]    Is there a DME ordered?  No   Prep survey completed?  Yes          Shirlene Streeter RN

## 2019-11-21 ENCOUNTER — READMISSION MANAGEMENT (OUTPATIENT)
Dept: CALL CENTER | Facility: HOSPITAL | Age: 69
End: 2019-11-21

## 2019-11-21 NOTE — OUTREACH NOTE
COPD/PN Week 1 Survey      Responses   Facility patient discharged from?  [facility]   Does the patient have one of the following disease processes/diagnoses(primary or secondary)?  COPD/Pneumonia   Is there a successful TCM telephone encounter documented?  No   Was the primary reason for admission:  COPD exacerbation   Week 1 attempt successful?  Yes   Call start time  1115   Call end time  1118   Meds reviewed with patient/caregiver?  Yes   Is the patient having any side effects they believe may be caused by any medication additions or changes?  No   Does the patient have all medications ordered at discharge?  Yes   Is the patient taking all medications as directed (includes completed medication regime)?  Yes   Does the patient have a primary care provider?   Yes   Does the patient have an appointment with their PCP or pulmonologist within 7 days of discharge?  Yes   Comments regarding PCP  PATIENT HAS AN APPOINTMENT WITH DR. CORBETT 11/25/19   Has the patient kept scheduled appointments due by today?  N/A   What is the Home health agency?   [facility]    Has home health visited the patient within 72 hours of discharge?  Call prior to 72 hours   Home health comments  PATIENT STATES HOME HEALTH NURSE IS COMING TOMORROW 11/22   Did the patient receive a copy of their discharge instructions?  Yes   Nursing interventions  Reviewed instructions with patient   What is the patient's perception of their health status since discharge?  Improving   Is the patient/caregiver able to teach back the hierarchy of who to call/visit for symptoms/problems? PCP, Specialist, Home health nurse, Urgent Care, ED, 911  Yes   Is the patient able to teach back COPD zones?  Yes   Nursing interventions  Education provided on various zones   Patient reports what zone on this call?  Green Zone   Green Zone  Reports doing well, Breathing without shortness of breath, Usual activity and exercise level, Usual amount of phlegm/mucus without difficulty  coughing up, Sleeping well, Appetite is good   Green Zone interventions:  Take daily medications, Avoid indoor/outdoor triggers, Continue regular exercise/diet plan   Week 1 call completed?  Yes          Georgette Shah LPN

## 2019-11-26 ENCOUNTER — TELEPHONE (OUTPATIENT)
Dept: CARDIAC REHAB | Facility: HOSPITAL | Age: 69
End: 2019-11-26

## 2019-11-26 NOTE — TELEPHONE ENCOUNTER
Called patient to let her know she can now call and schedule her pft so can get her started in [place]. DEZ Dawson RRT

## 2019-11-27 ENCOUNTER — TRANSCRIBE ORDERS (OUTPATIENT)
Dept: ADMINISTRATIVE | Facility: HOSPITAL | Age: 69
End: 2019-11-27

## 2019-11-27 DIAGNOSIS — J44.9 CHRONIC OBSTRUCTIVE PULMONARY DISEASE, UNSPECIFIED COPD TYPE (HCC): Primary | ICD-10-CM

## 2019-11-29 ENCOUNTER — READMISSION MANAGEMENT (OUTPATIENT)
Dept: CALL CENTER | Facility: HOSPITAL | Age: 69
End: 2019-11-29

## 2019-11-29 NOTE — OUTREACH NOTE
COPD/PN Week 2 Survey      Responses   Facility patient discharged from?  [facility]   Does the patient have one of the following disease processes/diagnoses(primary or secondary)?  COPD/Pneumonia   Was the primary reason for admission:  COPD exacerbation   Week 2 attempt successful?  Yes   Call start time  1636   Call end time  1638   Discharge diagnosis  acute COPD exacerbation  Acute pulmonary edema    Meds reviewed with patient/caregiver?  Yes   Is the patient having any side effects they believe may be caused by any medication additions or changes?  No   Does the patient have all medications ordered at discharge?  Yes   Is the patient taking all medications as directed (includes completed medication regime)?  Yes   Does the patient have a primary care provider?   Yes   Does the patient have an appointment with their PCP or pulmonologist within 7 days of discharge?  Yes   Has the patient kept scheduled appointments due by today?  Yes   What is the Home health agency?   [facility]    Has home health visited the patient within 72 hours of discharge?  Yes   Psychosocial issues?  No   Did the patient receive a copy of their discharge instructions?  Yes   Nursing interventions  Reviewed instructions with patient   What is the patient's perception of their health status since discharge?  Improving   Nursing Interventions  Nurse provided patient education   Is the patient/caregiver able to teach back the hierarchy of who to call/visit for symptoms/problems? PCP, Specialist, Home health nurse, Urgent Care, ED, 911  Yes   Is the patient able to teach back COPD zones?  Yes   Nursing interventions  Education provided on various zones   Patient reports what zone on this call?  Green Zone   Green Zone  Reports doing well, Breathing without shortness of breath, Usual activity and exercise level, Usual amount of phlegm/mucus without difficulty coughing up, Sleeping well, Appetite is good   Green Zone interventions:  Take daily  medications, Continue regular exercise/diet plan   Week 2 call completed?  Yes          Raul Blue RN

## 2019-12-05 ENCOUNTER — HOSPITAL ENCOUNTER (OUTPATIENT)
Dept: RESPIRATORY THERAPY | Facility: HOSPITAL | Age: 69
Discharge: HOME | End: 2019-12-05
Admitting: INTERNAL MEDICINE

## 2019-12-05 DIAGNOSIS — J44.9 CHRONIC OBSTRUCTIVE PULMONARY DISEASE, UNSPECIFIED COPD TYPE (HCC): ICD-10-CM

## 2019-12-05 PROCEDURE — 94729 DIFFUSING CAPACITY: CPT

## 2019-12-05 PROCEDURE — 94727 GAS DIL/WSHOT DETER LNG VOL: CPT

## 2019-12-05 PROCEDURE — 94060 EVALUATION OF WHEEZING: CPT

## 2019-12-05 RX ORDER — ALBUTEROL SULFATE 2.5 MG/3ML
2.5 SOLUTION RESPIRATORY (INHALATION) ONCE
Status: COMPLETED | OUTPATIENT
Start: 2019-12-05 | End: 2019-12-05

## 2019-12-05 RX ADMIN — ALBUTEROL SULFATE 2.5 MG: 2.5 SOLUTION RESPIRATORY (INHALATION) at 08:35

## 2019-12-06 ENCOUNTER — READMISSION MANAGEMENT (OUTPATIENT)
Dept: CALL CENTER | Facility: HOSPITAL | Age: 69
End: 2019-12-06

## 2019-12-06 RX ORDER — ESOMEPRAZOLE MAGNESIUM 40 MG/1
CAPSULE, DELAYED RELEASE ORAL
Qty: 90 CAPSULE | Refills: 3 | Status: SHIPPED | OUTPATIENT
Start: 2019-12-06

## 2019-12-06 NOTE — OUTREACH NOTE
COPD/PN Week 3 Survey      Responses   Facility patient discharged from?  [facility]   Does the patient have one of the following disease processes/diagnoses(primary or secondary)?  COPD/Pneumonia   Was the primary reason for admission:  COPD exacerbation   Week 3 attempt successful?  No   Unsuccessful attempts  Attempt 1          Shirlene Traylor RN

## 2019-12-09 ENCOUNTER — READMISSION MANAGEMENT (OUTPATIENT)
Dept: CALL CENTER | Facility: HOSPITAL | Age: 69
End: 2019-12-09

## 2019-12-09 NOTE — OUTREACH NOTE
COPD/PN Week 3 Survey      Responses   Facility patient discharged from?  [facility]   Does the patient have one of the following disease processes/diagnoses(primary or secondary)?  COPD/Pneumonia   Was the primary reason for admission:  COPD exacerbation   Week 3 attempt successful?  Yes   Call start time  1308   Call end time  1311   Discharge diagnosis  acute COPD exacerbation  Acute pulmonary edema    Meds reviewed with patient/caregiver?  Yes   Is the patient having any side effects they believe may be caused by any medication additions or changes?  No   Does the patient have all medications ordered at discharge?  Yes   Is the patient taking all medications as directed (includes completed medication regime)?  Yes   Does the patient have a primary care provider?   Yes   Has the patient kept scheduled appointments due by today?  Yes   What is the Home health agency?   [facility]    Has home health visited the patient within 72 hours of discharge?  Yes   Did the patient receive a copy of their discharge instructions?  Yes   Nursing interventions  Reviewed instructions with patient   What is the patient's perception of their health status since discharge?  Improving   Nursing Interventions  Nurse provided patient education   Is the patient/caregiver able to teach back the hierarchy of who to call/visit for symptoms/problems? PCP, Specialist, Home health nurse, Urgent Care, ED, 911  Yes   Is the patient able to teach back COPD zones?  Yes   Nursing interventions  Education provided on various zones   Patient reports what zone on this call?  Green Zone   Green Zone  Reports doing well, Breathing without shortness of breath, Usual activity and exercise level, Usual amount of phlegm/mucus without difficulty coughing up, Sleeping well, Appetite is good   Green Zone interventions:  Take daily medications, Continue regular exercise/diet plan, Avoid indoor/outdoor triggers   Week 3 call completed?  Yes   Revoked  No further  contact(revokes)-requires comment          Georgette Shah LPN

## 2019-12-16 ENCOUNTER — TELEPHONE (OUTPATIENT)
Dept: CARDIAC REHAB | Facility: HOSPITAL | Age: 69
End: 2019-12-16

## 2019-12-20 PROBLEM — J44.9 COPD (CHRONIC OBSTRUCTIVE PULMONARY DISEASE) (HCC): Chronic | Status: RESOLVED | Noted: 2019-11-13 | Resolved: 2019-12-20

## 2019-12-20 PROBLEM — I10 ESSENTIAL HYPERTENSION: Status: ACTIVE | Noted: 2019-11-13

## 2019-12-20 PROBLEM — E78.2 MIXED HYPERLIPIDEMIA: Status: ACTIVE | Noted: 2019-11-13

## 2019-12-27 ENCOUNTER — HOSPITAL ENCOUNTER (EMERGENCY)
Facility: HOSPITAL | Age: 69
Discharge: HOME | End: 2019-12-27
Admitting: EMERGENCY MEDICINE

## 2019-12-27 ENCOUNTER — APPOINTMENT (OUTPATIENT)
Dept: GENERAL RADIOLOGY | Facility: HOSPITAL | Age: 69
End: 2019-12-27

## 2019-12-27 VITALS
HEART RATE: 95 BPM | HEIGHT: 66 IN | RESPIRATION RATE: 22 BRPM | WEIGHT: 224 LBS | BODY MASS INDEX: 36 KG/M2 | TEMPERATURE: 99 F | OXYGEN SATURATION: 94 % | SYSTOLIC BLOOD PRESSURE: 126 MMHG | DIASTOLIC BLOOD PRESSURE: 51 MMHG

## 2019-12-27 DIAGNOSIS — J44.1 COPD EXACERBATION (HCC): Primary | ICD-10-CM

## 2019-12-27 LAB
ALBUMIN SERPL-MCNC: 3.8 G/DL (ref 3.5–5.2)
ALBUMIN/GLOB SERPL: 1.1 G/DL
ALP SERPL-CCNC: 79 U/L (ref 39–117)
ALT SERPL W P-5'-P-CCNC: 7 U/L (ref 1–33)
ANION GAP SERPL CALCULATED.3IONS-SCNC: 10 MMOL/L (ref 5–15)
AST SERPL-CCNC: 11 U/L (ref 1–32)
BASOPHILS # BLD AUTO: 0.1 10*3/MM3 (ref 0–0.2)
BASOPHILS NFR BLD AUTO: 1.3 % (ref 0–1.5)
BILIRUB SERPL-MCNC: 0.4 MG/DL (ref 0.2–1.2)
BUN BLD-MCNC: 7 MG/DL (ref 8–23)
BUN/CREAT SERPL: 9.5 (ref 7–25)
CALCIUM SPEC-SCNC: 9.6 MG/DL (ref 8.6–10.5)
CHLORIDE SERPL-SCNC: 101 MMOL/L (ref 98–107)
CO2 SERPL-SCNC: 26 MMOL/L (ref 22–29)
CREAT BLD-MCNC: 0.74 MG/DL (ref 0.57–1)
D-LACTATE SERPL-SCNC: 1.7 MMOL/L (ref 0.5–2)
DEPRECATED RDW RBC AUTO: 50.8 FL (ref 37–54)
EOSINOPHIL # BLD AUTO: 0 10*3/MM3 (ref 0–0.4)
EOSINOPHIL NFR BLD AUTO: 0.3 % (ref 0.3–6.2)
ERYTHROCYTE [DISTWIDTH] IN BLOOD BY AUTOMATED COUNT: 16.9 % (ref 12.3–15.4)
GFR SERPL CREATININE-BSD FRML MDRD: 78 ML/MIN/1.73
GLOBULIN UR ELPH-MCNC: 3.4 GM/DL
GLUCOSE BLD-MCNC: 117 MG/DL (ref 65–99)
HCT VFR BLD AUTO: 36.3 % (ref 34–46.6)
HGB BLD-MCNC: 11.7 G/DL (ref 12–15.9)
HOLD SPECIMEN: NORMAL
LYMPHOCYTES # BLD AUTO: 1.9 10*3/MM3 (ref 0.7–3.1)
LYMPHOCYTES NFR BLD AUTO: 16.7 % (ref 19.6–45.3)
MCH RBC QN AUTO: 27.6 PG (ref 26.6–33)
MCHC RBC AUTO-ENTMCNC: 32.3 G/DL (ref 31.5–35.7)
MCV RBC AUTO: 85.4 FL (ref 79–97)
MONOCYTES # BLD AUTO: 1.2 10*3/MM3 (ref 0.1–0.9)
MONOCYTES NFR BLD AUTO: 10 % (ref 5–12)
NEUTROPHILS # BLD AUTO: 8.3 10*3/MM3 (ref 1.7–7)
NEUTROPHILS NFR BLD AUTO: 71.7 % (ref 42.7–76)
NRBC BLD AUTO-RTO: 0.1 /100 WBC (ref 0–0.2)
PLATELET # BLD AUTO: 155 10*3/MM3 (ref 140–450)
PMV BLD AUTO: 8 FL (ref 6–12)
POTASSIUM BLD-SCNC: 4 MMOL/L (ref 3.5–5.2)
PROT SERPL-MCNC: 7.2 G/DL (ref 6–8.5)
RBC # BLD AUTO: 4.26 10*6/MM3 (ref 3.77–5.28)
SODIUM BLD-SCNC: 137 MMOL/L (ref 136–145)
WBC NRBC COR # BLD: 11.5 10*3/MM3 (ref 3.4–10.8)
WHOLE BLOOD HOLD SPECIMEN: NORMAL
WHOLE BLOOD HOLD SPECIMEN: NORMAL

## 2019-12-27 PROCEDURE — 94799 UNLISTED PULMONARY SVC/PX: CPT

## 2019-12-27 PROCEDURE — 94640 AIRWAY INHALATION TREATMENT: CPT

## 2019-12-27 PROCEDURE — 36415 COLL VENOUS BLD VENIPUNCTURE: CPT

## 2019-12-27 PROCEDURE — 80053 COMPREHEN METABOLIC PANEL: CPT

## 2019-12-27 PROCEDURE — 25010000002 METHYLPREDNISOLONE PER 125 MG: Performed by: NURSE PRACTITIONER

## 2019-12-27 PROCEDURE — 96374 THER/PROPH/DIAG INJ IV PUSH: CPT

## 2019-12-27 PROCEDURE — 85025 COMPLETE CBC W/AUTO DIFF WBC: CPT

## 2019-12-27 PROCEDURE — 99284 EMERGENCY DEPT VISIT MOD MDM: CPT

## 2019-12-27 PROCEDURE — 87040 BLOOD CULTURE FOR BACTERIA: CPT

## 2019-12-27 PROCEDURE — 93005 ELECTROCARDIOGRAM TRACING: CPT

## 2019-12-27 PROCEDURE — 83605 ASSAY OF LACTIC ACID: CPT

## 2019-12-27 PROCEDURE — 71045 X-RAY EXAM CHEST 1 VIEW: CPT

## 2019-12-27 RX ORDER — IPRATROPIUM BROMIDE AND ALBUTEROL SULFATE 2.5; .5 MG/3ML; MG/3ML
3 SOLUTION RESPIRATORY (INHALATION) EVERY 4 HOURS PRN
Qty: 90 ML | Refills: 0 | Status: SHIPPED | OUTPATIENT
Start: 2019-12-27 | End: 2020-01-23

## 2019-12-27 RX ORDER — METHYLPREDNISOLONE SODIUM SUCCINATE 125 MG/2ML
125 INJECTION, POWDER, LYOPHILIZED, FOR SOLUTION INTRAMUSCULAR; INTRAVENOUS ONCE
Status: COMPLETED | OUTPATIENT
Start: 2019-12-27 | End: 2019-12-27

## 2019-12-27 RX ORDER — SODIUM CHLORIDE 0.9 % (FLUSH) 0.9 %
10 SYRINGE (ML) INJECTION AS NEEDED
Status: DISCONTINUED | OUTPATIENT
Start: 2019-12-27 | End: 2019-12-27 | Stop reason: HOSPADM

## 2019-12-27 RX ORDER — PREDNISONE 10 MG/1
40 TABLET ORAL DAILY
Qty: 20 TABLET | Refills: 0 | Status: SHIPPED | OUTPATIENT
Start: 2019-12-27 | End: 2020-01-01

## 2019-12-27 RX ORDER — IPRATROPIUM BROMIDE AND ALBUTEROL SULFATE 2.5; .5 MG/3ML; MG/3ML
3 SOLUTION RESPIRATORY (INHALATION) ONCE
Status: COMPLETED | OUTPATIENT
Start: 2019-12-27 | End: 2019-12-27

## 2019-12-27 RX ADMIN — METHYLPREDNISOLONE SODIUM SUCCINATE 125 MG: 125 INJECTION, POWDER, FOR SOLUTION INTRAMUSCULAR; INTRAVENOUS at 13:36

## 2019-12-27 RX ADMIN — IPRATROPIUM BROMIDE AND ALBUTEROL SULFATE 3 ML: .5; 3 SOLUTION RESPIRATORY (INHALATION) at 13:26

## 2020-01-01 LAB
BACTERIA SPEC AEROBE CULT: NORMAL
BACTERIA SPEC AEROBE CULT: NORMAL

## 2020-01-07 ENCOUNTER — TRANSCRIBE ORDERS (OUTPATIENT)
Dept: CARDIAC REHAB | Facility: HOSPITAL | Age: 70
End: 2020-01-07

## 2020-01-07 DIAGNOSIS — J43.9 PULMONARY EMPHYSEMA, UNSPECIFIED EMPHYSEMA TYPE (HCC): Primary | ICD-10-CM

## 2020-01-22 PROBLEM — R07.89 CHEST PAIN, ATYPICAL: Status: ACTIVE | Noted: 2020-01-22

## 2020-01-23 ENCOUNTER — OFFICE VISIT (OUTPATIENT)
Dept: CARDIOLOGY | Facility: CLINIC | Age: 70
End: 2020-01-23

## 2020-01-23 VITALS
WEIGHT: 232 LBS | DIASTOLIC BLOOD PRESSURE: 84 MMHG | HEIGHT: 66 IN | SYSTOLIC BLOOD PRESSURE: 132 MMHG | BODY MASS INDEX: 37.28 KG/M2 | HEART RATE: 94 BPM

## 2020-01-23 DIAGNOSIS — R00.0 TACHYCARDIA: ICD-10-CM

## 2020-01-23 DIAGNOSIS — R06.02 SHORTNESS OF BREATH: ICD-10-CM

## 2020-01-23 DIAGNOSIS — I10 ESSENTIAL HYPERTENSION: ICD-10-CM

## 2020-01-23 DIAGNOSIS — E78.2 MIXED HYPERLIPIDEMIA: Primary | ICD-10-CM

## 2020-01-23 DIAGNOSIS — R07.89 CHEST PAIN, ATYPICAL: ICD-10-CM

## 2020-01-23 PROBLEM — K46.9 ABDOMINAL HERNIA: Status: ACTIVE | Noted: 2018-10-22

## 2020-01-23 PROBLEM — E55.9 VITAMIN D DEFICIENCY: Status: ACTIVE | Noted: 2017-06-23

## 2020-01-23 PROBLEM — K21.9 GASTROESOPHAGEAL REFLUX DISEASE: Status: ACTIVE | Noted: 2017-06-20

## 2020-01-23 PROBLEM — E53.8 DISORDER OF VITAMIN B12: Status: ACTIVE | Noted: 2017-06-23

## 2020-01-23 PROCEDURE — 99214 OFFICE O/P EST MOD 30 MIN: CPT | Performed by: INTERNAL MEDICINE

## 2020-01-23 RX ORDER — DILTIAZEM HYDROCHLORIDE 120 MG/1
1 CAPSULE, COATED, EXTENDED RELEASE ORAL DAILY
COMMUNITY
Start: 2020-01-08 | End: 2020-01-23

## 2020-01-23 RX ORDER — DILTIAZEM HYDROCHLORIDE 120 MG/1
120 CAPSULE, COATED, EXTENDED RELEASE ORAL DAILY
COMMUNITY
End: 2021-12-08

## 2020-01-23 RX ORDER — FUROSEMIDE 20 MG/1
20 TABLET ORAL DAILY
Qty: 90 TABLET | Refills: 3 | Status: ON HOLD | OUTPATIENT
Start: 2020-01-23 | End: 2021-12-27

## 2020-01-23 RX ORDER — POTASSIUM CHLORIDE 750 MG/1
10 TABLET, FILM COATED, EXTENDED RELEASE ORAL DAILY
Qty: 90 TABLET | Refills: 3 | Status: ON HOLD | OUTPATIENT
Start: 2020-01-23 | End: 2021-12-27

## 2020-01-23 NOTE — PROGRESS NOTES
Date of Office Visit: 2020  Encounter Provider: Dr. Ryne Staton  Place of Service: [facility] CARDIOLOGY [place]  Patient Name: Michelle Francis  :1950  Aristeo Mauro MD    Chief Complaint   Patient presents with   • Chest Pain     hospital follow up /echo   • Rapid Heart Rate   • Hypertension   • Hyperlipidemia     History of Present Illness:    I am pleased to see Mrs. Francis in my office today as a follow-up.    As you know, patient is 69 years old white female whose past medical history significant for hypertension, hyperlipidemia, COPD, who came today for follow-up after her recent hospital admission at discharge.    In 2019, patient was admitted at [facility] with symptom of COPD exacerbation.  Patient also complained of chest discomfort.  Echocardiogram showed EF was 60 to 65%.  No significant valvular heart disease noted.  However ischemic evaluation was deferred.    Since the discharge patient is feeling better.  She still have significant shortness of breath.  She has home oxygen.  Patient complained of chest discomfort.  Patient denies any orthopnea or PND no significant leg edema.    At this stage, I would recommend to proceed with stress test with Cardiolite imaging.  Patient is high risk for CAD.  She has not had any ischemic evaluation in the recent past.  Continue current treatment        Past Medical History:   Diagnosis Date   • Arthritis    • Colon polyps     Dr Bates   • COPD (chronic obstructive pulmonary disease) (CMS/[facility])    • History of emphysema    • Hyperlipidemia    • Hypertension    • Kidney disease    • Osteoporosis    • Restless leg syndrome     sees Dr Seipel   • Sleep apnea     npsg [facility] , ahi 5.9, on auto bipap min 8 max, PS 2-8-Alcaraz's, nasal mask         Past Surgical History:   Procedure Laterality Date   • CATARACT EXTRACTION      Dr Moyer   • CEREBRAL ANEURYSM REPAIR      Brain Aneurysm approx , treated with stents and coils,   [place]   • COLON SURGERY      partial colectomy-2013 Dr Valladares- due to multiple large polyps   • ECHO - CONVERTED  2019   • EXPLORATORY LAPAROTOMY      lysis of intra abdominal adhesions, primary ventral hernia repair 08/01/2018 Dr West   • WRIST SURGERY      wrist fracture - Dr Patton           Current Outpatient Medications:   •  albuterol sulfate  (90 Base) MCG/ACT inhaler, Inhale 1 puff 4 (Four) Times a Day., Disp: , Rfl:   •  aspirin 81 MG chewable tablet, Chew 81 mg Daily., Disp: , Rfl:   •  budesonide (PULMICORT) 1 MG/2ML nebulizer solution, Take 1 mg by nebulization 2 (Two) Times a Day., Disp: , Rfl:   •  buPROPion SR (WELLBUTRIN SR) 150 MG 12 hr tablet, Take 150 mg by mouth 2 (Two) Times a Day., Disp: , Rfl:   •  calcium carbonate (OS-BILLIE) 600 MG tablet, Take 600 mg by mouth 2 (Two) Times a Day With Meals., Disp: , Rfl:   •  cholecalciferol (VITAMIN D3) 25 MCG (1000 UT) tablet, Take 2,000 Units by mouth 2 (Two) Times a Day., Disp: , Rfl:   •  Cyanocobalamin (B-12) 1000 MCG sublingual tablet, Place 1 tablet under the tongue Daily., Disp: , Rfl:   •  dilTIAZem CD (CARDIZEM CD) 120 MG 24 hr capsule, Take 120 mg by mouth Daily., Disp: , Rfl:   •  esomeprazole (nexIUM) 40 MG capsule, TAKE 1 CAPSULE BY MOUTH EVERY DAY, Disp: 90 capsule, Rfl: 3  •  gabapentin (NEURONTIN) 300 MG capsule, Take 900 mg by mouth Every Night., Disp: , Rfl:   •  guaiFENesin (MUCINEX) 600 MG 12 hr tablet, Take 1 tablet by mouth Every 12 (Twelve) Hours., Disp: 10 tablet, Rfl: 0  •  ipratropium-albuterol (DUO-NEB) 0.5-2.5 mg/3 ml nebulizer, Take 3 mL by nebulization Every 4 (Four) Hours As Needed for Wheezing or Shortness of Air. Every 4-6h PRN, Disp: , Rfl:   •  metoprolol succinate XL (TOPROL-XL) 50 MG 24 hr tablet, Take 50 mg by mouth Daily., Disp: , Rfl:   •  mometasone-formoterol (DULERA 200) 200-5 MCG/ACT inhaler, Inhale 2 puffs 2 (Two) Times a Day., Disp: , Rfl:   •  montelukast (SINGULAIR) 10 MG tablet, Take 1 tablet by mouth  Every Night., Disp: 30 tablet, Rfl: 0  •  potassium chloride (K-DUR) 10 MEQ CR tablet, Take 1 tablet by mouth Daily., Disp: 90 tablet, Rfl: 3  •  risedronate (ACTONEL) 35 MG tablet, Take 35 mg by mouth Every 7 (Seven) Days. On Thursday, Disp: , Rfl:   •  rOPINIRole (REQUIP) 1 MG tablet, Take 1 mg by mouth 2 (Two) Times a Day. At 19:00 and 21:00, Disp: , Rfl:   •  rosuvastatin (CRESTOR) 10 MG tablet, Take 10 mg by mouth Daily., Disp: , Rfl:   •  theophylline (UNIPHYL) 400 MG 24 hr tablet, Take 400 mg by mouth Daily., Disp: , Rfl:   •  tiotropium bromide-olodaterol (STIOLTO RESPIMAT) 2.5-2.5 MCG/ACT aerosol solution inhaler, Inhale 2 puffs Daily., Disp: , Rfl:   •  furosemide (LASIX) 20 MG tablet, Take 1 tablet by mouth Daily., Disp: 90 tablet, Rfl: 3      Social History     Socioeconomic History   • Marital status:      Spouse name: Not on file   • Number of children: Not on file   • Years of education: Not on file   • Highest education level: Not on file   Tobacco Use   • Smoking status: Former Smoker   Substance and Sexual Activity   • Alcohol use: Yes   • Drug use: No   • Sexual activity: Defer         Review of Systems   Constitution: Negative for chills and fever.   HENT: Negative for ear discharge and nosebleeds.    Eyes: Negative for discharge and redness.   Cardiovascular: Positive for chest pain. Negative for orthopnea, palpitations, paroxysmal nocturnal dyspnea and syncope.   Respiratory: Positive for shortness of breath. Negative for cough and wheezing.    Endocrine: Negative for heat intolerance.   Skin: Negative for rash.   Musculoskeletal: Positive for arthritis and joint pain. Negative for myalgias.   Gastrointestinal: Negative for abdominal pain, melena, nausea and vomiting.   Genitourinary: Negative for dysuria and hematuria.   Neurological: Negative for dizziness, light-headedness, numbness and tremors.   Psychiatric/Behavioral: Negative for depression. The patient is not nervous/anxious.   "      Procedures    Procedures    No orders to display           Objective:    /84   Pulse 94   Ht 167.6 cm (65.98\")   Wt 105 kg (232 lb)   BMI 37.46 kg/m²         Physical Exam   Constitutional: She is oriented to person, place, and time. She appears well-developed and well-nourished.   HENT:   Head: Normocephalic and atraumatic.   Eyes: No scleral icterus.   Neck: No thyromegaly present.   Cardiovascular: Normal rate, regular rhythm and normal heart sounds. Exam reveals no gallop and no friction rub.   No murmur heard.  Pulmonary/Chest: Effort normal and breath sounds normal. No respiratory distress. She has no wheezes. She has no rales.   Abdominal: There is no tenderness.   Musculoskeletal: She exhibits no edema.   Lymphadenopathy:     She has no cervical adenopathy.   Neurological: She is alert and oriented to person, place, and time.   Skin: No rash noted. No erythema.   Psychiatric: She has a normal mood and affect.           Assessment:       Diagnosis Plan   1. Mixed hyperlipidemia  Stress Test With Myocardial Perfusion One Day    furosemide (LASIX) 20 MG tablet    potassium chloride (K-DUR) 10 MEQ CR tablet    Stress Test With Myocardial Perfusion One Day   2. Essential hypertension  Stress Test With Myocardial Perfusion One Day    furosemide (LASIX) 20 MG tablet    potassium chloride (K-DUR) 10 MEQ CR tablet    Stress Test With Myocardial Perfusion One Day   3. Tachycardia  Stress Test With Myocardial Perfusion One Day    furosemide (LASIX) 20 MG tablet    potassium chloride (K-DUR) 10 MEQ CR tablet    Stress Test With Myocardial Perfusion One Day   4. Chest pain, atypical  Stress Test With Myocardial Perfusion One Day    furosemide (LASIX) 20 MG tablet    potassium chloride (K-DUR) 10 MEQ CR tablet    Stress Test With Myocardial Perfusion One Day   5. Shortness of breath  Stress Test With Myocardial Perfusion One Day    furosemide (LASIX) 20 MG tablet    potassium chloride (K-DUR) 10 MEQ CR " tablet    Stress Test With Myocardial Perfusion One Day            Plan:       At this stage, I would recommend to proceed with stress test with Cardiolite imaging.  Continue diuretics.  I will see the patient in 6 months.

## 2020-02-05 ENCOUNTER — HOSPITAL ENCOUNTER (OUTPATIENT)
Dept: NUCLEAR MEDICINE | Facility: HOSPITAL | Age: 70
Discharge: HOME | End: 2020-02-05

## 2020-02-05 DIAGNOSIS — R06.02 SHORTNESS OF BREATH: ICD-10-CM

## 2020-02-05 DIAGNOSIS — I10 ESSENTIAL HYPERTENSION: ICD-10-CM

## 2020-02-05 DIAGNOSIS — R00.0 TACHYCARDIA: ICD-10-CM

## 2020-02-05 DIAGNOSIS — R07.89 CHEST PAIN, ATYPICAL: ICD-10-CM

## 2020-02-05 DIAGNOSIS — E78.2 MIXED HYPERLIPIDEMIA: ICD-10-CM

## 2020-02-05 PROCEDURE — 78452 HT MUSCLE IMAGE SPECT MULT: CPT

## 2020-02-05 PROCEDURE — A9500 TC99M SESTAMIBI: HCPCS | Performed by: INTERNAL MEDICINE

## 2020-02-05 PROCEDURE — 93017 CV STRESS TEST TRACING ONLY: CPT

## 2020-02-05 PROCEDURE — 0 TECHNETIUM SESTAMIBI: Performed by: INTERNAL MEDICINE

## 2020-02-05 PROCEDURE — 93016 CV STRESS TEST SUPVJ ONLY: CPT | Performed by: INTERNAL MEDICINE

## 2020-02-05 RX ADMIN — TECHNETIUM TC 99M SESTAMIBI 1 DOSE: 1 INJECTION INTRAVENOUS at 14:20

## 2020-02-05 RX ADMIN — TECHNETIUM TC 99M SESTAMIBI 1 DOSE: 1 INJECTION INTRAVENOUS at 12:25

## 2020-02-09 LAB
BH CV NUCLEAR PRIOR STUDY: 3
BH CV STRESS BP STAGE 2: NORMAL
BH CV STRESS BP STAGE 3: NORMAL
BH CV STRESS DOSE DOBUTAMINE STAGE 1: 5
BH CV STRESS DOSE DOBUTAMINE STAGE 2: 10
BH CV STRESS DOSE DOBUTAMINE STAGE 3: 20
BH CV STRESS DURATION MIN STAGE 1: 1
BH CV STRESS DURATION MIN STAGE 2: 2
BH CV STRESS DURATION MIN STAGE 3: 2
BH CV STRESS DURATION SEC STAGE 1: 0
BH CV STRESS DURATION SEC STAGE 2: 0
BH CV STRESS DURATION SEC STAGE 3: 0
BH CV STRESS HR STAGE 1: 111
BH CV STRESS HR STAGE 2: 122
BH CV STRESS HR STAGE 3: 146
BH CV STRESS PROTOCOL 1: NORMAL
BH CV STRESS RECOVERY BP: NORMAL MMHG
BH CV STRESS RECOVERY HR: 112 BPM
BH CV STRESS STAGE 1: 1
BH CV STRESS STAGE 2: 2
BH CV STRESS STAGE 3: 3
LV EF NUC BP: 67 %
MAXIMAL PREDICTED HEART RATE: 150 BPM
PERCENT MAX PREDICTED HR: 97.33 %
STRESS BASELINE BP: NORMAL MMHG
STRESS BASELINE HR: 109 BPM
STRESS PERCENT HR: 115 %
STRESS POST PEAK BP: NORMAL MMHG
STRESS POST PEAK HR: 146 BPM
STRESS TARGET HR: 128 BPM

## 2020-02-09 PROCEDURE — 78452 HT MUSCLE IMAGE SPECT MULT: CPT | Performed by: INTERNAL MEDICINE

## 2020-02-09 PROCEDURE — 93018 CV STRESS TEST I&R ONLY: CPT | Performed by: INTERNAL MEDICINE

## 2020-02-10 ENCOUNTER — TELEPHONE (OUTPATIENT)
Dept: CARDIOLOGY | Facility: CLINIC | Age: 70
End: 2020-02-10

## 2020-02-28 ENCOUNTER — TELEPHONE (OUTPATIENT)
Dept: CARDIAC REHAB | Facility: HOSPITAL | Age: 70
End: 2020-02-28

## 2020-02-28 NOTE — TELEPHONE ENCOUNTER
Spoke with patient to see if wants to get her initial TX visit scheduled and even though Dr. Sifuentes ordered it she wants to discuss with him again at her appt 3-9-2020. Will keep her info until then. Sukhjinder RRT

## 2020-07-10 ENCOUNTER — DOCUMENTATION (OUTPATIENT)
Dept: CARDIAC REHAB | Facility: HOSPITAL | Age: 70
End: 2020-07-10

## 2020-07-23 ENCOUNTER — OFFICE VISIT (OUTPATIENT)
Dept: CARDIOLOGY | Facility: CLINIC | Age: 70
End: 2020-07-23

## 2020-07-23 VITALS
SYSTOLIC BLOOD PRESSURE: 124 MMHG | WEIGHT: 225 LBS | HEIGHT: 66 IN | HEART RATE: 102 BPM | DIASTOLIC BLOOD PRESSURE: 64 MMHG | BODY MASS INDEX: 36.16 KG/M2

## 2020-07-23 DIAGNOSIS — E78.2 MIXED HYPERLIPIDEMIA: Primary | ICD-10-CM

## 2020-07-23 DIAGNOSIS — R07.89 CHEST PAIN, ATYPICAL: ICD-10-CM

## 2020-07-23 DIAGNOSIS — R00.0 TACHYCARDIA: ICD-10-CM

## 2020-07-23 DIAGNOSIS — I10 ESSENTIAL HYPERTENSION: ICD-10-CM

## 2020-07-23 PROCEDURE — 99214 OFFICE O/P EST MOD 30 MIN: CPT | Performed by: INTERNAL MEDICINE

## 2020-07-23 PROCEDURE — 93000 ELECTROCARDIOGRAM COMPLETE: CPT | Performed by: INTERNAL MEDICINE

## 2020-07-23 RX ORDER — METOPROLOL SUCCINATE 50 MG/1
50 TABLET, EXTENDED RELEASE ORAL 2 TIMES DAILY
Qty: 120 TABLET | Refills: 1 | Status: SHIPPED | OUTPATIENT
Start: 2020-07-23 | End: 2020-11-30 | Stop reason: SDUPTHER

## 2020-07-23 RX ORDER — ROFLUMILAST 500 UG/1
500 TABLET ORAL EVERY OTHER DAY
COMMUNITY
End: 2021-12-08

## 2020-07-23 NOTE — PROGRESS NOTES
Date of Office Visit: 2020  Encounter Provider: Dr. Ryne Staton  Place of Service: [facility] CARDIOLOGY [place]  Patient Name: Michelle Francis  :1950  Aristeo Mauro MD    Chief Complaint   Patient presents with   • Rapid Heart Rate     6 month follow up/stress test   • Hypertension   • Hyperlipidemia     History of Present Illness    I am pleased to see Mrs. Francis in my office today as a follow-up.    As you know, patient is 70 years old white female whose past medical history significant for hypertension, hyperlipidemia, COPD, who came today for follow-up after her recent hospital admission at discharge.    In 2019, patient was admitted at [facility] with symptom of COPD exacerbation.  Patient also complained of chest discomfort.  Echocardiogram showed EF was 60 to 65%.  No significant valvular heart disease noted.  In 2020, patient underwent stress test which was negative for ischemia or myocardial infarction.    Since the previous visit, patient overall is stable.  She is not very active.  She uses home oxygen.  She walks with a walker.  Patient does have shortness of breath on exertion.  Patient denies any chest pain.  Patient complain of palpitation.  It is described as a racing of the heart.  No significant leg edema.    EKG showed sinus tachycardia with heart rate of 102 bpm.    At this stage I would recommend to increase Toprol-XL to 50 mg twice daily.  I would recommend to monitor blood pressure carefully patient is advised to call my office in 2 months.  If blood pressure is stable I will continue current treatment if the blood pressure is low, I would discontinue Cardizem CD.    Past Medical History:   Diagnosis Date   • Arthritis    • Colon polyps     Dr Bates   • COPD (chronic obstructive pulmonary disease) (CMS/[facility])    • History of emphysema    • Hyperlipidemia    • Hypertension    • Kidney disease    • Osteoporosis    • Restless leg syndrome      sees Dr Seipel   • Sleep apnea     npsg [facility] 2014, ahi 5.9, on auto bipap min 8 max, PS 2-8-Alcarza's, nasal mask         Past Surgical History:   Procedure Laterality Date   • CARDIOVASCULAR STRESS TEST  2020   • CATARACT EXTRACTION      Dr Moyer   • CEREBRAL ANEURYSM REPAIR      Brain Aneurysm approx 2009, treated with stents and coils, Dr. Fraga   • COLON SURGERY      partial colectomy-2013 Dr Valladares- due to multiple large polyps   • ECHO - CONVERTED  2019   • EXPLORATORY LAPAROTOMY      lysis of intra abdominal adhesions, primary ventral hernia repair 08/01/2018 Dr West   • WRIST SURGERY      wrist fracture - Dr Patton           Current Outpatient Medications:   •  albuterol sulfate  (90 Base) MCG/ACT inhaler, Inhale 1 puff 4 (Four) Times a Day., Disp: , Rfl:   •  aspirin 81 MG chewable tablet, Chew 81 mg Daily., Disp: , Rfl:   •  budesonide (PULMICORT) 1 MG/2ML nebulizer solution, Take 1 mg by nebulization 2 (Two) Times a Day., Disp: , Rfl:   •  dilTIAZem CD (CARDIZEM CD) 120 MG 24 hr capsule, Take 120 mg by mouth Daily., Disp: , Rfl:   •  esomeprazole (nexIUM) 40 MG capsule, TAKE 1 CAPSULE BY MOUTH EVERY DAY, Disp: 90 capsule, Rfl: 3  •  furosemide (LASIX) 20 MG tablet, Take 1 tablet by mouth Daily., Disp: 90 tablet, Rfl: 3  •  gabapentin (NEURONTIN) 300 MG capsule, Take 900 mg by mouth Every Night., Disp: , Rfl:   •  ipratropium-albuterol (DUO-NEB) 0.5-2.5 mg/3 ml nebulizer, Take 3 mL by nebulization Every 4 (Four) Hours As Needed for Wheezing or Shortness of Air. Every 4-6h PRN, Disp: , Rfl:   •  metoprolol succinate XL (TOPROL-XL) 50 MG 24 hr tablet, Take 1 tablet by mouth 2 (Two) Times a Day., Disp: 120 tablet, Rfl: 1  •  montelukast (SINGULAIR) 10 MG tablet, Take 1 tablet by mouth Every Night., Disp: 30 tablet, Rfl: 0  •  potassium chloride (K-DUR) 10 MEQ CR tablet, Take 1 tablet by mouth Daily., Disp: 90 tablet, Rfl: 3  •  risedronate (ACTONEL) 35 MG tablet, Take 35 mg by mouth Every 7 (Seven) Days.  On Thursday, Disp: , Rfl:   •  roflumilast (DALIRESP) 500 MCG tablet tablet, Take 500 mcg by mouth Every Other Day., Disp: , Rfl:   •  rOPINIRole (REQUIP) 1 MG tablet, Take 1 mg by mouth 2 (Two) Times a Day. At 19:00 and 21:00, Disp: , Rfl:   •  rosuvastatin (CRESTOR) 10 MG tablet, Take 10 mg by mouth Daily., Disp: , Rfl:   •  theophylline (UNIPHYL) 400 MG 24 hr tablet, Take 400 mg by mouth Daily., Disp: , Rfl:   •  tiotropium bromide-olodaterol (STIOLTO RESPIMAT) 2.5-2.5 MCG/ACT aerosol solution inhaler, Inhale 2 puffs Daily., Disp: , Rfl:       Social History     Socioeconomic History   • Marital status:      Spouse name: Not on file   • Number of children: Not on file   • Years of education: Not on file   • Highest education level: Not on file   Tobacco Use   • Smoking status: Former Smoker   • Smokeless tobacco: Never Used   Substance and Sexual Activity   • Alcohol use: Yes   • Drug use: No   • Sexual activity: Defer         Review of Systems   Constitution: Negative for chills and fever.   HENT: Negative for ear discharge and nosebleeds.    Eyes: Negative for discharge and redness.   Cardiovascular: Positive for palpitations. Negative for chest pain, orthopnea, paroxysmal nocturnal dyspnea and syncope.   Respiratory: Positive for shortness of breath. Negative for cough and wheezing.    Endocrine: Negative for heat intolerance.   Skin: Negative for rash.   Musculoskeletal: Positive for arthritis and joint pain. Negative for myalgias.   Gastrointestinal: Negative for abdominal pain, melena, nausea and vomiting.   Genitourinary: Negative for dysuria and hematuria.   Neurological: Negative for dizziness, light-headedness, numbness and tremors.   Psychiatric/Behavioral: Negative for depression. The patient is not nervous/anxious.        Procedures      ECG 12 Lead  Date/Time: 7/23/2020 3:17 PM  Performed by: Ryne Staton MD  Authorized by: Ryne Staton MD   Comparison: compared with previous ECG   Rhythm:  "sinus rhythm and sinus tachycardia    Clinical impression: abnormal EKG            ECG 12 Lead    (Results Pending)           Objective:    /64   Pulse 102   Ht 167.6 cm (65.98\")   Wt 102 kg (225 lb)   BMI 36.33 kg/m²         Physical Exam   Constitutional: She is oriented to person, place, and time. She appears well-developed and well-nourished.   HENT:   Head: Normocephalic and atraumatic.   Eyes: No scleral icterus.   Neck: No thyromegaly present.   Cardiovascular: Normal rate, regular rhythm and normal heart sounds. Exam reveals no gallop and no friction rub.   No murmur heard.  Pulmonary/Chest: Effort normal and breath sounds normal. No respiratory distress. She has no wheezes. She has no rales.   Abdominal: There is no tenderness.   Musculoskeletal: She exhibits no edema.   Lymphadenopathy:     She has no cervical adenopathy.   Neurological: She is alert and oriented to person, place, and time.   Skin: No rash noted. No erythema.   Psychiatric: She has a normal mood and affect.           Assessment:       Diagnosis Plan   1. Mixed hyperlipidemia  ECG 12 Lead    metoprolol succinate XL (TOPROL-XL) 50 MG 24 hr tablet   2. Essential hypertension  ECG 12 Lead    metoprolol succinate XL (TOPROL-XL) 50 MG 24 hr tablet   3. Tachycardia  ECG 12 Lead    metoprolol succinate XL (TOPROL-XL) 50 MG 24 hr tablet   4. Chest pain, atypical  ECG 12 Lead    metoprolol succinate XL (TOPROL-XL) 50 MG 24 hr tablet            Plan:       I would recommend to increase Lopressor ER 50 mg twice daily.  Blood pressure monitoring is recommended.  I will see the patient in 6 months  "

## 2020-11-30 DIAGNOSIS — E78.2 MIXED HYPERLIPIDEMIA: Primary | ICD-10-CM

## 2020-11-30 DIAGNOSIS — I10 ESSENTIAL HYPERTENSION: ICD-10-CM

## 2020-11-30 DIAGNOSIS — R07.89 CHEST PAIN, ATYPICAL: ICD-10-CM

## 2020-11-30 DIAGNOSIS — R00.0 TACHYCARDIA: ICD-10-CM

## 2020-11-30 DIAGNOSIS — E78.2 MIXED HYPERLIPIDEMIA: ICD-10-CM

## 2020-11-30 RX ORDER — METOPROLOL SUCCINATE 50 MG/1
50 TABLET, EXTENDED RELEASE ORAL 2 TIMES DAILY
Qty: 180 TABLET | Refills: 1 | Status: SHIPPED | OUTPATIENT
Start: 2020-11-30 | End: 2021-06-01

## 2020-12-01 ENCOUNTER — LAB (OUTPATIENT)
Dept: LAB | Facility: HOSPITAL | Age: 70
End: 2020-12-01

## 2020-12-01 DIAGNOSIS — E78.2 MIXED HYPERLIPIDEMIA: ICD-10-CM

## 2020-12-01 LAB
ALBUMIN SERPL-MCNC: 4.4 G/DL (ref 3.5–5.2)
ALBUMIN/GLOB SERPL: 1.7 G/DL
ALP SERPL-CCNC: 85 U/L (ref 39–117)
ALT SERPL W P-5'-P-CCNC: 8 U/L (ref 1–33)
ANION GAP SERPL CALCULATED.3IONS-SCNC: 10.7 MMOL/L (ref 5–15)
AST SERPL-CCNC: 13 U/L (ref 1–32)
BILIRUB SERPL-MCNC: 0.2 MG/DL (ref 0–1.2)
BUN SERPL-MCNC: 11 MG/DL (ref 8–23)
BUN/CREAT SERPL: 11.2 (ref 7–25)
CALCIUM SPEC-SCNC: 9.5 MG/DL (ref 8.6–10.5)
CHLORIDE SERPL-SCNC: 102 MMOL/L (ref 98–107)
CHOLEST SERPL-MCNC: 143 MG/DL (ref 0–200)
CO2 SERPL-SCNC: 27.3 MMOL/L (ref 22–29)
CREAT SERPL-MCNC: 0.98 MG/DL (ref 0.57–1)
GFR SERPL CREATININE-BSD FRML MDRD: 56 ML/MIN/1.73
GLOBULIN UR ELPH-MCNC: 2.6 GM/DL
GLUCOSE SERPL-MCNC: 113 MG/DL (ref 65–99)
HDLC SERPL-MCNC: 53 MG/DL (ref 40–60)
LDLC SERPL CALC-MCNC: 76 MG/DL (ref 0–100)
LDLC/HDLC SERPL: 1.43 {RATIO}
POTASSIUM SERPL-SCNC: 3.6 MMOL/L (ref 3.5–5.2)
PROT SERPL-MCNC: 7 G/DL (ref 6–8.5)
SODIUM SERPL-SCNC: 140 MMOL/L (ref 136–145)
TRIGL SERPL-MCNC: 71 MG/DL (ref 0–150)
VLDLC SERPL-MCNC: 14 MG/DL (ref 5–40)

## 2020-12-01 PROCEDURE — 80061 LIPID PANEL: CPT

## 2020-12-01 PROCEDURE — 36415 COLL VENOUS BLD VENIPUNCTURE: CPT

## 2020-12-01 PROCEDURE — 80053 COMPREHEN METABOLIC PANEL: CPT

## 2021-01-21 ENCOUNTER — OFFICE VISIT (OUTPATIENT)
Dept: CARDIOLOGY | Facility: CLINIC | Age: 71
End: 2021-01-21

## 2021-01-21 VITALS
SYSTOLIC BLOOD PRESSURE: 142 MMHG | HEIGHT: 66 IN | HEART RATE: 78 BPM | WEIGHT: 217 LBS | BODY MASS INDEX: 34.87 KG/M2 | DIASTOLIC BLOOD PRESSURE: 78 MMHG

## 2021-01-21 DIAGNOSIS — I10 ESSENTIAL HYPERTENSION: ICD-10-CM

## 2021-01-21 DIAGNOSIS — E78.2 MIXED HYPERLIPIDEMIA: Primary | ICD-10-CM

## 2021-01-21 DIAGNOSIS — R00.0 TACHYCARDIA: ICD-10-CM

## 2021-01-21 DIAGNOSIS — R07.89 CHEST PAIN, ATYPICAL: ICD-10-CM

## 2021-01-21 PROCEDURE — 99214 OFFICE O/P EST MOD 30 MIN: CPT | Performed by: INTERNAL MEDICINE

## 2021-01-21 PROCEDURE — 93000 ELECTROCARDIOGRAM COMPLETE: CPT | Performed by: INTERNAL MEDICINE

## 2021-01-21 NOTE — PROGRESS NOTES
Date of Office Visit: 2021  Encounter Provider: Dr. Ryne Staton  Place of Service: [facility] CARDIOLOGY [place]  Patient Name: Michelle Francis  :1950  Aristeo Mauro MD    Chief Complaint   Patient presents with   • Rapid Heart Rate     6 month follow up    • Hypertension   • Hyperlipidemia   • Chest Pain     History of Present Illness    I am pleased to see Mrs. Francis in my office today as a follow-up.    As you know, patient is 70 years old white female whose past medical history significant for hypertension, hyperlipidemia, COPD, who came today for follow-up.    In 2019, patient was admitted at [facility] with symptom of COPD exacerbation.  Patient also complained of chest discomfort.  Echocardiogram showed EF was 60 to 65%.  No significant valvular heart disease noted.  In 2020, patient underwent stress test which was negative for ischemia or myocardial infarction.    On previous visit, I recommended to increase Toprol-XL to 50 mg twice daily.  This was done to control blood pressure and also sinus tachycardia.  Her heart rate has improved.  Her symptom of palpitations are contained.  She still have shortness of breath due to underlying COPD.  She denies any chest pain.  No orthopnea PND no syncope or presyncope.  No leg edema noted.    EKG showed normal sinus rhythm baseline artifact is noted.    I am overall pleased from cardiac status of this patient.  She is in euvolemic status.  Salt and water restriction discussed.  Continue current therapy.  I will see the patient in 9 months.      Past Medical History:   Diagnosis Date   • Arthritis    • Colon polyps     Dr Bates   • COPD (chronic obstructive pulmonary disease) (CMS/[facility])    • History of emphysema    • Hyperlipidemia    • Hypertension    • Kidney disease    • Osteoporosis    • Restless leg syndrome     sees Dr Seipel   • Sleep apnea     npsg [facility] , ahi 5.9, on auto bipap min 8 max, PS 2-8-Alcaraz's,  nasal mask         Past Surgical History:   Procedure Laterality Date   • CARDIOVASCULAR STRESS TEST  2020   • CATARACT EXTRACTION      Dr Moyer   • CEREBRAL ANEURYSM REPAIR      Brain Aneurysm approx 2009, treated with stents and coils, Dr. Fraga   • COLON SURGERY      partial colectomy-2013 Dr Valladares- due to multiple large polyps   • ECHO - CONVERTED  2019   • EXPLORATORY LAPAROTOMY      lysis of intra abdominal adhesions, primary ventral hernia repair 08/01/2018 Dr West   • WRIST SURGERY      wrist fracture - Dr Patton           Current Outpatient Medications:   •  albuterol sulfate  (90 Base) MCG/ACT inhaler, Inhale 1 puff 4 (Four) Times a Day., Disp: , Rfl:   •  aspirin 81 MG chewable tablet, Chew 81 mg Daily., Disp: , Rfl:   •  budesonide (PULMICORT) 1 MG/2ML nebulizer solution, Take 1 mg by nebulization 2 (Two) Times a Day., Disp: , Rfl:   •  dilTIAZem CD (CARDIZEM CD) 120 MG 24 hr capsule, Take 120 mg by mouth Daily., Disp: , Rfl:   •  esomeprazole (nexIUM) 40 MG capsule, TAKE 1 CAPSULE BY MOUTH EVERY DAY, Disp: 90 capsule, Rfl: 3  •  furosemide (LASIX) 20 MG tablet, Take 1 tablet by mouth Daily., Disp: 90 tablet, Rfl: 3  •  gabapentin (NEURONTIN) 300 MG capsule, Take 900 mg by mouth Every Night., Disp: , Rfl:   •  ipratropium-albuterol (DUO-NEB) 0.5-2.5 mg/3 ml nebulizer, Take 3 mL by nebulization Every 4 (Four) Hours As Needed for Wheezing or Shortness of Air. Every 4-6h PRN, Disp: , Rfl:   •  metoprolol succinate XL (TOPROL-XL) 50 MG 24 hr tablet, Take 1 tablet by mouth 2 (Two) Times a Day., Disp: 180 tablet, Rfl: 1  •  montelukast (SINGULAIR) 10 MG tablet, Take 1 tablet by mouth Every Night., Disp: 30 tablet, Rfl: 0  •  potassium chloride (K-DUR) 10 MEQ CR tablet, Take 1 tablet by mouth Daily., Disp: 90 tablet, Rfl: 3  •  risedronate (ACTONEL) 35 MG tablet, Take 35 mg by mouth Every 7 (Seven) Days. On Thursday, Disp: , Rfl:   •  roflumilast (DALIRESP) 500 MCG tablet tablet, Take 500 mcg by mouth  Every Other Day., Disp: , Rfl:   •  rOPINIRole (REQUIP) 1 MG tablet, Take 1 mg by mouth 2 (Two) Times a Day. At 19:00 and 21:00, Disp: , Rfl:   •  rosuvastatin (CRESTOR) 10 MG tablet, Take 10 mg by mouth Daily., Disp: , Rfl:   •  theophylline (UNIPHYL) 400 MG 24 hr tablet, Take 400 mg by mouth Daily., Disp: , Rfl:   •  tiotropium bromide-olodaterol (STIOLTO RESPIMAT) 2.5-2.5 MCG/ACT aerosol solution inhaler, Inhale 2 puffs Daily., Disp: , Rfl:       Social History     Socioeconomic History   • Marital status:      Spouse name: Not on file   • Number of children: Not on file   • Years of education: Not on file   • Highest education level: Not on file   Tobacco Use   • Smoking status: Former Smoker   • Smokeless tobacco: Never Used   Substance and Sexual Activity   • Alcohol use: Yes   • Drug use: No   • Sexual activity: Defer         Review of Systems   Constitution: Negative for chills and fever.   HENT: Negative for ear discharge and nosebleeds.    Eyes: Negative for discharge and redness.   Cardiovascular: Positive for palpitations. Negative for chest pain, orthopnea, paroxysmal nocturnal dyspnea and syncope.   Respiratory: Positive for shortness of breath. Negative for cough and wheezing.    Endocrine: Negative for heat intolerance.   Skin: Negative for rash.   Musculoskeletal: Positive for arthritis and joint swelling. Negative for myalgias.   Gastrointestinal: Negative for abdominal pain, melena, nausea and vomiting.   Genitourinary: Negative for dysuria and hematuria.   Neurological: Negative for dizziness, light-headedness, numbness and tremors.   Psychiatric/Behavioral: Negative for depression. The patient is not nervous/anxious.        Procedures      ECG 12 Lead    Date/Time: 1/21/2021 1:44 PM  Performed by: Ryne Staton MD  Authorized by: Ryne Staton MD   Comparison: compared with previous ECG   Similar to previous ECG  Rhythm: sinus rhythm    Clinical impression: normal ECG            ECG 12  "Lead    (Results Pending)           Objective:    /78   Pulse 78   Ht 167.6 cm (65.98\")   Wt 98.4 kg (217 lb)   BMI 35.04 kg/m²         Constitutional:       Appearance: Well-developed.   Eyes:      General: No scleral icterus.        Right eye: No discharge.   HENT:      Head: Normocephalic and atraumatic.   Neck:      Thyroid: No thyromegaly.      Lymphadenopathy: No cervical adenopathy.   Pulmonary:      Effort: Pulmonary effort is normal. No respiratory distress.      Breath sounds: No wheezing. Rhonchi present. No rales.   Cardiovascular:      Normal rate. Regular rhythm.      No gallop.   Abdominal:      Tenderness: There is no abdominal tenderness.   Skin:     Findings: No erythema or rash.   Neurological:      Mental Status: Alert and oriented to person, place, and time.             Assessment:       Diagnosis Plan   1. Mixed hyperlipidemia  ECG 12 Lead   2. Essential hypertension  ECG 12 Lead   3. Tachycardia  ECG 12 Lead   4. Chest pain, atypical  ECG 12 Lead            Plan:       Assessment and plan: MDM:    1.  Tachycardia:    Patient had sinus tachycardia.  With increase in Toprol-XL heart rate is controlled.  Continue current dose.    2.  Hypertension:    Her blood pressure is slightly on the upper side but majority of blood pressure at home are stable.  Continue current therapy.    3.  Chest pain:    Patient has not had any further episode of chest pain.  Previous cardiac work-up including stress test was negative.    4.  COPD:    Patient is on home oxygen.  I would follow the instruction of her pulmonologist.    5.  Hyperlipidemia:    Patient is on Crestor.  Check lipid panel in future  "

## 2021-05-28 DIAGNOSIS — E78.2 MIXED HYPERLIPIDEMIA: ICD-10-CM

## 2021-05-28 DIAGNOSIS — R07.89 CHEST PAIN, ATYPICAL: ICD-10-CM

## 2021-05-28 DIAGNOSIS — I10 ESSENTIAL HYPERTENSION: ICD-10-CM

## 2021-05-28 DIAGNOSIS — R00.0 TACHYCARDIA: ICD-10-CM

## 2021-06-01 RX ORDER — METOPROLOL SUCCINATE 50 MG/1
TABLET, EXTENDED RELEASE ORAL
Qty: 180 TABLET | Refills: 0 | Status: SHIPPED | OUTPATIENT
Start: 2021-06-01 | End: 2021-09-08

## 2021-09-07 DIAGNOSIS — E78.2 MIXED HYPERLIPIDEMIA: ICD-10-CM

## 2021-09-07 DIAGNOSIS — I10 ESSENTIAL HYPERTENSION: ICD-10-CM

## 2021-09-07 DIAGNOSIS — R00.0 TACHYCARDIA: ICD-10-CM

## 2021-09-07 DIAGNOSIS — R07.89 CHEST PAIN, ATYPICAL: ICD-10-CM

## 2021-09-08 RX ORDER — METOPROLOL SUCCINATE 50 MG/1
TABLET, EXTENDED RELEASE ORAL
Qty: 180 TABLET | Refills: 0 | Status: SHIPPED | OUTPATIENT
Start: 2021-09-08 | End: 2021-12-07

## 2021-12-07 DIAGNOSIS — E78.2 MIXED HYPERLIPIDEMIA: ICD-10-CM

## 2021-12-07 DIAGNOSIS — R00.0 TACHYCARDIA: ICD-10-CM

## 2021-12-07 DIAGNOSIS — R07.89 CHEST PAIN, ATYPICAL: ICD-10-CM

## 2021-12-07 DIAGNOSIS — I10 ESSENTIAL HYPERTENSION: ICD-10-CM

## 2021-12-07 RX ORDER — METOPROLOL SUCCINATE 50 MG/1
TABLET, EXTENDED RELEASE ORAL
Qty: 180 TABLET | Refills: 0 | Status: SHIPPED | OUTPATIENT
Start: 2021-12-07

## 2021-12-07 NOTE — PROGRESS NOTES
Date of Office Visit: 2021  Encounter Provider: Dr. Ryne Staton  Place of Service: [facility]ARDI[facility] [place]  Patient Name: Michelle Francis  :1950  Aristeo Mauro MD    Chief Complaint   Patient presents with   • Rapid Heart Rate     9 month follow up   • Hypertension   • Hyperlipidemia   • Chest Pain     History of Present Illness    I am pleased to see Mrs. Francis in my office today as a follow-up.    As you know, patient is 71 years old white female whose past medical history significant for hypertension, hyperlipidemia, COPD, who came today for follow-up.    In 2019, patient was admitted at [facility] with symptom of COPD exacerbation.  Patient also complained of chest discomfort.  Echocardiogram showed EF was 60 to 65%.  No significant valvular heart disease noted.  In 2020, patient underwent stress test which was negative for ischemia or myocardial infarction.    This is 9 months follow-up for the patient.  Patient is overall doing well.  Patient is extremely short of breath.  Patient is on home oxygen.  Patient has underlying advanced COPD.  Patient has quit smoking.  Patient does not have right heart failure.  No leg edema noted.  Patient is still tachycardic.  Patient is complaining of palpitation with minimal activity.  Patient denies any orthopnea or PND.  No syncope or presyncope.    EKG showed normal sinus rhythm with sinus tachycardia    Patient is having sinus tachycardia.  It is probably due to underlying advanced COPD.  I would recommend to increase Cardizem CD to 180 mg daily.  Blood pressure monitoring is recommended.  I will see the patient in 1 year        Past Medical History:   Diagnosis Date   • Arthritis    • Colon polyps     Dr Bates   • COPD (chronic obstructive pulmonary disease) (HCC)    • History of emphysema    • Hyperlipidemia    • Hypertension    • Kidney disease    • Osteoporosis    • Restless leg syndrome     sees Dr Seipel   •  Sleep apnea     npsg [facility] 2014, ahi 5.9, on auto bipap min 8 max, PS 2-8-Alcaraz's, nasal mask         Past Surgical History:   Procedure Laterality Date   • CARDIOVASCULAR STRESS TEST  2020   • CATARACT EXTRACTION      Dr Moyer   • CEREBRAL ANEURYSM REPAIR      Brain Aneurysm approx 2009, treated with stents and coils, Dr. Fraga   • COLON SURGERY      partial colectomy-2013 Dr Valladares- due to multiple large polyps   • ECHO - CONVERTED  2019   • EXPLORATORY LAPAROTOMY      lysis of intra abdominal adhesions, primary ventral hernia repair 08/01/2018 Dr West   • WRIST SURGERY      wrist fracture - Dr Patton           Current Outpatient Medications:   •  albuterol sulfate  (90 Base) MCG/ACT inhaler, Inhale 1 puff 4 (Four) Times a Day., Disp: , Rfl:   •  aspirin 81 MG chewable tablet, Chew 81 mg Daily., Disp: , Rfl:   •  budesonide (PULMICORT) 1 MG/2ML nebulizer solution, Take 1 mg by nebulization 2 (Two) Times a Day., Disp: , Rfl:   •  esomeprazole (nexIUM) 40 MG capsule, TAKE 1 CAPSULE BY MOUTH EVERY DAY, Disp: 90 capsule, Rfl: 3  •  furosemide (LASIX) 20 MG tablet, Take 1 tablet by mouth Daily. (Patient taking differently: Take 40 mg by mouth Daily.), Disp: 90 tablet, Rfl: 3  •  gabapentin (NEURONTIN) 300 MG capsule, Take 900 mg by mouth Every Night., Disp: , Rfl:   •  ipratropium-albuterol (DUO-NEB) 0.5-2.5 mg/3 ml nebulizer, Take 3 mL by nebulization Every 4 (Four) Hours As Needed for Wheezing or Shortness of Air. Every 4-6h PRN, Disp: , Rfl:   •  metoprolol succinate XL (TOPROL-XL) 50 MG 24 hr tablet, TAKE ONE TABLET BY MOUTH TWICE DAILY, Disp: 180 tablet, Rfl: 0  •  montelukast (SINGULAIR) 10 MG tablet, Take 1 tablet by mouth Every Night., Disp: 30 tablet, Rfl: 0  •  potassium chloride (K-DUR) 10 MEQ CR tablet, Take 1 tablet by mouth Daily., Disp: 90 tablet, Rfl: 3  •  risedronate (ACTONEL) 35 MG tablet, Take 35 mg by mouth Every 7 (Seven) Days. On Thursday, Disp: , Rfl:   •  rOPINIRole (REQUIP) 1 MG tablet,  Take 1 mg by mouth 2 (Two) Times a Day. At 19:00 and 21:00, Disp: , Rfl:   •  rosuvastatin (CRESTOR) 10 MG tablet, Take 10 mg by mouth Daily., Disp: , Rfl:   •  theophylline (UNIPHYL) 400 MG 24 hr tablet, Take 400 mg by mouth Daily., Disp: , Rfl:   •  dilTIAZem CD (Cardizem CD) 180 MG 24 hr capsule, Take 1 capsule by mouth Daily., Disp: 90 capsule, Rfl: 1  •  tiotropium bromide-olodaterol (STIOLTO RESPIMAT) 2.5-2.5 MCG/ACT aerosol solution inhaler, Inhale 2 puffs Daily., Disp: , Rfl:       Social History     Socioeconomic History   • Marital status:    Tobacco Use   • Smoking status: Former Smoker   • Smokeless tobacco: Never Used   Vaping Use   • Vaping Use: Never used   Substance and Sexual Activity   • Alcohol use: Yes   • Drug use: No   • Sexual activity: Defer         Review of Systems   Constitutional: Negative for chills and fever.   HENT: Negative for ear discharge and nosebleeds.    Eyes: Negative for discharge and redness.   Cardiovascular: Positive for palpitations. Negative for chest pain, orthopnea, paroxysmal nocturnal dyspnea and syncope.   Respiratory: Positive for shortness of breath. Negative for cough and wheezing.    Endocrine: Negative for heat intolerance.   Skin: Negative for rash.   Musculoskeletal: Positive for arthritis, back pain and joint pain. Negative for myalgias.   Gastrointestinal: Negative for abdominal pain, melena, nausea and vomiting.   Genitourinary: Negative for dysuria and hematuria.   Neurological: Negative for dizziness, light-headedness, numbness and tremors.   Psychiatric/Behavioral: Negative for depression. The patient is not nervous/anxious.        Procedures      ECG 12 Lead    Date/Time: 12/8/2021 4:55 PM  Performed by: Ryne Staton MD  Authorized by: Ryne Staton MD   Comparison: compared with previous ECG   Similar to previous ECG  Rhythm: sinus rhythm and sinus tachycardia    Clinical impression: normal ECG            ECG 12 Lead   Final Result   Ryne Staton  "MD ARTUR     12/8/2021  4:57 PM      ECG 12 Lead      Date/Time: 12/8/2021 4:55 PM   Performed by: Ryne Staton MD   Authorized by: Ryne Staton MD    Comparison: compared with previous ECG    Similar to previous ECG   Rhythm: sinus rhythm and sinus tachycardia      Clinical impression: normal ECG                    Objective:    /74   Pulse 107   Ht 167.6 cm (65.98\")   Wt 97.5 kg (215 lb)   BMI 34.72 kg/m²         Constitutional:       Appearance: Well-developed.   Eyes:      General: No scleral icterus.        Right eye: No discharge.   HENT:      Head: Normocephalic and atraumatic.   Neck:      Thyroid: No thyromegaly.      Lymphadenopathy: No cervical adenopathy.   Pulmonary:      Effort: Pulmonary effort is normal. No respiratory distress.      Breath sounds: Normal breath sounds. No wheezing. No rales.   Cardiovascular:      Normal rate. Regular rhythm.      No gallop.   Abdominal:      Tenderness: There is no abdominal tenderness.   Skin:     Findings: No erythema or rash.   Neurological:      Mental Status: Alert and oriented to person, place, and time.             Assessment:       Diagnosis Plan   1. Mixed hyperlipidemia  ECG 12 Lead    dilTIAZem CD (Cardizem CD) 180 MG 24 hr capsule   2. Essential hypertension  ECG 12 Lead    dilTIAZem CD (Cardizem CD) 180 MG 24 hr capsule   3. Tachycardia  ECG 12 Lead    dilTIAZem CD (Cardizem CD) 180 MG 24 hr capsule   4. Chest pain, atypical  ECG 12 Lead    dilTIAZem CD (Cardizem CD) 180 MG 24 hr capsule            Plan:       MDM:    1.  Tachycardia:    Patient has sinus tachycardia probably due to underlying COPD.  I would increase diltiazem CD.    2.  Hypertension:    Blood pressure is very well controlled    3.  Hyperlipidemia:    Patient is on Crestor.  Repeat lipid panel in future    5.  COPD:    Patient is on home oxygen and nebulization treatment.  Continue current therapy.  Patient follows with pulmonologist  "

## 2021-12-08 ENCOUNTER — OFFICE VISIT (OUTPATIENT)
Dept: CARDIOLOGY | Facility: CLINIC | Age: 71
End: 2021-12-08

## 2021-12-08 VITALS
BODY MASS INDEX: 34.55 KG/M2 | HEART RATE: 107 BPM | HEIGHT: 66 IN | WEIGHT: 215 LBS | SYSTOLIC BLOOD PRESSURE: 134 MMHG | DIASTOLIC BLOOD PRESSURE: 74 MMHG

## 2021-12-08 DIAGNOSIS — R07.89 CHEST PAIN, ATYPICAL: ICD-10-CM

## 2021-12-08 DIAGNOSIS — I10 ESSENTIAL HYPERTENSION: ICD-10-CM

## 2021-12-08 DIAGNOSIS — R00.0 TACHYCARDIA: ICD-10-CM

## 2021-12-08 DIAGNOSIS — E78.2 MIXED HYPERLIPIDEMIA: Primary | ICD-10-CM

## 2021-12-08 PROCEDURE — 99214 OFFICE O/P EST MOD 30 MIN: CPT | Performed by: INTERNAL MEDICINE

## 2021-12-08 PROCEDURE — 93000 ELECTROCARDIOGRAM COMPLETE: CPT | Performed by: INTERNAL MEDICINE

## 2021-12-08 RX ORDER — DILTIAZEM HYDROCHLORIDE 180 MG/1
180 CAPSULE, COATED, EXTENDED RELEASE ORAL DAILY
Qty: 90 CAPSULE | Refills: 1 | Status: SHIPPED | OUTPATIENT
Start: 2021-12-08 | End: 2022-06-06

## 2021-12-26 ENCOUNTER — APPOINTMENT (OUTPATIENT)
Dept: GENERAL RADIOLOGY | Facility: HOSPITAL | Age: 71
End: 2021-12-26

## 2021-12-26 ENCOUNTER — HOSPITAL ENCOUNTER (INPATIENT)
Facility: HOSPITAL | Age: 71
LOS: 9 days | Discharge: HOME | End: 2022-01-04
Attending: INTERNAL MEDICINE | Admitting: HOSPITALIST

## 2021-12-26 ENCOUNTER — HOSPITAL ENCOUNTER (EMERGENCY)
Facility: HOSPITAL | Age: 71
Discharge: HOME | End: 2021-12-26
Attending: INTERNAL MEDICINE | Admitting: EMERGENCY MEDICINE

## 2021-12-26 VITALS
HEART RATE: 115 BPM | TEMPERATURE: 101.9 F | OXYGEN SATURATION: 92 % | WEIGHT: 200 LBS | BODY MASS INDEX: 32.14 KG/M2 | RESPIRATION RATE: 27 BRPM | DIASTOLIC BLOOD PRESSURE: 61 MMHG | HEIGHT: 66 IN | SYSTOLIC BLOOD PRESSURE: 152 MMHG

## 2021-12-26 DIAGNOSIS — J44.1 CHRONIC OBSTRUCTIVE PULMONARY DISEASE WITH ACUTE EXACERBATION: ICD-10-CM

## 2021-12-26 DIAGNOSIS — R06.03 ACUTE RESPIRATORY DISTRESS: ICD-10-CM

## 2021-12-26 DIAGNOSIS — Z20.822 COVID-19 RULED OUT BY LABORATORY TESTING: ICD-10-CM

## 2021-12-26 DIAGNOSIS — R50.9 FEVER AND CHILLS: ICD-10-CM

## 2021-12-26 DIAGNOSIS — J18.9 PNEUMONIA OF RIGHT LOWER LOBE DUE TO INFECTIOUS ORGANISM: ICD-10-CM

## 2021-12-26 DIAGNOSIS — J44.1 COPD WITH ACUTE EXACERBATION: Primary | ICD-10-CM

## 2021-12-26 DIAGNOSIS — R50.9 FEVER AND CHILLS: Primary | ICD-10-CM

## 2021-12-26 LAB
ALBUMIN SERPL-MCNC: 3.8 G/DL (ref 3.5–5.2)
ALBUMIN/GLOB SERPL: 1 G/DL
ALP SERPL-CCNC: 85 U/L (ref 39–117)
ALT SERPL W P-5'-P-CCNC: 7 U/L (ref 1–33)
ANION GAP SERPL CALCULATED.3IONS-SCNC: 15 MMOL/L (ref 5–15)
ARTERIAL PATENCY WRIST A: POSITIVE
AST SERPL-CCNC: 45 U/L (ref 1–32)
ATMOSPHERIC PRESS: ABNORMAL MM[HG]
BASE EXCESS BLDA CALC-SCNC: 0.2 MMOL/L (ref 0–3)
BDY SITE: ABNORMAL
BILIRUB SERPL-MCNC: 0.4 MG/DL (ref 0–1.2)
BUN SERPL-MCNC: 13 MG/DL (ref 8–23)
BUN/CREAT SERPL: 15.5 (ref 7–25)
CALCIUM SPEC-SCNC: 9.2 MG/DL (ref 8.6–10.5)
CHLORIDE SERPL-SCNC: 94 MMOL/L (ref 98–107)
CO2 BLDA-SCNC: 28.8 MMOL/L (ref 22–29)
CO2 SERPL-SCNC: 24 MMOL/L (ref 22–29)
CREAT SERPL-MCNC: 0.84 MG/DL (ref 0.57–1)
CRP SERPL-MCNC: 38.59 MG/DL (ref 0–0.5)
D-LACTATE SERPL-SCNC: 1.1 MMOL/L (ref 0.5–2)
DEPRECATED RDW RBC AUTO: 42.9 FL (ref 37–54)
ERYTHROCYTE [DISTWIDTH] IN BLOOD BY AUTOMATED COUNT: 15 % (ref 12.3–15.4)
GFR SERPL CREATININE-BSD FRML MDRD: 68 ML/MIN/1.73
GLOBULIN UR ELPH-MCNC: 3.9 GM/DL
GLUCOSE SERPL-MCNC: 115 MG/DL (ref 65–99)
HCO3 BLDA-SCNC: 27.2 MMOL/L (ref 21–28)
HCT VFR BLD AUTO: 38 % (ref 34–46.6)
HEMODILUTION: NO
HGB BLD-MCNC: 13 G/DL (ref 12–15.9)
HOLD SPECIMEN: NORMAL
INHALED O2 CONCENTRATION: 100 %
LIPASE SERPL-CCNC: 18 U/L (ref 13–60)
LYMPHOCYTES # BLD MANUAL: 0.7 10*3/MM3 (ref 0.7–3.1)
LYMPHOCYTES NFR BLD MANUAL: 4 % (ref 5–12)
MCH RBC QN AUTO: 27.9 PG (ref 26.6–33)
MCHC RBC AUTO-ENTMCNC: 34.1 G/DL (ref 31.5–35.7)
MCV RBC AUTO: 81.8 FL (ref 79–97)
METAMYELOCYTES NFR BLD MANUAL: 1 % (ref 0–0)
MODALITY: ABNORMAL
MONOCYTES # BLD: 0.47 10*3/MM3 (ref 0.1–0.9)
NEUTROPHILS # BLD AUTO: 10.41 10*3/MM3 (ref 1.7–7)
NEUTROPHILS NFR BLD MANUAL: 82 % (ref 42.7–76)
NEUTS BAND NFR BLD MANUAL: 7 % (ref 0–5)
NT-PROBNP SERPL-MCNC: 337 PG/ML (ref 0–900)
PCO2 BLDA: 52.9 MM HG (ref 35–48)
PEEP RESPIRATORY: 5 CM[H2O]
PH BLDA: 7.32 PH UNITS (ref 7.35–7.45)
PLAT MORPH BLD: NORMAL
PLATELET # BLD AUTO: 158 10*3/MM3 (ref 140–450)
PMV BLD AUTO: 8.2 FL (ref 6–12)
PO2 BLDA: 346.9 MM HG (ref 83–108)
POTASSIUM SERPL-SCNC: 3.6 MMOL/L (ref 3.5–5.2)
PROCALCITONIN SERPL-MCNC: 0.51 NG/ML (ref 0–0.25)
PROT SERPL-MCNC: 7.7 G/DL (ref 6–8.5)
QT INTERVAL: 298 MS
QT INTERVAL: 312 MS
RBC # BLD AUTO: 4.65 10*6/MM3 (ref 3.77–5.28)
RBC MORPH BLD: NORMAL
RESPIRATORY RATE: 20
SAO2 % BLDCOA: 99.9 % (ref 94–98)
SARS-COV-2 RNA PNL SPEC NAA+PROBE: NOT DETECTED
SCAN SLIDE: NORMAL
SODIUM SERPL-SCNC: 133 MMOL/L (ref 136–145)
TOTAL RATE: 29 BREATHS/MINUTE
TROPONIN T SERPL-MCNC: <0.01 NG/ML (ref 0–0.03)
VARIANT LYMPHS NFR BLD MANUAL: 6 % (ref 19.6–45.3)
VT ON VENT VENT: 696 ML
WBC MORPH BLD: NORMAL
WBC NRBC COR # BLD: 11.7 10*3/MM3 (ref 3.4–10.8)
WHOLE BLOOD HOLD SPECIMEN: NORMAL

## 2021-12-26 PROCEDURE — 94640 AIRWAY INHALATION TREATMENT: CPT

## 2021-12-26 PROCEDURE — 84145 PROCALCITONIN (PCT): CPT | Performed by: NURSE PRACTITIONER

## 2021-12-26 PROCEDURE — 36415 COLL VENOUS BLD VENIPUNCTURE: CPT

## 2021-12-26 PROCEDURE — 25010000002 AZITHROMYCIN PER 500 MG: Performed by: NURSE PRACTITIONER

## 2021-12-26 PROCEDURE — 71045 X-RAY EXAM CHEST 1 VIEW: CPT

## 2021-12-26 PROCEDURE — 86140 C-REACTIVE PROTEIN: CPT | Performed by: NURSE PRACTITIONER

## 2021-12-26 PROCEDURE — 82803 BLOOD GASES ANY COMBINATION: CPT

## 2021-12-26 PROCEDURE — 80053 COMPREHEN METABOLIC PANEL: CPT | Performed by: NURSE PRACTITIONER

## 2021-12-26 PROCEDURE — 94799 UNLISTED PULMONARY SVC/PX: CPT

## 2021-12-26 PROCEDURE — 83605 ASSAY OF LACTIC ACID: CPT

## 2021-12-26 PROCEDURE — 84484 ASSAY OF TROPONIN QUANT: CPT | Performed by: NURSE PRACTITIONER

## 2021-12-26 PROCEDURE — 87040 BLOOD CULTURE FOR BACTERIA: CPT | Performed by: NURSE PRACTITIONER

## 2021-12-26 PROCEDURE — 25010000002 LORAZEPAM PER 2 MG: Performed by: NURSE PRACTITIONER

## 2021-12-26 PROCEDURE — 93005 ELECTROCARDIOGRAM TRACING: CPT | Performed by: NURSE PRACTITIONER

## 2021-12-26 PROCEDURE — 85025 COMPLETE CBC W/AUTO DIFF WBC: CPT | Performed by: NURSE PRACTITIONER

## 2021-12-26 PROCEDURE — 99284 EMERGENCY DEPT VISIT MOD MDM: CPT

## 2021-12-26 PROCEDURE — 83735 ASSAY OF MAGNESIUM: CPT | Performed by: NURSE PRACTITIONER

## 2021-12-26 PROCEDURE — 25010000002 METHYLPREDNISOLONE PER 125 MG

## 2021-12-26 PROCEDURE — 83690 ASSAY OF LIPASE: CPT | Performed by: NURSE PRACTITIONER

## 2021-12-26 PROCEDURE — 85007 BL SMEAR W/DIFF WBC COUNT: CPT | Performed by: NURSE PRACTITIONER

## 2021-12-26 PROCEDURE — 93005 ELECTROCARDIOGRAM TRACING: CPT | Performed by: INTERNAL MEDICINE

## 2021-12-26 PROCEDURE — 93005 ELECTROCARDIOGRAM TRACING: CPT

## 2021-12-26 PROCEDURE — 87635 SARS-COV-2 COVID-19 AMP PRB: CPT | Performed by: NURSE PRACTITIONER

## 2021-12-26 PROCEDURE — 25010000002 CEFTRIAXONE PER 250 MG: Performed by: NURSE PRACTITIONER

## 2021-12-26 PROCEDURE — 83036 HEMOGLOBIN GLYCOSYLATED A1C: CPT | Performed by: NURSE PRACTITIONER

## 2021-12-26 PROCEDURE — 83880 ASSAY OF NATRIURETIC PEPTIDE: CPT | Performed by: NURSE PRACTITIONER

## 2021-12-26 PROCEDURE — 94660 CPAP INITIATION&MGMT: CPT

## 2021-12-26 PROCEDURE — 36600 WITHDRAWAL OF ARTERIAL BLOOD: CPT

## 2021-12-26 RX ORDER — POTASSIUM CHLORIDE 1.5 G/1.77G
40 POWDER, FOR SOLUTION ORAL AS NEEDED
Status: DISCONTINUED | OUTPATIENT
Start: 2021-12-26 | End: 2021-12-27

## 2021-12-26 RX ORDER — ACETAMINOPHEN 325 MG/1
650 TABLET ORAL EVERY 4 HOURS PRN
Status: DISCONTINUED | OUTPATIENT
Start: 2021-12-26 | End: 2022-01-04 | Stop reason: HOSPADM

## 2021-12-26 RX ORDER — ACETAMINOPHEN 650 MG/1
650 SUPPOSITORY RECTAL EVERY 4 HOURS PRN
Status: DISCONTINUED | OUTPATIENT
Start: 2021-12-26 | End: 2022-01-04 | Stop reason: HOSPADM

## 2021-12-26 RX ORDER — INSULIN LISPRO 100 [IU]/ML
0-7 INJECTION, SOLUTION INTRAVENOUS; SUBCUTANEOUS AS NEEDED
Status: DISCONTINUED | OUTPATIENT
Start: 2021-12-26 | End: 2021-12-27

## 2021-12-26 RX ORDER — ONDANSETRON 4 MG/1
4 TABLET, FILM COATED ORAL EVERY 6 HOURS PRN
Status: DISCONTINUED | OUTPATIENT
Start: 2021-12-26 | End: 2022-01-04 | Stop reason: HOSPADM

## 2021-12-26 RX ORDER — ONDANSETRON 2 MG/ML
4 INJECTION INTRAMUSCULAR; INTRAVENOUS EVERY 6 HOURS PRN
Status: DISCONTINUED | OUTPATIENT
Start: 2021-12-26 | End: 2021-12-27

## 2021-12-26 RX ORDER — ONDANSETRON 4 MG/1
4 TABLET, FILM COATED ORAL EVERY 6 HOURS PRN
Status: DISCONTINUED | OUTPATIENT
Start: 2021-12-26 | End: 2021-12-27

## 2021-12-26 RX ORDER — MAGNESIUM SULFATE HEPTAHYDRATE 40 MG/ML
2 INJECTION, SOLUTION INTRAVENOUS AS NEEDED
Status: DISCONTINUED | OUTPATIENT
Start: 2021-12-26 | End: 2021-12-27

## 2021-12-26 RX ORDER — SODIUM CHLORIDE 0.9 % (FLUSH) 0.9 %
10 SYRINGE (ML) INJECTION AS NEEDED
Status: DISCONTINUED | OUTPATIENT
Start: 2021-12-26 | End: 2021-12-27

## 2021-12-26 RX ORDER — BENZONATATE 100 MG/1
100 CAPSULE ORAL 3 TIMES DAILY PRN
Status: DISCONTINUED | OUTPATIENT
Start: 2021-12-26 | End: 2022-01-04 | Stop reason: HOSPADM

## 2021-12-26 RX ORDER — ACETAMINOPHEN 650 MG/1
650 SUPPOSITORY RECTAL EVERY 4 HOURS PRN
Status: DISCONTINUED | OUTPATIENT
Start: 2021-12-26 | End: 2021-12-27

## 2021-12-26 RX ORDER — ACETAMINOPHEN 650 MG/1
650 SUPPOSITORY RECTAL ONCE
Status: COMPLETED | OUTPATIENT
Start: 2021-12-26 | End: 2021-12-26

## 2021-12-26 RX ORDER — OLANZAPINE 10 MG/2ML
1 INJECTION, POWDER, LYOPHILIZED, FOR SOLUTION INTRAMUSCULAR AS NEEDED
Status: DISCONTINUED | OUTPATIENT
Start: 2021-12-26 | End: 2021-12-27

## 2021-12-26 RX ORDER — CHOLECALCIFEROL (VITAMIN D3) 125 MCG
5 CAPSULE ORAL NIGHTLY PRN
Status: DISCONTINUED | OUTPATIENT
Start: 2021-12-26 | End: 2022-01-04 | Stop reason: HOSPADM

## 2021-12-26 RX ORDER — MAGNESIUM SULFATE 1 G/100ML
1 INJECTION INTRAVENOUS AS NEEDED
Status: DISCONTINUED | OUTPATIENT
Start: 2021-12-26 | End: 2021-12-27

## 2021-12-26 RX ORDER — POTASSIUM CHLORIDE 20 MEQ/1
40 TABLET, EXTENDED RELEASE ORAL AS NEEDED
Status: DISCONTINUED | OUTPATIENT
Start: 2021-12-26 | End: 2022-01-04 | Stop reason: HOSPADM

## 2021-12-26 RX ORDER — IPRATROPIUM BROMIDE AND ALBUTEROL SULFATE 2.5; .5 MG/3ML; MG/3ML
3 SOLUTION RESPIRATORY (INHALATION) EVERY 4 HOURS PRN
Status: DISCONTINUED | OUTPATIENT
Start: 2021-12-26 | End: 2022-01-04 | Stop reason: HOSPADM

## 2021-12-26 RX ORDER — SODIUM CHLORIDE 0.9 % (FLUSH) 0.9 %
10 SYRINGE (ML) INJECTION EVERY 12 HOURS SCHEDULED
Status: DISCONTINUED | OUTPATIENT
Start: 2021-12-27 | End: 2022-01-04 | Stop reason: HOSPADM

## 2021-12-26 RX ORDER — POTASSIUM CHLORIDE 20 MEQ/1
40 TABLET, EXTENDED RELEASE ORAL AS NEEDED
Status: DISCONTINUED | OUTPATIENT
Start: 2021-12-26 | End: 2021-12-27

## 2021-12-26 RX ORDER — METHYLPREDNISOLONE SODIUM SUCCINATE 125 MG/2ML
INJECTION, POWDER, LYOPHILIZED, FOR SOLUTION INTRAMUSCULAR; INTRAVENOUS
Status: COMPLETED
Start: 2021-12-26 | End: 2021-12-26

## 2021-12-26 RX ORDER — CALCIUM CARBONATE 200(500)MG
2 TABLET,CHEWABLE ORAL 2 TIMES DAILY PRN
Status: DISCONTINUED | OUTPATIENT
Start: 2021-12-26 | End: 2021-12-27

## 2021-12-26 RX ORDER — CALCIUM CARBONATE 200(500)MG
2 TABLET,CHEWABLE ORAL 2 TIMES DAILY PRN
Status: DISCONTINUED | OUTPATIENT
Start: 2021-12-26 | End: 2022-01-04 | Stop reason: HOSPADM

## 2021-12-26 RX ORDER — GUAIFENESIN/DEXTROMETHORPHAN 100-10MG/5
5 SYRUP ORAL EVERY 4 HOURS PRN
Status: DISCONTINUED | OUTPATIENT
Start: 2021-12-26 | End: 2022-01-04 | Stop reason: HOSPADM

## 2021-12-26 RX ORDER — LORAZEPAM 2 MG/ML
0.5 INJECTION INTRAMUSCULAR ONCE
Status: COMPLETED | OUTPATIENT
Start: 2021-12-26 | End: 2021-12-26

## 2021-12-26 RX ORDER — NITROGLYCERIN 0.4 MG/1
0.4 TABLET SUBLINGUAL
Status: DISCONTINUED | OUTPATIENT
Start: 2021-12-26 | End: 2022-01-04 | Stop reason: HOSPADM

## 2021-12-26 RX ORDER — ACETAMINOPHEN 325 MG/1
650 TABLET ORAL EVERY 4 HOURS PRN
Status: DISCONTINUED | OUTPATIENT
Start: 2021-12-26 | End: 2021-12-27

## 2021-12-26 RX ORDER — IPRATROPIUM BROMIDE AND ALBUTEROL SULFATE 2.5; .5 MG/3ML; MG/3ML
3 SOLUTION RESPIRATORY (INHALATION)
Status: DISCONTINUED | OUTPATIENT
Start: 2021-12-27 | End: 2022-01-04 | Stop reason: HOSPADM

## 2021-12-26 RX ORDER — GUAIFENESIN/DEXTROMETHORPHAN 100-10MG/5
5 SYRUP ORAL EVERY 4 HOURS PRN
Status: DISCONTINUED | OUTPATIENT
Start: 2021-12-26 | End: 2021-12-27

## 2021-12-26 RX ORDER — ACETAMINOPHEN 160 MG/5ML
650 SOLUTION ORAL EVERY 4 HOURS PRN
Status: DISCONTINUED | OUTPATIENT
Start: 2021-12-26 | End: 2022-01-04 | Stop reason: HOSPADM

## 2021-12-26 RX ORDER — ONDANSETRON 2 MG/ML
4 INJECTION INTRAMUSCULAR; INTRAVENOUS EVERY 6 HOURS PRN
Status: DISCONTINUED | OUTPATIENT
Start: 2021-12-26 | End: 2022-01-04 | Stop reason: HOSPADM

## 2021-12-26 RX ORDER — IPRATROPIUM BROMIDE AND ALBUTEROL SULFATE 2.5; .5 MG/3ML; MG/3ML
3 SOLUTION RESPIRATORY (INHALATION) EVERY 4 HOURS PRN
Status: DISCONTINUED | OUTPATIENT
Start: 2021-12-26 | End: 2021-12-27

## 2021-12-26 RX ORDER — NICOTINE POLACRILEX 4 MG
15 LOZENGE BUCCAL
Status: DISCONTINUED | OUTPATIENT
Start: 2021-12-26 | End: 2021-12-27

## 2021-12-26 RX ORDER — CHOLECALCIFEROL (VITAMIN D3) 125 MCG
5 CAPSULE ORAL NIGHTLY PRN
Status: DISCONTINUED | OUTPATIENT
Start: 2021-12-26 | End: 2021-12-27

## 2021-12-26 RX ORDER — DOCUSATE SODIUM 100 MG/1
100 CAPSULE, LIQUID FILLED ORAL 2 TIMES DAILY PRN
Status: DISCONTINUED | OUTPATIENT
Start: 2021-12-26 | End: 2021-12-27

## 2021-12-26 RX ORDER — LORAZEPAM 2 MG/ML
0.5 INJECTION INTRAMUSCULAR ONCE AS NEEDED
Status: DISCONTINUED | OUTPATIENT
Start: 2021-12-26 | End: 2022-01-04 | Stop reason: HOSPADM

## 2021-12-26 RX ORDER — ALBUTEROL SULFATE 2.5 MG/3ML
2.5 SOLUTION RESPIRATORY (INHALATION) ONCE
Status: COMPLETED | OUTPATIENT
Start: 2021-12-26 | End: 2021-12-26

## 2021-12-26 RX ORDER — MAGNESIUM SULFATE 1 G/100ML
1 INJECTION INTRAVENOUS AS NEEDED
Status: DISCONTINUED | OUTPATIENT
Start: 2021-12-26 | End: 2022-01-04 | Stop reason: HOSPADM

## 2021-12-26 RX ORDER — METHYLPREDNISOLONE SODIUM SUCCINATE 125 MG/2ML
125 INJECTION, POWDER, LYOPHILIZED, FOR SOLUTION INTRAMUSCULAR; INTRAVENOUS EVERY 12 HOURS
Status: COMPLETED | OUTPATIENT
Start: 2021-12-27 | End: 2021-12-29

## 2021-12-26 RX ORDER — INSULIN LISPRO 100 [IU]/ML
0-7 INJECTION, SOLUTION INTRAVENOUS; SUBCUTANEOUS
Status: DISCONTINUED | OUTPATIENT
Start: 2021-12-27 | End: 2021-12-27

## 2021-12-26 RX ORDER — MAGNESIUM SULFATE HEPTAHYDRATE 40 MG/ML
2 INJECTION, SOLUTION INTRAVENOUS AS NEEDED
Status: DISCONTINUED | OUTPATIENT
Start: 2021-12-26 | End: 2022-01-04 | Stop reason: HOSPADM

## 2021-12-26 RX ORDER — POTASSIUM CHLORIDE 1.5 G/1.77G
40 POWDER, FOR SOLUTION ORAL AS NEEDED
Status: DISCONTINUED | OUTPATIENT
Start: 2021-12-26 | End: 2022-01-04 | Stop reason: HOSPADM

## 2021-12-26 RX ORDER — ALUMINA, MAGNESIA, AND SIMETHICONE 2400; 2400; 240 MG/30ML; MG/30ML; MG/30ML
15 SUSPENSION ORAL EVERY 6 HOURS PRN
Status: DISCONTINUED | OUTPATIENT
Start: 2021-12-26 | End: 2022-01-04 | Stop reason: HOSPADM

## 2021-12-26 RX ORDER — ALUMINA, MAGNESIA, AND SIMETHICONE 2400; 2400; 240 MG/30ML; MG/30ML; MG/30ML
15 SUSPENSION ORAL EVERY 6 HOURS PRN
Status: DISCONTINUED | OUTPATIENT
Start: 2021-12-26 | End: 2021-12-27

## 2021-12-26 RX ORDER — DEXTROSE MONOHYDRATE 25 G/50ML
25 INJECTION, SOLUTION INTRAVENOUS
Status: DISCONTINUED | OUTPATIENT
Start: 2021-12-26 | End: 2021-12-27

## 2021-12-26 RX ORDER — NITROGLYCERIN 0.4 MG/1
0.4 TABLET SUBLINGUAL
Status: DISCONTINUED | OUTPATIENT
Start: 2021-12-26 | End: 2021-12-27

## 2021-12-26 RX ORDER — BENZONATATE 100 MG/1
100 CAPSULE ORAL 3 TIMES DAILY PRN
Status: DISCONTINUED | OUTPATIENT
Start: 2021-12-26 | End: 2021-12-27

## 2021-12-26 RX ORDER — SODIUM CHLORIDE 0.9 % (FLUSH) 0.9 %
10 SYRINGE (ML) INJECTION AS NEEDED
Status: DISCONTINUED | OUTPATIENT
Start: 2021-12-26 | End: 2022-01-04 | Stop reason: HOSPADM

## 2021-12-26 RX ORDER — METHYLPREDNISOLONE SODIUM SUCCINATE 125 MG/2ML
125 INJECTION, POWDER, LYOPHILIZED, FOR SOLUTION INTRAMUSCULAR; INTRAVENOUS ONCE
Status: COMPLETED | OUTPATIENT
Start: 2021-12-26 | End: 2021-12-26

## 2021-12-26 RX ORDER — SODIUM CHLORIDE 0.9 % (FLUSH) 0.9 %
10 SYRINGE (ML) INJECTION EVERY 12 HOURS SCHEDULED
Status: DISCONTINUED | OUTPATIENT
Start: 2021-12-26 | End: 2021-12-27

## 2021-12-26 RX ORDER — IPRATROPIUM BROMIDE AND ALBUTEROL SULFATE 2.5; .5 MG/3ML; MG/3ML
3 SOLUTION RESPIRATORY (INHALATION) ONCE
Status: COMPLETED | OUTPATIENT
Start: 2021-12-26 | End: 2021-12-26

## 2021-12-26 RX ORDER — ACETAMINOPHEN 160 MG/5ML
650 SOLUTION ORAL EVERY 4 HOURS PRN
Status: DISCONTINUED | OUTPATIENT
Start: 2021-12-26 | End: 2021-12-27

## 2021-12-26 RX ADMIN — METHYLPREDNISOLONE SODIUM SUCCINATE 125 MG: 125 INJECTION, POWDER, LYOPHILIZED, FOR SOLUTION INTRAMUSCULAR; INTRAVENOUS at 20:00

## 2021-12-26 RX ADMIN — ACETAMINOPHEN 650 MG: 650 SUPPOSITORY RECTAL at 20:32

## 2021-12-26 RX ADMIN — METHYLPREDNISOLONE SODIUM SUCCINATE 125 MG: 125 INJECTION, POWDER, FOR SOLUTION INTRAMUSCULAR; INTRAVENOUS at 20:00

## 2021-12-26 RX ADMIN — ALBUTEROL SULFATE 2.5 MG: 2.5 SOLUTION RESPIRATORY (INHALATION) at 20:56

## 2021-12-26 RX ADMIN — LORAZEPAM 0.5 MG: 2 INJECTION INTRAMUSCULAR; INTRAVENOUS at 20:38

## 2021-12-26 RX ADMIN — IPRATROPIUM BROMIDE AND ALBUTEROL SULFATE 3 ML: .5; 3 SOLUTION RESPIRATORY (INHALATION) at 20:57

## 2021-12-26 RX ADMIN — CEFTRIAXONE 2 G: 10 INJECTION, POWDER, FOR SOLUTION INTRAVENOUS at 20:38

## 2021-12-26 RX ADMIN — AZITHROMYCIN DIHYDRATE 500 MG: 500 INJECTION, POWDER, LYOPHILIZED, FOR SOLUTION INTRAVENOUS at 20:38

## 2021-12-27 ENCOUNTER — APPOINTMENT (OUTPATIENT)
Dept: CT IMAGING | Facility: HOSPITAL | Age: 71
End: 2021-12-27

## 2021-12-27 PROBLEM — R60.0 LEG EDEMA: Status: ACTIVE | Noted: 2021-12-27

## 2021-12-27 PROBLEM — J96.12 CHRONIC RESPIRATORY FAILURE WITH HYPERCAPNIA: Status: ACTIVE | Noted: 2021-12-27

## 2021-12-27 PROBLEM — N18.2 CKD (CHRONIC KIDNEY DISEASE) STAGE 2, GFR 60-89 ML/MIN: Status: ACTIVE | Noted: 2019-11-14

## 2021-12-27 PROBLEM — E87.1 HYPONATREMIA: Status: ACTIVE | Noted: 2021-12-27

## 2021-12-27 PROBLEM — R74.01 ELEVATED AST (SGOT): Status: ACTIVE | Noted: 2021-12-27

## 2021-12-27 PROBLEM — R73.9 HYPERGLYCEMIA: Status: ACTIVE | Noted: 2021-12-27

## 2021-12-27 LAB
ALBUMIN SERPL-MCNC: 3.5 G/DL (ref 3.5–5.2)
ALBUMIN/GLOB SERPL: 0.9 G/DL
ALP SERPL-CCNC: 89 U/L (ref 39–117)
ALT SERPL W P-5'-P-CCNC: 8 U/L (ref 1–33)
ANION GAP SERPL CALCULATED.3IONS-SCNC: 15 MMOL/L (ref 5–15)
ARTERIAL PATENCY WRIST A: POSITIVE
AST SERPL-CCNC: 43 U/L (ref 1–32)
ATMOSPHERIC PRESS: ABNORMAL MM[HG]
B PARAPERT DNA SPEC QL NAA+PROBE: NOT DETECTED
B PERT DNA SPEC QL NAA+PROBE: NOT DETECTED
BASE EXCESS BLDA CALC-SCNC: 0.6 MMOL/L (ref 0–3)
BDY SITE: ABNORMAL
BILIRUB SERPL-MCNC: 0.2 MG/DL (ref 0–1.2)
BUN SERPL-MCNC: 15 MG/DL (ref 8–23)
BUN/CREAT SERPL: 17.9 (ref 7–25)
C PNEUM DNA NPH QL NAA+NON-PROBE: NOT DETECTED
CALCIUM SPEC-SCNC: 8.7 MG/DL (ref 8.6–10.5)
CHLORIDE SERPL-SCNC: 96 MMOL/L (ref 98–107)
CHOLEST SERPL-MCNC: 112 MG/DL (ref 0–200)
CO2 BLDA-SCNC: 27.9 MMOL/L (ref 22–29)
CO2 SERPL-SCNC: 24 MMOL/L (ref 22–29)
CREAT SERPL-MCNC: 0.84 MG/DL (ref 0.57–1)
CRP SERPL-MCNC: 32.22 MG/DL (ref 0–0.5)
DEPRECATED RDW RBC AUTO: 43.8 FL (ref 37–54)
ERYTHROCYTE [DISTWIDTH] IN BLOOD BY AUTOMATED COUNT: 15.4 % (ref 12.3–15.4)
FLUAV SUBTYP SPEC NAA+PROBE: NOT DETECTED
FLUBV RNA ISLT QL NAA+PROBE: NOT DETECTED
GFR SERPL CREATININE-BSD FRML MDRD: 67 ML/MIN/1.73
GLOBULIN UR ELPH-MCNC: 3.7 GM/DL
GLUCOSE SERPL-MCNC: 155 MG/DL (ref 65–99)
HADV DNA SPEC NAA+PROBE: NOT DETECTED
HBA1C MFR BLD: 6.2 % (ref 3.5–5.6)
HCO3 BLDA-SCNC: 26.5 MMOL/L (ref 21–28)
HCOV 229E RNA SPEC QL NAA+PROBE: NOT DETECTED
HCOV HKU1 RNA SPEC QL NAA+PROBE: NOT DETECTED
HCOV NL63 RNA SPEC QL NAA+PROBE: NOT DETECTED
HCOV OC43 RNA SPEC QL NAA+PROBE: NOT DETECTED
HCT VFR BLD AUTO: 36.4 % (ref 34–46.6)
HDLC SERPL-MCNC: 43 MG/DL (ref 40–60)
HEMODILUTION: NO
HGB BLD-MCNC: 11.8 G/DL (ref 12–15.9)
HMPV RNA NPH QL NAA+NON-PROBE: NOT DETECTED
HPIV1 RNA ISLT QL NAA+PROBE: NOT DETECTED
HPIV2 RNA SPEC QL NAA+PROBE: NOT DETECTED
HPIV3 RNA NPH QL NAA+PROBE: NOT DETECTED
HPIV4 P GENE NPH QL NAA+PROBE: NOT DETECTED
INHALED O2 CONCENTRATION: 100 %
LDLC SERPL CALC-MCNC: 56 MG/DL (ref 0–100)
LDLC/HDLC SERPL: 1.34 {RATIO}
LIPASE SERPL-CCNC: 18 U/L (ref 13–60)
LYMPHOCYTES # BLD MANUAL: 0.09 10*3/MM3 (ref 0.7–3.1)
LYMPHOCYTES NFR BLD MANUAL: 2 % (ref 5–12)
M PNEUMO IGG SER IA-ACNC: NOT DETECTED
MAGNESIUM SERPL-MCNC: 2.2 MG/DL (ref 1.6–2.4)
MCH RBC QN AUTO: 26.5 PG (ref 26.6–33)
MCHC RBC AUTO-ENTMCNC: 32.5 G/DL (ref 31.5–35.7)
MCV RBC AUTO: 81.6 FL (ref 79–97)
MODALITY: ABNORMAL
MONOCYTES # BLD: 0.18 10*3/MM3 (ref 0.1–0.9)
NEUTROPHILS # BLD AUTO: 8.92 10*3/MM3 (ref 1.7–7)
NEUTROPHILS NFR BLD MANUAL: 85 % (ref 42.7–76)
NEUTS BAND NFR BLD MANUAL: 12 % (ref 0–5)
NT-PROBNP SERPL-MCNC: 407.1 PG/ML (ref 0–900)
PCO2 BLDA: 46.5 MM HG (ref 35–48)
PH BLDA: 7.36 PH UNITS (ref 7.35–7.45)
PLAT MORPH BLD: NORMAL
PLATELET # BLD AUTO: 151 10*3/MM3 (ref 140–450)
PMV BLD AUTO: 8.9 FL (ref 6–12)
PO2 BLDA: 53.4 MM HG (ref 83–108)
POTASSIUM SERPL-SCNC: 3.8 MMOL/L (ref 3.5–5.2)
PROCALCITONIN SERPL-MCNC: 0.53 NG/ML (ref 0–0.25)
PROT SERPL-MCNC: 7.2 G/DL (ref 6–8.5)
RBC # BLD AUTO: 4.46 10*6/MM3 (ref 3.77–5.28)
RBC MORPH BLD: NORMAL
RHINOVIRUS RNA SPEC NAA+PROBE: NOT DETECTED
RSV RNA NPH QL NAA+NON-PROBE: NOT DETECTED
SAO2 % BLDCOA: 85.7 % (ref 94–98)
SARS-COV-2 RNA NPH QL NAA+NON-PROBE: NOT DETECTED
SCAN SLIDE: NORMAL
SODIUM SERPL-SCNC: 135 MMOL/L (ref 136–145)
THEOPHYLLINE SERPL-MCNC: 6.2 MCG/ML (ref 10–20)
TRIGL SERPL-MCNC: 57 MG/DL (ref 0–150)
TROPONIN T SERPL-MCNC: <0.01 NG/ML (ref 0–0.03)
VARIANT LYMPHS NFR BLD MANUAL: 1 % (ref 19.6–45.3)
VLDLC SERPL-MCNC: 13 MG/DL (ref 5–40)
WBC MORPH BLD: NORMAL
WBC NRBC COR # BLD: 9.2 10*3/MM3 (ref 3.4–10.8)

## 2021-12-27 PROCEDURE — 94799 UNLISTED PULMONARY SVC/PX: CPT

## 2021-12-27 PROCEDURE — 25010000002 ENOXAPARIN PER 10 MG: Performed by: NURSE PRACTITIONER

## 2021-12-27 PROCEDURE — 82803 BLOOD GASES ANY COMBINATION: CPT

## 2021-12-27 PROCEDURE — 80198 ASSAY OF THEOPHYLLINE: CPT | Performed by: NURSE PRACTITIONER

## 2021-12-27 PROCEDURE — 0202U NFCT DS 22 TRGT SARS-COV-2: CPT | Performed by: NURSE PRACTITIONER

## 2021-12-27 PROCEDURE — 36600 WITHDRAWAL OF ARTERIAL BLOOD: CPT

## 2021-12-27 PROCEDURE — 25010000002 CEFTRIAXONE PER 250 MG: Performed by: NURSE PRACTITIONER

## 2021-12-27 PROCEDURE — 87205 SMEAR GRAM STAIN: CPT | Performed by: NURSE PRACTITIONER

## 2021-12-27 PROCEDURE — 87070 CULTURE OTHR SPECIMN AEROBIC: CPT | Performed by: NURSE PRACTITIONER

## 2021-12-27 PROCEDURE — 83880 ASSAY OF NATRIURETIC PEPTIDE: CPT | Performed by: NURSE PRACTITIONER

## 2021-12-27 PROCEDURE — 94660 CPAP INITIATION&MGMT: CPT

## 2021-12-27 PROCEDURE — 25010000002 METHYLPREDNISOLONE PER 125 MG: Performed by: NURSE PRACTITIONER

## 2021-12-27 PROCEDURE — 71250 CT THORAX DX C-: CPT

## 2021-12-27 PROCEDURE — 99223 1ST HOSP IP/OBS HIGH 75: CPT | Performed by: INTERNAL MEDICINE

## 2021-12-27 PROCEDURE — 80061 LIPID PANEL: CPT | Performed by: NURSE PRACTITIONER

## 2021-12-27 PROCEDURE — 25010000002 AZITHROMYCIN PER 500 MG: Performed by: NURSE PRACTITIONER

## 2021-12-27 PROCEDURE — 97165 OT EVAL LOW COMPLEX 30 MIN: CPT

## 2021-12-27 PROCEDURE — 94664 DEMO&/EVAL PT USE INHALER: CPT

## 2021-12-27 RX ORDER — BUDESONIDE 0.5 MG/2ML
1 INHALANT ORAL 2 TIMES DAILY
Status: DISCONTINUED | OUTPATIENT
Start: 2021-12-27 | End: 2022-01-04 | Stop reason: HOSPADM

## 2021-12-27 RX ORDER — FUROSEMIDE 40 MG/1
40 TABLET ORAL DAILY
Status: DISCONTINUED | OUTPATIENT
Start: 2021-12-27 | End: 2022-01-04 | Stop reason: HOSPADM

## 2021-12-27 RX ORDER — ROSUVASTATIN CALCIUM 10 MG/1
10 TABLET, COATED ORAL NIGHTLY
Status: DISCONTINUED | OUTPATIENT
Start: 2021-12-27 | End: 2022-01-04 | Stop reason: HOSPADM

## 2021-12-27 RX ORDER — GABAPENTIN 300 MG/1
900 CAPSULE ORAL NIGHTLY
Status: DISCONTINUED | OUTPATIENT
Start: 2021-12-27 | End: 2022-01-04 | Stop reason: HOSPADM

## 2021-12-27 RX ORDER — ROPINIROLE 1 MG/1
1 TABLET, FILM COATED ORAL NIGHTLY
Status: DISCONTINUED | OUTPATIENT
Start: 2021-12-27 | End: 2022-01-04 | Stop reason: HOSPADM

## 2021-12-27 RX ORDER — THEOPHYLLINE 300 MG/1
300 TABLET, EXTENDED RELEASE ORAL EVERY 24 HOURS
Status: DISCONTINUED | OUTPATIENT
Start: 2021-12-27 | End: 2021-12-27

## 2021-12-27 RX ORDER — MONTELUKAST SODIUM 10 MG/1
10 TABLET ORAL NIGHTLY
Status: DISCONTINUED | OUTPATIENT
Start: 2021-12-27 | End: 2022-01-04 | Stop reason: HOSPADM

## 2021-12-27 RX ORDER — POTASSIUM CHLORIDE 1500 MG/1
20 TABLET, FILM COATED, EXTENDED RELEASE ORAL 2 TIMES DAILY
COMMUNITY
End: 2022-12-31

## 2021-12-27 RX ORDER — DILTIAZEM HYDROCHLORIDE 180 MG/1
180 CAPSULE, COATED, EXTENDED RELEASE ORAL DAILY
Status: DISCONTINUED | OUTPATIENT
Start: 2021-12-27 | End: 2022-01-04 | Stop reason: HOSPADM

## 2021-12-27 RX ORDER — METOPROLOL SUCCINATE 50 MG/1
50 TABLET, EXTENDED RELEASE ORAL 2 TIMES DAILY
Status: DISCONTINUED | OUTPATIENT
Start: 2021-12-27 | End: 2022-01-04 | Stop reason: HOSPADM

## 2021-12-27 RX ORDER — ASPIRIN 81 MG/1
81 TABLET, CHEWABLE ORAL DAILY
Status: DISCONTINUED | OUTPATIENT
Start: 2021-12-27 | End: 2022-01-04 | Stop reason: HOSPADM

## 2021-12-27 RX ORDER — FUROSEMIDE 40 MG/1
40 TABLET ORAL 2 TIMES DAILY
COMMUNITY
End: 2022-12-31

## 2021-12-27 RX ORDER — PANTOPRAZOLE SODIUM 40 MG/1
40 TABLET, DELAYED RELEASE ORAL EVERY MORNING
Refills: 3 | Status: DISCONTINUED | OUTPATIENT
Start: 2021-12-27 | End: 2022-01-04 | Stop reason: HOSPADM

## 2021-12-27 RX ADMIN — IPRATROPIUM BROMIDE AND ALBUTEROL SULFATE 3 ML: 2.5; .5 SOLUTION RESPIRATORY (INHALATION) at 14:31

## 2021-12-27 RX ADMIN — IPRATROPIUM BROMIDE AND ALBUTEROL SULFATE 3 ML: 2.5; .5 SOLUTION RESPIRATORY (INHALATION) at 07:08

## 2021-12-27 RX ADMIN — ASPIRIN 81 MG CHEWABLE TABLET 81 MG: 81 TABLET CHEWABLE at 12:51

## 2021-12-27 RX ADMIN — ENOXAPARIN SODIUM 40 MG: 40 INJECTION SUBCUTANEOUS at 17:20

## 2021-12-27 RX ADMIN — FUROSEMIDE 40 MG: 40 TABLET ORAL at 12:51

## 2021-12-27 RX ADMIN — PANTOPRAZOLE SODIUM 40 MG: 40 TABLET, DELAYED RELEASE ORAL at 08:50

## 2021-12-27 RX ADMIN — MONTELUKAST 10 MG: 10 TABLET, FILM COATED ORAL at 20:39

## 2021-12-27 RX ADMIN — ROSUVASTATIN 10 MG: 10 TABLET, FILM COATED ORAL at 20:40

## 2021-12-27 RX ADMIN — CEFTRIAXONE SODIUM 1 G: 1 INJECTION, POWDER, FOR SOLUTION INTRAMUSCULAR; INTRAVENOUS at 20:40

## 2021-12-27 RX ADMIN — BUDESONIDE 1 MG: 0.5 INHALANT RESPIRATORY (INHALATION) at 20:19

## 2021-12-27 RX ADMIN — ROPINIROLE HYDROCHLORIDE 1 MG: 1 TABLET, FILM COATED ORAL at 20:39

## 2021-12-27 RX ADMIN — METOPROLOL SUCCINATE 50 MG: 50 TABLET, EXTENDED RELEASE ORAL at 12:51

## 2021-12-27 RX ADMIN — IPRATROPIUM BROMIDE AND ALBUTEROL SULFATE 3 ML: 2.5; .5 SOLUTION RESPIRATORY (INHALATION) at 11:20

## 2021-12-27 RX ADMIN — METHYLPREDNISOLONE SODIUM SUCCINATE 125 MG: 125 INJECTION, POWDER, FOR SOLUTION INTRAMUSCULAR; INTRAVENOUS at 08:48

## 2021-12-27 RX ADMIN — METOPROLOL SUCCINATE 50 MG: 50 TABLET, EXTENDED RELEASE ORAL at 20:40

## 2021-12-27 RX ADMIN — SODIUM CHLORIDE, PRESERVATIVE FREE 10 ML: 5 INJECTION INTRAVENOUS at 20:41

## 2021-12-27 RX ADMIN — SODIUM CHLORIDE, PRESERVATIVE FREE 10 ML: 5 INJECTION INTRAVENOUS at 08:48

## 2021-12-27 RX ADMIN — IPRATROPIUM BROMIDE AND ALBUTEROL SULFATE 3 ML: 2.5; .5 SOLUTION RESPIRATORY (INHALATION) at 20:15

## 2021-12-27 RX ADMIN — GABAPENTIN 900 MG: 300 CAPSULE ORAL at 20:39

## 2021-12-27 RX ADMIN — METHYLPREDNISOLONE SODIUM SUCCINATE 125 MG: 125 INJECTION, POWDER, FOR SOLUTION INTRAMUSCULAR; INTRAVENOUS at 20:40

## 2021-12-27 RX ADMIN — DILTIAZEM HYDROCHLORIDE 180 MG: 180 CAPSULE, COATED, EXTENDED RELEASE ORAL at 12:51

## 2021-12-27 RX ADMIN — THEOPHYLLINE ANHYDROUS 400 MG: 200 CAPSULE, EXTENDED RELEASE ORAL at 12:55

## 2021-12-27 RX ADMIN — AZITHROMYCIN 500 MG: 500 INJECTION, POWDER, LYOPHILIZED, FOR SOLUTION INTRAVENOUS at 20:40

## 2021-12-27 RX ADMIN — BUDESONIDE 1 MG: 0.5 INHALANT RESPIRATORY (INHALATION) at 07:14

## 2021-12-28 LAB
ALBUMIN SERPL-MCNC: 3.4 G/DL (ref 3.5–5.2)
ALBUMIN/GLOB SERPL: 0.9 G/DL
ALP SERPL-CCNC: 96 U/L (ref 39–117)
ALT SERPL W P-5'-P-CCNC: 10 U/L (ref 1–33)
ANION GAP SERPL CALCULATED.3IONS-SCNC: 13 MMOL/L (ref 5–15)
AST SERPL-CCNC: 29 U/L (ref 1–32)
BASOPHILS # BLD AUTO: 0 10*3/MM3 (ref 0–0.2)
BASOPHILS NFR BLD AUTO: 0.2 % (ref 0–1.5)
BILIRUB SERPL-MCNC: 0.2 MG/DL (ref 0–1.2)
BUN SERPL-MCNC: 20 MG/DL (ref 8–23)
BUN/CREAT SERPL: 19.2 (ref 7–25)
CALCIUM SPEC-SCNC: 9.1 MG/DL (ref 8.6–10.5)
CHLORIDE SERPL-SCNC: 99 MMOL/L (ref 98–107)
CO2 SERPL-SCNC: 25 MMOL/L (ref 22–29)
CREAT SERPL-MCNC: 1.04 MG/DL (ref 0.57–1)
DEPRECATED RDW RBC AUTO: 43.3 FL (ref 37–54)
EOSINOPHIL # BLD AUTO: 0 10*3/MM3 (ref 0–0.4)
EOSINOPHIL NFR BLD AUTO: 0 % (ref 0.3–6.2)
ERYTHROCYTE [DISTWIDTH] IN BLOOD BY AUTOMATED COUNT: 15.2 % (ref 12.3–15.4)
GFR SERPL CREATININE-BSD FRML MDRD: 52 ML/MIN/1.73
GLOBULIN UR ELPH-MCNC: 3.8 GM/DL
GLUCOSE SERPL-MCNC: 133 MG/DL (ref 65–99)
HCT VFR BLD AUTO: 37 % (ref 34–46.6)
HGB BLD-MCNC: 12.1 G/DL (ref 12–15.9)
LYMPHOCYTES # BLD AUTO: 0.7 10*3/MM3 (ref 0.7–3.1)
LYMPHOCYTES NFR BLD AUTO: 6.1 % (ref 19.6–45.3)
MAGNESIUM SERPL-MCNC: 2.4 MG/DL (ref 1.6–2.4)
MCH RBC QN AUTO: 26.9 PG (ref 26.6–33)
MCHC RBC AUTO-ENTMCNC: 32.8 G/DL (ref 31.5–35.7)
MCV RBC AUTO: 81.9 FL (ref 79–97)
MONOCYTES # BLD AUTO: 0.7 10*3/MM3 (ref 0.1–0.9)
MONOCYTES NFR BLD AUTO: 5.8 % (ref 5–12)
NEUTROPHILS NFR BLD AUTO: 10 10*3/MM3 (ref 1.7–7)
NEUTROPHILS NFR BLD AUTO: 87.9 % (ref 42.7–76)
NRBC BLD AUTO-RTO: 0.1 /100 WBC (ref 0–0.2)
PLATELET # BLD AUTO: 188 10*3/MM3 (ref 140–450)
PMV BLD AUTO: 8.7 FL (ref 6–12)
POTASSIUM SERPL-SCNC: 4 MMOL/L (ref 3.5–5.2)
PROT SERPL-MCNC: 7.2 G/DL (ref 6–8.5)
RBC # BLD AUTO: 4.52 10*6/MM3 (ref 3.77–5.28)
SODIUM SERPL-SCNC: 137 MMOL/L (ref 136–145)
WBC NRBC COR # BLD: 11.4 10*3/MM3 (ref 3.4–10.8)

## 2021-12-28 PROCEDURE — 25010000002 AZITHROMYCIN PER 500 MG: Performed by: NURSE PRACTITIONER

## 2021-12-28 PROCEDURE — 25010000002 METHYLPREDNISOLONE PER 125 MG: Performed by: NURSE PRACTITIONER

## 2021-12-28 PROCEDURE — 97162 PT EVAL MOD COMPLEX 30 MIN: CPT

## 2021-12-28 PROCEDURE — 25010000002 CEFTRIAXONE PER 250 MG: Performed by: NURSE PRACTITIONER

## 2021-12-28 PROCEDURE — 25010000002 ENOXAPARIN PER 10 MG: Performed by: NURSE PRACTITIONER

## 2021-12-28 PROCEDURE — 99232 SBSQ HOSP IP/OBS MODERATE 35: CPT | Performed by: INTERNAL MEDICINE

## 2021-12-28 PROCEDURE — 85025 COMPLETE CBC W/AUTO DIFF WBC: CPT | Performed by: INTERNAL MEDICINE

## 2021-12-28 PROCEDURE — 80053 COMPREHEN METABOLIC PANEL: CPT | Performed by: INTERNAL MEDICINE

## 2021-12-28 PROCEDURE — 94799 UNLISTED PULMONARY SVC/PX: CPT

## 2021-12-28 PROCEDURE — 94660 CPAP INITIATION&MGMT: CPT

## 2021-12-28 PROCEDURE — 83735 ASSAY OF MAGNESIUM: CPT | Performed by: NURSE PRACTITIONER

## 2021-12-28 RX ADMIN — BUDESONIDE 1 MG: 0.5 INHALANT RESPIRATORY (INHALATION) at 18:56

## 2021-12-28 RX ADMIN — ENOXAPARIN SODIUM 40 MG: 40 INJECTION SUBCUTANEOUS at 17:17

## 2021-12-28 RX ADMIN — THEOPHYLLINE ANHYDROUS 400 MG: 200 CAPSULE, EXTENDED RELEASE ORAL at 08:26

## 2021-12-28 RX ADMIN — SODIUM CHLORIDE, PRESERVATIVE FREE 10 ML: 5 INJECTION INTRAVENOUS at 08:26

## 2021-12-28 RX ADMIN — METHYLPREDNISOLONE SODIUM SUCCINATE 125 MG: 125 INJECTION, POWDER, FOR SOLUTION INTRAMUSCULAR; INTRAVENOUS at 08:25

## 2021-12-28 RX ADMIN — PANTOPRAZOLE SODIUM 40 MG: 40 TABLET, DELAYED RELEASE ORAL at 06:02

## 2021-12-28 RX ADMIN — IPRATROPIUM BROMIDE AND ALBUTEROL SULFATE 3 ML: 2.5; .5 SOLUTION RESPIRATORY (INHALATION) at 06:57

## 2021-12-28 RX ADMIN — METHYLPREDNISOLONE SODIUM SUCCINATE 125 MG: 125 INJECTION, POWDER, FOR SOLUTION INTRAMUSCULAR; INTRAVENOUS at 19:46

## 2021-12-28 RX ADMIN — SODIUM CHLORIDE, PRESERVATIVE FREE 10 ML: 5 INJECTION INTRAVENOUS at 19:50

## 2021-12-28 RX ADMIN — CEFTRIAXONE SODIUM 1 G: 1 INJECTION, POWDER, FOR SOLUTION INTRAMUSCULAR; INTRAVENOUS at 19:46

## 2021-12-28 RX ADMIN — MONTELUKAST 10 MG: 10 TABLET, FILM COATED ORAL at 19:51

## 2021-12-28 RX ADMIN — IPRATROPIUM BROMIDE AND ALBUTEROL SULFATE 3 ML: 2.5; .5 SOLUTION RESPIRATORY (INHALATION) at 14:59

## 2021-12-28 RX ADMIN — METOPROLOL SUCCINATE 50 MG: 50 TABLET, EXTENDED RELEASE ORAL at 19:50

## 2021-12-28 RX ADMIN — METOPROLOL SUCCINATE 50 MG: 50 TABLET, EXTENDED RELEASE ORAL at 08:26

## 2021-12-28 RX ADMIN — GABAPENTIN 900 MG: 300 CAPSULE ORAL at 19:50

## 2021-12-28 RX ADMIN — AZITHROMYCIN 500 MG: 500 INJECTION, POWDER, LYOPHILIZED, FOR SOLUTION INTRAVENOUS at 20:33

## 2021-12-28 RX ADMIN — ASPIRIN 81 MG CHEWABLE TABLET 81 MG: 81 TABLET CHEWABLE at 08:26

## 2021-12-28 RX ADMIN — FUROSEMIDE 40 MG: 40 TABLET ORAL at 08:26

## 2021-12-28 RX ADMIN — IPRATROPIUM BROMIDE AND ALBUTEROL SULFATE 3 ML: 2.5; .5 SOLUTION RESPIRATORY (INHALATION) at 18:56

## 2021-12-28 RX ADMIN — BUDESONIDE 1 MG: 0.5 INHALANT RESPIRATORY (INHALATION) at 07:05

## 2021-12-28 RX ADMIN — DILTIAZEM HYDROCHLORIDE 180 MG: 180 CAPSULE, COATED, EXTENDED RELEASE ORAL at 08:26

## 2021-12-28 RX ADMIN — IPRATROPIUM BROMIDE AND ALBUTEROL SULFATE 3 ML: 2.5; .5 SOLUTION RESPIRATORY (INHALATION) at 10:57

## 2021-12-28 RX ADMIN — ROSUVASTATIN 10 MG: 10 TABLET, FILM COATED ORAL at 19:48

## 2021-12-28 RX ADMIN — ROPINIROLE HYDROCHLORIDE 1 MG: 1 TABLET, FILM COATED ORAL at 19:50

## 2021-12-29 ENCOUNTER — APPOINTMENT (OUTPATIENT)
Dept: GENERAL RADIOLOGY | Facility: HOSPITAL | Age: 71
End: 2021-12-29

## 2021-12-29 LAB
ALBUMIN SERPL-MCNC: 3.3 G/DL (ref 3.5–5.2)
ALBUMIN/GLOB SERPL: 1 G/DL
ALP SERPL-CCNC: 109 U/L (ref 39–117)
ALT SERPL W P-5'-P-CCNC: 11 U/L (ref 1–33)
ANION GAP SERPL CALCULATED.3IONS-SCNC: 10 MMOL/L (ref 5–15)
AST SERPL-CCNC: 20 U/L (ref 1–32)
BACTERIA SPEC RESP CULT: NORMAL
BASOPHILS # BLD AUTO: 0 10*3/MM3 (ref 0–0.2)
BASOPHILS NFR BLD AUTO: 0.3 % (ref 0–1.5)
BILIRUB SERPL-MCNC: 0.2 MG/DL (ref 0–1.2)
BUN SERPL-MCNC: 21 MG/DL (ref 8–23)
BUN/CREAT SERPL: 25.3 (ref 7–25)
CALCIUM SPEC-SCNC: 8.9 MG/DL (ref 8.6–10.5)
CHLORIDE SERPL-SCNC: 101 MMOL/L (ref 98–107)
CO2 SERPL-SCNC: 27 MMOL/L (ref 22–29)
CREAT SERPL-MCNC: 0.83 MG/DL (ref 0.57–1)
DEPRECATED RDW RBC AUTO: 43.3 FL (ref 37–54)
EOSINOPHIL # BLD AUTO: 0 10*3/MM3 (ref 0–0.4)
EOSINOPHIL NFR BLD AUTO: 0 % (ref 0.3–6.2)
ERYTHROCYTE [DISTWIDTH] IN BLOOD BY AUTOMATED COUNT: 15.1 % (ref 12.3–15.4)
GFR SERPL CREATININE-BSD FRML MDRD: 68 ML/MIN/1.73
GLOBULIN UR ELPH-MCNC: 3.3 GM/DL
GLUCOSE SERPL-MCNC: 160 MG/DL (ref 65–99)
GRAM STN SPEC: NORMAL
HCT VFR BLD AUTO: 34.5 % (ref 34–46.6)
HGB BLD-MCNC: 11.3 G/DL (ref 12–15.9)
L PNEUMO1 AG UR QL IA: NEGATIVE
LYMPHOCYTES # BLD AUTO: 0.6 10*3/MM3 (ref 0.7–3.1)
LYMPHOCYTES NFR BLD AUTO: 6.5 % (ref 19.6–45.3)
MAGNESIUM SERPL-MCNC: 2.3 MG/DL (ref 1.6–2.4)
MCH RBC QN AUTO: 26.7 PG (ref 26.6–33)
MCHC RBC AUTO-ENTMCNC: 32.9 G/DL (ref 31.5–35.7)
MCV RBC AUTO: 81.1 FL (ref 79–97)
MONOCYTES # BLD AUTO: 0.4 10*3/MM3 (ref 0.1–0.9)
MONOCYTES NFR BLD AUTO: 4.3 % (ref 5–12)
NEUTROPHILS NFR BLD AUTO: 8.8 10*3/MM3 (ref 1.7–7)
NEUTROPHILS NFR BLD AUTO: 88.9 % (ref 42.7–76)
NRBC BLD AUTO-RTO: 0.1 /100 WBC (ref 0–0.2)
PLATELET # BLD AUTO: 198 10*3/MM3 (ref 140–450)
PMV BLD AUTO: 8.3 FL (ref 6–12)
POTASSIUM SERPL-SCNC: 4 MMOL/L (ref 3.5–5.2)
PROT SERPL-MCNC: 6.6 G/DL (ref 6–8.5)
RBC # BLD AUTO: 4.25 10*6/MM3 (ref 3.77–5.28)
S PNEUM AG SPEC QL LA: NEGATIVE
SODIUM SERPL-SCNC: 138 MMOL/L (ref 136–145)
WBC NRBC COR # BLD: 9.9 10*3/MM3 (ref 3.4–10.8)

## 2021-12-29 PROCEDURE — 85025 COMPLETE CBC W/AUTO DIFF WBC: CPT | Performed by: INTERNAL MEDICINE

## 2021-12-29 PROCEDURE — 94799 UNLISTED PULMONARY SVC/PX: CPT

## 2021-12-29 PROCEDURE — 87899 AGENT NOS ASSAY W/OPTIC: CPT | Performed by: NURSE PRACTITIONER

## 2021-12-29 PROCEDURE — 99232 SBSQ HOSP IP/OBS MODERATE 35: CPT | Performed by: INTERNAL MEDICINE

## 2021-12-29 PROCEDURE — 25010000002 AZITHROMYCIN PER 500 MG: Performed by: NURSE PRACTITIONER

## 2021-12-29 PROCEDURE — 83735 ASSAY OF MAGNESIUM: CPT | Performed by: NURSE PRACTITIONER

## 2021-12-29 PROCEDURE — 25010000002 CEFTRIAXONE PER 250 MG: Performed by: NURSE PRACTITIONER

## 2021-12-29 PROCEDURE — 71045 X-RAY EXAM CHEST 1 VIEW: CPT

## 2021-12-29 PROCEDURE — 25010000002 ENOXAPARIN PER 10 MG: Performed by: NURSE PRACTITIONER

## 2021-12-29 PROCEDURE — 25010000002 METHYLPREDNISOLONE PER 125 MG: Performed by: NURSE PRACTITIONER

## 2021-12-29 PROCEDURE — 80053 COMPREHEN METABOLIC PANEL: CPT | Performed by: INTERNAL MEDICINE

## 2021-12-29 RX ADMIN — METOPROLOL SUCCINATE 50 MG: 50 TABLET, EXTENDED RELEASE ORAL at 08:32

## 2021-12-29 RX ADMIN — METHYLPREDNISOLONE SODIUM SUCCINATE 125 MG: 125 INJECTION, POWDER, FOR SOLUTION INTRAMUSCULAR; INTRAVENOUS at 08:32

## 2021-12-29 RX ADMIN — IPRATROPIUM BROMIDE AND ALBUTEROL SULFATE 3 ML: 2.5; .5 SOLUTION RESPIRATORY (INHALATION) at 06:49

## 2021-12-29 RX ADMIN — FUROSEMIDE 40 MG: 40 TABLET ORAL at 08:32

## 2021-12-29 RX ADMIN — IPRATROPIUM BROMIDE AND ALBUTEROL SULFATE 3 ML: 2.5; .5 SOLUTION RESPIRATORY (INHALATION) at 20:26

## 2021-12-29 RX ADMIN — ROSUVASTATIN 10 MG: 10 TABLET, FILM COATED ORAL at 20:21

## 2021-12-29 RX ADMIN — THEOPHYLLINE ANHYDROUS 400 MG: 200 CAPSULE, EXTENDED RELEASE ORAL at 08:32

## 2021-12-29 RX ADMIN — METOPROLOL SUCCINATE 50 MG: 50 TABLET, EXTENDED RELEASE ORAL at 20:21

## 2021-12-29 RX ADMIN — SODIUM CHLORIDE, PRESERVATIVE FREE 10 ML: 5 INJECTION INTRAVENOUS at 08:32

## 2021-12-29 RX ADMIN — AZITHROMYCIN 500 MG: 500 INJECTION, POWDER, LYOPHILIZED, FOR SOLUTION INTRAVENOUS at 21:02

## 2021-12-29 RX ADMIN — CEFTRIAXONE SODIUM 1 G: 1 INJECTION, POWDER, FOR SOLUTION INTRAMUSCULAR; INTRAVENOUS at 20:22

## 2021-12-29 RX ADMIN — BUDESONIDE 1 MG: 0.5 INHALANT RESPIRATORY (INHALATION) at 20:30

## 2021-12-29 RX ADMIN — ROPINIROLE HYDROCHLORIDE 1 MG: 1 TABLET, FILM COATED ORAL at 20:21

## 2021-12-29 RX ADMIN — MONTELUKAST 10 MG: 10 TABLET, FILM COATED ORAL at 20:21

## 2021-12-29 RX ADMIN — PANTOPRAZOLE SODIUM 40 MG: 40 TABLET, DELAYED RELEASE ORAL at 06:11

## 2021-12-29 RX ADMIN — GABAPENTIN 900 MG: 300 CAPSULE ORAL at 20:21

## 2021-12-29 RX ADMIN — IPRATROPIUM BROMIDE AND ALBUTEROL SULFATE 3 ML: 2.5; .5 SOLUTION RESPIRATORY (INHALATION) at 14:33

## 2021-12-29 RX ADMIN — ASPIRIN 81 MG CHEWABLE TABLET 81 MG: 81 TABLET CHEWABLE at 08:32

## 2021-12-29 RX ADMIN — SODIUM CHLORIDE, PRESERVATIVE FREE 10 ML: 5 INJECTION INTRAVENOUS at 20:22

## 2021-12-29 RX ADMIN — ENOXAPARIN SODIUM 40 MG: 40 INJECTION SUBCUTANEOUS at 15:27

## 2021-12-29 RX ADMIN — BUDESONIDE 1 MG: 0.5 INHALANT RESPIRATORY (INHALATION) at 06:52

## 2021-12-29 RX ADMIN — METHYLPREDNISOLONE SODIUM SUCCINATE 125 MG: 125 INJECTION, POWDER, FOR SOLUTION INTRAMUSCULAR; INTRAVENOUS at 20:22

## 2021-12-29 RX ADMIN — IPRATROPIUM BROMIDE AND ALBUTEROL SULFATE 3 ML: 2.5; .5 SOLUTION RESPIRATORY (INHALATION) at 10:43

## 2021-12-29 RX ADMIN — DILTIAZEM HYDROCHLORIDE 180 MG: 180 CAPSULE, COATED, EXTENDED RELEASE ORAL at 08:32

## 2021-12-30 LAB
ALBUMIN SERPL-MCNC: 3.1 G/DL (ref 3.5–5.2)
ALBUMIN/GLOB SERPL: 1 G/DL
ALP SERPL-CCNC: 71 U/L (ref 39–117)
ALT SERPL W P-5'-P-CCNC: 11 U/L (ref 1–33)
ANION GAP SERPL CALCULATED.3IONS-SCNC: 13 MMOL/L (ref 5–15)
AST SERPL-CCNC: 13 U/L (ref 1–32)
BILIRUB SERPL-MCNC: <0.2 MG/DL (ref 0–1.2)
BUN SERPL-MCNC: 21 MG/DL (ref 8–23)
BUN/CREAT SERPL: 25 (ref 7–25)
CALCIUM SPEC-SCNC: 8.6 MG/DL (ref 8.6–10.5)
CHLORIDE SERPL-SCNC: 101 MMOL/L (ref 98–107)
CO2 SERPL-SCNC: 26 MMOL/L (ref 22–29)
CREAT SERPL-MCNC: 0.84 MG/DL (ref 0.57–1)
DEPRECATED RDW RBC AUTO: 43.3 FL (ref 37–54)
ERYTHROCYTE [DISTWIDTH] IN BLOOD BY AUTOMATED COUNT: 15.2 % (ref 12.3–15.4)
GFR SERPL CREATININE-BSD FRML MDRD: 67 ML/MIN/1.73
GLOBULIN UR ELPH-MCNC: 3.1 GM/DL
GLUCOSE SERPL-MCNC: 164 MG/DL (ref 65–99)
HCT VFR BLD AUTO: 35.7 % (ref 34–46.6)
HGB BLD-MCNC: 11.4 G/DL (ref 12–15.9)
LYMPHOCYTES # BLD MANUAL: 0.59 10*3/MM3 (ref 0.7–3.1)
LYMPHOCYTES NFR BLD MANUAL: 2 % (ref 5–12)
MAGNESIUM SERPL-MCNC: 2.2 MG/DL (ref 1.6–2.4)
MCH RBC QN AUTO: 26 PG (ref 26.6–33)
MCHC RBC AUTO-ENTMCNC: 32 G/DL (ref 31.5–35.7)
MCV RBC AUTO: 81.1 FL (ref 79–97)
MONOCYTES # BLD: 0.17 10*3/MM3 (ref 0.1–0.9)
NEUTROPHILS # BLD AUTO: 7.64 10*3/MM3 (ref 1.7–7)
NEUTROPHILS NFR BLD MANUAL: 86 % (ref 42.7–76)
NEUTS BAND NFR BLD MANUAL: 5 % (ref 0–5)
PLATELET # BLD AUTO: 215 10*3/MM3 (ref 140–450)
PMV BLD AUTO: 8 FL (ref 6–12)
POTASSIUM SERPL-SCNC: 3.9 MMOL/L (ref 3.5–5.2)
PROT SERPL-MCNC: 6.2 G/DL (ref 6–8.5)
RBC # BLD AUTO: 4.4 10*6/MM3 (ref 3.77–5.28)
RBC MORPH BLD: NORMAL
SCAN SLIDE: NORMAL
SMALL PLATELETS BLD QL SMEAR: ADEQUATE
SODIUM SERPL-SCNC: 140 MMOL/L (ref 136–145)
VARIANT LYMPHS NFR BLD MANUAL: 7 % (ref 19.6–45.3)
WBC MORPH BLD: NORMAL
WBC NRBC COR # BLD: 8.4 10*3/MM3 (ref 3.4–10.8)

## 2021-12-30 PROCEDURE — 97530 THERAPEUTIC ACTIVITIES: CPT

## 2021-12-30 PROCEDURE — 94799 UNLISTED PULMONARY SVC/PX: CPT

## 2021-12-30 PROCEDURE — 94660 CPAP INITIATION&MGMT: CPT

## 2021-12-30 PROCEDURE — 85025 COMPLETE CBC W/AUTO DIFF WBC: CPT | Performed by: INTERNAL MEDICINE

## 2021-12-30 PROCEDURE — 25010000002 ENOXAPARIN PER 10 MG: Performed by: NURSE PRACTITIONER

## 2021-12-30 PROCEDURE — 80053 COMPREHEN METABOLIC PANEL: CPT | Performed by: INTERNAL MEDICINE

## 2021-12-30 PROCEDURE — 83735 ASSAY OF MAGNESIUM: CPT | Performed by: NURSE PRACTITIONER

## 2021-12-30 PROCEDURE — 85007 BL SMEAR W/DIFF WBC COUNT: CPT | Performed by: INTERNAL MEDICINE

## 2021-12-30 PROCEDURE — 25010000002 CEFTRIAXONE PER 250 MG: Performed by: INTERNAL MEDICINE

## 2021-12-30 PROCEDURE — 99232 SBSQ HOSP IP/OBS MODERATE 35: CPT | Performed by: INTERNAL MEDICINE

## 2021-12-30 RX ORDER — AZITHROMYCIN 250 MG/1
500 TABLET, FILM COATED ORAL ONCE
Status: DISCONTINUED | OUTPATIENT
Start: 2021-12-30 | End: 2021-12-30

## 2021-12-30 RX ADMIN — METOPROLOL SUCCINATE 50 MG: 50 TABLET, EXTENDED RELEASE ORAL at 08:51

## 2021-12-30 RX ADMIN — SODIUM CHLORIDE, PRESERVATIVE FREE 10 ML: 5 INJECTION INTRAVENOUS at 08:52

## 2021-12-30 RX ADMIN — THEOPHYLLINE ANHYDROUS 400 MG: 200 CAPSULE, EXTENDED RELEASE ORAL at 08:51

## 2021-12-30 RX ADMIN — BUDESONIDE 1 MG: 0.5 INHALANT RESPIRATORY (INHALATION) at 18:25

## 2021-12-30 RX ADMIN — CEFTRIAXONE SODIUM 1 G: 1 INJECTION, POWDER, FOR SOLUTION INTRAMUSCULAR; INTRAVENOUS at 20:11

## 2021-12-30 RX ADMIN — FUROSEMIDE 40 MG: 40 TABLET ORAL at 08:51

## 2021-12-30 RX ADMIN — ROSUVASTATIN 10 MG: 10 TABLET, FILM COATED ORAL at 20:12

## 2021-12-30 RX ADMIN — ENOXAPARIN SODIUM 40 MG: 40 INJECTION SUBCUTANEOUS at 15:49

## 2021-12-30 RX ADMIN — IPRATROPIUM BROMIDE AND ALBUTEROL SULFATE 3 ML: 2.5; .5 SOLUTION RESPIRATORY (INHALATION) at 10:35

## 2021-12-30 RX ADMIN — IPRATROPIUM BROMIDE AND ALBUTEROL SULFATE 3 ML: 2.5; .5 SOLUTION RESPIRATORY (INHALATION) at 14:55

## 2021-12-30 RX ADMIN — SODIUM CHLORIDE, PRESERVATIVE FREE 10 ML: 5 INJECTION INTRAVENOUS at 20:12

## 2021-12-30 RX ADMIN — PANTOPRAZOLE SODIUM 40 MG: 40 TABLET, DELAYED RELEASE ORAL at 06:10

## 2021-12-30 RX ADMIN — MONTELUKAST 10 MG: 10 TABLET, FILM COATED ORAL at 20:12

## 2021-12-30 RX ADMIN — ROPINIROLE HYDROCHLORIDE 1 MG: 1 TABLET, FILM COATED ORAL at 20:12

## 2021-12-30 RX ADMIN — ASPIRIN 81 MG CHEWABLE TABLET 81 MG: 81 TABLET CHEWABLE at 08:51

## 2021-12-30 RX ADMIN — BUDESONIDE 1 MG: 0.5 INHALANT RESPIRATORY (INHALATION) at 06:49

## 2021-12-30 RX ADMIN — IPRATROPIUM BROMIDE AND ALBUTEROL SULFATE 3 ML: 2.5; .5 SOLUTION RESPIRATORY (INHALATION) at 18:25

## 2021-12-30 RX ADMIN — IPRATROPIUM BROMIDE AND ALBUTEROL SULFATE 3 ML: 2.5; .5 SOLUTION RESPIRATORY (INHALATION) at 06:45

## 2021-12-30 RX ADMIN — METOPROLOL SUCCINATE 50 MG: 50 TABLET, EXTENDED RELEASE ORAL at 20:13

## 2021-12-30 RX ADMIN — DILTIAZEM HYDROCHLORIDE 180 MG: 180 CAPSULE, COATED, EXTENDED RELEASE ORAL at 08:51

## 2021-12-30 RX ADMIN — GABAPENTIN 900 MG: 300 CAPSULE ORAL at 20:12

## 2021-12-31 LAB
ALBUMIN SERPL-MCNC: 3.2 G/DL (ref 3.5–5.2)
ALBUMIN/GLOB SERPL: 1.1 G/DL
ALP SERPL-CCNC: 66 U/L (ref 39–117)
ALT SERPL W P-5'-P-CCNC: 12 U/L (ref 1–33)
ANION GAP SERPL CALCULATED.3IONS-SCNC: 11 MMOL/L (ref 5–15)
AST SERPL-CCNC: 13 U/L (ref 1–32)
BACTERIA SPEC AEROBE CULT: NORMAL
BACTERIA SPEC AEROBE CULT: NORMAL
BILIRUB SERPL-MCNC: <0.2 MG/DL (ref 0–1.2)
BUN SERPL-MCNC: 19 MG/DL (ref 8–23)
BUN/CREAT SERPL: 24.4 (ref 7–25)
CALCIUM SPEC-SCNC: 8.9 MG/DL (ref 8.6–10.5)
CHLORIDE SERPL-SCNC: 99 MMOL/L (ref 98–107)
CO2 SERPL-SCNC: 30 MMOL/L (ref 22–29)
CREAT SERPL-MCNC: 0.78 MG/DL (ref 0.57–1)
DEPRECATED RDW RBC AUTO: 42.9 FL (ref 37–54)
ERYTHROCYTE [DISTWIDTH] IN BLOOD BY AUTOMATED COUNT: 15.2 % (ref 12.3–15.4)
GFR SERPL CREATININE-BSD FRML MDRD: 73 ML/MIN/1.73
GLOBULIN UR ELPH-MCNC: 2.8 GM/DL
GLUCOSE SERPL-MCNC: 119 MG/DL (ref 65–99)
HCT VFR BLD AUTO: 35.8 % (ref 34–46.6)
HGB BLD-MCNC: 11.9 G/DL (ref 12–15.9)
LARGE PLATELETS: ABNORMAL
LYMPHOCYTES # BLD MANUAL: 1.58 10*3/MM3 (ref 0.7–3.1)
LYMPHOCYTES NFR BLD MANUAL: 7 % (ref 5–12)
MAGNESIUM SERPL-MCNC: 2 MG/DL (ref 1.6–2.4)
MCH RBC QN AUTO: 27 PG (ref 26.6–33)
MCHC RBC AUTO-ENTMCNC: 33.3 G/DL (ref 31.5–35.7)
MCV RBC AUTO: 81.1 FL (ref 79–97)
METAMYELOCYTES NFR BLD MANUAL: 1 % (ref 0–0)
MONOCYTES # BLD: 0.79 10*3/MM3 (ref 0.1–0.9)
MYELOCYTES NFR BLD MANUAL: 3 % (ref 0–0)
NEUTROPHILS # BLD AUTO: 8.48 10*3/MM3 (ref 1.7–7)
NEUTROPHILS NFR BLD MANUAL: 64 % (ref 42.7–76)
NEUTS BAND NFR BLD MANUAL: 11 % (ref 0–5)
NEUTS VAC BLD QL SMEAR: ABNORMAL
PLATELET # BLD AUTO: 222 10*3/MM3 (ref 140–450)
PMV BLD AUTO: 8.1 FL (ref 6–12)
POTASSIUM SERPL-SCNC: 3.6 MMOL/L (ref 3.5–5.2)
PROT SERPL-MCNC: 6 G/DL (ref 6–8.5)
RBC # BLD AUTO: 4.41 10*6/MM3 (ref 3.77–5.28)
RBC MORPH BLD: NORMAL
SCAN SLIDE: NORMAL
SODIUM SERPL-SCNC: 140 MMOL/L (ref 136–145)
TOXIC GRANULATION: ABNORMAL
VARIANT LYMPHS NFR BLD MANUAL: 14 % (ref 19.6–45.3)
WBC NRBC COR # BLD: 11.3 10*3/MM3 (ref 3.4–10.8)

## 2021-12-31 PROCEDURE — 99232 SBSQ HOSP IP/OBS MODERATE 35: CPT | Performed by: INTERNAL MEDICINE

## 2021-12-31 PROCEDURE — 85025 COMPLETE CBC W/AUTO DIFF WBC: CPT | Performed by: INTERNAL MEDICINE

## 2021-12-31 PROCEDURE — 94799 UNLISTED PULMONARY SVC/PX: CPT

## 2021-12-31 PROCEDURE — 97116 GAIT TRAINING THERAPY: CPT

## 2021-12-31 PROCEDURE — 80053 COMPREHEN METABOLIC PANEL: CPT | Performed by: INTERNAL MEDICINE

## 2021-12-31 PROCEDURE — 25010000002 CEFTRIAXONE PER 250 MG: Performed by: INTERNAL MEDICINE

## 2021-12-31 PROCEDURE — 83735 ASSAY OF MAGNESIUM: CPT | Performed by: NURSE PRACTITIONER

## 2021-12-31 PROCEDURE — 85007 BL SMEAR W/DIFF WBC COUNT: CPT | Performed by: INTERNAL MEDICINE

## 2021-12-31 PROCEDURE — 97530 THERAPEUTIC ACTIVITIES: CPT

## 2021-12-31 PROCEDURE — 94660 CPAP INITIATION&MGMT: CPT

## 2021-12-31 PROCEDURE — 25010000002 ENOXAPARIN PER 10 MG: Performed by: NURSE PRACTITIONER

## 2021-12-31 RX ADMIN — GABAPENTIN 900 MG: 300 CAPSULE ORAL at 21:08

## 2021-12-31 RX ADMIN — IPRATROPIUM BROMIDE AND ALBUTEROL SULFATE 3 ML: 2.5; .5 SOLUTION RESPIRATORY (INHALATION) at 14:30

## 2021-12-31 RX ADMIN — ROSUVASTATIN 10 MG: 10 TABLET, FILM COATED ORAL at 21:07

## 2021-12-31 RX ADMIN — ENOXAPARIN SODIUM 40 MG: 40 INJECTION SUBCUTANEOUS at 16:59

## 2021-12-31 RX ADMIN — IPRATROPIUM BROMIDE AND ALBUTEROL SULFATE 3 ML: 2.5; .5 SOLUTION RESPIRATORY (INHALATION) at 20:53

## 2021-12-31 RX ADMIN — ASPIRIN 81 MG CHEWABLE TABLET 81 MG: 81 TABLET CHEWABLE at 08:05

## 2021-12-31 RX ADMIN — MONTELUKAST 10 MG: 10 TABLET, FILM COATED ORAL at 21:08

## 2021-12-31 RX ADMIN — PANTOPRAZOLE SODIUM 40 MG: 40 TABLET, DELAYED RELEASE ORAL at 06:12

## 2021-12-31 RX ADMIN — METOPROLOL SUCCINATE 50 MG: 50 TABLET, EXTENDED RELEASE ORAL at 08:04

## 2021-12-31 RX ADMIN — METOPROLOL SUCCINATE 50 MG: 50 TABLET, EXTENDED RELEASE ORAL at 21:08

## 2021-12-31 RX ADMIN — FUROSEMIDE 40 MG: 40 TABLET ORAL at 08:05

## 2021-12-31 RX ADMIN — THEOPHYLLINE ANHYDROUS 400 MG: 200 CAPSULE, EXTENDED RELEASE ORAL at 08:04

## 2021-12-31 RX ADMIN — SODIUM CHLORIDE, PRESERVATIVE FREE 10 ML: 5 INJECTION INTRAVENOUS at 21:08

## 2021-12-31 RX ADMIN — ROPINIROLE HYDROCHLORIDE 1 MG: 1 TABLET, FILM COATED ORAL at 21:07

## 2021-12-31 RX ADMIN — DILTIAZEM HYDROCHLORIDE 180 MG: 180 CAPSULE, COATED, EXTENDED RELEASE ORAL at 08:05

## 2021-12-31 RX ADMIN — CEFTRIAXONE SODIUM 1 G: 1 INJECTION, POWDER, FOR SOLUTION INTRAMUSCULAR; INTRAVENOUS at 19:47

## 2021-12-31 RX ADMIN — IPRATROPIUM BROMIDE AND ALBUTEROL SULFATE 3 ML: 2.5; .5 SOLUTION RESPIRATORY (INHALATION) at 10:33

## 2021-12-31 RX ADMIN — SODIUM CHLORIDE, PRESERVATIVE FREE 10 ML: 5 INJECTION INTRAVENOUS at 08:05

## 2021-12-31 RX ADMIN — BUDESONIDE 0.5 MG: 0.5 INHALANT RESPIRATORY (INHALATION) at 20:58

## 2021-12-31 RX ADMIN — IPRATROPIUM BROMIDE AND ALBUTEROL SULFATE 3 ML: 2.5; .5 SOLUTION RESPIRATORY (INHALATION) at 07:03

## 2021-12-31 RX ADMIN — BUDESONIDE 0.5 MG: 0.5 INHALANT RESPIRATORY (INHALATION) at 07:03

## 2022-01-01 LAB
ALBUMIN SERPL-MCNC: 3 G/DL (ref 3.5–5.2)
ALBUMIN/GLOB SERPL: 1.1 G/DL
ALP SERPL-CCNC: 63 U/L (ref 39–117)
ALT SERPL W P-5'-P-CCNC: 11 U/L (ref 1–33)
ANION GAP SERPL CALCULATED.3IONS-SCNC: 11 MMOL/L (ref 5–15)
AST SERPL-CCNC: 13 U/L (ref 1–32)
BILIRUB SERPL-MCNC: 0.2 MG/DL (ref 0–1.2)
BUN SERPL-MCNC: 22 MG/DL (ref 8–23)
BUN/CREAT SERPL: 23.7 (ref 7–25)
CALCIUM SPEC-SCNC: 8.8 MG/DL (ref 8.6–10.5)
CHLORIDE SERPL-SCNC: 99 MMOL/L (ref 98–107)
CO2 SERPL-SCNC: 30 MMOL/L (ref 22–29)
CREAT SERPL-MCNC: 0.93 MG/DL (ref 0.57–1)
DEPRECATED RDW RBC AUTO: 43.8 FL (ref 37–54)
EOSINOPHIL # BLD MANUAL: 0.11 10*3/MM3 (ref 0–0.4)
EOSINOPHIL NFR BLD MANUAL: 1 % (ref 0.3–6.2)
ERYTHROCYTE [DISTWIDTH] IN BLOOD BY AUTOMATED COUNT: 15.1 % (ref 12.3–15.4)
GFR SERPL CREATININE-BSD FRML MDRD: 59 ML/MIN/1.73
GLOBULIN UR ELPH-MCNC: 2.8 GM/DL
GLUCOSE SERPL-MCNC: 89 MG/DL (ref 65–99)
HCT VFR BLD AUTO: 36.2 % (ref 34–46.6)
HGB BLD-MCNC: 11.8 G/DL (ref 12–15.9)
LYMPHOCYTES # BLD MANUAL: 2.57 10*3/MM3 (ref 0.7–3.1)
LYMPHOCYTES NFR BLD MANUAL: 7 % (ref 5–12)
MAGNESIUM SERPL-MCNC: 1.9 MG/DL (ref 1.6–2.4)
MCH RBC QN AUTO: 27 PG (ref 26.6–33)
MCHC RBC AUTO-ENTMCNC: 32.7 G/DL (ref 31.5–35.7)
MCV RBC AUTO: 82.6 FL (ref 79–97)
METAMYELOCYTES NFR BLD MANUAL: 3 % (ref 0–0)
MONOCYTES # BLD: 0.75 10*3/MM3 (ref 0.1–0.9)
NEUTROPHILS # BLD AUTO: 6.96 10*3/MM3 (ref 1.7–7)
NEUTROPHILS NFR BLD MANUAL: 59 % (ref 42.7–76)
NEUTS BAND NFR BLD MANUAL: 6 % (ref 0–5)
NRBC SPEC MANUAL: 1 /100 WBC (ref 0–0.2)
PLAT MORPH BLD: NORMAL
PLATELET # BLD AUTO: 233 10*3/MM3 (ref 140–450)
PMV BLD AUTO: 7.9 FL (ref 6–12)
POTASSIUM SERPL-SCNC: 3.3 MMOL/L (ref 3.5–5.2)
PROT SERPL-MCNC: 5.8 G/DL (ref 6–8.5)
RBC # BLD AUTO: 4.38 10*6/MM3 (ref 3.77–5.28)
RBC MORPH BLD: NORMAL
SCAN SLIDE: NORMAL
SODIUM SERPL-SCNC: 140 MMOL/L (ref 136–145)
TOXIC GRANULATION: ABNORMAL
VARIANT LYMPHS NFR BLD MANUAL: 24 % (ref 19.6–45.3)
WBC NRBC COR # BLD: 10.7 10*3/MM3 (ref 3.4–10.8)

## 2022-01-01 PROCEDURE — 94799 UNLISTED PULMONARY SVC/PX: CPT

## 2022-01-01 PROCEDURE — 80053 COMPREHEN METABOLIC PANEL: CPT | Performed by: INTERNAL MEDICINE

## 2022-01-01 PROCEDURE — 25010000002 ENOXAPARIN PER 10 MG: Performed by: NURSE PRACTITIONER

## 2022-01-01 PROCEDURE — 85025 COMPLETE CBC W/AUTO DIFF WBC: CPT | Performed by: INTERNAL MEDICINE

## 2022-01-01 PROCEDURE — 94660 CPAP INITIATION&MGMT: CPT

## 2022-01-01 PROCEDURE — 83735 ASSAY OF MAGNESIUM: CPT | Performed by: NURSE PRACTITIONER

## 2022-01-01 PROCEDURE — 85007 BL SMEAR W/DIFF WBC COUNT: CPT | Performed by: INTERNAL MEDICINE

## 2022-01-01 PROCEDURE — 25010000002 CEFTRIAXONE PER 250 MG: Performed by: INTERNAL MEDICINE

## 2022-01-01 PROCEDURE — 99232 SBSQ HOSP IP/OBS MODERATE 35: CPT | Performed by: INTERNAL MEDICINE

## 2022-01-01 RX ADMIN — BUDESONIDE 0.5 MG: 0.5 INHALANT RESPIRATORY (INHALATION) at 06:31

## 2022-01-01 RX ADMIN — BUDESONIDE 1 MG: 0.5 INHALANT RESPIRATORY (INHALATION) at 18:38

## 2022-01-01 RX ADMIN — SODIUM CHLORIDE, PRESERVATIVE FREE 10 ML: 5 INJECTION INTRAVENOUS at 08:23

## 2022-01-01 RX ADMIN — IPRATROPIUM BROMIDE AND ALBUTEROL SULFATE 3 ML: 2.5; .5 SOLUTION RESPIRATORY (INHALATION) at 06:31

## 2022-01-01 RX ADMIN — CEFTRIAXONE SODIUM 1 G: 1 INJECTION, POWDER, FOR SOLUTION INTRAMUSCULAR; INTRAVENOUS at 20:51

## 2022-01-01 RX ADMIN — SODIUM CHLORIDE, PRESERVATIVE FREE 10 ML: 5 INJECTION INTRAVENOUS at 20:50

## 2022-01-01 RX ADMIN — IPRATROPIUM BROMIDE AND ALBUTEROL SULFATE 3 ML: 2.5; .5 SOLUTION RESPIRATORY (INHALATION) at 18:38

## 2022-01-01 RX ADMIN — GABAPENTIN 900 MG: 300 CAPSULE ORAL at 20:51

## 2022-01-01 RX ADMIN — IPRATROPIUM BROMIDE AND ALBUTEROL SULFATE 3 ML: 2.5; .5 SOLUTION RESPIRATORY (INHALATION) at 10:34

## 2022-01-01 RX ADMIN — METOPROLOL SUCCINATE 50 MG: 50 TABLET, EXTENDED RELEASE ORAL at 20:51

## 2022-01-01 RX ADMIN — DILTIAZEM HYDROCHLORIDE 180 MG: 180 CAPSULE, COATED, EXTENDED RELEASE ORAL at 08:20

## 2022-01-01 RX ADMIN — POTASSIUM CHLORIDE 40 MEQ: 1500 TABLET, EXTENDED RELEASE ORAL at 03:01

## 2022-01-01 RX ADMIN — ROPINIROLE HYDROCHLORIDE 1 MG: 1 TABLET, FILM COATED ORAL at 20:51

## 2022-01-01 RX ADMIN — PANTOPRAZOLE SODIUM 40 MG: 40 TABLET, DELAYED RELEASE ORAL at 06:16

## 2022-01-01 RX ADMIN — IPRATROPIUM BROMIDE AND ALBUTEROL SULFATE 3 ML: 2.5; .5 SOLUTION RESPIRATORY (INHALATION) at 14:50

## 2022-01-01 RX ADMIN — POTASSIUM CHLORIDE 40 MEQ: 1500 TABLET, EXTENDED RELEASE ORAL at 06:16

## 2022-01-01 RX ADMIN — FUROSEMIDE 40 MG: 40 TABLET ORAL at 08:22

## 2022-01-01 RX ADMIN — THEOPHYLLINE ANHYDROUS 400 MG: 200 CAPSULE, EXTENDED RELEASE ORAL at 08:22

## 2022-01-01 RX ADMIN — ASPIRIN 81 MG CHEWABLE TABLET 81 MG: 81 TABLET CHEWABLE at 08:22

## 2022-01-01 RX ADMIN — MONTELUKAST 10 MG: 10 TABLET, FILM COATED ORAL at 20:51

## 2022-01-01 RX ADMIN — ROSUVASTATIN 10 MG: 10 TABLET, FILM COATED ORAL at 20:52

## 2022-01-01 RX ADMIN — METOPROLOL SUCCINATE 50 MG: 50 TABLET, EXTENDED RELEASE ORAL at 08:22

## 2022-01-01 RX ADMIN — ENOXAPARIN SODIUM 40 MG: 40 INJECTION SUBCUTANEOUS at 17:34

## 2022-01-01 NOTE — PLAN OF CARE
Goal Outcome Evaluation:  Plan of Care Reviewed With: patient            Patient alert and oriented, continues on IV antibiotics, O2@5liters via nc, bipap@HS, patient has rested well this shift.

## 2022-01-01 NOTE — PROGRESS NOTES
[facility] Medicine Services Daily Progress Note    Patient Name: Michelle Francis  : 1950  MRN: [id number]  Primary Care Physician:  Aristeo Mauro MD  Date of admission: 2021      Subjective      Chief Complaint: Shortness of air    2021  Patient Reports she is feeling a little better, able to get around with less shortness of air although she still has a drop in O2 sats with exertion.    2021  Patient reports she continues to feel better.  Her oxygen requirement did come down, however she continues to desat with exertion.    2021  No acute events or new complaints reported.  Currently on 5 L HF.  CODE STATUS clarified.    2021  No acute events or new complaints. Patient remains on 5L HF, which is more than she can go home with.    2022  Patient states no new complaints, no acute events.  She has remained steady on 5 L HF O2.  She continues to desat significantly with even the smallest amount of exertion.  She continues to work with physical therapy though.      Review of Systems   Constitutional: Negative for chills and fever.   Cardiovascular: Positive for dyspnea on exertion. Negative for chest pain.   Respiratory: Negative for cough, shortness of breath, sputum production and wheezing.    All other systems reviewed and are negative.         Objective      Vitals:   Temp:  [97.8 °F (36.6 °C)-98.5 °F (36.9 °C)] 98 °F (36.7 °C)  Heart Rate:  [62-83] 78  Resp:  [18-23] 18  BP: ()/(40-57) 103/48  Flow (L/min):  [4-5] 5    Physical Exam  General: well-developed and well-nourished, NAD  HEENT: NC/AT, EOMI, PERRLA  Heart: RRR. No murmur   Chest: CTAB, diminished air entry, no w/r/r, normal respiratory effort, conversational dyspnea has resolved  Abdominal: Soft. NT/ND. Bowel sounds present  Musculoskeletal: Normal ROM.  No edema. No calf tenderness.  Neurological: AAOx3, no focal deficits  Skin: Skin is warm and dry. No rash  Psychiatric: Normal  mood and affect.     Result Review    Result Review:  I have personally reviewed the results from the time of this admission to 1/1/2022 15:12 EST and agree with these findings:  [x]  Laboratory  [x]  Microbiology  [x]  Radiology  []  EKG/Telemetry   []  Cardiology/Vascular   []  Pathology  []  Old records  []  Other:  Most notable findings include:           Assessment/Plan      Brief Patient Summary:  Michelle Francis is a 71 y.o. female who presents with worsening shortness of breath and O2 sats which were dropping into the mid 80s.  She is normally on 4 L of home O2 but is currently requiring up to 15 L high flow and is still becoming hypoxic with conversation.  Her  states couple of the grandkids were sick recently with some kind of cold virus, and he believes she picked this up from them.      aspirin, 81 mg, Oral, Daily  budesonide, 1 mg, Nebulization, BID  cefTRIAXone, 1 g, Intravenous, Q24H  dilTIAZem CD, 180 mg, Oral, Daily  enoxaparin, 40 mg, Subcutaneous, Q24H  furosemide, 40 mg, Oral, Daily  gabapentin, 900 mg, Oral, Nightly  ipratropium-albuterol, 3 mL, Nebulization, 4x Daily - RT  metoprolol succinate XL, 50 mg, Oral, BID  montelukast, 10 mg, Oral, Nightly  pantoprazole, 40 mg, Oral, QAM  rOPINIRole, 1 mg, Oral, Nightly  rosuvastatin, 10 mg, Oral, Nightly  sodium chloride, 10 mL, Intravenous, Q12H  theophylline, 400 mg, Oral, Q24H             Active Hospital Problems:  Active Hospital Problems    Diagnosis    • **Right lower lobe pneumonia    • Hyponatremia    • Hyperglycemia    • Elevated AST (SGOT)    • Chronic respiratory failure with hypercapnia (HCC)    • CKD (chronic kidney disease) stage 2, GFR 60-89 ml/min      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     • Restless leg syndrome      sees Dr Seipel     • Obesity (BMI 30-39.9)    • Mixed hyperlipidemia    • Essential hypertension    • COPD with acute exacerbation (HCC)    • Tachycardia    • Acute  pulmonary edema (HCC)      Plan:     Right lower lobe pneumonia  COPD with acute exacerbation   -Chest x-ray reviewed  -ABG reviewed  -EKG reviewed  -Negative for COVID-19  -Blood cultures negative  -Patient wears 4 L supplemental oxygen per nasal cannula at home  -Sputum culture normal ava  -Urine for Legionella and strep pneumo negative  -Procalcitonin, CRP elevated  -Respiratory panel negative  -Continue home Pulmicort, theophylline, Singulair  --continue duonebs as needed  --empiric Rocephin  --Pulmonology consulted and following     Mixed hyperlipidemia  Essential hypertension  -/61  -Lipid panel ordered - LDL 56  -Continue home aspirin, Lipitor, metoprolol     CKD stage 2  -Creatinine 0.84, baseline 0.74-1.17  -GFR 67, baseline 46-78     Restless leg syndrome  -Continue home Requip     Obesity   -BMI 35  -Encourage lifestyle modifications    DVT prophylaxis:  Medical DVT prophylaxis orders are present.    CODE STATUS:    Level Of Support Discussed With: Patient  Code Status (Patient has no pulse and is not breathing): CPR (Attempt to Resuscitate)  Medical Interventions (Patient has pulse or is breathing): Full Support      Disposition:  I expect patient to be discharged home with home health services pending clearance by pulmonology.    This patient has been examined wearing appropriate Personal Protective Equipment. 01/01/22      Electronically signed by Kathy Acevedo MD, 01/01/22, 15:12 EST.  [facility] Hospitalist Team

## 2022-01-01 NOTE — PLAN OF CARE
Goal Outcome Evaluation:            Patient stable throughout shift. Spouse at bedside. Continues to desat with exertion.  Eating well. Continuing to monitor.

## 2022-01-01 NOTE — PROGRESS NOTES
"Daily Progress Note        Right lower lobe pneumonia    Mixed hyperlipidemia    Essential hypertension    Restless leg syndrome    COPD with acute exacerbation (HCC)    Acute pulmonary edema (HCC)    Obesity (BMI 30-39.9)    Tachycardia    CKD (chronic kidney disease) stage 2, GFR 60-89 ml/min    Hyponatremia    Hyperglycemia    Elevated AST (SGOT)    Chronic respiratory failure with hypercapnia (HCC)      Assessment  Right lower lobe pneumonia-possible aspiration per CT report  COPD  Chronic kidney disease  Tachycardia  Hypertension  Wears 5-7 l nasal cannula at home     Respiratory culture negative at 2 days   respiratory panel negative     Legionella and strep pneumonia antigens pending    Patient reports an increase in oxygen requirements at home over the last 6 months. Reports she has worn oxygen at home for \"years \".    Patient reports feeling a lot better today and not coughing as much.  Still gets short of breath and oxygen level drops during eating or after physical exertion.    Plan    Continue to titrate oxygen- Currently on  5L HF nasal cannula and/or BiPAP at night and as needed  Rocephin 1 g daily x7 days for pneumonia, started 12/27/2021  Lasix 40 mg daily  Theophylline 400 mg daily  Singulair  inhaled corticosteroids  Bronchodilators  Electrolyte/Glycemic control  DVT/GI Prophylaxis-Lovenox and Protonix  PT/OT  OOB daily and I-S every 4 while awake     CT 12/27/2021         LOS: 6 days     Subjective     Shortness of breath    Objective   No distress on rest  Vital signs for last 24 hours:  Vitals:    01/01/22 1037 01/01/22 1423 01/01/22 1450 01/01/22 1453   BP:  103/48     Pulse: 83 78 76 74   Resp: 18 18 18 18   Temp:  98 °F (36.7 °C)     TempSrc:  Oral     SpO2: 91% 95% 94% 95%   Weight:       Height:           Intake/Output last 3 shifts:  I/O last 3 completed shifts:  In: 1180 [P.O.:1080; IV Piggyback:100]  Out: 2375 [Urine:2375]  Intake/Output this shift:  I/O this shift:  In: 480 " [P.O.:480]  Out: -       Radiology  Imaging Results (Last 24 Hours)     ** No results found for the last 24 hours. **          Labs:  Results from last 7 days   Lab Units 01/01/22  0126   WBC 10*3/mm3 10.70   HEMOGLOBIN g/dL 11.8*   HEMATOCRIT % 36.2   PLATELETS 10*3/mm3 233     Results from last 7 days   Lab Units 01/01/22  0126   SODIUM mmol/L 140   POTASSIUM mmol/L 3.3*   CHLORIDE mmol/L 99   CO2 mmol/L 30.0*   BUN mg/dL 22   CREATININE mg/dL 0.93   CALCIUM mg/dL 8.8   BILIRUBIN mg/dL 0.2   ALK PHOS U/L 63   ALT (SGPT) U/L 11   AST (SGOT) U/L 13   GLUCOSE mg/dL 89     Results from last 7 days   Lab Units 12/27/21  1420   PH, ARTERIAL pH units 7.364   PO2 ART mm Hg 53.4*   PCO2, ARTERIAL mm Hg 46.5   HCO3 ART mmol/L 26.5     Results from last 7 days   Lab Units 01/01/22  0126 12/31/21  0344 12/30/21  0314   ALBUMIN g/dL 3.00* 3.20* 3.10*     Results from last 7 days   Lab Units 12/26/21  2323 12/26/21  1947   TROPONIN T ng/mL <0.010 <0.010         Results from last 7 days   Lab Units 01/01/22  0126   MAGNESIUM mg/dL 1.9                   Meds:   SCHEDULE  aspirin, 81 mg, Oral, Daily  budesonide, 1 mg, Nebulization, BID  cefTRIAXone, 1 g, Intravenous, Q24H  dilTIAZem CD, 180 mg, Oral, Daily  enoxaparin, 40 mg, Subcutaneous, Q24H  furosemide, 40 mg, Oral, Daily  gabapentin, 900 mg, Oral, Nightly  ipratropium-albuterol, 3 mL, Nebulization, 4x Daily - RT  metoprolol succinate XL, 50 mg, Oral, BID  montelukast, 10 mg, Oral, Nightly  pantoprazole, 40 mg, Oral, QAM  rOPINIRole, 1 mg, Oral, Nightly  rosuvastatin, 10 mg, Oral, Nightly  sodium chloride, 10 mL, Intravenous, Q12H  theophylline, 400 mg, Oral, Q24H      Infusions     PRNs  •  acetaminophen **OR** acetaminophen **OR** acetaminophen  •  aluminum-magnesium hydroxide-simethicone  •  benzonatate  •  calcium carbonate  •  guaiFENesin-dextromethorphan  •  ipratropium-albuterol  •  LORazepam  •  magnesium sulfate **OR** magnesium sulfate in D5W 1g/100mL (PREMIX)  •   melatonin  •  nitroglycerin  •  ondansetron **OR** ondansetron  •  potassium chloride  •  potassium chloride  •  sodium chloride    Physical Exam:  Physical Exam  Vitals reviewed.   Constitutional:       Appearance: She is obese.   Pulmonary:      Effort: Pulmonary effort is normal.      Breath sounds: Examination of the right-lower field reveals decreased breath sounds. Examination of the left-lower field reveals decreased breath sounds. Decreased breath sounds and rales present.      Comments: Slight diminished bilateral bases  Skin:     General: Skin is warm and dry.   Neurological:      Mental Status: She is alert and oriented to person, place, and time.       General Appearance:  Alert   HEENT:  Normocephalic, without obvious abnormality, Conjunctiva/corneas clear,.  Normal external ear canals, Nares normal, no drainage     Neck:  Supple, symmetrical, trachea midline. No JVD.  Lungs /Chest wall:   Bilateral basal rhonchi, respirations unlabored symmetrical wall movement.     Heart:  Regular rate and rhythm, systolic murmur PMI left sternal border  Abdomen: Soft, non-tender, no masses, no organomegaly.    Extremities: No edema, no clubbing or cyanosis  ROS  Review of Systems    Constitutional: Negative for chills, fever and malaise/fatigue.   HENT: Negative.    Eyes: Negative.    Cardiovascular: Negative.    Respiratory: Positive for cough and shortness of breath.    Skin: Negative.    Musculoskeletal: Negative.    Gastrointestinal: Negative.    Genitourinary: Negative.    Neurological: Negative.    Psychiatric/Behavioral: Negative.      I have reviewed current clinicals.     Electronically signed by BERKLEY Patton, 01/01/22, 4:33 PM EST.         I have examined the patient and reviewed and verified the above findings and agree with the assessment and plan as documented

## 2022-01-02 LAB
ALBUMIN SERPL-MCNC: 3 G/DL (ref 3.5–5.2)
ALBUMIN/GLOB SERPL: 1.2 G/DL
ALP SERPL-CCNC: 61 U/L (ref 39–117)
ALT SERPL W P-5'-P-CCNC: 11 U/L (ref 1–33)
ANION GAP SERPL CALCULATED.3IONS-SCNC: 7 MMOL/L (ref 5–15)
AST SERPL-CCNC: 10 U/L (ref 1–32)
BILIRUB SERPL-MCNC: 0.2 MG/DL (ref 0–1.2)
BUN SERPL-MCNC: 18 MG/DL (ref 8–23)
BUN/CREAT SERPL: 22 (ref 7–25)
CALCIUM SPEC-SCNC: 9.2 MG/DL (ref 8.6–10.5)
CHLORIDE SERPL-SCNC: 100 MMOL/L (ref 98–107)
CO2 SERPL-SCNC: 32 MMOL/L (ref 22–29)
CREAT SERPL-MCNC: 0.82 MG/DL (ref 0.57–1)
DEPRECATED RDW RBC AUTO: 44.2 FL (ref 37–54)
EOSINOPHIL # BLD MANUAL: 0.1 10*3/MM3 (ref 0–0.4)
EOSINOPHIL NFR BLD MANUAL: 1 % (ref 0.3–6.2)
ERYTHROCYTE [DISTWIDTH] IN BLOOD BY AUTOMATED COUNT: 15.4 % (ref 12.3–15.4)
GFR SERPL CREATININE-BSD FRML MDRD: 69 ML/MIN/1.73
GLOBULIN UR ELPH-MCNC: 2.5 GM/DL
GLUCOSE SERPL-MCNC: 95 MG/DL (ref 65–99)
HCT VFR BLD AUTO: 36.1 % (ref 34–46.6)
HGB BLD-MCNC: 11.9 G/DL (ref 12–15.9)
LARGE PLATELETS: ABNORMAL
LYMPHOCYTES # BLD MANUAL: 3.46 10*3/MM3 (ref 0.7–3.1)
LYMPHOCYTES NFR BLD MANUAL: 6 % (ref 5–12)
MAGNESIUM SERPL-MCNC: 1.8 MG/DL (ref 1.6–2.4)
MCH RBC QN AUTO: 27.1 PG (ref 26.6–33)
MCHC RBC AUTO-ENTMCNC: 32.9 G/DL (ref 31.5–35.7)
MCV RBC AUTO: 82.4 FL (ref 79–97)
MONOCYTES # BLD: 0.58 10*3/MM3 (ref 0.1–0.9)
MYELOCYTES NFR BLD MANUAL: 1 % (ref 0–0)
NEUTROPHILS # BLD AUTO: 5.38 10*3/MM3 (ref 1.7–7)
NEUTROPHILS NFR BLD MANUAL: 55 % (ref 42.7–76)
NEUTS BAND NFR BLD MANUAL: 1 % (ref 0–5)
PLATELET # BLD AUTO: 206 10*3/MM3 (ref 140–450)
PMV BLD AUTO: 7.8 FL (ref 6–12)
POTASSIUM SERPL-SCNC: 4 MMOL/L (ref 3.5–5.2)
PROT SERPL-MCNC: 5.5 G/DL (ref 6–8.5)
RBC # BLD AUTO: 4.38 10*6/MM3 (ref 3.77–5.28)
RBC MORPH BLD: NORMAL
SCAN SLIDE: NORMAL
SODIUM SERPL-SCNC: 139 MMOL/L (ref 136–145)
TOXIC GRANULATION: ABNORMAL
VARIANT LYMPHS NFR BLD MANUAL: 2 % (ref 0–5)
VARIANT LYMPHS NFR BLD MANUAL: 34 % (ref 19.6–45.3)
WBC NRBC COR # BLD: 9.6 10*3/MM3 (ref 3.4–10.8)

## 2022-01-02 PROCEDURE — 85007 BL SMEAR W/DIFF WBC COUNT: CPT | Performed by: INTERNAL MEDICINE

## 2022-01-02 PROCEDURE — 80053 COMPREHEN METABOLIC PANEL: CPT | Performed by: INTERNAL MEDICINE

## 2022-01-02 PROCEDURE — 94761 N-INVAS EAR/PLS OXIMETRY MLT: CPT

## 2022-01-02 PROCEDURE — 94799 UNLISTED PULMONARY SVC/PX: CPT

## 2022-01-02 PROCEDURE — 85025 COMPLETE CBC W/AUTO DIFF WBC: CPT | Performed by: INTERNAL MEDICINE

## 2022-01-02 PROCEDURE — 99232 SBSQ HOSP IP/OBS MODERATE 35: CPT | Performed by: INTERNAL MEDICINE

## 2022-01-02 PROCEDURE — 25010000002 MAGNESIUM SULFATE IN D5W 1G/100ML (PREMIX) 1-5 GM/100ML-% SOLUTION: Performed by: NURSE PRACTITIONER

## 2022-01-02 PROCEDURE — 83735 ASSAY OF MAGNESIUM: CPT | Performed by: NURSE PRACTITIONER

## 2022-01-02 PROCEDURE — 63710000001 PREDNISONE PER 1 MG: Performed by: INTERNAL MEDICINE

## 2022-01-02 PROCEDURE — 25010000002 ENOXAPARIN PER 10 MG: Performed by: NURSE PRACTITIONER

## 2022-01-02 RX ORDER — PREDNISONE 20 MG/1
20 TABLET ORAL
Status: DISCONTINUED | OUTPATIENT
Start: 2022-01-02 | End: 2022-01-04 | Stop reason: HOSPADM

## 2022-01-02 RX ADMIN — FUROSEMIDE 40 MG: 40 TABLET ORAL at 08:52

## 2022-01-02 RX ADMIN — IPRATROPIUM BROMIDE AND ALBUTEROL SULFATE 3 ML: 2.5; .5 SOLUTION RESPIRATORY (INHALATION) at 10:37

## 2022-01-02 RX ADMIN — BUDESONIDE 1 MG: 0.5 INHALANT RESPIRATORY (INHALATION) at 06:58

## 2022-01-02 RX ADMIN — IPRATROPIUM BROMIDE AND ALBUTEROL SULFATE 3 ML: 2.5; .5 SOLUTION RESPIRATORY (INHALATION) at 21:30

## 2022-01-02 RX ADMIN — METOPROLOL SUCCINATE 50 MG: 50 TABLET, EXTENDED RELEASE ORAL at 20:25

## 2022-01-02 RX ADMIN — SODIUM CHLORIDE, PRESERVATIVE FREE 10 ML: 5 INJECTION INTRAVENOUS at 08:52

## 2022-01-02 RX ADMIN — ENOXAPARIN SODIUM 40 MG: 40 INJECTION SUBCUTANEOUS at 15:40

## 2022-01-02 RX ADMIN — IPRATROPIUM BROMIDE AND ALBUTEROL SULFATE 3 ML: 2.5; .5 SOLUTION RESPIRATORY (INHALATION) at 06:54

## 2022-01-02 RX ADMIN — DILTIAZEM HYDROCHLORIDE 180 MG: 180 CAPSULE, COATED, EXTENDED RELEASE ORAL at 08:52

## 2022-01-02 RX ADMIN — PANTOPRAZOLE SODIUM 40 MG: 40 TABLET, DELAYED RELEASE ORAL at 06:24

## 2022-01-02 RX ADMIN — SODIUM CHLORIDE, PRESERVATIVE FREE 10 ML: 5 INJECTION INTRAVENOUS at 06:24

## 2022-01-02 RX ADMIN — IPRATROPIUM BROMIDE AND ALBUTEROL SULFATE 3 ML: 2.5; .5 SOLUTION RESPIRATORY (INHALATION) at 15:02

## 2022-01-02 RX ADMIN — ROSUVASTATIN 10 MG: 10 TABLET, FILM COATED ORAL at 20:24

## 2022-01-02 RX ADMIN — PREDNISONE 20 MG: 20 TABLET ORAL at 11:16

## 2022-01-02 RX ADMIN — MONTELUKAST 10 MG: 10 TABLET, FILM COATED ORAL at 20:24

## 2022-01-02 RX ADMIN — SODIUM CHLORIDE, PRESERVATIVE FREE 10 ML: 5 INJECTION INTRAVENOUS at 20:25

## 2022-01-02 RX ADMIN — MAGNESIUM SULFATE HEPTAHYDRATE 1 G: 1 INJECTION, SOLUTION INTRAVENOUS at 06:24

## 2022-01-02 RX ADMIN — METOPROLOL SUCCINATE 50 MG: 50 TABLET, EXTENDED RELEASE ORAL at 08:52

## 2022-01-02 RX ADMIN — THEOPHYLLINE ANHYDROUS 400 MG: 200 CAPSULE, EXTENDED RELEASE ORAL at 08:52

## 2022-01-02 RX ADMIN — GABAPENTIN 900 MG: 300 CAPSULE ORAL at 20:25

## 2022-01-02 RX ADMIN — ROPINIROLE HYDROCHLORIDE 1 MG: 1 TABLET, FILM COATED ORAL at 20:24

## 2022-01-02 RX ADMIN — ASPIRIN 81 MG CHEWABLE TABLET 81 MG: 81 TABLET CHEWABLE at 08:52

## 2022-01-02 RX ADMIN — BUDESONIDE 0.5 MG: 0.5 INHALANT RESPIRATORY (INHALATION) at 21:36

## 2022-01-02 NOTE — PLAN OF CARE
VSS throughout shift. O2 sats drop on exertion. Pt started on prednisone. Will cont to monitor.   Problem: Adult Inpatient Plan of Care  Goal: Plan of Care Review  Outcome: Ongoing, Progressing  Goal: Patient-Specific Goal (Individualized)  Outcome: Ongoing, Progressing  Goal: Absence of Hospital-Acquired Illness or Injury  Outcome: Ongoing, Progressing  Intervention: Identify and Manage Fall Risk  Recent Flowsheet Documentation  Taken 1/2/2022 1200 by Alissa Zhou RN  Safety Promotion/Fall Prevention: safety round/check completed  Taken 1/2/2022 1000 by Alissa Zhou RN  Safety Promotion/Fall Prevention: safety round/check completed  Taken 1/2/2022 0800 by Alissa Zhou RN  Safety Promotion/Fall Prevention: safety round/check completed  Intervention: Prevent Skin Injury  Recent Flowsheet Documentation  Taken 1/2/2022 1200 by Alissa Zhou RN  Body Position: position changed independently  Taken 1/2/2022 0800 by Alissa Zhou RN  Body Position: position changed independently  Goal: Optimal Comfort and Wellbeing  Outcome: Ongoing, Progressing  Intervention: Provide Person-Centered Care  Recent Flowsheet Documentation  Taken 1/2/2022 0800 by Alissa Zhou RN  Trust Relationship/Rapport:   care explained   choices provided  Goal: Readiness for Transition of Care  Outcome: Ongoing, Progressing   Goal Outcome Evaluation:

## 2022-01-02 NOTE — PLAN OF CARE
Goal Outcome Evaluation:  Plan of Care Reviewed With: patient     Progress: improving    Patient still desat when she moves around. No voiced complaint. Will continue to monitor. Call light in reach.

## 2022-01-03 LAB
ARTERIAL PATENCY WRIST A: POSITIVE
ATMOSPHERIC PRESS: ABNORMAL MM[HG]
BASE EXCESS BLDA CALC-SCNC: 6.8 MMOL/L (ref 0–3)
BDY SITE: ABNORMAL
CO2 BLDA-SCNC: 33.2 MMOL/L (ref 22–29)
HCO3 BLDA-SCNC: 31.8 MMOL/L (ref 21–28)
HEMODILUTION: NO
INHALED O2 CONCENTRATION: 21 %
MAGNESIUM SERPL-MCNC: 2.1 MG/DL (ref 1.6–2.4)
MODALITY: ABNORMAL
PCO2 BLDA: 45.4 MM HG (ref 35–48)
PH BLDA: 7.45 PH UNITS (ref 7.35–7.45)
PO2 BLDA: 40.7 MM HG (ref 83–108)
POTASSIUM SERPL-SCNC: 4.1 MMOL/L (ref 3.5–5.2)
SAO2 % BLDCOA: 77.8 % (ref 94–98)

## 2022-01-03 PROCEDURE — 97110 THERAPEUTIC EXERCISES: CPT

## 2022-01-03 PROCEDURE — 82803 BLOOD GASES ANY COMBINATION: CPT

## 2022-01-03 PROCEDURE — 83735 ASSAY OF MAGNESIUM: CPT | Performed by: NURSE PRACTITIONER

## 2022-01-03 PROCEDURE — 99232 SBSQ HOSP IP/OBS MODERATE 35: CPT | Performed by: HOSPITALIST

## 2022-01-03 PROCEDURE — 25010000002 ENOXAPARIN PER 10 MG: Performed by: NURSE PRACTITIONER

## 2022-01-03 PROCEDURE — 84132 ASSAY OF SERUM POTASSIUM: CPT | Performed by: NURSE PRACTITIONER

## 2022-01-03 PROCEDURE — 97116 GAIT TRAINING THERAPY: CPT

## 2022-01-03 PROCEDURE — 94761 N-INVAS EAR/PLS OXIMETRY MLT: CPT

## 2022-01-03 PROCEDURE — 63710000001 PREDNISONE PER 1 MG: Performed by: INTERNAL MEDICINE

## 2022-01-03 PROCEDURE — 94799 UNLISTED PULMONARY SVC/PX: CPT

## 2022-01-03 PROCEDURE — 36600 WITHDRAWAL OF ARTERIAL BLOOD: CPT

## 2022-01-03 PROCEDURE — 94660 CPAP INITIATION&MGMT: CPT

## 2022-01-03 RX ADMIN — IPRATROPIUM BROMIDE AND ALBUTEROL SULFATE 3 ML: 2.5; .5 SOLUTION RESPIRATORY (INHALATION) at 14:30

## 2022-01-03 RX ADMIN — PREDNISONE 20 MG: 20 TABLET ORAL at 08:50

## 2022-01-03 RX ADMIN — BUDESONIDE 1 MG: 0.5 INHALANT RESPIRATORY (INHALATION) at 06:52

## 2022-01-03 RX ADMIN — SODIUM CHLORIDE, PRESERVATIVE FREE 10 ML: 5 INJECTION INTRAVENOUS at 09:07

## 2022-01-03 RX ADMIN — SODIUM CHLORIDE, PRESERVATIVE FREE 10 ML: 5 INJECTION INTRAVENOUS at 20:07

## 2022-01-03 RX ADMIN — ROSUVASTATIN 10 MG: 10 TABLET, FILM COATED ORAL at 20:07

## 2022-01-03 RX ADMIN — ENOXAPARIN SODIUM 40 MG: 40 INJECTION SUBCUTANEOUS at 17:20

## 2022-01-03 RX ADMIN — GABAPENTIN 900 MG: 300 CAPSULE ORAL at 20:08

## 2022-01-03 RX ADMIN — DILTIAZEM HYDROCHLORIDE 180 MG: 180 CAPSULE, COATED, EXTENDED RELEASE ORAL at 08:49

## 2022-01-03 RX ADMIN — IPRATROPIUM BROMIDE AND ALBUTEROL SULFATE 3 ML: 2.5; .5 SOLUTION RESPIRATORY (INHALATION) at 19:13

## 2022-01-03 RX ADMIN — ASPIRIN 81 MG CHEWABLE TABLET 81 MG: 81 TABLET CHEWABLE at 08:50

## 2022-01-03 RX ADMIN — METOPROLOL SUCCINATE 50 MG: 50 TABLET, EXTENDED RELEASE ORAL at 08:50

## 2022-01-03 RX ADMIN — MONTELUKAST 10 MG: 10 TABLET, FILM COATED ORAL at 20:07

## 2022-01-03 RX ADMIN — THEOPHYLLINE ANHYDROUS 400 MG: 200 CAPSULE, EXTENDED RELEASE ORAL at 08:57

## 2022-01-03 RX ADMIN — BUDESONIDE 0.5 MG: 0.5 INHALANT RESPIRATORY (INHALATION) at 19:12

## 2022-01-03 RX ADMIN — ROPINIROLE HYDROCHLORIDE 1 MG: 1 TABLET, FILM COATED ORAL at 20:08

## 2022-01-03 RX ADMIN — PANTOPRAZOLE SODIUM 40 MG: 40 TABLET, DELAYED RELEASE ORAL at 08:50

## 2022-01-03 RX ADMIN — METOPROLOL SUCCINATE 50 MG: 50 TABLET, EXTENDED RELEASE ORAL at 20:08

## 2022-01-03 RX ADMIN — IPRATROPIUM BROMIDE AND ALBUTEROL SULFATE 3 ML: 2.5; .5 SOLUTION RESPIRATORY (INHALATION) at 06:48

## 2022-01-03 RX ADMIN — IPRATROPIUM BROMIDE AND ALBUTEROL SULFATE 3 ML: 2.5; .5 SOLUTION RESPIRATORY (INHALATION) at 11:44

## 2022-01-03 RX ADMIN — FUROSEMIDE 40 MG: 40 TABLET ORAL at 08:49

## 2022-01-03 NOTE — PROGRESS NOTES
[facility] Medicine Services Daily Progress Note    Patient Name: Michelle Francis  : 1950  MRN: [id number]  Primary Care Physician:  Aristeo Mauro MD  Date of admission: 2021      Subjective      Chief Complaint: Shortness of air    2021  Patient Reports she is feeling a little better, able to get around with less shortness of air although she still has a drop in O2 sats with exertion.    2021  Patient reports she continues to feel better.  Her oxygen requirement did come down, however she continues to desat with exertion.    2021  No acute events or new complaints reported.  Currently on 5 L HF.  CODE STATUS clarified.    2021  No acute events or new complaints. Patient remains on 5L HF, which is more than she can go home with.    2022  Patient states no new complaints, no acute events.  She has remained steady on 5 L HF O2.  She continues to desat significantly with even the smallest amount of exertion.  She continues to work with physical therapy though.    2022  Patient states no new complaints. She continues to desaturate into the 80's with any activity. Currently 4L HF during the day and BiPAP at night with FiO2 50%      Review of Systems   Constitutional: Negative for chills and fever.   Cardiovascular: Positive for dyspnea on exertion. Negative for chest pain.   Respiratory: Negative for cough, shortness of breath, sputum production and wheezing.    All other systems reviewed and are negative.         Objective      Vitals:   Temp:  [97.1 °F (36.2 °C)-98.1 °F (36.7 °C)] 98.1 °F (36.7 °C)  Heart Rate:  [65-97] 79  Resp:  [20-30] 28  BP: ()/(44-96) 118/53  Flow (L/min):  [4-50] 50    Physical Exam  General: well-developed and well-nourished, NAD  HEENT: NC/AT, EOMI, PERRLA  Heart: RRR. No murmur   Chest: CTAB, diminished air entry, no w/r/r, normal respiratory effort, conversational dyspnea has resolved  Abdominal: Soft. NT/ND. Bowel  sounds present  Musculoskeletal: Normal ROM.  No edema. No calf tenderness.  Neurological: AAOx3, no focal deficits  Skin: Skin is warm and dry. No rash  Psychiatric: Normal mood and affect.     Result Review    Result Review:  I have personally reviewed the results from the time of this admission to 1/2/2022 21:36 EST and agree with these findings:  [x]  Laboratory  [x]  Microbiology  [x]  Radiology  []  EKG/Telemetry   []  Cardiology/Vascular   []  Pathology  []  Old records  []  Other:  Most notable findings include:           Assessment/Plan      Brief Patient Summary:  Michelle Francis is a 71 y.o. female who presents with worsening shortness of breath and O2 sats which were dropping into the mid 80s.  She is normally on 4 L of home O2 but is currently requiring up to 15 L high flow and is still becoming hypoxic with conversation.  Her  states couple of the grandkids were sick recently with some kind of cold virus, and he believes she picked this up from them.      aspirin, 81 mg, Oral, Daily  budesonide, 1 mg, Nebulization, BID  dilTIAZem CD, 180 mg, Oral, Daily  enoxaparin, 40 mg, Subcutaneous, Q24H  furosemide, 40 mg, Oral, Daily  gabapentin, 900 mg, Oral, Nightly  ipratropium-albuterol, 3 mL, Nebulization, 4x Daily - RT  metoprolol succinate XL, 50 mg, Oral, BID  montelukast, 10 mg, Oral, Nightly  pantoprazole, 40 mg, Oral, QAM  predniSONE, 20 mg, Oral, Daily With Breakfast  rOPINIRole, 1 mg, Oral, Nightly  rosuvastatin, 10 mg, Oral, Nightly  sodium chloride, 10 mL, Intravenous, Q12H  theophylline, 400 mg, Oral, Q24H             Active Hospital Problems:  Active Hospital Problems    Diagnosis    • **Right lower lobe pneumonia    • Hyponatremia    • Hyperglycemia    • Elevated AST (SGOT)    • Chronic respiratory failure with hypercapnia (HCC)    • CKD (chronic kidney disease) stage 2, GFR 60-89 ml/min      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     •  Restless leg syndrome      sees Dr Seipel     • Obesity (BMI 30-39.9)    • Mixed hyperlipidemia    • Essential hypertension    • COPD with acute exacerbation (HCC)    • Tachycardia    • Acute pulmonary edema (HCC)      Plan:     Right lower lobe pneumonia  COPD with acute exacerbation   -Chest x-ray reviewed  -ABG reviewed  -EKG reviewed  -Negative for COVID-19  -Blood cultures negative  -Patient wears 4 L supplemental oxygen per nasal cannula at home  -Sputum culture normal ava  -Urine for Legionella and strep pneumo negative  -Procalcitonin, CRP elevated  -Respiratory panel negative  -Continue home Pulmicort, theophylline, Singulair  --continue duonebs as needed  --s/p full course of empiric Rocephin  --Pulmonology consulted and following     Mixed hyperlipidemia  Essential hypertension  -/61  -Lipid panel ordered - LDL 56  -Continue home aspirin, Lipitor, metoprolol     CKD stage 2  -Creatinine 0.84, baseline 0.74-1.17  -GFR 67, baseline 46-78     Restless leg syndrome  -Continue home Requip     Obesity   -BMI 35  -Encourage lifestyle modifications    DVT prophylaxis:  Medical DVT prophylaxis orders are present.    CODE STATUS:    Level Of Support Discussed With: Patient  Code Status (Patient has no pulse and is not breathing): CPR (Attempt to Resuscitate)  Medical Interventions (Patient has pulse or is breathing): Full Support      Disposition:  I expect patient to be discharged home with home health services pending clearance by pulmonology.    This patient has been examined wearing appropriate Personal Protective Equipment. 01/02/22      Electronically signed by Kathy Acevedo MD, 01/02/22, 21:36 EST.  [facility] Hospitalist Team

## 2022-01-03 NOTE — PROGRESS NOTES
[facility] Medicine Services Daily Progress Note    Patient Name: Michelle Francis  : 1950  MRN: [id number]  Primary Care Physician:  Aristeo Mauro MD  Date of admission: 2021      Subjective      Chief Complaint: Shortness of air      Patient Reports     1/3/21: Patient desaturates with slight movements. On 4L. Ordered ABG. Has not used CPAP since August due to recall. Consulted speech for swallow eval    Review of Systems   All other systems reviewed and are negative.         Objective      Vitals:   Temp:  [97.7 °F (36.5 °C)-98.3 °F (36.8 °C)] 98 °F (36.7 °C)  Heart Rate:  [69-97] 77  Resp:  [20-28] 20  BP: (112-138)/(39-77) 113/77  Flow (L/min):  [4] 4    Physical Exam  HENT:      Head: Normocephalic.      Nose: Nose normal.   Eyes:      Pupils: Pupils are equal, round, and reactive to light.   Cardiovascular:      Rate and Rhythm: Normal rate and regular rhythm.   Pulmonary:      Effort: Pulmonary effort is normal.   Abdominal:      General: Bowel sounds are normal.   Musculoskeletal:         General: Normal range of motion.      Cervical back: Normal range of motion.   Skin:     General: Skin is warm.   Neurological:      Mental Status: She is alert and oriented to person, place, and time. Mental status is at baseline.   Psychiatric:         Mood and Affect: Mood normal.             Result Review    Result Review:  I have personally reviewed the results from the time of this admission to 1/3/2022 16:16 EST and agree with these findings:  [x]  Laboratory  []  Microbiology  [x]  Radiology  []  EKG/Telemetry   []  Cardiology/Vascular   []  Pathology  [x]  Old records  []  Other:            Assessment/Plan      Brief Patient Summary:  71-year-old female with history of IRIS and chronic hypoxemic respiratory failure on 4L. Admission for pneumonia and COPD exacerbation      aspirin, 81 mg, Oral, Daily  budesonide, 1 mg, Nebulization, BID  dilTIAZem CD, 180 mg, Oral,  Daily  enoxaparin, 40 mg, Subcutaneous, Q24H  furosemide, 40 mg, Oral, Daily  gabapentin, 900 mg, Oral, Nightly  ipratropium-albuterol, 3 mL, Nebulization, 4x Daily - RT  metoprolol succinate XL, 50 mg, Oral, BID  montelukast, 10 mg, Oral, Nightly  pantoprazole, 40 mg, Oral, QAM  predniSONE, 20 mg, Oral, Daily With Breakfast  rOPINIRole, 1 mg, Oral, Nightly  rosuvastatin, 10 mg, Oral, Nightly  sodium chloride, 10 mL, Intravenous, Q12H  theophylline, 400 mg, Oral, Q24H             Active Hospital Problems:  Active Hospital Problems    Diagnosis    • **Right lower lobe pneumonia    • Hyponatremia    • Hyperglycemia    • Elevated AST (SGOT)    • Chronic respiratory failure with hypercapnia (HCC)    • CKD (chronic kidney disease) stage 2, GFR 60-89 ml/min      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     • Restless leg syndrome      sees Dr Seipel     • Obesity (BMI 30-39.9)    • Mixed hyperlipidemia    • Essential hypertension    • COPD with acute exacerbation (HCC)    • Tachycardia    • Acute pulmonary edema (HCC)        Right lower lobe aspiration pneumonia and COPD exacerbation:  -s/p CT chest w/o 12/27/2021--> severe emphysema. Left lower lobe pneumonia.  -COVID-19 ruled out  -Blood cultures without growth  -Check MRSA screen  -Pulmonology consulted. Follows Dr. Sifuentes    IRIS:  -CPAP recalled August 2021    Hyperlipidemia:  -Continue statin      Hypertension:  -Continue metoprolol    Restless leg syndrome:  -Requip      DVT prophylaxis:  Medical DVT prophylaxis orders are present.    CODE STATUS:    Level Of Support Discussed With: Patient  Code Status (Patient has no pulse and is not breathing): CPR (Attempt to Resuscitate)  Medical Interventions (Patient has pulse or is breathing): Full Support      Disposition:  I expect patient to be discharged defer.    This patient has been examined wearing appropriate Personal Protective Equipment and discussed with nursing.  01/03/22      Electronically signed by Murphy Hamilton DO, 01/03/22, 16:16 EST.  [facility]ist Team

## 2022-01-03 NOTE — CASE MANAGEMENT/SOCIAL WORK
Continued Stay Note   [facility]     Patient Name: Michelle Francis  MRN: [id number]  Today's Date: 1/3/2022    Admit Date: 12/26/2021     Discharge Plan     Row Name 01/03/22 1034       Plan    Plan D/C Plan Home with [facility] , they have accpeted and orders are placed , pt is on 4l HF of o2 and wears 4l at home , pt refuses rehab               Discharge Codes    No documentation.                     Yessi Dawson RN

## 2022-01-03 NOTE — PLAN OF CARE
Goal Outcome Evaluation:  Plan of Care Reviewed With: patient, spouse       Progress: no change  Outcome Summary: Patient is on 4L high flow, with VSS. No acute events this shift but still drops oxygen levels with excertion.

## 2022-01-03 NOTE — PROGRESS NOTES
"PULMONARY CRITICAL CARE Progress  NOTE      PATIENT IDENTIFICATION:  Name: Michelle Francis  MRN: [id number]  :  1950     Age: 71 y.o.  Sex: female    DATE OF Note:  1/3/2022   Referring Physician: Murphy Hamilton,                   Subjective:   Feeling better, Still on O2, no chest or abd pain, no nausea or vomiting, no change in bowel habit, no dysuria,  no new  skin rash or itching.      Objective:  tMax 24 hrs: Temp (24hrs), Av °F (36.7 °C), Min:97.7 °F (36.5 °C), Max:98.3 °F (36.8 °C)      Vitals Ranges:   Temp:  [97.7 °F (36.5 °C)-98.3 °F (36.8 °C)] 98 °F (36.7 °C)  Heart Rate:  [69-97] 77  Resp:  [20-28] 20  BP: (112-138)/(39-77) 113/77    Intake and Output Last 3 Shifts:   I/O last 3 completed shifts:  In:  [P.O.:1920]  Out: 4000 [Urine:4000]    Exam:  /77   Pulse 77   Temp 98 °F (36.7 °C)   Resp 20   Ht 165.1 cm (65\")   Wt 98.4 kg (216 lb 14.9 oz) Comment: bed zeroed  SpO2 92%   BMI 36.10 kg/m²     General Appearance:     HEENT:  Normocephalic, without obvious abnormality, Conjunctiva/corneas clear,.  Normal external ear canals, Nares normal, no drainage     Neck:  Supple, symmetrical, trachea midline. No JVD.  Lungs /Chest wall:   Bilateral basal rhonchi, respirations unlabored symmetrical wall movement.     Heart:  Regular rate and rhythm, systolic murmur PMI left sternal border  Abdomen: Soft, non-tender, no masses, no organomegaly.    Extremities: Trace edema no clubbing or Cyanosis        Medications:    Current Facility-Administered Medications:   •  acetaminophen (TYLENOL) tablet 650 mg, 650 mg, Oral, Q4H PRN **OR** acetaminophen (TYLENOL) 160 MG/5ML solution 650 mg, 650 mg, Oral, Q4H PRN **OR** acetaminophen (TYLENOL) suppository 650 mg, 650 mg, Rectal, Q4H PRN, Alsop, Xiao L, APRN  •  aluminum-magnesium hydroxide-simethicone (MAALOX MAX) 400-400-40 MG/5ML suspension 15 mL, 15 mL, Oral, Q6H PRN, Alsop, Xiao DAMON, APRN  •  aspirin chewable tablet 81 mg, 81 mg, " Oral, Daily, Alsop, Xiao L, APRN, 81 mg at 01/03/22 0850  •  benzonatate (TESSALON) capsule 100 mg, 100 mg, Oral, TID PRN, Alsop, Xiao L, APRN  •  budesonide (PULMICORT) nebulizer solution 1 mg, 1 mg, Nebulization, BID, Alsop, Xiao L, APRN, 1 mg at 01/03/22 0652  •  calcium carbonate (TUMS) chewable tablet 500 mg (200 mg elemental), 2 tablet, Oral, BID PRN, Alsop, Xiao L, APRN  •  dilTIAZem CD (CARDIZEM CD) 24 hr capsule 180 mg, 180 mg, Oral, Daily, Alsop, Xiao L, APRN, 180 mg at 01/03/22 0849  •  enoxaparin (LOVENOX) syringe 40 mg, 40 mg, Subcutaneous, Q24H, Alsop, Xiao L, APRN, 40 mg at 01/03/22 1720  •  furosemide (LASIX) tablet 40 mg, 40 mg, Oral, Daily, Alsop, Xiao L, APRN, 40 mg at 01/03/22 0849  •  gabapentin (NEURONTIN) capsule 900 mg, 900 mg, Oral, Nightly, Alsop, Xiao L, APRN, 900 mg at 01/02/22 2025  •  guaiFENesin-dextromethorphan (ROBITUSSIN DM) 100-10 MG/5ML syrup 5 mL, 5 mL, Oral, Q4H PRN, Alsop, Xiao L, APRN  •  ipratropium-albuterol (DUO-NEB) nebulizer solution 3 mL, 3 mL, Nebulization, Q4H PRN, Alsop, Xiao L, APRN  •  ipratropium-albuterol (DUO-NEB) nebulizer solution 3 mL, 3 mL, Nebulization, 4x Daily - RT, Alsop, Xiao L, APRN, 3 mL at 01/03/22 1430  •  LORazepam (ATIVAN) injection 0.5 mg, 0.5 mg, Intravenous, Once PRN, Alsop, Xiao L, APRN  •  Magnesium Sulfate 2 gram infusion - Mg less than or equal to 1.5 mg/dL, 2 g, Intravenous, PRN **OR** Magnesium Sulfate 1 gram infusion - Mg 1.6-1.9 mg/dL, 1 g, Intravenous, PRN, Alsop, Xiao L, APRN, Last Rate: 100 mL/hr at 01/02/22 0624, 1 g at 01/02/22 0624  •  melatonin tablet 5 mg, 5 mg, Oral, Nightly PRN, Alsop, Xiao L, APRN  •  metoprolol succinate XL (TOPROL-XL) 24 hr tablet 50 mg, 50 mg, Oral, BID, Alsop, Xiao L, APRN, 50 mg at 01/03/22 0850  •  montelukast (SINGULAIR) tablet 10 mg, 10 mg, Oral, Nightly, Alsop, Xiao L, APRN, 10 mg at 01/02/22 2024  •  nitroglycerin (NITROSTAT) SL tablet 0.4 mg, 0.4 mg, Sublingual, Q5 Min PRN, Alsop,  Xiao L, APRN  •  ondansetron (ZOFRAN) tablet 4 mg, 4 mg, Oral, Q6H PRN **OR** ondansetron (ZOFRAN) injection 4 mg, 4 mg, Intravenous, Q6H PRN, Alsop, Xiao L, APRN  •  pantoprazole (PROTONIX) EC tablet 40 mg, 40 mg, Oral, QAM, Alsop, Xiao L, APRN, 40 mg at 01/03/22 0850  •  potassium chloride (K-DUR,KLOR-CON) CR tablet 40 mEq, 40 mEq, Oral, PRN, Alsop, Xiao L, APRN, 40 mEq at 01/01/22 0616  •  potassium chloride (KLOR-CON) packet 40 mEq, 40 mEq, Oral, PRN, Alsop, Xiao L, APRN  •  predniSONE (DELTASONE) tablet 20 mg, 20 mg, Oral, Daily With Breakfast, Edin Young MD, 20 mg at 01/03/22 0850  •  rOPINIRole (REQUIP) tablet 1 mg, 1 mg, Oral, Nightly, Alsop, Xiao L, APRN, 1 mg at 01/02/22 2024  •  rosuvastatin (CRESTOR) tablet 10 mg, 10 mg, Oral, Nightly, Alsop, Xiao L, APRN, 10 mg at 01/02/22 2024  •  sodium chloride 0.9 % flush 10 mL, 10 mL, Intravenous, Q12H, Alsop, Xiao L, APRN, 10 mL at 01/03/22 0907  •  sodium chloride 0.9 % flush 10 mL, 10 mL, Intravenous, PRN, Alsop, Xiao L, APRN, 10 mL at 01/02/22 0624  •  theophylline (FIDEL-24) 24 hr capsule 400 mg, 400 mg, Oral, Q24H, Kathy Acevedo MD, 400 mg at 01/03/22 0857    Data Review:  All labs (24hrs):   Recent Results (from the past 24 hour(s))   Potassium    Collection Time: 01/03/22  2:40 AM    Specimen: Blood   Result Value Ref Range    Potassium 4.1 3.5 - 5.2 mmol/L   Magnesium    Collection Time: 01/03/22  2:40 AM    Specimen: Blood   Result Value Ref Range    Magnesium 2.1 1.6 - 2.4 mg/dL   Blood Gas, Arterial -    Collection Time: 01/03/22  2:32 PM    Specimen: Arterial Blood   Result Value Ref Range    Site Right Radial     Emile's Test Positive     pH, Arterial 7.454 (H) 7.350 - 7.450 pH units    pCO2, Arterial 45.4 35.0 - 48.0 mm Hg    pO2, Arterial 40.7 (L) 83.0 - 108.0 mm Hg    HCO3, Arterial 31.8 (H) 21.0 - 28.0 mmol/L    Base Excess, Arterial 6.8 (H) 0.0 - 3.0 mmol/L    O2 Saturation, Arterial 77.8 (L) 94.0 - 98.0 %    CO2 Content 33.2  (H) 22 - 29 mmol/L    Barometric Pressure for Blood Gas      Modality Room Air     FIO2 21 %    Hemodilution No         Imaging:  XR Chest 1 View  Narrative: DATE OF EXAM:  12/29/2021 12:48 PM     PROCEDURE:  XR CHEST 1 VW-     INDICATIONS:  Pneumonia; J44.1-Chronic obstructive pulmonary disease with (acute)  exacerbation; R06.03-Acute respiratory distress; J44.1-Chronic  obstructive pulmonary disease with (acute) exacerbation; R50.9-Fever,  unspecified; J18.9-Pneumonia, unspecified organism; Z20.822-Contact with  and (suspected) exposure to covid-19       COMPARISON:  AP portable chest 12/26/2021.     TECHNIQUE:   Single radiographic view of the chest was obtained.     FINDINGS:  Stable mild cardiac enlargement. Previously seen dense left lower lobe  consolidation appears improved; however, faint reticular interstitial  and alveolar infiltrates are thought to remain within the lower lobes.  No new airspace disease is identified. No pleural effusion or  pneumothorax is seen. There are no acute osseous abnormalities.     Impression: The left lower lobe consolidation/pneumonia is thought to be improved  although some residual airspace disease is thought to be present in the  medial left base. Faint reticular interstitial infiltrates remain in the  bilateral lower lobes suggesting residual ammonia. No new airspace  disease is identified.     Electronically Signed By-Racheal Cam MD On:12/29/2021 12:59 PM  This report was finalized on [id number] by  Racheal Cam MD.       ASSESSMENT:    Right lower lobe pneumonia with aspiration     Mixed hyperlipidemia    Essential hypertension    Restless leg syndrome    COPD with acute exacerbation (HCC)    Acute pulmonary edema (HCC)    Obesity (BMI 30-39.9)    Tachycardia    CKD (chronic kidney disease) stage 2, GFR 60-89 ml/min    Hyponatremia    Hyperglycemia    Elevated AST (SGOT)    Chronic respiratory failure with hypercapnia (HCC)     PLAN:  Encourage OOB daily and  use of IS   Antibiotics  Bronchodilator  Inhaled corticosteroids  BiPAP at HS as needed   Diuresis  Electrolytes/ glycemic control  DVT and GI prophylaxis.    Discussed with Dr Hannah Jones, APRN   1/3/2022  17:32 EST   I have examined the patient and reviewed and verified the above findings and agree with the assessment and plan as documented

## 2022-01-03 NOTE — PROGRESS NOTES
"Daily Progress Note        Right lower lobe pneumonia    Mixed hyperlipidemia    Essential hypertension    Restless leg syndrome    COPD with acute exacerbation (HCC)    Acute pulmonary edema (HCC)    Obesity (BMI 30-39.9)    Tachycardia    CKD (chronic kidney disease) stage 2, GFR 60-89 ml/min    Hyponatremia    Hyperglycemia    Elevated AST (SGOT)    Chronic respiratory failure with hypercapnia (HCC)      Assessment  Right lower lobe pneumonia  aspiration per CT report  COPD with acute exacerbation  Chronic kidney disease  Tachycardia  Hypertension  Wears 5-7 l nasal cannula at home     Respiratory culture negative   respiratory panel negative     Legionella and strep pneumonia antigens pending    Patient reports an increase in oxygen requirements at home over the last 6 months. Reports she has worn oxygen at home for \"years \".    Patient reports feeling a lot better today and not coughing as much.  Still gets short of breath and oxygen level drops during eating or after physical exertion.    Plan    Continue to titrate oxygen  HF nasal cannula and/or BiPAP at night and as needed  Rocephin 1 g daily x7 days for pneumonia, started 12/27/2021  Lasix 40 mg daily  Theophylline 400 mg daily  Singulair  inhaled corticosteroids  Bronchodilators  Electrolyte/Glycemic control  DVT/GI Prophylaxis-Lovenox and Protonix  PT/OT  OOB daily and I-S every 4 while awake     CT 12/27/2021         LOS: 7 days     Subjective     Shortness of breath    Objective   No distress on rest  Vital signs for last 24 hours:  Vitals:    01/02/22 1502 01/02/22 1505 01/02/22 1722 01/02/22 2025   BP:   118/53    BP Location:   Right arm    Patient Position:   Sitting    Pulse: 73 71 82 82   Resp: 20 20 20    Temp:   98.1 °F (36.7 °C)    TempSrc:   Oral    SpO2: 93% 98% 99%    Weight:       Height:           Intake/Output last 3 shifts:  I/O last 3 completed shifts:  In: 2880 [P.O.:2880]  Out: 3625 [Urine:3625]  Intake/Output this shift:  No " intake/output data recorded.      Radiology  Imaging Results (Last 24 Hours)     ** No results found for the last 24 hours. **          Labs:  Results from last 7 days   Lab Units 01/02/22  0459   WBC 10*3/mm3 9.60   HEMOGLOBIN g/dL 11.9*   HEMATOCRIT % 36.1   PLATELETS 10*3/mm3 206     Results from last 7 days   Lab Units 01/02/22  0459   SODIUM mmol/L 139   POTASSIUM mmol/L 4.0   CHLORIDE mmol/L 100   CO2 mmol/L 32.0*   BUN mg/dL 18   CREATININE mg/dL 0.82   CALCIUM mg/dL 9.2   BILIRUBIN mg/dL 0.2   ALK PHOS U/L 61   ALT (SGPT) U/L 11   AST (SGOT) U/L 10   GLUCOSE mg/dL 95     Results from last 7 days   Lab Units 12/27/21  1420   PH, ARTERIAL pH units 7.364   PO2 ART mm Hg 53.4*   PCO2, ARTERIAL mm Hg 46.5   HCO3 ART mmol/L 26.5     Results from last 7 days   Lab Units 01/02/22  0459 01/01/22  0126 12/31/21  0344   ALBUMIN g/dL 3.00* 3.00* 3.20*     Results from last 7 days   Lab Units 12/26/21  2323   TROPONIN T ng/mL <0.010         Results from last 7 days   Lab Units 01/02/22  0459   MAGNESIUM mg/dL 1.8                   Meds:   SCHEDULE  aspirin, 81 mg, Oral, Daily  budesonide, 1 mg, Nebulization, BID  dilTIAZem CD, 180 mg, Oral, Daily  enoxaparin, 40 mg, Subcutaneous, Q24H  furosemide, 40 mg, Oral, Daily  gabapentin, 900 mg, Oral, Nightly  ipratropium-albuterol, 3 mL, Nebulization, 4x Daily - RT  metoprolol succinate XL, 50 mg, Oral, BID  montelukast, 10 mg, Oral, Nightly  pantoprazole, 40 mg, Oral, QAM  predniSONE, 20 mg, Oral, Daily With Breakfast  rOPINIRole, 1 mg, Oral, Nightly  rosuvastatin, 10 mg, Oral, Nightly  sodium chloride, 10 mL, Intravenous, Q12H  theophylline, 400 mg, Oral, Q24H      Infusions     PRNs  •  acetaminophen **OR** acetaminophen **OR** acetaminophen  •  aluminum-magnesium hydroxide-simethicone  •  benzonatate  •  calcium carbonate  •  guaiFENesin-dextromethorphan  •  ipratropium-albuterol  •  LORazepam  •  magnesium sulfate **OR** magnesium sulfate in D5W 1g/100mL (PREMIX)  •   melatonin  •  nitroglycerin  •  ondansetron **OR** ondansetron  •  potassium chloride  •  potassium chloride  •  sodium chloride    Physical Exam:  Physical Exam  Vitals reviewed.   Constitutional:       Appearance: She is obese.   Pulmonary:      Effort: Pulmonary effort is normal.      Breath sounds: Examination of the right-lower field reveals decreased breath sounds. Examination of the left-lower field reveals decreased breath sounds. Decreased breath sounds and rales present.      Comments: Slight diminished bilateral bases  Skin:     General: Skin is warm and dry.   Neurological:      Mental Status: She is alert and oriented to person, place, and time.       General Appearance:  Alert   HEENT:  Normocephalic, without obvious abnormality, Conjunctiva/corneas clear,.  Normal external ear canals, Nares normal, no drainage     Neck:  Supple, symmetrical, trachea midline. No JVD.  Lungs /Chest wall:   Bilateral basal rhonchi, respirations unlabored symmetrical wall movement.     Heart:  Regular rate and rhythm, systolic murmur PMI left sternal border  Abdomen: Soft, non-tender, no masses, no organomegaly.    Extremities: No edema, no clubbing or cyanosis  ROS  Review of Systems    Constitutional: Negative for chills, fever and malaise/fatigue.   HENT: Negative.    Eyes: Negative.    Cardiovascular: Negative.    Respiratory: Positive for cough and shortness of breath.    Skin: Negative.    Musculoskeletal: Negative.    Gastrointestinal: Negative.    Genitourinary: Negative.    Neurological: Negative.    Psychiatric/Behavioral: Negative.      I have reviewed current clinicals.     Electronically signed by BERKLEY Patton, 01/01/22, 4:33 PM EST.         I have examined the patient and reviewed and verified the above findings and agree with the assessment and plan as documented

## 2022-01-03 NOTE — PLAN OF CARE
Goal Outcome Evaluation:      Patient stable throughout the shift of 4L O2 by HFNC.  She does continue to desat with any exertion. Spouse at bedside. Continuing to monitor.

## 2022-01-03 NOTE — THERAPY TREATMENT NOTE
Subjective: Pt agreeable to therapeutic plan of care. Feels better, still gets SOA when walking.  Has been walking to the bathroom with A from her spouse.      Objective:     Bed mobility - N/A or Not attempted.  Transfers - Independent  Ambulation - 60', 30 feet SBA with A for portable O2 on 4L hi flow.  Needs seated rest break to recover O2 sats.  Drops to 81% with walking.      Seated LE exercises 10 reps x 2:  LAQs, marching, heel/toe raises.      Pain: 0 VAS  Education: Provided education on importance of mobility and skilled verbal / tactile cueing throughout intervention.     Assessment: Michelle Francis presents with functional mobility impairments which indicate the need for skilled intervention. Mobilizing well in the room, no longer needs RW.  Still limited gait distance and frequent rest breaks due to SOA and drop in O2 sats with activity.  Tolerates seated LE exercises. Tolerating session today without incident. Will continue to follow and progress as tolerated.     Plan/Recommendations:   Pt would benefit from Home with Home Health and Home with family assist at discharge from facility and requires no DME at discharge.   Pt desires Home with Home Health and Home with family assist at discharge. Pt cooperative; agreeable to therapeutic recommendations and plan of care.       Modified [place]: N/A = No pre-op stroke/TIA    Post-Tx Position: Up in Chair and Call light and personal items within reach  PPE: gloves, surgical mask, eyewear protection

## 2022-01-03 NOTE — PLAN OF CARE
Goal Outcome Evaluation:      Assessment: Michelle Francis presents with functional mobility impairments which indicate the need for skilled intervention. Mobilizing well in the room, no longer needs RW.  Still limited gait distance and frequent rest breaks due to SOA and drop in O2 sats with activity.  Tolerates seated LE exercises. Tolerating session today without incident. Will continue to follow and progress as tolerated.

## 2022-01-04 ENCOUNTER — APPOINTMENT (OUTPATIENT)
Dept: GENERAL RADIOLOGY | Facility: HOSPITAL | Age: 72
End: 2022-01-04

## 2022-01-04 VITALS
RESPIRATION RATE: 20 BRPM | OXYGEN SATURATION: 94 % | SYSTOLIC BLOOD PRESSURE: 121 MMHG | HEIGHT: 65 IN | WEIGHT: 217.37 LBS | HEART RATE: 84 BPM | BODY MASS INDEX: 36.22 KG/M2 | TEMPERATURE: 98.4 F | DIASTOLIC BLOOD PRESSURE: 46 MMHG

## 2022-01-04 LAB
ANION GAP SERPL CALCULATED.3IONS-SCNC: 10 MMOL/L (ref 5–15)
ANISOCYTOSIS BLD QL: ABNORMAL
BASOPHILS # BLD MANUAL: 0.11 10*3/MM3 (ref 0–0.2)
BASOPHILS NFR BLD MANUAL: 1 % (ref 0–1.5)
BUN SERPL-MCNC: 20 MG/DL (ref 8–23)
BUN/CREAT SERPL: 18.2 (ref 7–25)
BURR CELLS BLD QL SMEAR: ABNORMAL
CALCIUM SPEC-SCNC: 9.2 MG/DL (ref 8.6–10.5)
CHLORIDE SERPL-SCNC: 97 MMOL/L (ref 98–107)
CO2 SERPL-SCNC: 31 MMOL/L (ref 22–29)
CREAT SERPL-MCNC: 1.1 MG/DL (ref 0.57–1)
DACRYOCYTES BLD QL SMEAR: ABNORMAL
DEPRECATED RDW RBC AUTO: 46.4 FL (ref 37–54)
EOSINOPHIL # BLD MANUAL: 0.11 10*3/MM3 (ref 0–0.4)
EOSINOPHIL NFR BLD MANUAL: 1 % (ref 0.3–6.2)
ERYTHROCYTE [DISTWIDTH] IN BLOOD BY AUTOMATED COUNT: 15.9 % (ref 12.3–15.4)
GFR SERPL CREATININE-BSD FRML MDRD: 49 ML/MIN/1.73
GLUCOSE SERPL-MCNC: 115 MG/DL (ref 65–99)
HCT VFR BLD AUTO: 35.9 % (ref 34–46.6)
HGB BLD-MCNC: 11.6 G/DL (ref 12–15.9)
LARGE PLATELETS: ABNORMAL
LYMPHOCYTES # BLD MANUAL: 3.42 10*3/MM3 (ref 0.7–3.1)
LYMPHOCYTES NFR BLD MANUAL: 7 % (ref 5–12)
MAGNESIUM SERPL-MCNC: 2.1 MG/DL (ref 1.6–2.4)
MCH RBC QN AUTO: 25.8 PG (ref 26.6–33)
MCHC RBC AUTO-ENTMCNC: 32.4 G/DL (ref 31.5–35.7)
MCV RBC AUTO: 79.7 FL (ref 79–97)
METAMYELOCYTES NFR BLD MANUAL: 1 % (ref 0–0)
MICROCYTES BLD QL: ABNORMAL
MONOCYTES # BLD: 0.8 10*3/MM3 (ref 0.1–0.9)
NEUTROPHILS # BLD AUTO: 6.84 10*3/MM3 (ref 1.7–7)
NEUTROPHILS NFR BLD MANUAL: 58 % (ref 42.7–76)
NEUTS BAND NFR BLD MANUAL: 2 % (ref 0–5)
PLATELET # BLD AUTO: 225 10*3/MM3 (ref 140–450)
PMV BLD AUTO: 8.2 FL (ref 6–12)
POIKILOCYTOSIS BLD QL SMEAR: ABNORMAL
POTASSIUM SERPL-SCNC: 4.4 MMOL/L (ref 3.5–5.2)
RBC # BLD AUTO: 4.5 10*6/MM3 (ref 3.77–5.28)
SCAN SLIDE: NORMAL
SMALL PLATELETS BLD QL SMEAR: ADEQUATE
SODIUM SERPL-SCNC: 138 MMOL/L (ref 136–145)
VARIANT LYMPHS NFR BLD MANUAL: 1 % (ref 0–5)
VARIANT LYMPHS NFR BLD MANUAL: 29 % (ref 19.6–45.3)
WBC MORPH BLD: NORMAL
WBC NRBC COR # BLD: 11.4 10*3/MM3 (ref 3.4–10.8)

## 2022-01-04 PROCEDURE — 94799 UNLISTED PULMONARY SVC/PX: CPT

## 2022-01-04 PROCEDURE — 63710000001 PREDNISONE PER 1 MG: Performed by: INTERNAL MEDICINE

## 2022-01-04 PROCEDURE — 99231 SBSQ HOSP IP/OBS SF/LOW 25: CPT | Performed by: HOSPITALIST

## 2022-01-04 PROCEDURE — 85025 COMPLETE CBC W/AUTO DIFF WBC: CPT | Performed by: HOSPITALIST

## 2022-01-04 PROCEDURE — 74230 X-RAY XM SWLNG FUNCJ C+: CPT

## 2022-01-04 PROCEDURE — 92611 MOTION FLUOROSCOPY/SWALLOW: CPT

## 2022-01-04 PROCEDURE — 80048 BASIC METABOLIC PNL TOTAL CA: CPT | Performed by: HOSPITALIST

## 2022-01-04 PROCEDURE — 85007 BL SMEAR W/DIFF WBC COUNT: CPT | Performed by: HOSPITALIST

## 2022-01-04 PROCEDURE — 94660 CPAP INITIATION&MGMT: CPT

## 2022-01-04 PROCEDURE — 83735 ASSAY OF MAGNESIUM: CPT | Performed by: NURSE PRACTITIONER

## 2022-01-04 PROCEDURE — 94761 N-INVAS EAR/PLS OXIMETRY MLT: CPT

## 2022-01-04 PROCEDURE — 92610 EVALUATE SWALLOWING FUNCTION: CPT

## 2022-01-04 RX ORDER — PREDNISONE 20 MG/1
20 TABLET ORAL
Qty: 1 TABLET | Refills: 0 | Status: SHIPPED | OUTPATIENT
Start: 2022-01-05 | End: 2022-01-06

## 2022-01-04 RX ORDER — BUDESONIDE 0.5 MG/2ML
1 INHALANT ORAL 2 TIMES DAILY
Start: 2022-01-04 | End: 2022-01-04

## 2022-01-04 RX ORDER — BUDESONIDE 0.5 MG/2ML
1 INHALANT ORAL 2 TIMES DAILY
Qty: 60 EACH | Refills: 0 | Status: SHIPPED | OUTPATIENT
Start: 2022-01-04 | End: 2022-06-06

## 2022-01-04 RX ADMIN — IPRATROPIUM BROMIDE AND ALBUTEROL SULFATE 3 ML: 2.5; .5 SOLUTION RESPIRATORY (INHALATION) at 12:50

## 2022-01-04 RX ADMIN — FUROSEMIDE 40 MG: 40 TABLET ORAL at 08:18

## 2022-01-04 RX ADMIN — IPRATROPIUM BROMIDE AND ALBUTEROL SULFATE 3 ML: 2.5; .5 SOLUTION RESPIRATORY (INHALATION) at 06:50

## 2022-01-04 RX ADMIN — GUAIFENESIN SYRUP AND DEXTROMETHORPHAN 5 ML: 100; 10 SYRUP ORAL at 05:35

## 2022-01-04 RX ADMIN — BUDESONIDE 1 MG: 0.5 INHALANT RESPIRATORY (INHALATION) at 06:54

## 2022-01-04 RX ADMIN — BARIUM SULFATE 1 TEASPOON(S): 0.6 CREAM ORAL at 12:12

## 2022-01-04 RX ADMIN — THEOPHYLLINE ANHYDROUS 400 MG: 200 CAPSULE, EXTENDED RELEASE ORAL at 08:18

## 2022-01-04 RX ADMIN — SODIUM CHLORIDE, PRESERVATIVE FREE 10 ML: 5 INJECTION INTRAVENOUS at 08:18

## 2022-01-04 RX ADMIN — METOPROLOL SUCCINATE 50 MG: 50 TABLET, EXTENDED RELEASE ORAL at 08:18

## 2022-01-04 RX ADMIN — BARIUM SULFATE 50 ML: 400 SUSPENSION ORAL at 10:50

## 2022-01-04 RX ADMIN — PANTOPRAZOLE SODIUM 40 MG: 40 TABLET, DELAYED RELEASE ORAL at 05:35

## 2022-01-04 RX ADMIN — DILTIAZEM HYDROCHLORIDE 180 MG: 180 CAPSULE, COATED, EXTENDED RELEASE ORAL at 08:18

## 2022-01-04 RX ADMIN — ASPIRIN 81 MG CHEWABLE TABLET 81 MG: 81 TABLET CHEWABLE at 08:18

## 2022-01-04 RX ADMIN — PREDNISONE 20 MG: 20 TABLET ORAL at 08:18

## 2022-01-04 NOTE — PROGRESS NOTES
[facility] Medicine Services Daily Progress Note    Patient Name: Michelle Francis  : 1950  MRN: [id number]  Primary Care Physician:  Aristeo Mauro MD  Date of admission: 2021      Subjective      Chief Complaint: Shortness of air      Patient Reports     1/3/22: Patient desaturates with slight movements. On 4L. Ordered ABG. Has not used CPAP since August due to recall. Consulted speech for swallow eval  22: Feels better.  Did not qualify for trilogy.    Review of Systems   All other systems reviewed and are negative.         Objective      Vitals:   Temp:  [97.6 °F (36.4 °C)-98.1 °F (36.7 °C)] 97.6 °F (36.4 °C)  Heart Rate:  [67-92] 80  Resp:  [16-26] 20  BP: (105-118)/(48-56) 115/51  Flow (L/min):  [4] 4    Physical Exam  HENT:      Head: Normocephalic.      Nose: Nose normal.   Eyes:      Pupils: Pupils are equal, round, and reactive to light.   Cardiovascular:      Rate and Rhythm: Normal rate and regular rhythm.   Pulmonary:      Effort: Pulmonary effort is normal.   Abdominal:      General: Bowel sounds are normal.   Musculoskeletal:         General: Normal range of motion.      Cervical back: Normal range of motion.   Skin:     General: Skin is warm.   Neurological:      Mental Status: She is alert and oriented to person, place, and time. Mental status is at baseline.   Psychiatric:         Mood and Affect: Mood normal.             Result Review    Result Review:  I have personally reviewed the results from the time of this admission to 2022 14:30 EST and agree with these findings:  [x]  Laboratory  []  Microbiology  [x]  Radiology  []  EKG/Telemetry   []  Cardiology/Vascular   []  Pathology  [x]  Old records  []  Other:            Assessment/Plan      Brief Patient Summary:  71-year-old female with history of IRIS and chronic hypoxemic respiratory failure on 4L. Admission for pneumonia and COPD exacerbation      aspirin, 81 mg, Oral, Daily  budesonide, 1 mg,  Nebulization, BID  dilTIAZem CD, 180 mg, Oral, Daily  enoxaparin, 40 mg, Subcutaneous, Q24H  furosemide, 40 mg, Oral, Daily  gabapentin, 900 mg, Oral, Nightly  ipratropium-albuterol, 3 mL, Nebulization, 4x Daily - RT  metoprolol succinate XL, 50 mg, Oral, BID  montelukast, 10 mg, Oral, Nightly  pantoprazole, 40 mg, Oral, QAM  predniSONE, 20 mg, Oral, Daily With Breakfast  rOPINIRole, 1 mg, Oral, Nightly  rosuvastatin, 10 mg, Oral, Nightly  sodium chloride, 10 mL, Intravenous, Q12H  theophylline, 400 mg, Oral, Q24H             Active Hospital Problems:  Active Hospital Problems    Diagnosis    • **Right lower lobe pneumonia    • Hyponatremia    • Hyperglycemia    • Elevated AST (SGOT)    • Chronic respiratory failure with hypercapnia (HCC)    • CKD (chronic kidney disease) stage 2, GFR 60-89 ml/min      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     • Restless leg syndrome      sees Dr Seipel     • Obesity (BMI 30-39.9)    • Mixed hyperlipidemia    • Essential hypertension    • COPD with acute exacerbation (HCC)    • Tachycardia    • Acute pulmonary edema (HCC)        Right lower lobe aspiration pneumonia and COPD exacerbation:  -s/p CT chest w/o 12/27/2021--> severe emphysema. Left lower lobe pneumonia.  -COVID-19 ruled out  -Blood cultures without growth  -Check MRSA screen  -Pulmonology consulted. Follows Dr. Sifuentes    IRIS:  -CPAP recalled August 2021    Hyperlipidemia:  -Continue statin      Hypertension:  -Continue metoprolol    Restless leg syndrome:  -Requip      DVT prophylaxis:  Medical DVT prophylaxis orders are present.    CODE STATUS:    Level Of Support Discussed With: Patient  Code Status (Patient has no pulse and is not breathing): CPR (Attempt to Resuscitate)  Medical Interventions (Patient has pulse or is breathing): Full Support      Disposition:  I expect patient to be discharged defer.    This patient has been examined wearing appropriate Personal Protective  Equipment and discussed with nursing. 01/04/22      Electronically signed by Murphy Hamilton DO, 01/04/22, 14:30 EST.  [facility]ist Team

## 2022-01-04 NOTE — CASE MANAGEMENT/SOCIAL WORK
Continued Stay Note  [facility]     Patient Name: Michelle Francis  MRN: [id number]  Today's Date: 1/4/2022    Admit Date: 12/26/2021     Discharge Plan     Row Name 01/04/22 1423       Plan    Plan Comments VIARNALDO came and look at pt's ABG'S to try and qualify pt for a Trilogy Machine , pt did not qualify and [facility] notified . Pt is waiting for a new bipap machine with [place] and still has her old machine which was recalled               Discharge Codes    No documentation.               Expected Discharge Date and Time     Expected Discharge Date Expected Discharge Time    Jan 4, 2022             Yessi Dawson RN

## 2022-01-04 NOTE — DISCHARGE SUMMARY
[facility] Medicine Services  DISCHARGE SUMMARY    Patient Name: Michelle Francis  : 1950  MRN: [id number]    Date of Admission: 2021  Date of Discharge:  2022  Primary Care Physician: Aristeo Mauro MD      Presenting Problem:   Acute respiratory distress [R06.03]  Fever and chills [R50.9]  Chronic obstructive pulmonary disease with acute exacerbation (HCC) [J44.1]  Pneumonia of right lower lobe due to infectious organism [J18.9]  COVID-19 ruled out by laboratory testing [Z20.822]  Right lower lobe pneumonia [J18.9]    Active and Resolved Hospital Problems:  Active Hospital Problems    Diagnosis POA   • **Right lower lobe pneumonia [J18.9] Yes   • Hyponatremia [E87.1] Yes   • Hyperglycemia [R73.9] Yes   • Elevated AST (SGOT) [R74.01] Yes   • Chronic respiratory failure with hypercapnia (HCC) [J96.12] Yes   • CKD (chronic kidney disease) stage 2, GFR 60-89 ml/min [N18.2] Yes   • Restless leg syndrome [G25.81] Yes   • Obesity (BMI 30-39.9) [E66.9] Yes   • Mixed hyperlipidemia [E78.2] Yes   • Essential hypertension [I10] Yes   • COPD with acute exacerbation (HCC) [J44.1] Yes   • Tachycardia [R00.0] Yes   • Acute pulmonary edema (HCC) [J81.0] Yes      Resolved Hospital Problems   No resolved problems to display.         Hospital Course     Hospital Course:    The patient is a very pleasant 71-year-old female with history of COPD, chronic hypoxemic respiratory failure, IRIS and restless leg syndrome.    The patient presented to the emergency room on 2021 complaining of episodes of desaturation while being on her 4 L home oxygen.    In the ED, the patient was requiring up to 15 L high flow and had severe dyspnea on exertion.  Apparently the patient's several grandchildren had been having upper respiratory infections.  Chest x-ray suggested pneumonia.    The patient was admitted to the medical floor for acute hypoxemic respiratory failure secondary to pneumonia and  COPD exacerbation.  She was evaluated by pulmonology.  She underwent CT of the chest without contrast on 12/27/2021.  It revealed left lower lobe pneumonia concerning for aspiration pneumonia.  The patient underwent video swallow eval on 1/4/2022 .  The patient is still awaiting her CPAP to be mailed to her and had submitted back to the company due to recall.  The patient did not qualify for trilogy.      DISCHARGE Follow Up Recommendations for labs and diagnostics:       Reasons For Change In Medications and Indications for New Medications:      Day of Discharge     Vital Signs:  Temp:  [97.6 °F (36.4 °C)-98.4 °F (36.9 °C)] 98.4 °F (36.9 °C)  Heart Rate:  [67-92] 84  Resp:  [20-26] 20  BP: (105-121)/(46-56) 121/46  Flow (L/min):  [4] 4    Physical Exam:  Physical Exam  HENT:      Head: Normocephalic.      Mouth/Throat:      Mouth: Mucous membranes are moist.   Eyes:      General: No scleral icterus.     Extraocular Movements: Extraocular movements intact.      Pupils: Pupils are equal, round, and reactive to light.   Cardiovascular:      Rate and Rhythm: Normal rate and regular rhythm.   Pulmonary:      Effort: Pulmonary effort is normal.   Abdominal:      General: Bowel sounds are normal.   Musculoskeletal:         General: Normal range of motion.      Cervical back: Normal range of motion.   Skin:     General: Skin is warm.   Neurological:      Mental Status: She is alert. Mental status is at baseline.   Psychiatric:         Mood and Affect: Mood normal.            Pertinent  and/or Most Recent Results     LAB RESULTS:      Lab 01/04/22  0137 01/02/22  0459 01/01/22  0126 12/31/21  0344 12/30/21  0314 12/29/21  0308 12/29/21  0308   WBC 11.40* 9.60 10.70 11.30* 8.40   < > 9.90   HEMOGLOBIN 11.6* 11.9* 11.8* 11.9* 11.4*   < > 11.3*   HEMATOCRIT 35.9 36.1 36.2 35.8 35.7   < > 34.5   PLATELETS 225 206 233 222 215   < > 198   NEUTROS ABS 6.84 5.38 6.96 8.48* 7.64*  --  8.80*   LYMPHS ABS  --   --   --   --   --   --   0.60*   MONOS ABS  --   --   --   --   --   --  0.40   EOS ABS 0.11 0.10 0.11  --   --   --  0.00   MCV 79.7 82.4 82.6 81.1 81.1   < > 81.1    < > = values in this interval not displayed.         Lab 01/04/22  0137 01/03/22  0240 01/02/22  0459 01/01/22 0126 12/31/21  0344 12/30/21  0314 12/30/21  0314   SODIUM 138  --  139 140 140  --  140   POTASSIUM 4.4 4.1 4.0 3.3* 3.6   < > 3.9   CHLORIDE 97*  --  100 99 99  --  101   CO2 31.0*  --  32.0* 30.0* 30.0*  --  26.0   ANION GAP 10.0  --  7.0 11.0 11.0  --  13.0   BUN 20  --  18 22 19  --  21   CREATININE 1.10*  --  0.82 0.93 0.78  --  0.84   GLUCOSE 115*  --  95 89 119*  --  164*   CALCIUM 9.2  --  9.2 8.8 8.9  --  8.6   MAGNESIUM 2.1 2.1 1.8 1.9 2.0   < > 2.2    < > = values in this interval not displayed.         Lab 01/02/22  0459 01/01/22 0126 12/31/21  0344 12/30/21  0314 12/29/21  0308   TOTAL PROTEIN 5.5* 5.8* 6.0 6.2 6.6   ALBUMIN 3.00* 3.00* 3.20* 3.10* 3.30*   GLOBULIN 2.5 2.8 2.8 3.1 3.3   ALT (SGPT) 11 11 12 11 11   AST (SGOT) 10 13 13 13 20   BILIRUBIN 0.2 0.2 <0.2 <0.2 0.2   ALK PHOS 61 63 66 71 109                     Lab 01/03/22  1432   PH, ARTERIAL 7.454*   PCO2, ARTERIAL 45.4   PO2 ART 40.7*   O2 SATURATION ART 77.8*   FIO2 21   HCO3 ART 31.8*   BASE EXCESS ART 6.8*     Brief Urine Lab Results     None        Microbiology Results (last 10 days)     Procedure Component Value - Date/Time    Legionella Antigen, Urine - Urine, Urine, Clean Catch [[id number]]  (Normal) Collected: 12/29/21 0714    Lab Status: Final result Specimen: Urine, Clean Catch Updated: 12/29/21 0815     LEGIONELLA ANTIGEN, URINE Negative    S. Pneumo Ag Urine or CSF - Urine, Urine, Clean Catch [[id number]]  (Normal) Collected: 12/29/21 0714    Lab Status: Final result Specimen: Urine, Clean Catch Updated: 12/29/21 0817     Strep Pneumo Ag Negative    Respiratory Culture - Sputum, Cough [[id number]] Collected: 12/27/21 2058    Lab Status: Final result Specimen: Sputum from Cough  Updated: 12/29/21 0910     Respiratory Culture Scant growth (1+) Normal respiratory ava. No S. aureus or Pseudomonas aeruginosa detected. Final report.     Gram Stain Moderate (3+) WBCs per low power field      Rare (1+) Epithelial cells per low power field      Rare (1+) Gram negative bacilli    Respiratory Panel PCR w/COVID-19(SARS-CoV-2) ZOILA/RAFAELA/OSCAR/PAD/COR/MAD/RASHAD In-House, NP Swab in UTM/VTM, 3-4 HR TAT - Swab, Nasopharynx [[id number]]  (Normal) Collected: 12/27/21 0511    Lab Status: Final result Specimen: Swab from Nasopharynx Updated: 12/27/21 0606     ADENOVIRUS, PCR Not Detected     Coronavirus 229E Not Detected     Coronavirus HKU1 Not Detected     Coronavirus NL63 Not Detected     Coronavirus OC43 Not Detected     COVID19 Not Detected     Human Metapneumovirus Not Detected     Human Rhinovirus/Enterovirus Not Detected     Influenza A PCR Not Detected     Influenza B PCR Not Detected     Parainfluenza Virus 1 Not Detected     Parainfluenza Virus 2 Not Detected     Parainfluenza Virus 3 Not Detected     Parainfluenza Virus 4 Not Detected     RSV, PCR Not Detected     Bordetella pertussis pcr Not Detected     Bordetella parapertussis PCR Not Detected     Chlamydophila pneumoniae PCR Not Detected     Mycoplasma pneumo by PCR Not Detected    Narrative:      In the setting of a positive respiratory panel with a viral infection PLUS a negative procalcitonin without other underlying concern for bacterial infection, consider observing off antibiotics or discontinuation of antibiotics and continue supportive care. If the respiratory panel is positive for atypical bacterial infection (Bordetella pertussis, Chlamydophila pneumoniae, or Mycoplasma pneumoniae), consider antibiotic de-escalation to target atypical bacterial infection.    Blood Culture - Blood, Arm, Left [[id number]]  (Normal) Collected: 12/26/21 2018    Lab Status: Final result Specimen: Blood from Arm, Left Updated: 12/31/21 2030     Blood Culture No  growth at 5 days    Blood Culture - Blood, Arm, Right [[id number]]  (Normal) Collected: 12/26/21 2018    Lab Status: Final result Specimen: Blood from Arm, Right Updated: 12/31/21 2030     Blood Culture No growth at 5 days    COVID-19,CEPHEID/KIERSTEN,COR/OSCAR/PAD/IVON IN-HOUSE(OR EMERGENT/ADD-ON),NP SWAB IN TRANSPORT MEDIA 3-4 HR TAT, RT-PCR - Swab, Nasopharynx [[id number]]  (Normal) Collected: 12/26/21 1947    Lab Status: Final result Specimen: Swab from Nasopharynx Updated: 12/26/21 2025     COVID19 Not Detected    Narrative:      Fact sheet for providers: https://www.fda.gov/media/658164/download     Fact sheet for patients: https://www.fda.gov/media/697235/download  Fact sheet for providers: https://www.fda.gov/media/892787/download    Fact sheet for patients: https://www.fda.gov/media/841175/download    Test performed by PCR.          CT Chest Without Contrast Diagnostic    Result Date: 12/27/2021  Impression: Left lower lobe pneumonia. This may relate to aspiration as there is endobronchial material within both lower lobe bronchi.  Severe emphysema  Electronically Signed By-Anirudh Duque On:12/27/2021 10:35 AM This report was finalized on [id number] by  Anirudh Duque, .    FL Video Swallow With Speech Single Contrast    Result Date: 1/4/2022  Impression: Modified barium swallow study with fluoroscopy. Please refer to the speech pathologist report for findings and dietary recommendations.  Electronically Signed By-Racheal Cam MD On:1/4/2022 10:53 AM This report was finalized on [id number] by  Racheal Cma MD.    XR Chest 1 View    Result Date: 12/29/2021  Impression: The left lower lobe consolidation/pneumonia is thought to be improved although some residual airspace disease is thought to be present in the medial left base. Faint reticular interstitial infiltrates remain in the bilateral lower lobes suggesting residual ammonia. No new airspace disease is identified.  Electronically Signed By-Racheal Cam MD  On:12/29/2021 12:59 PM This report was finalized on [id number] by  Racheal Cam MD.    XR Chest 1 View    Result Date: 12/28/2021  Impression: Bilateral lower lobe airspace disease, left greater than right. Correlate clinically for pneumonia.  Electronically Signed By-Racheal Cam MD On:12/28/2021 4:47 PM This report was finalized on [id number] by  Racheal Cam MD.              Results for orders placed during the hospital encounter of 11/11/19    Adult Transthoracic Echo Complete W/ Cont if Necessary Per Protocol    Interpretation Summary  · Left ventricular systolic function is normal.  · Estimated EF appears to be in the range of 61 - 65%.      Labs Pending at Discharge:      Procedures Performed           Consults:   Consults     Date and Time Order Name Status Description    12/28/2021 12:34 AM Inpatient Pulmonology Consult Completed             Discharge Details        Discharge Medications      New Medications      Instructions Start Date   budesonide 0.5 MG/2ML nebulizer solution  Commonly known as: PULMICORT  Replaces: budesonide 1 MG/2ML nebulizer solution   1 mg, Nebulization, 2 Times Daily      predniSONE 20 MG tablet  Commonly known as: DELTASONE   20 mg, Oral, Daily With Breakfast   Start Date: January 5, 2022        Continue These Medications      Instructions Start Date   albuterol sulfate  (90 Base) MCG/ACT inhaler  Commonly known as: PROVENTIL HFA;VENTOLIN HFA;PROAIR HFA   1 puff, Inhalation, Every 4 Hours PRN, Q4-6H      aspirin 81 MG chewable tablet   81 mg, Oral, Daily      dilTIAZem  MG 24 hr capsule  Commonly known as: Cardizem CD   180 mg, Oral, Daily      esomeprazole 40 MG capsule  Commonly known as: nexIUM   TAKE 1 CAPSULE BY MOUTH EVERY DAY      furosemide 40 MG tablet  Commonly known as: LASIX   40 mg, Oral, Daily      gabapentin 300 MG capsule  Commonly known as: NEURONTIN   900 mg, Oral, Nightly      ipratropium-albuterol 0.5-2.5 mg/3 ml nebulizer  Commonly  known as: DUO-NEB   3 mL, Nebulization, Every 4 Hours PRN, Every 4-6h PRN       metoprolol succinate XL 50 MG 24 hr tablet  Commonly known as: TOPROL-XL   TAKE ONE TABLET BY MOUTH TWICE DAILY      montelukast 10 MG tablet  Commonly known as: SINGULAIR   10 mg, Oral, Nightly      potassium chloride 20 MEQ tablet controlled-release ER tablet  Commonly known as: K-DUR,KLOR-CON   20 mEq, Oral, Daily      risedronate 35 MG tablet  Commonly known as: ACTONEL   35 mg, Oral, Every 7 Days, On Thursday       rOPINIRole 1 MG tablet  Commonly known as: REQUIP   1 mg, Oral, 2 Times Daily, At 19:00 and 21:00       rosuvastatin 10 MG tablet  Commonly known as: CRESTOR   10 mg, Oral, Daily      theophylline 400 MG 24 hr tablet  Commonly known as: UNIPHYL   400 mg, Oral, Daily      Trelegy Ellipta 100-62.5-25 MCG/INH inhaler  Generic drug: Fluticasone-Umeclidin-Vilant   1 puff, Inhalation, Daily - RT         Stop These Medications    budesonide 1 MG/2ML nebulizer solution  Commonly known as: PULMICORT  Replaced by: budesonide 0.5 MG/2ML nebulizer solution            No Known Allergies      Discharge Disposition:   Home or Self Care    Diet:  Hospital:  Diet Order   Procedures   • Diet Cardiac; Healthy Heart         Discharge Activity: as tolerated      Discharge Condition: stable      CODE STATUS:  Code Status and Medical Interventions:   Ordered at: 12/30/21 1927     Level Of Support Discussed With:    Patient     Code Status (Patient has no pulse and is not breathing):    CPR (Attempt to Resuscitate)     Medical Interventions (Patient has pulse or is breathing):    Full Support         Future Appointments   Date Time Provider Department Center   9/8/2022  2:30 PM Ryne Staton MD [facility]       Additional Instructions for the Follow-ups that You Need to Schedule     Ambulatory Referral to Home Health   As directed      [facility]    Order Comments: [facility]     Face to Face  Visit Date: 12/29/2021    Follow-up provider for Plan of Care?: I treated the patient in an acute care facility and will not continue treatment after discharge.    Follow-up provider: GLORIA CORBETT [[id number]]    Reason/Clinical Findings: copd    Describe mobility limitations that make leaving home difficult: weakness    Nursing/Therapeutic Services Requested: Other    Frequency: 1 Week 1         Discharge Follow-up with PCP   As directed       Currently Documented PCP:    Gloria Corbett MD    PCP Phone Number:    [phone]     Follow Up Details: 2 weeks         Discharge Follow-up with Specified Provider: Dr. Sifuentes, pulmonologist; 3 Weeks   As directed      To: Dr. Sifuentes, pulmonologist    Follow Up: 3 Weeks               Time spent on Discharge including face to face service:  20 minutes    This patient has been examined wearing appropriate Personal Protective Equipment and discussed with nursing. 01/04/22      Signature: Electronically signed by Murphy Hamilton DO, 01/04/22, 6:00 PM EST.

## 2022-01-04 NOTE — MBS/VFSS/FEES
Acute Care - Speech Language Pathology   Swallow Initial Evaluation [facility]     Patient Name: Michelle Francis  : 1950  MRN: [id number]  Today's Date: 2022               Admit Date: 2021    Visit Dx:     ICD-10-CM ICD-9-CM   1. COPD with acute exacerbation (HCC)  J44.1 491.21   2. Acute respiratory distress  R06.03 518.82   3. Chronic obstructive pulmonary disease with acute exacerbation (HCC)  J44.1 491.21   4. Fever and chills  R50.9 780.60   5. Pneumonia of right lower lobe due to infectious organism  J18.9 486   6. COVID-19 ruled out by laboratory testing  Z20.822 V01.79     Patient Active Problem List   Diagnosis   • Acute on chronic respiratory failure with hypoxia (HCC)   • Mixed hyperlipidemia   • Essential hypertension   • Sleep apnea   • Restless leg syndrome   • Arthritis   • Osteoporosis   • COPD with acute exacerbation (HCC)   • Acute bronchitis due to respiratory syncytial virus (RSV)   • Acute pulmonary edema (HCC)   • Obesity (BMI 30-39.9)   • Tachycardia   • CKD (chronic kidney disease) stage 2, GFR 60-89 ml/min   • Depression   • Chest pain, atypical   • Abdominal hernia   • Disorder of vitamin B12   • Gastroesophageal reflux disease   • Vitamin D deficiency   • Right lower lobe pneumonia   • Hyponatremia   • Hyperglycemia   • Elevated AST (SGOT)   • Chronic respiratory failure with hypercapnia (HCC)   • Leg edema     Past Medical History:   Diagnosis Date   • Arthritis    • Colon polyps     Dr Bates   • COPD (chronic obstructive pulmonary disease) (HCC)    • History of emphysema    • Hyperlipidemia    • Hypertension    • Kidney disease    • Osteoporosis    • Restless leg syndrome     sees Dr Seipel   • Sleep apnea     npsg [facility] , ahi 5.9, on auto bipap min 8 max, PS 2-8-Alcaraz's, nasal mask     Past Surgical History:   Procedure Laterality Date   • CARDIOVASCULAR STRESS TEST     • CATARACT EXTRACTION      Dr Moyer   • CEREBRAL ANEURYSM REPAIR      Brain Aneurysm approx ,  treated with stents and coils, Dr. Fraga   • COLON SURGERY      partial colectomy-2013 Dr Valladares- due to multiple large polyps   • ECHO - CONVERTED  2019   • EXPLORATORY LAPAROTOMY      lysis of intra abdominal adhesions, primary ventral hernia repair 08/01/2018 Dr West   • WRIST SURGERY      wrist fracture - Dr Patton       SLP Recommendation and Plan  SLP Swallowing Diagnosis: swallow WFL (01/04/22 1500)  SLP Diet Recommendation: regular textures, thin liquids (01/04/22 1500)  Recommended Precautions and Strategies: upright posture during/after eating, small bites of food and sips of liquid, general aspiration precautions (01/04/22 1500)  SLP Rec. for Method of Medication Administration: meds whole, meds crushed, as tolerated (01/04/22 1500)     Monitor for Signs of Aspiration: yes, notify SLP if any concerns (01/04/22 1500)              Therapy Frequency (Swallow): evaluation only (01/04/22 1500)                                            SWALLOW EVALUATION (last 72 hours)     SLP Adult Swallow Evaluation     Row Name 01/04/22 1500          Document Type evaluation  -    Subjective Information no complaints  -    Patient Observations alert; cooperative; agree to therapy  -    Patient/Family/Caregiver Comments/Observations VFSS completed this date  -    Patient Effort good  -    Symptoms Noted During/After Treatment none  -               Patient Profile Reviewed yes  -    Pertinent History Of Current Problem Michelle Francis is a 71 y.o. female with past medical history of of COPD, CKD, tachycardia, hypertension who presented to [facility] on 12/26/2021 complaining of shortness of air since 12/22/2021.  Patient came in acute respiratory distress by EMS from home.  She states that she uses 4 L supplemental oxygen via nasal cannula at home, but has increased shortness of air and has required more oxygen.  This evening the shortness of air was progressively worse and her oxygen saturations were  between 83 and 86% at home, prompting her to call EMS.  On arrival to the ED, patient was only able to talk in short bursts with tachypnea and tachycardia on assessment by ED provider.  Patient currently on BiPAP.     The ED, chest x-ray shows bibasilar opacities, which could reflect atelectasis or pneumonia.  EKG showed sinus tach with rate of 118.  ABG unremarkable except pH 7.319, PCO2 52.9, PO2 346.9.  All labs unremarkable except WBC 11.7, AST 45, sodium 133, glucose 115.  All vital signs unremarkable except temperature 101.9, O2 saturation 89% on 10 L, respirations 33, /61, pulse 132.  Patient received Tylenol suppository, neb treatments, azithromycin, Rocephin, Ativan, Solu-Medrol in the ED.  Patient admitted to hospitalist service for further evaluation and treatment.    -    Current Method of Nutrition regular textures; thin liquids  -    Precautions/Limitations, Vision WFL; for purposes of eval  -    Precautions/Limitations, Hearing WFL; for purposes of eval  -    Prior Level of Function-Swallowing no diet consistency restrictions  -    Plans/Goals Discussed with patient  -    Barriers to Rehab none identified  -    Patient's Goals for Discharge patient did not state  -               Additional Documentation Pain Scale: Numbers Pre/Post-Treatment (Group)  -               Pretreatment Pain Rating 0/10 - no pain  -    Posttreatment Pain Rating 0/10 - no pain  -               Dentition Assessment edentulous, dentures not available  -    Secretion Management WNL/WFL  -    Mucosal Quality moist, healthy  -               Oral Motor General Assessment WFL  -    Oral Motor, Comment Adequate ROM and mobility for all oral musculatures  -               Respiratory Support Currently in Use high-flow nasal cannula  -    Eating/Swallowing Skills self-fed  -    Positioning During Eating upright in chair  -    Utensils Used cup; straw  -    Consistencies Trialed regular  textures; mixed consistency; pureed; thin liquids  -               Clinical Swallow Evaluation Summary --               Utensils Used spoon; cup; straw  -AH    Consistencies Trialed regular textures; mixed consistency; pureed; thin liquids  -               VFSS Summary Videofluoroscopic swallow study completed in the lateral position with the pt seated upright at a 90 degree angle in a chair.  Assessed with the following respectively: thin liquids by cup (sequential sips) x2, thin liquids by cup (small single sip) x1, thin liquids by straw x2, puree (applesauce) x3, mixed/mechanial soft consistency (peaches) x3, solid (cracker) x1, and thin liquids by straw x1. Pt self fed all trials and demonstrated impulsivity at times taking large sequential sips despite cues from SLP technician to take small, single sips of thin liquid. Adequate oral acceptance and no anterior loss noted with timely mastication. Reduced base of tongue elevation and premature spillage to the valleculae with puree and solid consistencies and to the pyriform sinuses with thin liquids and mixed/mechanical consistencies; which is where the swallow was triggered respectively. Upon initiation of the swallow, adequate hyolaryngeal excursion and inconsistent incomplete epiglottic inversion. Penetration of sequential sips of thin liquids by cup and straw on 3/5 trials that clears independently upon completion of the swallow, which would equate to a 2 on the Rosenbeck Penetration-Aspiration Scale.      IMPRESSION: The pt's current swallow functioning is deemed to be WFL.    RECOMMENDATIONS: It is recommended that the pt remain on a regular diet with thin liquids with the following safe swallow precautions: upright at 90 degree angle when eating and drinking, small sips and bites, and slow rate/pace. No further ST intervention warranted at this time, re-consult as indicated.  -AH               Severity Level of Dysphagia WFL  -AH    Consistencies  Aspirated/Penetrated penetrated; thin liquids  -               SLP Swallowing Diagnosis swallow WFL  -    Functional Impact risk of aspiration/pneumonia  -    Rehab Potential/Prognosis, Swallowing --    Swallow Criteria for Skilled Therapeutic Interventions Met --               Therapy Frequency (Swallow) evaluation only  -    SLP Diet Recommendation regular textures; thin liquids  -    Recommended Diagnostics --    Recommended Precautions and Strategies upright posture during/after eating; small bites of food and sips of liquid; general aspiration precautions  -    Oral Care Recommendations Oral Care BID/PRN; Toothbrush  -    SLP Rec. for Method of Medication Administration meds whole; meds crushed; as tolerated  -    Monitor for Signs of Aspiration yes; notify SLP if any concerns  -          User Key  (r) = Recorded By, (t) = Taken By, (c) = Cosigned By    Initials Name Effective Dates    Colleen Krishna SLP 09/30/21 -     Rosalie Nascimento SLP 09/21/21 -                 EDUCATION  The patient has been educated in the following areas:   Dysphagia (Swallowing Impairment).              Time Calculation:                LILY Euceda  1/4/2022

## 2022-01-04 NOTE — PLAN OF CARE
Goal Outcome Evaluation:  Plan of Care Reviewed With: patient        Progress: no change  Outcome Summary: No acute events noted. Patient remains on 4L O2, BiPAP at 50% at HS.

## 2022-01-04 NOTE — PROGRESS NOTES
"PULMONARY CRITICAL CARE Progress  NOTE      PATIENT IDENTIFICATION:  Name: Michelle Francis  MRN: [id number]  :  1950     Age: 71 y.o.  Sex: female    DATE OF Note:  2022   Referring Physician: Murphy Hamilton DO                  Subjective:   Unable to get Trilogy due to insurance, does have a CPAP that she can use,  Still on O2, no chest or abd pain, no bowel or bladder issues reported       Objective:  tMax 24 hrs: Temp (24hrs), Av °F (36.7 °C), Min:97.6 °F (36.4 °C), Max:98.4 °F (36.9 °C)      Vitals Ranges:   Temp:  [97.6 °F (36.4 °C)-98.4 °F (36.9 °C)] 98.4 °F (36.9 °C)  Heart Rate:  [67-92] 84  Resp:  [16-26] 20  BP: (105-121)/(46-56) 121/46    Intake and Output Last 3 Shifts:   I/O last 3 completed shifts:  In: 960 [P.O.:960]  Out: 3200 [Urine:3200]    Exam:  /46 (BP Location: Left arm, Patient Position: Sitting)   Pulse 84   Temp 98.4 °F (36.9 °C) (Oral)   Resp 20   Ht 165.1 cm (65\")   Wt 98.6 kg (217 lb 6 oz)   SpO2 94%   BMI 36.17 kg/m²     General Appearance:     HEENT:  Normocephalic, without obvious abnormality, Conjunctiva/corneas clear,.  Normal external ear canals, Nares normal, no drainage     Neck:  Supple, symmetrical, trachea midline. No JVD.  Lungs /Chest wall:   Bilateral basal rhonchi, respirations unlabored symmetrical wall movement.     Heart:  Regular rate and rhythm, systolic murmur PMI left sternal border  Abdomen: Soft, non-tender, no masses, no organomegaly.    Extremities: Trace edema no clubbing or Cyanosis        Medications:    Current Facility-Administered Medications:   •  acetaminophen (TYLENOL) tablet 650 mg, 650 mg, Oral, Q4H PRN **OR** acetaminophen (TYLENOL) 160 MG/5ML solution 650 mg, 650 mg, Oral, Q4H PRN **OR** acetaminophen (TYLENOL) suppository 650 mg, 650 mg, Rectal, Q4H PRN, Alsop, Xiao L, APRN  •  aluminum-magnesium hydroxide-simethicone (MAALOX MAX) 400-400-40 MG/5ML suspension 15 mL, 15 mL, Oral, Q6H PRN, Alsop, Xiao L, APRN  • "  aspirin chewable tablet 81 mg, 81 mg, Oral, Daily, Alsop, Xiao L, APRN, 81 mg at 01/04/22 0818  •  benzonatate (TESSALON) capsule 100 mg, 100 mg, Oral, TID PRN, Alsop, Xiao L, APRN  •  budesonide (PULMICORT) nebulizer solution 1 mg, 1 mg, Nebulization, BID, Alsop, Xiao L, APRN, 1 mg at 01/04/22 0654  •  calcium carbonate (TUMS) chewable tablet 500 mg (200 mg elemental), 2 tablet, Oral, BID PRN, Alsop, Xiao L, APRN  •  dilTIAZem CD (CARDIZEM CD) 24 hr capsule 180 mg, 180 mg, Oral, Daily, Alsop, Xiao L, APRN, 180 mg at 01/04/22 0818  •  enoxaparin (LOVENOX) syringe 40 mg, 40 mg, Subcutaneous, Q24H, Alsop, Xiao L, APRN, 40 mg at 01/03/22 1720  •  furosemide (LASIX) tablet 40 mg, 40 mg, Oral, Daily, Alsop, Xiao L, APRN, 40 mg at 01/04/22 0818  •  gabapentin (NEURONTIN) capsule 900 mg, 900 mg, Oral, Nightly, Alsop, Xiao L, APRN, 900 mg at 01/03/22 2008  •  guaiFENesin-dextromethorphan (ROBITUSSIN DM) 100-10 MG/5ML syrup 5 mL, 5 mL, Oral, Q4H PRN, Alsop, Xiao L, APRN, 5 mL at 01/04/22 0535  •  ipratropium-albuterol (DUO-NEB) nebulizer solution 3 mL, 3 mL, Nebulization, Q4H PRN, Alsop, Xiao L, APRN, 3 mL at 01/04/22 1250  •  ipratropium-albuterol (DUO-NEB) nebulizer solution 3 mL, 3 mL, Nebulization, 4x Daily - RT, Alsop, Xiao L, APRN, 3 mL at 01/04/22 0650  •  LORazepam (ATIVAN) injection 0.5 mg, 0.5 mg, Intravenous, Once PRN, Alsop, Xiao L, APRN  •  Magnesium Sulfate 2 gram infusion - Mg less than or equal to 1.5 mg/dL, 2 g, Intravenous, PRN **OR** Magnesium Sulfate 1 gram infusion - Mg 1.6-1.9 mg/dL, 1 g, Intravenous, PRN, Alsop, Xiao L, APRN, Last Rate: 100 mL/hr at 01/02/22 0624, 1 g at 01/02/22 0624  •  melatonin tablet 5 mg, 5 mg, Oral, Nightly PRN, Alsop, Xiao L, APRN  •  metoprolol succinate XL (TOPROL-XL) 24 hr tablet 50 mg, 50 mg, Oral, BID, Alsop, Xiao L, APRN, 50 mg at 01/04/22 0818  •  montelukast (SINGULAIR) tablet 10 mg, 10 mg, Oral, Nightly, Alsop, Xiao L, APRN, 10 mg at 01/03/22 2007  •   nitroglycerin (NITROSTAT) SL tablet 0.4 mg, 0.4 mg, Sublingual, Q5 Min PRN, Alsop, Xiao L, APRN  •  ondansetron (ZOFRAN) tablet 4 mg, 4 mg, Oral, Q6H PRN **OR** ondansetron (ZOFRAN) injection 4 mg, 4 mg, Intravenous, Q6H PRN, Alsop, Xiao L, APRN  •  pantoprazole (PROTONIX) EC tablet 40 mg, 40 mg, Oral, QAM, Alsop, Xiao L, APRN, 40 mg at 01/04/22 0535  •  potassium chloride (K-DUR,KLOR-CON) CR tablet 40 mEq, 40 mEq, Oral, PRN, Alsop, Xiao L, APRN, 40 mEq at 01/01/22 0616  •  potassium chloride (KLOR-CON) packet 40 mEq, 40 mEq, Oral, PRN, Alsop, Xiao L, APRN  •  predniSONE (DELTASONE) tablet 20 mg, 20 mg, Oral, Daily With Breakfast, Edin Young MD, 20 mg at 01/04/22 0818  •  rOPINIRole (REQUIP) tablet 1 mg, 1 mg, Oral, Nightly, Alsop, Xiao L, APRN, 1 mg at 01/03/22 2008  •  rosuvastatin (CRESTOR) tablet 10 mg, 10 mg, Oral, Nightly, Alsop, Xiao L, APRN, 10 mg at 01/03/22 2007  •  sodium chloride 0.9 % flush 10 mL, 10 mL, Intravenous, Q12H, Alsop, Xiao L, APRN, 10 mL at 01/04/22 0818  •  sodium chloride 0.9 % flush 10 mL, 10 mL, Intravenous, PRN, Alsop, Xiao L, APRN, 10 mL at 01/02/22 0624  •  theophylline (FIDEL-24) 24 hr capsule 400 mg, 400 mg, Oral, Q24H, Kathy Acevedo MD, 400 mg at 01/04/22 0818    Data Review:  All labs (24hrs):   Recent Results (from the past 24 hour(s))   Magnesium    Collection Time: 01/04/22  1:37 AM    Specimen: Blood   Result Value Ref Range    Magnesium 2.1 1.6 - 2.4 mg/dL   Basic Metabolic Panel    Collection Time: 01/04/22  1:37 AM    Specimen: Blood   Result Value Ref Range    Glucose 115 (H) 65 - 99 mg/dL    BUN 20 8 - 23 mg/dL    Creatinine 1.10 (H) 0.57 - 1.00 mg/dL    Sodium 138 136 - 145 mmol/L    Potassium 4.4 3.5 - 5.2 mmol/L    Chloride 97 (L) 98 - 107 mmol/L    CO2 31.0 (H) 22.0 - 29.0 mmol/L    Calcium 9.2 8.6 - 10.5 mg/dL    eGFR Non African Amer 49 (L) >60 mL/min/1.73    BUN/Creatinine Ratio 18.2 7.0 - 25.0    Anion Gap 10.0 5.0 - 15.0 mmol/L   CBC Auto  Differential    Collection Time: 01/04/22  1:37 AM    Specimen: Blood   Result Value Ref Range    WBC 11.40 (H) 3.40 - 10.80 10*3/mm3    RBC 4.50 3.77 - 5.28 10*6/mm3    Hemoglobin 11.6 (L) 12.0 - 15.9 g/dL    Hematocrit 35.9 34.0 - 46.6 %    MCV 79.7 79.0 - 97.0 fL    MCH 25.8 (L) 26.6 - 33.0 pg    MCHC 32.4 31.5 - 35.7 g/dL    RDW 15.9 (H) 12.3 - 15.4 %    RDW-SD 46.4 37.0 - 54.0 fl    MPV 8.2 6.0 - 12.0 fL    Platelets 225 140 - 450 10*3/mm3   Scan Slide    Collection Time: 01/04/22  1:37 AM    Specimen: Blood   Result Value Ref Range    Scan Slide     Manual Differential    Collection Time: 01/04/22  1:37 AM    Specimen: Blood   Result Value Ref Range    Neutrophil % 58.0 42.7 - 76.0 %    Lymphocyte % 29.0 19.6 - 45.3 %    Monocyte % 7.0 5.0 - 12.0 %    Eosinophil % 1.0 0.3 - 6.2 %    Basophil % 1.0 0.0 - 1.5 %    Bands %  2.0 0.0 - 5.0 %    Metamyelocyte % 1.0 (H) 0.0 - 0.0 %    Atypical Lymphocyte % 1.0 0.0 - 5.0 %    Neutrophils Absolute 6.84 1.70 - 7.00 10*3/mm3    Lymphocytes Absolute 3.42 (H) 0.70 - 3.10 10*3/mm3    Monocytes Absolute 0.80 0.10 - 0.90 10*3/mm3    Eosinophils Absolute 0.11 0.00 - 0.40 10*3/mm3    Basophils Absolute 0.11 0.00 - 0.20 10*3/mm3    Anisocytosis Slight/1+ None Seen    [place] Cells Slight/1+ None Seen    Dacrocytes Slight/1+ None Seen    Microcytes Slight/1+ None Seen    Poikilocytes Slight/1+ None Seen    WBC Morphology Normal Normal    Platelet Estimate Adequate Normal    Large Platelets Slight/1+ None Seen        Imaging:  FL Video Swallow With Speech Single Contrast  Narrative: DATE OF EXAM:  1/4/2022 10:08 AM     PROCEDURE:  FL VIDEO SWALLOW W SPEECH SINGLE-CONTRAST-     INDICATIONS:  dysphagia; J44.1-Chronic obstructive pulmonary disease with (acute)  exacerbation; R06.03-Acute respiratory distress; J44.1-Chronic  obstructive pulmonary disease with (acute) exacerbation; R50.9-Fever,  unspecified; J18.9-Pneumonia, unspecified organism; Z20.822-Contact with  and (suspected)  exposure to covid-19     COMPARISON:  No Comparisons Available     TECHNIQUE:   This examination was performed in conjunction with speech pathology.  Lateral video fluoroscopic evaluation of the swallowing mechanism was  performed while correlate administering to the patient various  consistency food items mixed with barium.     Fluoroscopic Time:   2.0 minutes     FINDINGS:  13 spot fluoroscopic series images were obtained during modified barium  swallow study performed by a dedicated speech pathologist. I was present  during the entire examination.      Impression: Modified barium swallow study with fluoroscopy. Please refer to the  speech pathologist report for findings and dietary recommendations.     Electronically Signed By-Racheal Cam MD On:1/4/2022 10:53 AM  This report was finalized on [id number] by  Racheal Cam MD.       ASSESSMENT:    Right lower lobe pneumonia with aspiration     Mixed hyperlipidemia    Essential hypertension    Restless leg syndrome    COPD with acute exacerbation (HCC)    Acute pulmonary edema (HCC)    Obesity (BMI 30-39.9)    Tachycardia    CKD (chronic kidney disease) stage 2, GFR 60-89 ml/min    Hyponatremia    Hyperglycemia    Elevated AST (SGOT)    Chronic respiratory failure with hypercapnia (HCC)     PLAN:  May discharge home and follow up with our service in 2 weeks   Encourage OOB daily and use of IS   Antibiotics  Bronchodilator  Inhaled corticosteroids  BiPAP at HS as needed   Diuresis  Electrolytes/ glycemic control  DVT and GI prophylaxis.    Discussed with Dr Hannah Jones, APRN   1/4/2022  15:18 EST     I have examined the patient and reviewed and verified the above findings and agree with the assessment and plan as documented

## 2022-01-04 NOTE — THERAPY EVALUATION
Acute Care - Speech Language Pathology   Swallow Initial Evaluation [facility]     Patient Name: Michelle Francis  : 1950  MRN: [id number]  Today's Date: 2022               Admit Date: 2021  Patient was not wearing a face mask during this therapy encounter. Therapist used appropriate personal protective equipment including mask, eye protection and gloves.  Mask used was standard procedure mask. Appropriate PPE was worn during the entire therapy session. Hand hygiene was completed before and after therapy session. Patient is not in enhanced droplet precautions.             Visit Dx:     ICD-10-CM ICD-9-CM   1. COPD with acute exacerbation (HCC)  J44.1 491.21   2. Acute respiratory distress  R06.03 518.82   3. Chronic obstructive pulmonary disease with acute exacerbation (HCC)  J44.1 491.21   4. Fever and chills  R50.9 780.60   5. Pneumonia of right lower lobe due to infectious organism  J18.9 486   6. COVID-19 ruled out by laboratory testing  Z20.822 V01.79     Patient Active Problem List   Diagnosis   • Acute on chronic respiratory failure with hypoxia (HCC)   • Mixed hyperlipidemia   • Essential hypertension   • Sleep apnea   • Restless leg syndrome   • Arthritis   • Osteoporosis   • COPD with acute exacerbation (HCC)   • Acute bronchitis due to respiratory syncytial virus (RSV)   • Acute pulmonary edema (HCC)   • Obesity (BMI 30-39.9)   • Tachycardia   • CKD (chronic kidney disease) stage 2, GFR 60-89 ml/min   • Depression   • Chest pain, atypical   • Abdominal hernia   • Disorder of vitamin B12   • Gastroesophageal reflux disease   • Vitamin D deficiency   • Right lower lobe pneumonia   • Hyponatremia   • Hyperglycemia   • Elevated AST (SGOT)   • Chronic respiratory failure with hypercapnia (HCC)   • Leg edema     Past Medical History:   Diagnosis Date   • Arthritis    • Colon polyps     Dr Bates   • COPD (chronic obstructive pulmonary disease) (HCC)    • History of emphysema    • Hyperlipidemia    •  Hypertension    • Kidney disease    • Osteoporosis    • Restless leg syndrome     sees Dr Seipel   • Sleep apnea     npsg [facility] 2014, ahi 5.9, on auto bipap min 8 max, PS 2-8-Alcaraz's, nasal mask     Past Surgical History:   Procedure Laterality Date   • CARDIOVASCULAR STRESS TEST  2020   • CATARACT EXTRACTION      Dr Moyer   • CEREBRAL ANEURYSM REPAIR      Brain Aneurysm approx 2009, treated with stents and coils, Dr. Fraga   • COLON SURGERY      partial colectomy-2013 Dr Valladares- due to multiple large polyps   • ECHO - CONVERTED  2019   • EXPLORATORY LAPAROTOMY      lysis of intra abdominal adhesions, primary ventral hernia repair 08/01/2018 Dr West   • WRIST SURGERY      wrist fracture - Dr Patton       SLP Recommendation and Plan  SLP Swallowing Diagnosis: R/O pharyngeal dysphagia (01/04/22 1100)  SLP Diet Recommendation: regular textures, thin liquids (01/04/22 1100)     SLP Rec. for Method of Medication Administration: meds whole, meds crushed, as tolerated (01/04/22 1100)     Monitor for Signs of Aspiration: yes, notify SLP if any concerns (01/04/22 1100)  Recommended Diagnostics: reassess via VFSS (MBS) (01/04/22 1100)  Swallow Criteria for Skilled Therapeutic Interventions Met: demonstrates skilled criteria (01/04/22 1100)     Rehab Potential/Prognosis, Swallowing: good, to achieve stated therapy goals (01/04/22 1100)  Therapy Frequency (Swallow): evaluation only (01/04/22 1100)          SWALLOW EVALUATION (last 72 hours)     SLP Adult Swallow Evaluation     Row Name 01/04/22 1100          Document Type evaluation  -CB    Subjective Information no complaints  -CB    Patient Observations alert; cooperative  -CB    Patient/Family/Caregiver Comments/Observations Patient currently has right sided lower lobe pneumonia.Reported that patient desaturates when slight movements.  -CB    Patient Effort good  -CB               Patient Profile Reviewed yes  -CB    Pertinent History Of Current Problem Michelle OCHOA Tito is a 71  y.o. female with past medical history of of COPD, CKD, tachycardia, hypertension who presented to [facility] on 12/26/2021 complaining of shortness of air since 12/22/2021.  Patient came in acute respiratory distress by EMS from home.  She states that she uses 4 L supplemental oxygen via nasal cannula at home, but has increased shortness of air and has required more oxygen.  This evening the shortness of air was progressively worse and her oxygen saturations were between 83 and 86% at home, prompting her to call EMS.  On arrival to the ED, patient was only able to talk in short bursts with tachypnea and tachycardia on assessment by ED provider.  Patient currently on BiPAP  -CB    Current Method of Nutrition regular textures; thin liquids  -CB               Additional Documentation Pain Scale: FACES Pre/Post-Treatment (Group)  -CB               Pretreatment Pain Rating 0/10 - no pain  -CB    Posttreatment Pain Rating 0/10 - no pain  -CB               Dentition Assessment edentulous, dentures not available  Patient has 7 lower teeth but no upper teeth.  -CB    Secretion Management WNL/WFL  -CB    Mucosal Quality moist, healthy  -CB               Oral Motor General Assessment WFL  -CB    Oral Motor, Comment Oral mechanism examination revealed adequate ROM and mobility of all oral musculatures.  -CB               Respiratory Support Currently in Use high-flow nasal cannula  4L  -CB    Eating/Swallowing Skills self-fed  -CB    Positioning During Eating upright in chair  -CB    Utensils Used cup; straw  -CB    Consistencies Trialed regular textures; soft textures; thin liquids  -CB               Clinical Swallow Evaluation Summary Patient was seen for Dysphagia evaluation per MD request. MD suspects possible aspiration as she currently has right sided lower lobe pneumonia. Oral mechanism examination revealed patient  has upper denture but were not present. She has 7 lower natural teeth. Oral structure and  functions were WFL as she has full ROM and mobility of oral structures. Provided patient with sips of thins via spoon x 3. Swallow was timely with only a sllght cough noted x 1. Provided trials of thins via cup  x 3. No wet vocal quality was observed. Again patient demonstrated a slight cough  x1. Provided trials of thins via straw. Patient exhibited successive swallows with no overt s/s of aspiration or complications. Provided trials of soft chew x 1. Patient demonstrated adequate rotary chewing. Patient cleared oral cavity effectively. Provided trials of regular texture. Patient demonstrated adequate rotary chewing. Patient cleared oral cavity between bites.  Noted a stronger cough x1. Patient is to be discharged today. Since MD is concerned with aspiration. It is recommended that she receive instrumental assessment to assure safety and tolerance  with current diet of regular prior to her discharge.  -CB               SLP Swallowing Diagnosis R/O pharyngeal dysphagia  -CB    Functional Impact risk of aspiration/pneumonia  -CB    Rehab Potential/Prognosis, Swallowing good, to achieve stated therapy goals  -CB    Swallow Criteria for Skilled Therapeutic Interventions Met demonstrates skilled criteria  -CB               Therapy Frequency (Swallow) evaluation only  -CB    SLP Diet Recommendation regular textures; thin liquids  -CB    Recommended Diagnostics reassess via VFSS (MBS)  -CB    Oral Care Recommendations Oral Care BID/PRN; Toothbrush  -CB    SLP Rec. for Method of Medication Administration meds whole; meds crushed; as tolerated  -CB    Monitor for Signs of Aspiration yes; notify SLP if any concerns  -CB          User Key  (r) = Recorded By, (t) = Taken By, (c) = Cosigned By    Initials Name Effective Dates    Rosalie Nascimento, SLP 09/21/21 -                 EDUCATION  The patient has been educated in the following areas:   Dysphagia (Swallowing Impairment) Oral Care/Hydration.              Time  Calculation:                Rosalie Hoang, SLP  1/4/2022

## 2022-01-04 NOTE — PLAN OF CARE
Goal Outcome Evaluation:      Patient was seen for Dysphagia evaluation per MD request. MD suspects possible aspiration as she currently has right sided lower lobe pneumonia. Oral mechanism examination revealed patient  has upper denture but were not present. She has 7 lower natural teeth. Oral structure and functions were WFL as she has full ROM and mobility of oral structures. Provided patient with sips of thins via spoon x 3. Swallow was timely with only a sllght cough noted x 1. Provided trials of thins via cup  x 3. No wet vocal quality was observed. Again patient demonstrated a slight cough  x1. Provided trials of thins via straw. Patient exhibited successive swallows with no overt s/s of aspiration or complications. Provided trials of soft chew x 1. Patient demonstrated adequate rotary chewing. Patient cleared oral cavity effectively. Provided trials of regular texture. Patient demonstrated adequate rotary chewing. Patient cleared oral cavity between bites.  Noted a stronger cough x1. Patient is to be discharged today.Overall, patient appeared to manage current diet with minimal or no difficulty. Since MD is concerned with aspiration. It is recommended that she receive instrumental assessment to assure safety and tolerance  with current diet of regular prior to her discharge.

## 2022-01-04 NOTE — PLAN OF CARE
Goal Outcome Evaluation:     IMPRESSION: The pt's current swallow functioning is deemed to be WFL.     RECOMMENDATIONS: It is recommended that the pt remain on a regular diet with thin liquids with the following safe swallow precautions: upright at 90 degree angle when eating and drinking, small sips and bites, and slow rate/pace. No further ST intervention warranted at this time, re-consult as indicated

## 2022-01-05 ENCOUNTER — READMISSION MANAGEMENT (OUTPATIENT)
Dept: CALL CENTER | Facility: HOSPITAL | Age: 72
End: 2022-01-05

## 2022-01-05 NOTE — CASE MANAGEMENT/SOCIAL WORK
Case Management Discharge Note           Provided Post Acute Provider List?: Yes  Post Acute Provider List: Inpatient Rehab, Home Health  Delivered To: Patient  Method of Delivery: In person    Selected Continued Care - Discharged on 1/4/2022 Admission date: 12/26/2021 - Discharge disposition: Home or Self Care                 Final Discharge Disposition Code: 06 - home with home health care

## 2022-01-05 NOTE — OUTREACH NOTE
Prep Survey      Responses   [facility]? [facility]   Is LACE score < 7 ? No   Emergency Room discharge w/ pulse ox? No   Eligibility Readm Mgmt   Discharge diagnosis Right lower lobe pneumonia    Does the patient have one of the following disease processes/diagnoses(primary or secondary)? COPD/Pneumonia   Does the patient have Home health ordered? Yes   What is the Home health agency?  [facility]    Prep survey completed? Yes          Kayleen Bai RN

## 2022-01-11 ENCOUNTER — READMISSION MANAGEMENT (OUTPATIENT)
Dept: CALL CENTER | Facility: HOSPITAL | Age: 72
End: 2022-01-11

## 2022-01-11 NOTE — OUTREACH NOTE
COPD/PN Week 1 Survey      Responses   [facility] patient discharged from? [facility]   Does the patient have one of the following disease processes/diagnoses(primary or secondary)? COPD/Pneumonia   Was the primary reason for admission: Pneumonia   Week 1 attempt successful? Yes   Call start time 1033   Call end time 1035   Discharge diagnosis Right lower lobe pneumonia    Is patient permission given to speak with other caregiver? Yes   List who call center can speak with spouse- Geri   Person spoke with today (if not patient) and relationship spouse   Meds reviewed with patient/caregiver? Yes   Is the patient having any side effects they believe may be caused by any medication additions or changes? No   Does the patient have all medications ordered at discharge? Yes   Is the patient taking all medications as directed (includes completed medication regime)? Yes   Does the patient have a primary care provider?  Yes   Has the patient kept scheduled appointments due by today? N/A   What is the Home health agency?  [facility]    Has home health visited the patient within 72 hours of discharge? Yes   Psychosocial issues? No   Did the patient receive a copy of their discharge instructions? Yes   Nursing interventions Reviewed instructions with patient  [with spouse]   What is the patient's perception of their health status since discharge? Improving   Nursing Interventions Nurse provided patient education   Is the patient/caregiver able to teach back the hierarchy of who to call/visit for symptoms/problems? PCP, Specialist, Home health nurse, Urgent Care, ED, 911 Yes   Patient reports what zone on this call? Green Zone   Green Zone Reports doing well,  Breathing without shortness of breath   Green Zone interventions: Take daily medications   Is the patient/caregiver able to teach back signs and symptoms of worsening condition: Fever/chills,  Shortness of breath,  Chest pain   Week 1 call completed? Yes   Wrap up  additional comments Per spouse, patient is doing well, she is working with home health today, no questions or cocnerns.          Rosie Dawson RN

## 2022-01-19 ENCOUNTER — READMISSION MANAGEMENT (OUTPATIENT)
Dept: CALL CENTER | Facility: HOSPITAL | Age: 72
End: 2022-01-19

## 2022-01-19 NOTE — OUTREACH NOTE
COPD/PN Week 2 Survey      Responses   [facility] patient discharged from? [facility]   Does the patient have one of the following disease processes/diagnoses(primary or secondary)? COPD/Pneumonia   Was the primary reason for admission: Pneumonia   Week 2 attempt successful? Yes   Call start time 1354   Call end time 1356   Discharge diagnosis Right lower lobe pneumonia    Is the patient taking all medications as directed (includes completed medication regime)? Yes   Does the patient have a primary care provider?  Yes   Comments regarding PCP doing a telephone appt tomorrow (1/20) with PCP   Has the patient kept scheduled appointments due by today? N/A   What is the Home health agency?  [facility]   Psychosocial issues? No   What is the patient's perception of their health status since discharge? Improving   Nursing Interventions Nurse provided patient education   Is the patient/caregiver able to teach back the hierarchy of who to call/visit for symptoms/problems? PCP, Specialist, Home health nurse, Urgent Care, ED, 911 Yes   Is the patient/caregiver able to teach back signs and symptoms of worsening condition: Fever/chills,  Shortness of breath,  Chest pain   Week 2 call completed? Yes   Wrap up additional comments Says she is doing well, will have a telephone appt with PCP tomorrow, no questions at this time.          Rosie Dawson RN

## 2022-01-27 ENCOUNTER — READMISSION MANAGEMENT (OUTPATIENT)
Dept: CALL CENTER | Facility: HOSPITAL | Age: 72
End: 2022-01-27

## 2022-01-27 NOTE — OUTREACH NOTE
COPD/PN Week 3 Survey      Responses   [facility] patient discharged from? [facility]   Does the patient have one of the following disease processes/diagnoses(primary or secondary)? COPD/Pneumonia   Was the primary reason for admission: Pneumonia   Week 3 attempt successful? Yes   Call start time 1338   Call end time 1343   Discharge diagnosis Right lower lobe pneumonia    Is patient permission given to speak with other caregiver? Yes   List who call center can speak with spouse- Geri   Person spoke with today (if not patient) and relationship patient   Meds reviewed with patient/caregiver? Yes   Does the patient have all medications ordered at discharge? Yes   Is the patient taking all medications as directed (includes completed medication regime)? Yes   Does the patient have a primary care provider?  Yes   Has the patient kept scheduled appointments due by today? Yes   What is the Home health agency?  [facility]    DME comments Wears home O24L.    Pulse Ox monitoring Intermittent   Pulse Ox device source Patient   O2 Sat comments 93% on 4L at rest, drops to low 80's with activity.    O2 Sat: education provided Sat levels,  Monitoring frequency,  When to seek care   Psychosocial issues? No   Did the patient receive a copy of their discharge instructions? Yes   What is the patient's perception of their health status since discharge? Improving   If the patient is a current smoker, are they able to teach back resources for cessation? Not a smoker   Is the patient/caregiver able to teach back the hierarchy of who to call/visit for symptoms/problems? PCP, Specialist, Home health nurse, Urgent Care, ED, 911 Yes   Is the patient/caregiver able to teach back signs and symptoms of worsening condition: Fever/chills,  Shortness of breath,  Chest pain   Is the patient/caregiver able to teach back importance of completing antibiotic course of treatment? --  [Amanda did not go home on antibiotics. ]   Week 3 call completed?  Yes          Shirlene Traylor RN

## 2022-02-07 ENCOUNTER — READMISSION MANAGEMENT (OUTPATIENT)
Dept: CALL CENTER | Facility: HOSPITAL | Age: 72
End: 2022-02-07

## 2022-06-05 ENCOUNTER — HOSPITAL ENCOUNTER (INPATIENT)
Facility: HOSPITAL | Age: 72
LOS: 6 days | Discharge: HOME HEALTH | End: 2022-06-12
Attending: EMERGENCY MEDICINE | Admitting: INTERNAL MEDICINE

## 2022-06-05 DIAGNOSIS — J96.01 ACUTE RESPIRATORY FAILURE WITH HYPOXEMIA: Primary | ICD-10-CM

## 2022-06-05 DIAGNOSIS — I50.9 ACUTE CONGESTIVE HEART FAILURE, UNSPECIFIED HEART FAILURE TYPE: ICD-10-CM

## 2022-06-05 DIAGNOSIS — J44.1 COPD WITH ACUTE EXACERBATION: ICD-10-CM

## 2022-06-05 DIAGNOSIS — J18.9 PNEUMONIA OF LEFT LOWER LOBE DUE TO INFECTIOUS ORGANISM: ICD-10-CM

## 2022-06-05 PROCEDURE — 99285 EMERGENCY DEPT VISIT HI MDM: CPT

## 2022-06-05 PROCEDURE — 93005 ELECTROCARDIOGRAM TRACING: CPT

## 2022-06-05 PROCEDURE — 93005 ELECTROCARDIOGRAM TRACING: CPT | Performed by: EMERGENCY MEDICINE

## 2022-06-06 ENCOUNTER — APPOINTMENT (OUTPATIENT)
Dept: CARDIOLOGY | Facility: HOSPITAL | Age: 72
End: 2022-06-06

## 2022-06-06 ENCOUNTER — APPOINTMENT (OUTPATIENT)
Dept: GENERAL RADIOLOGY | Facility: HOSPITAL | Age: 72
End: 2022-06-06

## 2022-06-06 PROBLEM — J96.01 ACUTE RESPIRATORY FAILURE WITH HYPOXEMIA (HCC): Status: ACTIVE | Noted: 2022-06-06

## 2022-06-06 PROBLEM — I50.9 ACUTE CONGESTIVE HEART FAILURE: Status: ACTIVE | Noted: 2022-06-06

## 2022-06-06 PROBLEM — D69.6 THROMBOCYTOPENIA: Status: ACTIVE | Noted: 2022-06-06

## 2022-06-06 LAB
ALBUMIN SERPL-MCNC: 4 G/DL (ref 3.5–5.2)
ALBUMIN/GLOB SERPL: 1.4 G/DL
ALP SERPL-CCNC: 84 U/L (ref 39–117)
ALT SERPL W P-5'-P-CCNC: 5 U/L (ref 1–33)
ANION GAP SERPL CALCULATED.3IONS-SCNC: 13 MMOL/L (ref 5–15)
ANION GAP SERPL CALCULATED.3IONS-SCNC: 14 MMOL/L (ref 5–15)
ARTERIAL PATENCY WRIST A: POSITIVE
AST SERPL-CCNC: 25 U/L (ref 1–32)
ATMOSPHERIC PRESS: ABNORMAL MM[HG]
B PARAPERT DNA SPEC QL NAA+PROBE: NOT DETECTED
B PERT DNA SPEC QL NAA+PROBE: NOT DETECTED
BASE EXCESS BLDA CALC-SCNC: 3.6 MMOL/L (ref 0–3)
BASOPHILS # BLD AUTO: 0 10*3/MM3 (ref 0–0.2)
BASOPHILS # BLD AUTO: 0.1 10*3/MM3 (ref 0–0.2)
BASOPHILS NFR BLD AUTO: 0.5 % (ref 0–1.5)
BASOPHILS NFR BLD AUTO: 0.6 % (ref 0–1.5)
BDY SITE: ABNORMAL
BILIRUB SERPL-MCNC: 0.6 MG/DL (ref 0–1.2)
BUN SERPL-MCNC: 12 MG/DL (ref 8–23)
BUN SERPL-MCNC: 18 MG/DL (ref 8–23)
BUN/CREAT SERPL: 14.8 (ref 7–25)
BUN/CREAT SERPL: 24 (ref 7–25)
C PNEUM DNA NPH QL NAA+NON-PROBE: NOT DETECTED
CALCIUM SPEC-SCNC: 8.9 MG/DL (ref 8.6–10.5)
CALCIUM SPEC-SCNC: 9 MG/DL (ref 8.6–10.5)
CHLORIDE SERPL-SCNC: 92 MMOL/L (ref 98–107)
CHLORIDE SERPL-SCNC: 93 MMOL/L (ref 98–107)
CO2 BLDA-SCNC: 30.2 MMOL/L (ref 22–29)
CO2 SERPL-SCNC: 28 MMOL/L (ref 22–29)
CO2 SERPL-SCNC: 29 MMOL/L (ref 22–29)
CREAT SERPL-MCNC: 0.75 MG/DL (ref 0.57–1)
CREAT SERPL-MCNC: 0.81 MG/DL (ref 0.57–1)
D-LACTATE SERPL-SCNC: 1.4 MMOL/L (ref 0.5–2)
DEPRECATED RDW RBC AUTO: 45.1 FL (ref 37–54)
DEPRECATED RDW RBC AUTO: 45.5 FL (ref 37–54)
EGFRCR SERPLBLD CKD-EPI 2021: 76.8 ML/MIN/1.73
EGFRCR SERPLBLD CKD-EPI 2021: 84.2 ML/MIN/1.73
EOSINOPHIL # BLD AUTO: 0 10*3/MM3 (ref 0–0.4)
EOSINOPHIL # BLD AUTO: 0 10*3/MM3 (ref 0–0.4)
EOSINOPHIL NFR BLD AUTO: 0 % (ref 0.3–6.2)
EOSINOPHIL NFR BLD AUTO: 0 % (ref 0.3–6.2)
ERYTHROCYTE [DISTWIDTH] IN BLOOD BY AUTOMATED COUNT: 15.8 % (ref 12.3–15.4)
ERYTHROCYTE [DISTWIDTH] IN BLOOD BY AUTOMATED COUNT: 15.9 % (ref 12.3–15.4)
FLUAV SUBTYP SPEC NAA+PROBE: NOT DETECTED
FLUBV RNA ISLT QL NAA+PROBE: NOT DETECTED
GLOBULIN UR ELPH-MCNC: 2.8 GM/DL
GLUCOSE SERPL-MCNC: 114 MG/DL (ref 65–99)
GLUCOSE SERPL-MCNC: 150 MG/DL (ref 65–99)
HADV DNA SPEC NAA+PROBE: NOT DETECTED
HCO3 BLDA-SCNC: 28.8 MMOL/L (ref 21–28)
HCOV 229E RNA SPEC QL NAA+PROBE: NOT DETECTED
HCOV HKU1 RNA SPEC QL NAA+PROBE: NOT DETECTED
HCOV NL63 RNA SPEC QL NAA+PROBE: NOT DETECTED
HCOV OC43 RNA SPEC QL NAA+PROBE: NOT DETECTED
HCT VFR BLD AUTO: 38.1 % (ref 34–46.6)
HCT VFR BLD AUTO: 39.7 % (ref 34–46.6)
HEMODILUTION: NO
HGB BLD-MCNC: 12.4 G/DL (ref 12–15.9)
HGB BLD-MCNC: 12.5 G/DL (ref 12–15.9)
HMPV RNA NPH QL NAA+NON-PROBE: NOT DETECTED
HOLD SPECIMEN: NORMAL
HPIV1 RNA ISLT QL NAA+PROBE: NOT DETECTED
HPIV2 RNA SPEC QL NAA+PROBE: NOT DETECTED
HPIV3 RNA NPH QL NAA+PROBE: NOT DETECTED
HPIV4 P GENE NPH QL NAA+PROBE: NOT DETECTED
INHALED O2 CONCENTRATION: 100 %
LYMPHOCYTES # BLD AUTO: 1 10*3/MM3 (ref 0.7–3.1)
LYMPHOCYTES # BLD AUTO: 1.7 10*3/MM3 (ref 0.7–3.1)
LYMPHOCYTES NFR BLD AUTO: 12.6 % (ref 19.6–45.3)
LYMPHOCYTES NFR BLD AUTO: 14 % (ref 19.6–45.3)
M PNEUMO IGG SER IA-ACNC: NOT DETECTED
MCH RBC QN AUTO: 25.7 PG (ref 26.6–33)
MCH RBC QN AUTO: 26.1 PG (ref 26.6–33)
MCHC RBC AUTO-ENTMCNC: 31.5 G/DL (ref 31.5–35.7)
MCHC RBC AUTO-ENTMCNC: 32.6 G/DL (ref 31.5–35.7)
MCV RBC AUTO: 80.3 FL (ref 79–97)
MCV RBC AUTO: 81.5 FL (ref 79–97)
MODALITY: ABNORMAL
MONOCYTES # BLD AUTO: 0.2 10*3/MM3 (ref 0.1–0.9)
MONOCYTES # BLD AUTO: 1 10*3/MM3 (ref 0.1–0.9)
MONOCYTES NFR BLD AUTO: 2.1 % (ref 5–12)
MONOCYTES NFR BLD AUTO: 8.6 % (ref 5–12)
NEUTROPHILS NFR BLD AUTO: 6.6 10*3/MM3 (ref 1.7–7)
NEUTROPHILS NFR BLD AUTO: 76.9 % (ref 42.7–76)
NEUTROPHILS NFR BLD AUTO: 84.7 % (ref 42.7–76)
NEUTROPHILS NFR BLD AUTO: 9.3 10*3/MM3 (ref 1.7–7)
NRBC BLD AUTO-RTO: 0 /100 WBC (ref 0–0.2)
NRBC BLD AUTO-RTO: 0 /100 WBC (ref 0–0.2)
NT-PROBNP SERPL-MCNC: 326.2 PG/ML (ref 0–900)
PCO2 BLDA: 44.6 MM HG (ref 35–48)
PH BLDA: 7.42 PH UNITS (ref 7.35–7.45)
PLATELET # BLD AUTO: 123 10*3/MM3 (ref 140–450)
PLATELET # BLD AUTO: 124 10*3/MM3 (ref 140–450)
PMV BLD AUTO: 8.4 FL (ref 6–12)
PMV BLD AUTO: 8.7 FL (ref 6–12)
PO2 BLDA: 110.2 MM HG (ref 83–108)
POTASSIUM SERPL-SCNC: 3.6 MMOL/L (ref 3.5–5.2)
POTASSIUM SERPL-SCNC: 4 MMOL/L (ref 3.5–5.2)
PROCALCITONIN SERPL-MCNC: 0.23 NG/ML (ref 0–0.25)
PROT SERPL-MCNC: 6.8 G/DL (ref 6–8.5)
QT INTERVAL: 339 MS
RBC # BLD AUTO: 4.75 10*6/MM3 (ref 3.77–5.28)
RBC # BLD AUTO: 4.87 10*6/MM3 (ref 3.77–5.28)
RHINOVIRUS RNA SPEC NAA+PROBE: NOT DETECTED
RSV RNA NPH QL NAA+NON-PROBE: NOT DETECTED
SAO2 % BLDCOA: 98.3 % (ref 94–98)
SARS-COV-2 RNA NPH QL NAA+NON-PROBE: NOT DETECTED
SODIUM SERPL-SCNC: 134 MMOL/L (ref 136–145)
SODIUM SERPL-SCNC: 135 MMOL/L (ref 136–145)
THEOPHYLLINE SERPL-MCNC: 4.3 MCG/ML (ref 10–20)
TOTAL RATE: 26 BREATHS/MINUTE
TROPONIN T SERPL-MCNC: <0.01 NG/ML (ref 0–0.03)
WBC NRBC COR # BLD: 12.1 10*3/MM3 (ref 3.4–10.8)
WBC NRBC COR # BLD: 7.8 10*3/MM3 (ref 3.4–10.8)
WHOLE BLOOD HOLD COAG: NORMAL
WHOLE BLOOD HOLD COAG: NORMAL
WHOLE BLOOD HOLD SPECIMEN: NORMAL
WHOLE BLOOD HOLD SPECIMEN: NORMAL

## 2022-06-06 PROCEDURE — 94640 AIRWAY INHALATION TREATMENT: CPT

## 2022-06-06 PROCEDURE — 36415 COLL VENOUS BLD VENIPUNCTURE: CPT | Performed by: INTERNAL MEDICINE

## 2022-06-06 PROCEDURE — 25010000002 METHYLPREDNISOLONE PER 125 MG: Performed by: EMERGENCY MEDICINE

## 2022-06-06 PROCEDURE — 80053 COMPREHEN METABOLIC PANEL: CPT | Performed by: EMERGENCY MEDICINE

## 2022-06-06 PROCEDURE — 83605 ASSAY OF LACTIC ACID: CPT

## 2022-06-06 PROCEDURE — 0202U NFCT DS 22 TRGT SARS-COV-2: CPT | Performed by: EMERGENCY MEDICINE

## 2022-06-06 PROCEDURE — 94799 UNLISTED PULMONARY SVC/PX: CPT

## 2022-06-06 PROCEDURE — 93306 TTE W/DOPPLER COMPLETE: CPT

## 2022-06-06 PROCEDURE — 25010000002 METHYLPREDNISOLONE PER 40 MG: Performed by: INTERNAL MEDICINE

## 2022-06-06 PROCEDURE — 87040 BLOOD CULTURE FOR BACTERIA: CPT | Performed by: EMERGENCY MEDICINE

## 2022-06-06 PROCEDURE — 25010000002 ENOXAPARIN PER 10 MG: Performed by: INTERNAL MEDICINE

## 2022-06-06 PROCEDURE — 80198 ASSAY OF THEOPHYLLINE: CPT | Performed by: INTERNAL MEDICINE

## 2022-06-06 PROCEDURE — 36600 WITHDRAWAL OF ARTERIAL BLOOD: CPT

## 2022-06-06 PROCEDURE — 84484 ASSAY OF TROPONIN QUANT: CPT | Performed by: EMERGENCY MEDICINE

## 2022-06-06 PROCEDURE — 25010000002 FUROSEMIDE PER 20 MG: Performed by: INTERNAL MEDICINE

## 2022-06-06 PROCEDURE — 93306 TTE W/DOPPLER COMPLETE: CPT | Performed by: INTERNAL MEDICINE

## 2022-06-06 PROCEDURE — 84145 PROCALCITONIN (PCT): CPT | Performed by: EMERGENCY MEDICINE

## 2022-06-06 PROCEDURE — 85025 COMPLETE CBC W/AUTO DIFF WBC: CPT | Performed by: EMERGENCY MEDICINE

## 2022-06-06 PROCEDURE — 71045 X-RAY EXAM CHEST 1 VIEW: CPT

## 2022-06-06 PROCEDURE — 82803 BLOOD GASES ANY COMBINATION: CPT

## 2022-06-06 PROCEDURE — 99223 1ST HOSP IP/OBS HIGH 75: CPT | Performed by: INTERNAL MEDICINE

## 2022-06-06 PROCEDURE — 85025 COMPLETE CBC W/AUTO DIFF WBC: CPT | Performed by: INTERNAL MEDICINE

## 2022-06-06 PROCEDURE — 83880 ASSAY OF NATRIURETIC PEPTIDE: CPT | Performed by: EMERGENCY MEDICINE

## 2022-06-06 PROCEDURE — 25010000002 FUROSEMIDE PER 20 MG: Performed by: EMERGENCY MEDICINE

## 2022-06-06 PROCEDURE — 94660 CPAP INITIATION&MGMT: CPT

## 2022-06-06 RX ORDER — IPRATROPIUM BROMIDE AND ALBUTEROL SULFATE 2.5; .5 MG/3ML; MG/3ML
3 SOLUTION RESPIRATORY (INHALATION)
Status: DISCONTINUED | OUTPATIENT
Start: 2022-06-06 | End: 2022-06-06 | Stop reason: SDUPTHER

## 2022-06-06 RX ORDER — ONDANSETRON 2 MG/ML
4 INJECTION INTRAMUSCULAR; INTRAVENOUS EVERY 6 HOURS PRN
Status: DISCONTINUED | OUTPATIENT
Start: 2022-06-06 | End: 2022-06-12 | Stop reason: HOSPADM

## 2022-06-06 RX ORDER — ACETAMINOPHEN 160 MG/5ML
650 SOLUTION ORAL EVERY 4 HOURS PRN
Status: DISCONTINUED | OUTPATIENT
Start: 2022-06-06 | End: 2022-06-12 | Stop reason: HOSPADM

## 2022-06-06 RX ORDER — IPRATROPIUM BROMIDE AND ALBUTEROL SULFATE 2.5; .5 MG/3ML; MG/3ML
3 SOLUTION RESPIRATORY (INHALATION)
Status: DISCONTINUED | OUTPATIENT
Start: 2022-06-06 | End: 2022-06-07

## 2022-06-06 RX ORDER — BUDESONIDE AND FORMOTEROL FUMARATE DIHYDRATE 160; 4.5 UG/1; UG/1
2 AEROSOL RESPIRATORY (INHALATION)
Status: DISCONTINUED | OUTPATIENT
Start: 2022-06-06 | End: 2022-06-12 | Stop reason: HOSPADM

## 2022-06-06 RX ORDER — MONTELUKAST SODIUM 10 MG/1
10 TABLET ORAL NIGHTLY
Status: DISCONTINUED | OUTPATIENT
Start: 2022-06-07 | End: 2022-06-12 | Stop reason: HOSPADM

## 2022-06-06 RX ORDER — METOPROLOL SUCCINATE 50 MG/1
50 TABLET, EXTENDED RELEASE ORAL 2 TIMES DAILY
Status: DISCONTINUED | OUTPATIENT
Start: 2022-06-07 | End: 2022-06-12 | Stop reason: HOSPADM

## 2022-06-06 RX ORDER — FUROSEMIDE 10 MG/ML
40 INJECTION INTRAMUSCULAR; INTRAVENOUS
Status: DISCONTINUED | OUTPATIENT
Start: 2022-06-06 | End: 2022-06-12 | Stop reason: HOSPADM

## 2022-06-06 RX ORDER — NITROGLYCERIN 0.4 MG/1
0.4 TABLET SUBLINGUAL
Status: DISCONTINUED | OUTPATIENT
Start: 2022-06-06 | End: 2022-06-12 | Stop reason: HOSPADM

## 2022-06-06 RX ORDER — DILTIAZEM HYDROCHLORIDE 120 MG/1
120 CAPSULE, COATED, EXTENDED RELEASE ORAL DAILY
COMMUNITY
End: 2022-06-14

## 2022-06-06 RX ORDER — ENOXAPARIN SODIUM 100 MG/ML
40 INJECTION SUBCUTANEOUS DAILY
Status: DISCONTINUED | OUTPATIENT
Start: 2022-06-06 | End: 2022-06-12 | Stop reason: HOSPADM

## 2022-06-06 RX ORDER — SODIUM CHLORIDE 0.9 % (FLUSH) 0.9 %
10 SYRINGE (ML) INJECTION AS NEEDED
Status: DISCONTINUED | OUTPATIENT
Start: 2022-06-06 | End: 2022-06-12 | Stop reason: HOSPADM

## 2022-06-06 RX ORDER — ROSUVASTATIN CALCIUM 10 MG/1
20 TABLET, COATED ORAL DAILY
Status: DISCONTINUED | OUTPATIENT
Start: 2022-06-07 | End: 2022-06-12 | Stop reason: HOSPADM

## 2022-06-06 RX ORDER — IPRATROPIUM BROMIDE AND ALBUTEROL SULFATE 2.5; .5 MG/3ML; MG/3ML
3 SOLUTION RESPIRATORY (INHALATION) EVERY 4 HOURS PRN
Status: DISCONTINUED | OUTPATIENT
Start: 2022-06-06 | End: 2022-06-12 | Stop reason: HOSPADM

## 2022-06-06 RX ORDER — GUAIFENESIN 600 MG/1
1200 TABLET, EXTENDED RELEASE ORAL EVERY 12 HOURS SCHEDULED
Status: DISCONTINUED | OUTPATIENT
Start: 2022-06-06 | End: 2022-06-12 | Stop reason: HOSPADM

## 2022-06-06 RX ORDER — METOPROLOL SUCCINATE 50 MG/1
50 TABLET, EXTENDED RELEASE ORAL
Status: DISCONTINUED | OUTPATIENT
Start: 2022-06-06 | End: 2022-06-07

## 2022-06-06 RX ORDER — POTASSIUM CHLORIDE 20 MEQ/1
20 TABLET, EXTENDED RELEASE ORAL DAILY
Status: DISCONTINUED | OUTPATIENT
Start: 2022-06-07 | End: 2022-06-12 | Stop reason: HOSPADM

## 2022-06-06 RX ORDER — METHYLPREDNISOLONE SODIUM SUCCINATE 125 MG/2ML
125 INJECTION, POWDER, LYOPHILIZED, FOR SOLUTION INTRAMUSCULAR; INTRAVENOUS ONCE
Status: COMPLETED | OUTPATIENT
Start: 2022-06-06 | End: 2022-06-06

## 2022-06-06 RX ORDER — ACETAMINOPHEN 325 MG/1
650 TABLET ORAL EVERY 4 HOURS PRN
Status: DISCONTINUED | OUTPATIENT
Start: 2022-06-06 | End: 2022-06-12 | Stop reason: HOSPADM

## 2022-06-06 RX ORDER — DILTIAZEM HYDROCHLORIDE 120 MG/1
120 CAPSULE, COATED, EXTENDED RELEASE ORAL DAILY
Status: DISCONTINUED | OUTPATIENT
Start: 2022-06-07 | End: 2022-06-12 | Stop reason: HOSPADM

## 2022-06-06 RX ORDER — ALBUTEROL SULFATE 2.5 MG/3ML
2.5 SOLUTION RESPIRATORY (INHALATION) ONCE
Status: COMPLETED | OUTPATIENT
Start: 2022-06-06 | End: 2022-06-06

## 2022-06-06 RX ORDER — ALBUTEROL SULFATE 2.5 MG/3ML
2.5 SOLUTION RESPIRATORY (INHALATION) EVERY 4 HOURS PRN
Status: DISCONTINUED | OUTPATIENT
Start: 2022-06-06 | End: 2022-06-12 | Stop reason: HOSPADM

## 2022-06-06 RX ORDER — ROPINIROLE 1 MG/1
1 TABLET, FILM COATED ORAL 2 TIMES DAILY
Status: DISCONTINUED | OUTPATIENT
Start: 2022-06-07 | End: 2022-06-12 | Stop reason: HOSPADM

## 2022-06-06 RX ORDER — ONDANSETRON 4 MG/1
4 TABLET, FILM COATED ORAL EVERY 6 HOURS PRN
Status: DISCONTINUED | OUTPATIENT
Start: 2022-06-06 | End: 2022-06-12 | Stop reason: HOSPADM

## 2022-06-06 RX ORDER — ALUMINA, MAGNESIA, AND SIMETHICONE 2400; 2400; 240 MG/30ML; MG/30ML; MG/30ML
15 SUSPENSION ORAL EVERY 6 HOURS PRN
Status: DISCONTINUED | OUTPATIENT
Start: 2022-06-06 | End: 2022-06-12 | Stop reason: HOSPADM

## 2022-06-06 RX ORDER — CHOLECALCIFEROL (VITAMIN D3) 125 MCG
5 CAPSULE ORAL NIGHTLY PRN
Status: DISCONTINUED | OUTPATIENT
Start: 2022-06-06 | End: 2022-06-12 | Stop reason: HOSPADM

## 2022-06-06 RX ORDER — ASPIRIN 81 MG/1
81 TABLET ORAL DAILY
Status: DISCONTINUED | OUTPATIENT
Start: 2022-06-06 | End: 2022-06-12 | Stop reason: HOSPADM

## 2022-06-06 RX ORDER — DILTIAZEM HYDROCHLORIDE 180 MG/1
180 CAPSULE, COATED, EXTENDED RELEASE ORAL
Status: DISCONTINUED | OUTPATIENT
Start: 2022-06-06 | End: 2022-06-07

## 2022-06-06 RX ORDER — PANTOPRAZOLE SODIUM 40 MG/1
40 TABLET, DELAYED RELEASE ORAL
Status: DISCONTINUED | OUTPATIENT
Start: 2022-06-06 | End: 2022-06-12 | Stop reason: HOSPADM

## 2022-06-06 RX ORDER — SODIUM CHLORIDE 0.9 % (FLUSH) 0.9 %
10 SYRINGE (ML) INJECTION EVERY 12 HOURS SCHEDULED
Status: DISCONTINUED | OUTPATIENT
Start: 2022-06-06 | End: 2022-06-12 | Stop reason: HOSPADM

## 2022-06-06 RX ORDER — ACETAMINOPHEN 650 MG/1
650 SUPPOSITORY RECTAL EVERY 4 HOURS PRN
Status: DISCONTINUED | OUTPATIENT
Start: 2022-06-06 | End: 2022-06-12 | Stop reason: HOSPADM

## 2022-06-06 RX ORDER — GABAPENTIN 300 MG/1
900 CAPSULE ORAL NIGHTLY
Status: DISCONTINUED | OUTPATIENT
Start: 2022-06-07 | End: 2022-06-12 | Stop reason: HOSPADM

## 2022-06-06 RX ORDER — METHYLPREDNISOLONE SODIUM SUCCINATE 40 MG/ML
40 INJECTION, POWDER, LYOPHILIZED, FOR SOLUTION INTRAMUSCULAR; INTRAVENOUS EVERY 8 HOURS
Status: DISCONTINUED | OUTPATIENT
Start: 2022-06-06 | End: 2022-06-09

## 2022-06-06 RX ORDER — AZITHROMYCIN 250 MG/1
500 TABLET, FILM COATED ORAL
Status: DISCONTINUED | OUTPATIENT
Start: 2022-06-06 | End: 2022-06-09

## 2022-06-06 RX ORDER — FUROSEMIDE 10 MG/ML
80 INJECTION INTRAMUSCULAR; INTRAVENOUS ONCE
Status: COMPLETED | OUTPATIENT
Start: 2022-06-06 | End: 2022-06-06

## 2022-06-06 RX ORDER — ROSUVASTATIN CALCIUM 10 MG/1
10 TABLET, COATED ORAL NIGHTLY
Status: DISCONTINUED | OUTPATIENT
Start: 2022-06-06 | End: 2022-06-07

## 2022-06-06 RX ADMIN — Medication 10 ML: at 23:18

## 2022-06-06 RX ADMIN — PANTOPRAZOLE SODIUM 40 MG: 40 TABLET, DELAYED RELEASE ORAL at 06:52

## 2022-06-06 RX ADMIN — ENOXAPARIN SODIUM 40 MG: 100 INJECTION SUBCUTANEOUS at 16:19

## 2022-06-06 RX ADMIN — ASPIRIN 81 MG: 81 TABLET, COATED ORAL at 08:57

## 2022-06-06 RX ADMIN — ROSUVASTATIN 10 MG: 10 TABLET, FILM COATED ORAL at 23:18

## 2022-06-06 RX ADMIN — METHYLPREDNISOLONE SODIUM SUCCINATE 40 MG: 40 INJECTION, POWDER, FOR SOLUTION INTRAMUSCULAR; INTRAVENOUS at 10:13

## 2022-06-06 RX ADMIN — METHYLPREDNISOLONE SODIUM SUCCINATE 125 MG: 125 INJECTION, POWDER, FOR SOLUTION INTRAMUSCULAR; INTRAVENOUS at 02:38

## 2022-06-06 RX ADMIN — IPRATROPIUM BROMIDE AND ALBUTEROL SULFATE 3 ML: .5; 3 SOLUTION RESPIRATORY (INHALATION) at 22:48

## 2022-06-06 RX ADMIN — FUROSEMIDE 80 MG: 10 INJECTION, SOLUTION INTRAMUSCULAR; INTRAVENOUS at 02:38

## 2022-06-06 RX ADMIN — GABAPENTIN 900 MG: 300 CAPSULE ORAL at 23:35

## 2022-06-06 RX ADMIN — Medication 10 ML: at 08:57

## 2022-06-06 RX ADMIN — GUAIFENESIN 1200 MG: 600 TABLET, EXTENDED RELEASE ORAL at 08:57

## 2022-06-06 RX ADMIN — GUAIFENESIN 1200 MG: 600 TABLET, EXTENDED RELEASE ORAL at 23:18

## 2022-06-06 RX ADMIN — IPRATROPIUM BROMIDE AND ALBUTEROL SULFATE 3 ML: .5; 3 SOLUTION RESPIRATORY (INHALATION) at 15:02

## 2022-06-06 RX ADMIN — FUROSEMIDE 40 MG: 10 INJECTION, SOLUTION INTRAMUSCULAR; INTRAVENOUS at 23:18

## 2022-06-06 RX ADMIN — METOPROLOL SUCCINATE 50 MG: 25 TABLET, EXTENDED RELEASE ORAL at 08:58

## 2022-06-06 RX ADMIN — ROPINIROLE HYDROCHLORIDE 1 MG: 1 TABLET, FILM COATED ORAL at 23:35

## 2022-06-06 RX ADMIN — IPRATROPIUM BROMIDE AND ALBUTEROL SULFATE 3 ML: .5; 3 SOLUTION RESPIRATORY (INHALATION) at 11:14

## 2022-06-06 RX ADMIN — BUDESONIDE AND FORMOTEROL FUMARATE DIHYDRATE 2 PUFF: 160; 4.5 AEROSOL RESPIRATORY (INHALATION) at 07:01

## 2022-06-06 RX ADMIN — BUDESONIDE AND FORMOTEROL FUMARATE DIHYDRATE 2 PUFF: 160; 4.5 AEROSOL RESPIRATORY (INHALATION) at 19:08

## 2022-06-06 RX ADMIN — AZITHROMYCIN MONOHYDRATE 500 MG: 250 TABLET ORAL at 06:52

## 2022-06-06 RX ADMIN — ALBUTEROL SULFATE 2.5 MG: 2.5 SOLUTION RESPIRATORY (INHALATION) at 01:51

## 2022-06-06 RX ADMIN — IPRATROPIUM BROMIDE AND ALBUTEROL SULFATE 3 ML: .5; 3 SOLUTION RESPIRATORY (INHALATION) at 19:04

## 2022-06-06 RX ADMIN — FUROSEMIDE 40 MG: 10 INJECTION, SOLUTION INTRAMUSCULAR; INTRAVENOUS at 08:58

## 2022-06-06 RX ADMIN — IPRATROPIUM BROMIDE AND ALBUTEROL SULFATE 3 ML: .5; 3 SOLUTION RESPIRATORY (INHALATION) at 07:01

## 2022-06-06 RX ADMIN — METHYLPREDNISOLONE SODIUM SUCCINATE 40 MG: 40 INJECTION, POWDER, FOR SOLUTION INTRAMUSCULAR; INTRAVENOUS at 17:54

## 2022-06-06 RX ADMIN — DILTIAZEM HYDROCHLORIDE 180 MG: 180 CAPSULE, COATED, EXTENDED RELEASE ORAL at 08:57

## 2022-06-06 RX ADMIN — MONTELUKAST 10 MG: 10 TABLET, FILM COATED ORAL at 23:35

## 2022-06-06 NOTE — DISCHARGE PLACEMENT REQUEST
"Michelle Francis (72 y.o. Female)             Date of Birth   1950    Social Security Number       Address   [address] [place] [address] [place] [address]    Home Phone   [phone]    MRN   [id number]       Anabaptism   None    Marital Status                               Admission Date   6/5/22    Admission Type   Emergency    Admitting Provider   Virgen Pascual MD    Attending Provider   Virgen Pascual MD    Department, Room/Bed   [facility] EMERGENCY DEPARTMENT, 15/15       Discharge Date       Discharge Disposition       Discharge Destination                               Attending Provider: Virgen Pascual MD    Allergies: No Known Allergies    Isolation: None   Infection: None   Code Status: No CPR   Advance Care Planning Activity    Ht: 165.1 cm (65\")   Wt: 95.3 kg (210 lb)    Admission Cmt: None   Principal Problem: None                Active Insurance as of 6/5/2022     Primary Coverage     Payor Plan Insurance Group Employer/Plan Group    HUMANA MEDICARE REPLACEMENT HUMANA MEDICARE REPLACEMENT [id number]     Payor Plan Address Payor Plan Phone Number Payor Plan Fax Number Effective Dates    [address] [address] [phone]  1/1/2018 - None Entered    [facility] [id number]       Subscriber Name Subscriber Birth Date Member ID       MICHELLE FRANCIS 1950 [id number]                 Emergency Contacts      (Rel.) Home Phone Work Phone Mobile Phone    CHAUNCEY FRANCIS (Spouse) [phone] -- [phone]            "

## 2022-06-06 NOTE — ED PROVIDER NOTES
Subjective   72-year-old female with history of COPD who wears 5 L per nasal cannula at baseline reports increased dyspnea from her baseline, worse with exertion, associate with a productive purulent cough and wheezing.  Patient denies any fever or chest pain.  No GI or  symptoms.  No history of DVT or PE, no recent trips or travel, no calf pain or swelling, no known sick contacts.          Review of Systems   Constitutional: Negative.    HENT: Positive for congestion.    Respiratory: Positive for cough, shortness of breath and wheezing.    Cardiovascular: Negative.    Gastrointestinal: Negative.    Genitourinary: Negative.    Musculoskeletal: Negative.    Skin: Negative.    Neurological: Negative.    Psychiatric/Behavioral: Negative.    All other systems reviewed and are negative.      Past Medical History:   Diagnosis Date   • Arthritis    • Colon polyps     Dr Bates   • COPD (chronic obstructive pulmonary disease) (HCC)    • History of emphysema    • Hyperlipidemia    • Hypertension    • Kidney disease    • Osteoporosis    • Restless leg syndrome     sees Dr Seipel   • Sleep apnea     npsg [facility] 2014, ahi 5.9, on auto bipap min 8 max, PS 2-8-Alcaraz's, nasal mask       No Known Allergies    Past Surgical History:   Procedure Laterality Date   • CARDIOVASCULAR STRESS TEST  2020   • CATARACT EXTRACTION      Dr Moyer   • CEREBRAL ANEURYSM REPAIR      Brain Aneurysm approx 2009, treated with stents and coils, Dr. Fraga   • COLON SURGERY      partial colectomy-2013 Dr Valladares- due to multiple large polyps   • ECHO - CONVERTED  2019   • EXPLORATORY LAPAROTOMY      lysis of intra abdominal adhesions, primary ventral hernia repair 08/01/2018 Dr West   • WRIST SURGERY      wrist fracture - Dr Patton       Family History   Problem Relation Age of Onset   • Diabetes Mother    • Hypertension Mother    • Colon cancer Father    • Colon cancer Sister    • Arthritis Brother    • Hypertension Brother    • Allergies Other         Social History     Socioeconomic History   • Marital status:    Tobacco Use   • Smoking status: Former Smoker   • Smokeless tobacco: Never Used   Vaping Use   • Vaping Use: Never used   Substance and Sexual Activity   • Alcohol use: Yes   • Drug use: No   • Sexual activity: Defer           Objective   Physical Exam  Constitutional:       Comments: Elderly female in no acute distress, alert and appropriate   HENT:      Head: Normocephalic and atraumatic.      Mouth/Throat:      Mouth: Mucous membranes are moist.      Pharynx: Oropharynx is clear.   Eyes:      Conjunctiva/sclera: Conjunctivae normal.      Pupils: Pupils are equal, round, and reactive to light.   Cardiovascular:      Rate and Rhythm: Normal rate and regular rhythm.      Heart sounds: Normal heart sounds.   Pulmonary:      Comments: Mildly diminished bilaterally, no wheezes or rhonchi appreciated  Abdominal:      General: Bowel sounds are normal. There is no distension.      Palpations: Abdomen is soft.      Tenderness: There is no abdominal tenderness.   Musculoskeletal:         General: No swelling or tenderness. Normal range of motion.   Skin:     General: Skin is warm and dry.      Capillary Refill: Capillary refill takes less than 2 seconds.   Neurological:      General: No focal deficit present.      Mental Status: She is oriented to person, place, and time.   Psychiatric:         Mood and Affect: Mood normal.         Behavior: Behavior normal.         Procedures           ED Course                                                 MDM  Number of Diagnoses or Management Options  Acute congestive heart failure, unspecified heart failure type (HCC)  Acute respiratory failure with hypoxemia (HCC)  Diagnosis management comments: Results for orders placed or performed during the hospital encounter of 06/05/22  -Respiratory Panel PCR w/COVID-19(SARS-CoV-2) ZOILA/RAFAELA/OSCAR/PAD/COR/MAD/RASHAD In-House, NP Swab in UTM/VTM, 3-4 HR TAT - Swab,  Nasopharynx:   Specimen: Nasopharynx; Swab       Result                      Value             Ref Range           ADENOVIRUS, PCR             Not Detected      Not Detected        Coronavirus 229E            Not Detected      Not Detected        Coronavirus HKU1            Not Detected      Not Detected        Coronavirus NL63            Not Detected      Not Detected        Coronavirus OC43            Not Detected      Not Detected        COVID19                     Not Detected      Not Detected*       Human Metapneumovirus       Not Detected      Not Detected        Human Rhinovirus/Enter*     Not Detected      Not Detected        Influenza A PCR             Not Detected      Not Detected        Influenza B PCR             Not Detected      Not Detected        Parainfluenza Virus 1       Not Detected      Not Detected        Parainfluenza Virus 2       Not Detected      Not Detected        Parainfluenza Virus 3       Not Detected      Not Detected        Parainfluenza Virus 4       Not Detected      Not Detected        RSV, PCR                    Not Detected      Not Detected        Bordetella pertussis p*     Not Detected      Not Detected        Bordetella parapertuss*     Not Detected      Not Detected        Chlamydophila pneumoni*     Not Detected      Not Detected        Mycoplasma pneumo by P*     Not Detected      Not Detected   -Procalcitonin:   Specimen: Blood       Result                      Value             Ref Range           Procalcitonin               0.23              0.00 - 0.25 *  -Comprehensive Metabolic Panel:   Specimen: Blood       Result                      Value             Ref Range           Glucose                     114 (H)           65 - 99 mg/dL       BUN                         12                8 - 23 mg/dL        Creatinine                  0.81              0.57 - 1.00 *       Sodium                      135 (L)           136 - 145 mm*       Potassium                   4.0                3.5 - 5.2 mm*       Chloride                    93 (L)            98 - 107 mmo*       CO2                         28.0              22.0 - 29.0 *       Calcium                     9.0               8.6 - 10.5 m*       Total Protein               6.8               6.0 - 8.5 g/*       Albumin                     4.00              3.50 - 5.20 *       ALT (SGPT)                  5                 1 - 33 U/L          AST (SGOT)                  25                1 - 32 U/L          Alkaline Phosphatase        84                39 - 117 U/L        Total Bilirubin             0.6               0.0 - 1.2 mg*       Globulin                    2.8               gm/dL               A/G Ratio                   1.4               g/dL                BUN/Creatinine Ratio        14.8              7.0 - 25.0          Anion Gap                   14.0              5.0 - 15.0 m*       eGFR                        76.8              >60.0 mL/min*  -BNP:   Specimen: Blood       Result                      Value             Ref Range           proBNP                      326.2             0.0 - 900.0 *  -Troponin:   Specimen: Blood       Result                      Value             Ref Range           Troponin T                  <0.010            0.000 - 0.03*  -CBC Auto Differential:   Specimen: Blood       Result                      Value             Ref Range           WBC                         12.10 (H)         3.40 - 10.80*       RBC                         4.87              3.77 - 5.28 *       Hemoglobin                  12.5              12.0 - 15.9 *       Hematocrit                  39.7              34.0 - 46.6 %       MCV                         81.5              79.0 - 97.0 *       MCH                         25.7 (L)          26.6 - 33.0 *       MCHC                        31.5              31.5 - 35.7 *       RDW                         15.8 (H)          12.3 - 15.4 %       RDW-SD                      45.1               37.0 - 54.0 *       MPV                         8.7               6.0 - 12.0 fL       Platelets                   123 (L)           140 - 450 10*       Neutrophil %                76.9 (H)          42.7 - 76.0 %       Lymphocyte %                14.0 (L)          19.6 - 45.3 %       Monocyte %                  8.6               5.0 - 12.0 %        Eosinophil %                0.0 (L)           0.3 - 6.2 %         Basophil %                  0.5               0.0 - 1.5 %         Neutrophils, Absolute       9.30 (H)          1.70 - 7.00 *       Lymphocytes, Absolute       1.70              0.70 - 3.10 *       Monocytes, Absolute         1.00 (H)          0.10 - 0.90 *       Eosinophils, Absolute       0.00              0.00 - 0.40 *       Basophils, Absolute         0.10              0.00 - 0.20 *       nRBC                        0.0               0.0 - 0.2 /1*  -Blood Gas, Arterial -:   Specimen: Arterial Blood       Result                      Value             Ref Range           Site                        Right Radial                          Emile's Test                Positive                              pH, Arterial                7.418             7.350 - 7.45*       pCO2, Arterial              44.6              35.0 - 48.0 *       pO2, Arterial               110.2 (H)         83.0 - 108.0*       HCO3, Arterial              28.8 (H)          21.0 - 28.0 *       Base Excess, Arterial       3.6 (H)           0.0 - 3.0 mm*       O2 Saturation, Arterial     98.3 (H)          94.0 - 98.0 %       CO2 Content                 30.2 (H)          22 - 29 mmol*       Barometric Pressure fo*                                           Modality                    NRB                                   FIO2                        100               %                   Rate                        26.0000           Breaths/nissa*       Hemodilution                No                               -POC Lactate:   Specimen:  Blood       Result                      Value             Ref Range           Lactate                     1.4               0.5 - 2.0 mm*  -ECG 12 Lead:        Result                      Value             Ref Range           QT Interval                 339               ms             -Green Top (Gel):        Result                      Value             Ref Range           Extra Tube                                                    Hold for add-ons.  -Lavender Top:        Result                      Value             Ref Range           Extra Tube                                                    hold for add-on  -Gold Top - SST:        Result                      Value             Ref Range           Extra Tube                                                    Hold for add-ons.  -Light Blue Top:        Result                      Value             Ref Range           Extra Tube                                                    Hold for add-ons.  XR Chest 1 View   Final Result    Findings are suggestive of congestive heart failure similar to the prior study.        Electronically signed by:  Sascha Resendiz M.D.      6/6/2022 12:11 AM       CHF on chest x-ray.  Normal BNP.  Patient is managed on Lasix and tells me she takes it to keep fluid off of her heart but does not have a diagnosis of CHF per chart review.  Patient normally managed on 5 L per nasal cannula, patient de-escalated from nonrebreather to 50% Venturi and did not tolerate this well with oxygen saturations of 87% and respiratory distress.  No significant hypercapnia, will try on precision flow, escalate to BiPAP as needed.  Diuresis initiated.    Critical care time 30 minutes       Amount and/or Complexity of Data Reviewed  Clinical lab tests: ordered and reviewed  Tests in the radiology section of CPT®: ordered and reviewed  Tests in the medicine section of CPT®: reviewed and ordered  Discuss the patient with other providers: yes        Final  diagnoses:   Acute respiratory failure with hypoxemia (HCC)   Acute congestive heart failure, unspecified heart failure type (HCC)       ED Disposition  ED Disposition     ED Disposition   Decision to Admit    Condition   --    Comment   Level of Care: Telemetry [5]   Admitting Physician: CARROLL MATHEWS [[id number]]   Bed Request Comments: PCU               No follow-up provider specified.       Medication List      No changes were made to your prescriptions during this visit.          Carroll Hill MD  06/06/22 4510

## 2022-06-06 NOTE — H&P
[facility]      Patient Name: Michelle Francis  : 1950  MRN: [id number]  Primary Care Physician:  Avani Montoya MD  Date of admission: 2022      Subjective      Chief Complaint: I cannot breathe    History of Present Illness: Michelle Francis is a 72 y.o. female who presented to [facility] on 2022 with a past medical history of hypertension, restless leg, hyperlipidemia, chronic kidney disease stage II and end-stage COPD on chronic oxygen of approximately 5 L.  For the past few days she has had increased cough congestion shortness of breath and increased mucus production.  It got so bad that she decided to come to the emergency room.  In the emergency room her ABG was pH of 7.4 PCO2 of 44 and O2 110 on high flow oxygen.  Lactic acid was only 1.4.  Did not appear to have a pneumonia on chest x-ray was more consistent with possibly pulmonary edema.  The ER physician gave her 40 of Lasix she made quite a bit a urine and was placed on high flow nasal cannula.  However the patient is still working very hard to breathe.  Transition the patient over to BiPAP.  We will treat for COPD exacerbation with steroids, breathing treatments, Mucinex and azithromycin.  Echocardiogram has been ordered the BNP was not that elevated but the chest x-ray was supportive of possible heart failure and lungs like it dry.    Discussed CODE STATUS with the patient and at first she wanted to be full code and after discussing the trauma that would go on most likely broken ribs and being intubated she stated that she did not want to be resuscitated.  But she did want to be treated aggressively will comply with her wishes      ROS cough, congestion, shortness of breath, increased leg swelling, increased mucus production, patient denies fever, chills, changes in bowel or bladder, denies abdominal pain, the denies headache, denies rash and the rest the 15 essential review of systems  have been reviewed with the patient and are negative    Personal History     Past Medical History:   Diagnosis Date   • Arthritis    • Colon polyps     Dr Bates   • COPD (chronic obstructive pulmonary disease) (HCC)    • History of emphysema    • Hyperlipidemia    • Hypertension    • Kidney disease    • Osteoporosis    • Restless leg syndrome     sees Dr Seipel   • Sleep apnea     npsg [facility] 2014, ahi 5.9, on auto bipap min 8 max, PS 2-8-Alcaraz's, nasal mask       Past Surgical History:   Procedure Laterality Date   • CARDIOVASCULAR STRESS TEST  2020   • CATARACT EXTRACTION      Dr Moyer   • CEREBRAL ANEURYSM REPAIR      Brain Aneurysm approx 2009, treated with stents and coils, Dr. Fraga   • COLON SURGERY      partial colectomy-2013 Dr Valladares- due to multiple large polyps   • ECHO - CONVERTED  2019   • EXPLORATORY LAPAROTOMY      lysis of intra abdominal adhesions, primary ventral hernia repair 08/01/2018 Dr West   • WRIST SURGERY      wrist fracture - Dr Patton       Family History: family history includes Allergies in an other family member; Arthritis in her brother; Colon cancer in her father and sister; Diabetes in her mother; Hypertension in her brother and mother. Otherwise pertinent FHx was reviewed and not pertinent to current issue.    Social History:  reports that she has quit smoking. She has never used smokeless tobacco. She reports current alcohol use. She reports that she does not use drugs.    Home Medications:  Prior to Admission Medications     Prescriptions Last Dose Informant Patient Reported? Taking?    albuterol sulfate  (90 Base) MCG/ACT inhaler   Yes No    Inhale 1 puff Every 4 (Four) Hours As Needed for Wheezing or Shortness of Air. Q4-6H    aspirin 81 MG chewable tablet   Yes No    Chew 81 mg Daily.    budesonide (PULMICORT) 0.5 MG/2ML nebulizer solution   No No    Take 4 mL by nebulization 2 (Two) Times a Day.    dilTIAZem CD (Cardizem CD) 180 MG 24 hr capsule   No No    Take 1 capsule  by mouth Daily.    esomeprazole (nexIUM) 40 MG capsule   No No    TAKE 1 CAPSULE BY MOUTH EVERY DAY    Fluticasone-Umeclidin-Vilant (Trelegy Ellipta) 100-62.5-25 MCG/INH inhaler   Yes No    Inhale 1 puff Daily.    furosemide (LASIX) 40 MG tablet   Yes No    Take 40 mg by mouth Daily.    gabapentin (NEURONTIN) 300 MG capsule   Yes No    Take 900 mg by mouth Every Night.    ipratropium-albuterol (DUO-NEB) 0.5-2.5 mg/3 ml nebulizer   Yes No    Take 3 mL by nebulization Every 4 (Four) Hours As Needed for Wheezing or Shortness of Air. Every 4-6h PRN    metoprolol succinate XL (TOPROL-XL) 50 MG 24 hr tablet   No No    TAKE ONE TABLET BY MOUTH TWICE DAILY    montelukast (SINGULAIR) 10 MG tablet   No No    Take 1 tablet by mouth Every Night.    potassium chloride (K-DUR,KLOR-CON) 20 MEQ tablet controlled-release ER tablet   Yes No    Take 20 mEq by mouth Daily.    risedronate (ACTONEL) 35 MG tablet   Yes No    Take 35 mg by mouth Every 7 (Seven) Days. On Thursday    rOPINIRole (REQUIP) 1 MG tablet   Yes No    Take 1 mg by mouth 2 (Two) Times a Day. At 19:00 and 21:00    rosuvastatin (CRESTOR) 10 MG tablet   Yes No    Take 10 mg by mouth Daily.    theophylline (UNIPHYL) 400 MG 24 hr tablet   Yes No    Take 400 mg by mouth Daily.            Allergies:  No Known Allergies    Objective      Vitals:   Temp:  [98.4 °F (36.9 °C)] 98.4 °F (36.9 °C)  Heart Rate:  [] 99  Resp:  [16-24] 19  BP: (100-138)/(56-84) 132/82  Flow (L/min):  [10-40] 40    Physical Exam   General patient was in distress  Head atraumatic  Oropharynx membranes moist no erythema  Neck no thyroid megaly or lymphadenopathy  Pulmonary very little air movement throughout all lung fields, patient was tachypneic and using accessory muscles and having difficulty speaking in complete sentences  Cardiac tachycardic and hyperdynamic appeared to be irregular could not appreciate murmurs   Abdomen obese positive bowel sounds no guarding no rebound mild distention  could not appreciate any hepatosplenomegaly  Extremities peripheral edema +1 warm to the touch no cyanosis hemosiderin the distal third  Psych patient was anxious but appropriate with her respiratory distress alert and oriented x4  Neuro cranial nerves II through XII intact  Derm no rash    Result Review    Result Review:  I have personally reviewed the results from the time of this admission to 6/6/2022 07:10 EDT and agree with these findings:  [x]  Laboratory  [x]  Microbiology  [x]  Radiology  [x]  EKG/Telemetry   []  Cardiology/Vascular   []  Pathology  [x]  Old records  []  Other:  Most notable findings include:   ABG pH 7.4/PCO2 44/PO2 110  Lactic acid 1.4  Procalcitonin 0.23  Troponins less than 0.01 proBNP 326  Sodium 135, potassium 4, bicarb 28, chloride 93, creatinine 0.8, BUN 12, calcium 9 CMP unremarkable  White blood cell count 12,100 hemoglobin 12.5 platelets 123,000     FINDINGS:  Lung markings are mildly accentuated.  There are small effusions.  There is mild peribronchial cuffing.    Assessment & Plan        Active Hospital Problems:  Active Hospital Problems    Diagnosis    • Acute respiratory failure with hypoxemia (HCC)    • Acute congestive heart failure (HCC)    • Thrombocytopenia (HCC)    • COPD with acute exacerbation (HCC)    • Essential hypertension    • Obesity (BMI 30-39.9)    • Mixed hyperlipidemia    • Acute on chronic respiratory failure with hypoxia (HCC)      Plan:   1.  Acute respiratory failure with hypoxia -agree with trying the high flow oxygen at first based on the blood gas however the patient was working way too hard and I was afraid that she would exhaust herself.  Started her on BiPAP to assist in respiration.  So was transitioned to BiPAP.  Does not appear to have a bacterial pneumonia per se the respiratory pathogen panel is negative and the procalcitonin is not elevated.  Most likely a COPD exacerbation see below for treatment.  The chest x-ray also indicated possibly  congestive heart failure so we will be giving Lasix as well and evaluating for possible congestive heart failure with an echocardiogram however the BNP does not support that diagnosis.  I have consulted pulmonary critical care to help further manage the patient's respiratory condition.  She has theophylline on her med rec and have ordered a theophylline level  2.  COPD exacerbation Place patient on steroids, Mucinex, duo nebs, Symbicort and azithromycin  3.  Congestive heart failure -does not have a history of heart failure but she is on Lasix at home I have ordered an echocardiogram she did have some peripheral edema patient could very well have congestive heart failure with preserved ejection fraction or pulmonary hypertension.  Continuing Lasix  4.  Essential hypertension continue the diltiazem and metoprolol  5.  Obesity losing weight would help a lot of her function and respiratory status  6.  Chronic kidney disease stage II appears to be stable but with giving Lasix will need to monitor electrolytes and renal function  7.  Hyperlipidemia continue rosuvastatin 10  8.  Thrombocytopenia platelets dropped to 123,000 from the normal approximate 200,000 we will monitor and repeat CBC and be on the look out for any coagulopathy or thrombotic events.  If not improving could consider getting a heme consult    Patient will be in the PCU and critical.  She has a high probability of decompensating with her respiratory status being so precarious.  Patient patient wants aggressive care but if her heart should stop she does not want to be resuscitated.  Patient may need to be transferred to the intensive care unit for close observation of this patient      DVT prophylaxis:  Medical DVT prophylaxis orders are present.    CODE STATUS:    Level Of Support Discussed With: Patient  Code Status (Patient has no pulse and is not breathing): No CPR (Do Not Attempt to Resuscitate)  Medical Interventions (Patient has pulse or is  breathing): Comfort Measures    Admission Status:  I believe this patient meets inpatient status.    I discussed the patient's findings and my recommendations with patient.    This patient has been examined wearing appropriate Personal Protective Equipment and discussed with . 06/06/22      Signature:

## 2022-06-06 NOTE — ED NOTES
Patient states that this has started three days ago and has progressively gotten worse.  Patients home dose of oxygen is usually five but she has been using 7 for the last three days. The patient also states that she was trying to wait it out until she seen her primary care physician but she couldn't do it anymore.

## 2022-06-06 NOTE — CASE MANAGEMENT/SOCIAL WORK
Discharge Planning Assessment  [facility]     Patient Name: Michelle Francis  MRN: [id number]  Today's Date: 6/6/2022    Admit Date: 6/5/2022     Discharge Needs Assessment     Row Name 06/06/22 0931       Living Environment    People in Home spouse    Name(s) of People in Home  Eugene    Current Living Arrangements home    Primary Care Provided by self;spouse/significant other    Provides Primary Care For no one    Family Caregiver if Needed significant other    Quality of Family Relationships helpful;involved    Able to Return to Prior Arrangements yes       Resource/Environmental Concerns    Resource/Environmental Concerns none    Transportation Concerns none       Transition Planning    Patient/Family Anticipates Transition to home with family    Patient/Family Anticipated Services at Transition ;home health care;durable medical equipment    Transportation Anticipated family or friend will provide       Discharge Needs Assessment    Readmission Within the Last 30 Days no previous admission in last 30 days    Equipment Currently Used at Home cane, straight;oxygen    Concerns to be Addressed care coordination/care conferences;discharge planning    Provided Post Acute Provider List? N/A    Provided Post Acute Provider Quality & Resource List? N/A               Discharge Plan     Row Name 06/06/22 0990       Plan    Plan DC PLAN: Routine home with . Current with [facility]( will Need BALAJI) Home Oxygen thru [facility]    Patient/Family in Agreement with Plan yes    Provided Post Acute Provider List? N/A    Provided Post Acute Provider Quality & Resource List? N/A    Plan Comments Met with patient and  at bedside, From Home with ,  assists patient with all of ADL's. Uses Cane, States has had INPT rehab in the past and refuses to go back. Current with [facility], DCP report sheet faxed, sent text to [facility]. PCP is Avani Lassiter, sindi Herrera in [place],  Denies any transportation issues. Able to afford medications. Currently has home oxygen at 5L thru [facility] [employer].              Continued Care and Services - Admitted Since 6/5/2022     Home Medical Care     Service Provider Request Status Selected Services Address Phone Fax Patient Preferred    [facility]  Pending - Request Sent N/A [phone] [place] [address] [phone] --                 Demographic Summary     Row Name 06/06/22 0930       General Information    Admission Type inpatient    Arrived From emergency department    Referral Source emergency department    Reason for Consult care coordination/care conference;discharge planning    Preferred Language English       Contact Information    Permission Granted to Share Info With     Contact Information Obtained for                Functional Status     Row Name 06/06/22 0930       Functional Status    Usual Activity Tolerance moderate    Current Activity Tolerance moderate       Functional Status, IADL    Medications assistive person    Meal Preparation assistive person    Housekeeping assistive person    Laundry assistive person    Shopping assistive person    IADL Comments  Eugene helps with all ADLs       Mental Status    General Appearance WDL WDL       Mental Status Summary    Recent Changes in Mental Status/Cognitive Functioning no changes              Met with patient in room wearing PPE: mask, gown.      Maintained distance greater than six feet and spent less than 15 minutes in the room.      Alma Mccullough RN     Office phone: [phone]  Cell phone: [phone]

## 2022-06-07 ENCOUNTER — TRANSCRIBE ORDERS (OUTPATIENT)
Dept: ADMINISTRATIVE | Facility: HOSPITAL | Age: 72
End: 2022-06-07

## 2022-06-07 ENCOUNTER — APPOINTMENT (OUTPATIENT)
Dept: CT IMAGING | Facility: HOSPITAL | Age: 72
End: 2022-06-07

## 2022-06-07 DIAGNOSIS — E87.79 OTHER HYPERVOLEMIA: Primary | ICD-10-CM

## 2022-06-07 LAB — MAGNESIUM SERPL-MCNC: 2.3 MG/DL (ref 1.6–2.4)

## 2022-06-07 PROCEDURE — 94799 UNLISTED PULMONARY SVC/PX: CPT

## 2022-06-07 PROCEDURE — 94664 DEMO&/EVAL PT USE INHALER: CPT

## 2022-06-07 PROCEDURE — 97163 PT EVAL HIGH COMPLEX 45 MIN: CPT

## 2022-06-07 PROCEDURE — 25010000002 FUROSEMIDE PER 20 MG: Performed by: INTERNAL MEDICINE

## 2022-06-07 PROCEDURE — 25010000002 METHYLPREDNISOLONE PER 40 MG: Performed by: INTERNAL MEDICINE

## 2022-06-07 PROCEDURE — 94660 CPAP INITIATION&MGMT: CPT

## 2022-06-07 PROCEDURE — 83735 ASSAY OF MAGNESIUM: CPT | Performed by: HOSPITALIST

## 2022-06-07 PROCEDURE — 71250 CT THORAX DX C-: CPT

## 2022-06-07 PROCEDURE — 99232 SBSQ HOSP IP/OBS MODERATE 35: CPT | Performed by: HOSPITALIST

## 2022-06-07 PROCEDURE — 25010000002 ENOXAPARIN PER 10 MG: Performed by: INTERNAL MEDICINE

## 2022-06-07 PROCEDURE — 94761 N-INVAS EAR/PLS OXIMETRY MLT: CPT

## 2022-06-07 RX ORDER — IPRATROPIUM BROMIDE AND ALBUTEROL SULFATE 2.5; .5 MG/3ML; MG/3ML
3 SOLUTION RESPIRATORY (INHALATION)
Status: DISCONTINUED | OUTPATIENT
Start: 2022-06-07 | End: 2022-06-12 | Stop reason: HOSPADM

## 2022-06-07 RX ADMIN — FUROSEMIDE 40 MG: 10 INJECTION, SOLUTION INTRAMUSCULAR; INTRAVENOUS at 20:36

## 2022-06-07 RX ADMIN — METHYLPREDNISOLONE SODIUM SUCCINATE 40 MG: 40 INJECTION, POWDER, FOR SOLUTION INTRAMUSCULAR; INTRAVENOUS at 10:25

## 2022-06-07 RX ADMIN — ENOXAPARIN SODIUM 40 MG: 100 INJECTION SUBCUTANEOUS at 17:38

## 2022-06-07 RX ADMIN — ASPIRIN 81 MG: 81 TABLET, COATED ORAL at 10:26

## 2022-06-07 RX ADMIN — METOPROLOL SUCCINATE 50 MG: 50 TABLET, EXTENDED RELEASE ORAL at 20:37

## 2022-06-07 RX ADMIN — Medication 10 ML: at 10:29

## 2022-06-07 RX ADMIN — AZITHROMYCIN MONOHYDRATE 500 MG: 250 TABLET ORAL at 10:26

## 2022-06-07 RX ADMIN — IPRATROPIUM BROMIDE AND ALBUTEROL SULFATE 3 ML: .5; 3 SOLUTION RESPIRATORY (INHALATION) at 06:48

## 2022-06-07 RX ADMIN — METOPROLOL SUCCINATE 50 MG: 50 TABLET, EXTENDED RELEASE ORAL at 10:26

## 2022-06-07 RX ADMIN — METHYLPREDNISOLONE SODIUM SUCCINATE 40 MG: 40 INJECTION, POWDER, FOR SOLUTION INTRAMUSCULAR; INTRAVENOUS at 17:39

## 2022-06-07 RX ADMIN — DILTIAZEM HYDROCHLORIDE 120 MG: 120 CAPSULE, COATED, EXTENDED RELEASE ORAL at 10:26

## 2022-06-07 RX ADMIN — ROSUVASTATIN 20 MG: 10 TABLET, FILM COATED ORAL at 20:37

## 2022-06-07 RX ADMIN — Medication 10 ML: at 20:37

## 2022-06-07 RX ADMIN — BUDESONIDE AND FORMOTEROL FUMARATE DIHYDRATE 2 PUFF: 160; 4.5 AEROSOL RESPIRATORY (INHALATION) at 18:22

## 2022-06-07 RX ADMIN — ROPINIROLE HYDROCHLORIDE 1 MG: 1 TABLET, FILM COATED ORAL at 18:51

## 2022-06-07 RX ADMIN — MONTELUKAST 10 MG: 10 TABLET, FILM COATED ORAL at 20:40

## 2022-06-07 RX ADMIN — GUAIFENESIN 1200 MG: 600 TABLET, EXTENDED RELEASE ORAL at 10:26

## 2022-06-07 RX ADMIN — GABAPENTIN 900 MG: 300 CAPSULE ORAL at 20:40

## 2022-06-07 RX ADMIN — IPRATROPIUM BROMIDE AND ALBUTEROL SULFATE 3 ML: .5; 3 SOLUTION RESPIRATORY (INHALATION) at 10:46

## 2022-06-07 RX ADMIN — IPRATROPIUM BROMIDE AND ALBUTEROL SULFATE 3 ML: .5; 3 SOLUTION RESPIRATORY (INHALATION) at 18:22

## 2022-06-07 RX ADMIN — ROPINIROLE HYDROCHLORIDE 1 MG: 1 TABLET, FILM COATED ORAL at 18:50

## 2022-06-07 RX ADMIN — METHYLPREDNISOLONE SODIUM SUCCINATE 40 MG: 40 INJECTION, POWDER, FOR SOLUTION INTRAMUSCULAR; INTRAVENOUS at 02:53

## 2022-06-07 RX ADMIN — BUDESONIDE AND FORMOTEROL FUMARATE DIHYDRATE 2 PUFF: 160; 4.5 AEROSOL RESPIRATORY (INHALATION) at 06:52

## 2022-06-07 RX ADMIN — PANTOPRAZOLE SODIUM 40 MG: 40 TABLET, DELAYED RELEASE ORAL at 05:46

## 2022-06-07 RX ADMIN — GUAIFENESIN 1200 MG: 600 TABLET, EXTENDED RELEASE ORAL at 20:37

## 2022-06-07 RX ADMIN — POTASSIUM CHLORIDE 20 MEQ: 1500 TABLET, EXTENDED RELEASE ORAL at 10:26

## 2022-06-07 RX ADMIN — IPRATROPIUM BROMIDE AND ALBUTEROL SULFATE 3 ML: .5; 3 SOLUTION RESPIRATORY (INHALATION) at 02:30

## 2022-06-07 RX ADMIN — FUROSEMIDE 40 MG: 10 INJECTION, SOLUTION INTRAMUSCULAR; INTRAVENOUS at 10:25

## 2022-06-07 RX ADMIN — Medication 5 MG: at 20:37

## 2022-06-07 RX ADMIN — THEOPHYLLINE ANHYDROUS 400 MG: 200 CAPSULE, EXTENDED RELEASE ORAL at 10:27

## 2022-06-07 NOTE — PROGRESS NOTES
[facility] Medicine Services Daily Progress Note    Patient Name: Michelle Francis  : 1950  MRN: [id number]  Primary Care Physician:  Avani Montoya MD  Date of admission: 2022      Subjective      Chief Complaint: Shortness of air      Patient Reports   2022: Patient reports some improvement in her shortness of air and cough.  No other complaints at this time.      ROS   Other systems were reviewed and were negative.      Objective      Vitals:   Temp:  [98 °F (36.7 °C)-98.8 °F (37.1 °C)] 98.6 °F (37 °C)  Heart Rate:  [] 90  Resp:  [14-26] 18  BP: (104-126)/(41-95) 122/57  Flow (L/min):  [40] 40    Physical Exam   Vital signs and nurses notes reviewed.  Well-developed well-nourished in no acute distress sitting up in bed awake and alert; mucous membranes moist; sclerae anicteric; lungs decreased air entry with crackles in the bases posteriorly bilaterally; CV regular rate and rhythm; abdomen soft nontender nondistended with active bowel sounds; extremities with no edema, cyanosis or calf tenderness; palpable pedal pulses bilaterally; no Huertas catheter.       Result Review    Result Review:  I have personally reviewed the results from the time of this admission to 2022 15:44 EDT and agree with these findings:  [x]  Laboratory  [x]  Microbiology  [x]  Radiology  [x]  EKG/Telemetry   [x]  Cardiology/Vascular   [x]  Pathology  [x]  Old records  []  Other:  Most notable findings discussed in the assessment and plan.          Assessment & Plan      Brief Patient Summary:  Michelle Francis is a 72 y.o. female with a history of hypertension, hyperlipidemia, chronic kidney disease stage II, restless leg syndrome and end-stage COPD dependent on 5 L nasal cannula O2 continuously who presented to the emergency room because of recent increased congestion and cough and significant or shortness of air despite home O2.      Current inpatient medications include:  aspirin, 81 mg, Oral,  Daily  azithromycin, 500 mg, Oral, Q24H  budesonide-formoterol, 2 puff, Inhalation, BID - RT  dilTIAZem CD, 120 mg, Oral, Daily  enoxaparin, 40 mg, Subcutaneous, Daily  furosemide, 40 mg, Intravenous, BID - RT  gabapentin, 900 mg, Oral, Nightly  guaiFENesin, 1,200 mg, Oral, Q12H  ipratropium-albuterol, 3 mL, Nebulization, 4x Daily - RT  methylPREDNISolone sodium succinate, 40 mg, Intravenous, Q8H  metoprolol succinate XL, 50 mg, Oral, BID  montelukast, 10 mg, Oral, Nightly  pantoprazole, 40 mg, Oral, Q AM  potassium chloride, 20 mEq, Oral, Daily  rOPINIRole, 1 mg, Oral, BID  rosuvastatin, 20 mg, Oral, Daily  sodium chloride, 10 mL, Intravenous, Q12H  theophylline, 400 mg, Oral, Q24H             Active Hospital Problems:  Active Hospital Problems    Diagnosis    • COPD with acute exacerbation (HCC)    • Acute on chronic respiratory failure with hypoxia (HCC)    • Essential hypertension    • Mixed hyperlipidemia    • Acute respiratory failure with hypoxemia (HCC)    • Acute congestive heart failure (HCC)    • Thrombocytopenia (HCC)    • Obesity (BMI 30-39.9)      Plan:   Acute hypoxic respiratory failure secondary to acute exacerbation of severe COPD and bilateral lower lobe pneumonia/sleep apnea with CPAP compliance  -Pulmonary consulting and performed bronchoscopy which showed bilateral lower lobe pneumonia with significant inflammation suggestive of chronic aspiration possibly from GERD  -Continue Zithromax, Symbicort, DuoNebs, Singulair, theophylline, guaifenesin and IV methylprednisolone  -Patient was extensively counseled and all questions were answered  -Titrate O2 to maintain sat greater than 89%     Essential hypertension, chronic and controlled  -Continue diltiazem, Lasix and metoprolol     Chronic kidney disease stage II secondary to hypertension  Creatinine 1.0 and current creatinine 0.81 which is better than baseline  -Monitor renal function and avoid nephrotoxic medications     Hyperlipidemia  -Continue  Crestor     Congestive heart failure is felt to have been ruled out  -proBNP and LVEF are normal with no report of elevated RVSP     GERD  -Continue PPI     Overweight  -Lifestyle modification counseling     Restless leg syndrome  -Continue Requip and gabapentin  DVT prophylaxis:  Medical DVT prophylaxis orders are present.    CODE STATUS:    Level Of Support Discussed With: Patient  Code Status (Patient has no pulse and is not breathing): No CPR (Do Not Attempt to Resuscitate)  Medical Interventions (Patient has pulse or is breathing): Full Support      Disposition:  I expect patient to be discharged in 3 to 4 days depending on hospital course.    This patient has been examined wearing appropriate Personal Protective Equipment and discussed with hospital infection control department. 06/07/22      Electronically signed by Virgen Pascual MD, 06/07/22, 15:44 EDT.  [facility]ist Team

## 2022-06-07 NOTE — CASE MANAGEMENT/SOCIAL WORK
Continued Stay Note  [facility]     Patient Name: Michelle Francis  MRN: [id number]  Today's Date: 6/7/2022    Admit Date: 6/5/2022     Discharge Plan     Row Name 06/07/22 1511       Plan    Plan DC Plan: PT eval pending. Anticipate routine home with spouse and [facility] (current, BALAJI order per MD). Has home O2 through [employer] [employer].    Plan Comments Home health resumption of care order requested from MD in unit rounds. PT eval pending.              Phone communication or documentation only - no physical contact with patient or family.    Eunice Quiroz RN     Office Phone: [phone]  Office Cell: [phone]

## 2022-06-07 NOTE — PLAN OF CARE
Problem: Adult Inpatient Plan of Care  Goal: Plan of Care Review  Outcome: Ongoing, Progressing  Goal: Patient-Specific Goal (Individualized)  Outcome: Ongoing, Progressing  Goal: Absence of Hospital-Acquired Illness or Injury  Outcome: Ongoing, Progressing  Intervention: Identify and Manage Fall Risk  Recent Flowsheet Documentation  Taken 6/7/2022 0304 by Saeed Carreon RN  Safety Promotion/Fall Prevention:   activity supervised   assistive device/personal items within reach   clutter free environment maintained   fall prevention program maintained   nonskid shoes/slippers when out of bed   room organization consistent   safety round/check completed  Taken 6/7/2022 0200 by Saeed Carreon RN  Safety Promotion/Fall Prevention:   activity supervised   assistive device/personal items within reach   clutter free environment maintained  Taken 6/7/2022 0005 by Saeed Carreon RN  Safety Promotion/Fall Prevention:   activity supervised   assistive device/personal items within reach   clutter free environment maintained   fall prevention program maintained   nonskid shoes/slippers when out of bed   room organization consistent   safety round/check completed  Taken 6/7/2022 0000 by Saeed Carreon RN  Safety Promotion/Fall Prevention:   activity supervised   assistive device/personal items within reach   clutter free environment maintained   fall prevention program maintained   nonskid shoes/slippers when out of bed   room organization consistent   safety round/check completed  Taken 6/6/2022 2200 by Saeed Carreon RN  Safety Promotion/Fall Prevention:   activity supervised   assistive device/personal items within reach   clutter free environment maintained   fall prevention program maintained   nonskid shoes/slippers when out of bed   room organization consistent   safety round/check completed  Taken 6/6/2022 2150 by Saeed Carreon RN  Safety Promotion/Fall Prevention:   activity supervised   assistive  device/personal items within reach   clutter free environment maintained   fall prevention program maintained   nonskid shoes/slippers when out of bed   room organization consistent   safety round/check completed  Intervention: Prevent Skin Injury  Recent Flowsheet Documentation  Taken 6/7/2022 0304 by Saeed Carreon RN  Body Position: position changed independently  Skin Protection:   adhesive use limited   incontinence pads utilized   skin-to-device areas padded   skin-to-skin areas padded   transparent dressing maintained   tubing/devices free from skin contact  Taken 6/7/2022 0005 by Saeed Carreon RN  Body Position: position changed independently  Skin Protection:   adhesive use limited   incontinence pads utilized   skin-to-device areas padded   skin-to-skin areas padded   transparent dressing maintained   tubing/devices free from skin contact  Taken 6/6/2022 2150 by Saeed Carreon RN  Body Position: position changed independently  Skin Protection:   adhesive use limited   incontinence pads utilized   skin-to-device areas padded   skin-to-skin areas padded   transparent dressing maintained   tubing/devices free from skin contact  Intervention: Prevent and Manage VTE (Venous Thromboembolism) Risk  Recent Flowsheet Documentation  Taken 6/7/2022 0304 by Saeed Carreon RN  Activity Management: activity adjusted per tolerance  VTE Prevention/Management: bleeding risk factor(s) identified  Range of Motion: active ROM (range of motion) encouraged  Taken 6/7/2022 0005 by Saeed Carreon RN  Activity Management: activity adjusted per tolerance  VTE Prevention/Management: bleeding risk factor(s) identified  Range of Motion: active ROM (range of motion) encouraged  Taken 6/6/2022 2150 by Saeed Carreon RN  Activity Management: activity adjusted per tolerance  VTE Prevention/Management: bleeding risk factor(s) identified  Range of Motion: active ROM (range of motion) encouraged  Intervention: Prevent  Infection  Recent Flowsheet Documentation  Taken 6/7/2022 0304 by Saeed Carreon RN  Infection Prevention:   environmental surveillance performed   hand hygiene promoted   personal protective equipment utilized   rest/sleep promoted   single patient room provided  Taken 6/7/2022 0005 by Saeed Carreon RN  Infection Prevention:   environmental surveillance performed   hand hygiene promoted   personal protective equipment utilized   rest/sleep promoted   single patient room provided  Taken 6/6/2022 2150 by Saeed Carreon RN  Infection Prevention:   environmental surveillance performed   hand hygiene promoted   personal protective equipment utilized   rest/sleep promoted   single patient room provided  Goal: Optimal Comfort and Wellbeing  Outcome: Ongoing, Progressing  Intervention: Provide Person-Centered Care  Recent Flowsheet Documentation  Taken 6/7/2022 0304 by Saeed Carreon RN  Trust Relationship/Rapport:   care explained   choices provided   emotional support provided   empathic listening provided   questions answered   questions encouraged   reassurance provided   thoughts/feelings acknowledged  Taken 6/7/2022 0005 by Saeed Carreon RN  Trust Relationship/Rapport:   choices provided   care explained   emotional support provided   empathic listening provided   questions answered   questions encouraged   reassurance provided   thoughts/feelings acknowledged  Taken 6/6/2022 2150 by Saeed Carreon RN  Trust Relationship/Rapport:   care explained   choices provided   emotional support provided   empathic listening provided   questions answered   questions encouraged   reassurance provided   thoughts/feelings acknowledged  Goal: Readiness for Transition of Care  Outcome: Ongoing, Progressing  Intervention: Mutually Develop Transition Plan  Recent Flowsheet Documentation  Taken 6/6/2022 2200 by Saeed Carreon RN  Equipment Currently Used at Home:   oxygen   bipap   shower chair  Taken 6/6/2022 2158 by  Saeed Carreon RN  Transportation Anticipated: family or friend will provide  Transportation Concerns: no car  Patient/Family Anticipated Services at Transition:      home health care   durable medical equipment  Patient/Family Anticipates Transition to: home with family     Problem: Asthma Comorbidity  Goal: Maintenance of Asthma Control  Outcome: Ongoing, Progressing  Intervention: Maintain Asthma Symptom Control  Recent Flowsheet Documentation  Taken 6/7/2022 0304 by Saeed Carreon RN  Medication Review/Management: medications reviewed  Taken 6/7/2022 0005 by Saeed Carreon RN  Medication Review/Management: medications reviewed  Taken 6/6/2022 2150 by Saeed Carreon RN  Medication Review/Management: medications reviewed     Problem: COPD (Chronic Obstructive Pulmonary Disease) Comorbidity  Goal: Maintenance of COPD Symptom Control  Outcome: Ongoing, Progressing  Intervention: Maintain COPD-Symptom Control  Recent Flowsheet Documentation  Taken 6/7/2022 0304 by Saeed Carreon RN  Supportive Measures:   active listening utilized   decision-making supported   self-care encouraged   relaxation techniques promoted  Medication Review/Management: medications reviewed  Taken 6/7/2022 0005 by Saeed Carreon RN  Supportive Measures:   active listening utilized   decision-making supported   self-care encouraged   relaxation techniques promoted  Medication Review/Management: medications reviewed  Taken 6/6/2022 2150 by Saeed Carreon RN  Supportive Measures:   active listening utilized   decision-making supported   relaxation techniques promoted   self-care encouraged  Medication Review/Management: medications reviewed     Problem: Obstructive Sleep Apnea Risk or Actual Comorbidity Management  Goal: Unobstructed Breathing During Sleep  Outcome: Ongoing, Progressing  Intervention: Monitor and Manage Obstructive Sleep Apnea  Recent Flowsheet Documentation  Taken 6/7/2022 0304 by Saeed Carreon  RN  NPPV/CPAP Maintenance:   nasal prongs adjusted   nasal prongs secure  Taken 6/7/2022 0005 by Saeed Carreon RN  NPPV/CPAP Maintenance:   nasal prongs adjusted   nasal prongs secure  Taken 6/6/2022 2150 by Saeed Carreon RN  NPPV/CPAP Maintenance:   nasal prongs adjusted   nasal prongs secure     Problem: Pain Chronic (Persistent) (Comorbidity Management)  Goal: Acceptable Pain Control and Functional Ability  Outcome: Ongoing, Progressing  Intervention: Manage Persistent Pain  Recent Flowsheet Documentation  Taken 6/7/2022 0304 by Saeed Carreon RN  Medication Review/Management: medications reviewed  Taken 6/7/2022 0005 by Saeed Carreon RN  Sleep/Rest Enhancement:   awakenings minimized   noise level reduced   regular sleep/rest pattern promoted   relaxation techniques promoted   room darkened  Medication Review/Management: medications reviewed  Taken 6/6/2022 2150 by Saeed Carreon RN  Sleep/Rest Enhancement:   awakenings minimized   consistent schedule promoted   noise level reduced   regular sleep/rest pattern promoted   relaxation techniques promoted   room darkened  Medication Review/Management: medications reviewed  Intervention: Optimize Psychosocial Wellbeing  Recent Flowsheet Documentation  Taken 6/7/2022 0304 by Saeed Carreon RN  Supportive Measures:   active listening utilized   decision-making supported   self-care encouraged   relaxation techniques promoted  Diversional Activities: television  Family/Support System Care:   self-care encouraged   support provided  Taken 6/7/2022 0005 by Saeed Carreon RN  Supportive Measures:   active listening utilized   decision-making supported   self-care encouraged   relaxation techniques promoted  Diversional Activities: television  Family/Support System Care:   self-care encouraged   support provided  Taken 6/6/2022 2150 by Saeed Carreon RN  Supportive Measures:   active listening utilized   decision-making supported   relaxation techniques  promoted   self-care encouraged  Diversional Activities: television  Family/Support System Care:   self-care encouraged   support provided     Problem: Skin Injury Risk Increased  Goal: Skin Health and Integrity  Outcome: Ongoing, Progressing  Intervention: Optimize Skin Protection  Recent Flowsheet Documentation  Taken 6/7/2022 0304 by Saeed Carreon RN  Pressure Reduction Techniques:   frequent weight shift encouraged   pressure points protected  Head of Bed (HOB) Positioning: [facility] elevated  Pressure Reduction Devices:   pressure-redistributing mattress utilized   positioning supports utilized  Skin Protection:   adhesive use limited   incontinence pads utilized   skin-to-device areas padded   skin-to-skin areas padded   transparent dressing maintained   tubing/devices free from skin contact  Taken 6/7/2022 0005 by Saeed Carreon RN  Pressure Reduction Techniques:   frequent weight shift encouraged   pressure points protected  Head of Bed (HOB) Positioning: HOB elevated  Pressure Reduction Devices:   pressure-redistributing mattress utilized   positioning supports utilized  Skin Protection:   adhesive use limited   incontinence pads utilized   skin-to-device areas padded   skin-to-skin areas padded   transparent dressing maintained   tubing/devices free from skin contact  Taken 6/6/2022 2150 by Saeed Carreon RN  Pressure Reduction Techniques:   frequent weight shift encouraged   pressure points protected  Head of Bed (HOB) Positioning: [facility] elevated  Pressure Reduction Devices:   pressure-redistributing mattress utilized   positioning supports utilized  Skin Protection:   adhesive use limited   incontinence pads utilized   skin-to-device areas padded   skin-to-skin areas padded   transparent dressing maintained   tubing/devices free from skin contact     Problem: Fall Injury Risk  Goal: Absence of Fall and Fall-Related Injury  Outcome: Ongoing, Progressing  Intervention: Identify and Manage  Contributors  Recent Flowsheet Documentation  Taken 6/7/2022 0304 by Saeed Carreon RN  Medication Review/Management: medications reviewed  Self-Care Promotion:   independence encouraged   BADL personal objects within reach  Taken 6/7/2022 0005 by Saeed Carreon RN  Medication Review/Management: medications reviewed  Self-Care Promotion:   independence encouraged   BADL personal objects within reach  Taken 6/6/2022 2150 by Saeed Carreon RN  Medication Review/Management: medications reviewed  Intervention: Promote Injury-Free Environment  Recent Flowsheet Documentation  Taken 6/7/2022 0304 by Saeed Carreon RN  Safety Promotion/Fall Prevention:   activity supervised   assistive device/personal items within reach   clutter free environment maintained   fall prevention program maintained   nonskid shoes/slippers when out of bed   room organization consistent   safety round/check completed  Taken 6/7/2022 0200 by Saeed Carreon RN  Safety Promotion/Fall Prevention:   activity supervised   assistive device/personal items within reach   clutter free environment maintained  Taken 6/7/2022 0005 by Saeed Carreon RN  Safety Promotion/Fall Prevention:   activity supervised   assistive device/personal items within reach   clutter free environment maintained   fall prevention program maintained   nonskid shoes/slippers when out of bed   room organization consistent   safety round/check completed  Taken 6/7/2022 0000 by Saeed Carreon RN  Safety Promotion/Fall Prevention:   activity supervised   assistive device/personal items within reach   clutter free environment maintained   fall prevention program maintained   nonskid shoes/slippers when out of bed   room organization consistent   safety round/check completed  Taken 6/6/2022 2200 by Saeed Carreon RN  Safety Promotion/Fall Prevention:   activity supervised   assistive device/personal items within reach   clutter free environment maintained   fall prevention  program maintained   nonskid shoes/slippers when out of bed   room organization consistent   safety round/check completed  Taken 6/6/2022 2150 by Saeed Carreon, RN  Safety Promotion/Fall Prevention:   activity supervised   assistive device/personal items within reach   clutter free environment maintained   fall prevention program maintained   nonskid shoes/slippers when out of bed   room organization consistent   safety round/check completed   Goal Outcome Evaluation:

## 2022-06-07 NOTE — CONSULTS
Group: [facility]         CONSULT NOTE    Patient Identification:  Michelle Francis  72 y.o.  female  1950  [id number]            Requesting physician: Attending physician    Reason for Consultation: Acute on chronic respiratory failure with hypoxia      History of Present Illness:    Michelle Francis is a 72-year-old female who presents to [facility] ED on 6/5/2022 with complaints of worsening shortness of breath, productive cough with increased music production.  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, chronic kidney disease stage II, hypertension, hyperlipidemia, restless leg syndrome    Assessment:    Acute on chronic respiratory failure  -ABG pH 7.418, PCO2 44.6, PO2 110.2, HCO3 28.8    Chronic obstructive pulmonary disease, on home oxygen at 5 L  -PFTs December 2019, FEV1 0.8 L which is  36%, improved to 0.9 L 40% after bronchodilators, FEV1/FVC ratio 45, TLC 91%, %, DLCO 28%    Obstructive sleep apnea, not wearing BiPAP due to recall    Chronic kidney disease, stage II    Hypertension  Hyperlipidemia  Restless leg syndrome    Office note 5/31/2022  IRIS, last download residual AHI 0.6, No snoring, no leak, Compliance 51.7% > 4 HOURS.  Not wearing d/t recall has registered machine. O2 wearing 4-5L continuous     PAH, RVSP 44.  no   COPD, Severe PFTs December 2019, FEV1 0.8 L which is  36%, improved to 0.9 L 40% after bronchodilators, FEV1/FVC ratio 45, TLC 91%, %, DLCO 28%  Hypoxemia  O2 to 7-8 L at HS while not wearing BiPAP.  Patient to monitor O2 sats  Patient is compliant and benefits from oxygen therapy  Cont theophylline  order nebulizer supplies send to [facility]   stopped Trelegy due to SE swelling. , duo nebs / albuterol nebs  samples of Starla made her worse  off  Daliresp 500 mcg due to diarrhea every other day  following nephrology for history of chronic kidney disease and edema  budesonide nebulized, off Dulera  Discussed  [id number]  20-10 CM H2O with pressure support 3-7 CM H2O,   REVIEW SUPPLIER FOR SA DEVICE  ADEQUATE SLEEP HYGEINE  SAFETY DRIVING  DISCUSSED CARDIOVASCULAR & METABOLIC SIDE EFFECTS OF UNTREATED IRIS  PATIENT IS COMPLIANT USING MACHINE AND BENEFITS FROM THERAPY, PATIENT HAS NASAL DRYNESS AND WILL NEED HEATED HUMIDITY WITH PAP    Recommendations:     Chest CT without contrast, will consider bronchoscopy depending on results  2D echo pending    Continue to titrate oxygen  - Currently requiring precision flow 40\70%  -Wears 5 L at home    Antibiotic azithromycin p.o. x5 days, first dose 6/6/2022    Lasix 40 mg twice daily  Solu-Medrol 40 mg every 8  Bronchodilator\inhaled corticosteroid  Mucinex  Singulair  Theophylline  GI prophylaxis Protonix    Patient is a DNR at this time.  Does want to be intubated if necessary and continue aggressive treatment        Review of Sytems:  Review of Systems   Respiratory: Positive for cough and shortness of breath.        Past Medical History:  Past Medical History:   Diagnosis Date   • Arthritis    • Colon polyps     Dr Bates   • COPD (chronic obstructive pulmonary disease) (HCC)    • History of emphysema    • Hyperlipidemia    • Hypertension    • Kidney disease    • Osteoporosis    • Restless leg syndrome     sees Dr Seipel   • Sleep apnea     npsg [facility] 2014, ahi 5.9, on auto bipap min 8 max, PS 2-8-Alcaraz's, nasal mask       Past Surgical History:  Past Surgical History:   Procedure Laterality Date   • CARDIOVASCULAR STRESS TEST  2020   • CATARACT EXTRACTION      Dr Moyer   • CEREBRAL ANEURYSM REPAIR      Brain Aneurysm approx 2009, treated with stents and coils, Dr. Fraga   • COLON SURGERY      partial colectomy-2013 Dr Valladares- due to multiple large polyps   • ECHO - CONVERTED  2019   • EXPLORATORY LAPAROTOMY      lysis of intra abdominal adhesions, primary ventral hernia repair 08/01/2018 Dr West   • WRIST SURGERY      wrist fracture - Dr Patton        Home  Meds:  Medications Prior to Admission   Medication Sig Dispense Refill Last Dose   • albuterol sulfate  (90 Base) MCG/ACT inhaler Inhale 1 puff Every 4 (Four) Hours As Needed for Wheezing or Shortness of Air. Q4-6H      • dilTIAZem CD (Cardizem CD) 120 MG 24 hr capsule Take 120 mg by mouth Daily.      • esomeprazole (nexIUM) 40 MG capsule TAKE 1 CAPSULE BY MOUTH EVERY DAY 90 capsule 3    • furosemide (LASIX) 40 MG tablet Take 40 mg by mouth Daily.      • gabapentin (NEURONTIN) 300 MG capsule Take 900 mg by mouth Every Night.      • ipratropium-albuterol (DUO-NEB) 0.5-2.5 mg/3 ml nebulizer Take 3 mL by nebulization Every 4 (Four) Hours As Needed for Wheezing or Shortness of Air. Every 4-6h PRN      • metoprolol succinate XL (TOPROL-XL) 50 MG 24 hr tablet TAKE ONE TABLET BY MOUTH TWICE DAILY 180 tablet 0    • montelukast (SINGULAIR) 10 MG tablet Take 1 tablet by mouth Every Night. 30 tablet 0    • potassium chloride (K-DUR,KLOR-CON) 20 MEQ tablet controlled-release ER tablet Take 20 mEq by mouth Daily.      • risedronate (ACTONEL) 35 MG tablet Take 35 mg by mouth Every 7 (Seven) Days. On Thursday      • rOPINIRole (REQUIP) 1 MG tablet Take 1 mg by mouth 2 (Two) Times a Day. At 19:00 and 21:00      • rosuvastatin (CRESTOR) 20 MG tablet Take 20 mg by mouth Daily.      • theophylline (UNIPHYL) 400 MG 24 hr tablet Take 400 mg by mouth Daily.      • tiotropium bromide-olodaterol (STIOLTO RESPIMAT) 2.5-2.5 MCG/ACT aerosol solution inhaler Inhale 1 puff Daily.      • aspirin 81 MG chewable tablet Chew 81 mg Daily.          Allergies:  No Known Allergies    Social History:   Social History     Socioeconomic History   • Marital status:    Tobacco Use   • Smoking status: Former Smoker   • Smokeless tobacco: Never Used   Vaping Use   • Vaping Use: Never used   Substance and Sexual Activity   • Alcohol use: Yes   • Drug use: No   • Sexual activity: Defer       Family History:  Family History   Problem Relation Age  "of Onset   • Diabetes Mother    • Hypertension Mother    • Colon cancer Father    • Colon cancer Sister    • Arthritis Brother    • Hypertension Brother    • Allergies Other        Physical Exam:  /65 (BP Location: Right arm, Patient Position: Lying)   Pulse 75   Temp 98 °F (36.7 °C) (Oral)   Resp 24   Ht 167.6 cm (66\")   Wt 87.3 kg (192 lb 7.4 oz)   SpO2 95%   BMI 31.06 kg/m²  Body mass index is 31.06 kg/m². 95% 87.3 kg (192 lb 7.4 oz)  Physical Exam  Pulmonary:      Breath sounds: Wheezing, rhonchi and rales present.   Skin:     General: Skin is warm and dry.   Neurological:      Mental Status: She is alert and oriented to person, place, and time.         LABS:  Lab Results   Component Value Date    CALCIUM 8.9 06/06/2022    PHOS 4.0 08/05/2018     Results from last 7 days   Lab Units 06/06/22  1347 06/06/22  0013   SODIUM mmol/L 134* 135*   POTASSIUM mmol/L 3.6 4.0   CHLORIDE mmol/L 92* 93*   CO2 mmol/L 29.0 28.0   BUN mg/dL 18 12   CREATININE mg/dL 0.75 0.81   GLUCOSE mg/dL 150* 114*   CALCIUM mg/dL 8.9 9.0   WBC 10*3/mm3 7.80 12.10*   HEMOGLOBIN g/dL 12.4 12.5   PLATELETS 10*3/mm3 124* 123*   ALT (SGPT) U/L  --  5   AST (SGOT) U/L  --  25   PROBNP pg/mL  --  326.2   PROCALCITONIN ng/mL  --  0.23     Lab Results   Component Value Date    TROPONINT <0.010 06/06/2022     Results from last 7 days   Lab Units 06/06/22  0013   TROPONIN T ng/mL <0.010     Results from last 7 days   Lab Units 06/06/22  0215   BLOODCX  No growth at 24 hours  No growth at 24 hours     Results from last 7 days   Lab Units 06/06/22  0128 06/06/22  0013   PROCALCITONIN ng/mL  --  0.23   LACTATE mmol/L 1.4  --      Results from last 7 days   Lab Units 06/06/22  0121   PH, ARTERIAL pH units 7.418   PCO2, ARTERIAL mm Hg 44.6   PO2 ART mm Hg 110.2*   O2 SATURATION ART % 98.3*   MODALITY  NRB     Results from last 7 days   Lab Units 06/06/22  0129   ADENOVIRUS DETECTION BY PCR  Not Detected   CORONAVIRUS 229E  Not Detected "   CORONAVIRUS HKU1  Not Detected   CORONAVIRUS NL63  Not Detected   CORONAVIRUS OC43  Not Detected   HUMAN METAPNEUMOVIRUS  Not Detected   HUMAN RHINOVIRUS/ENTEROVIRUS  Not Detected   INFLUENZA B PCR  Not Detected   PARAINFLUENZA 1  Not Detected   PARAINFLUENZA VIRUS 2  Not Detected   PARAINFLUENZA VIRUS 3  Not Detected   PARAINFLUENZA VIRUS 4  Not Detected   BORDETELLA PERTUSSIS PCR  Not Detected   CHLAMYDOPHILA PNEUMONIAE PCR  Not Detected   MYCOPLAMA PNEUMO PCR  Not Detected   INFLUENZA A PCR  Not Detected   RSV, PCR  Not Detected         Results from last 7 days   Lab Units 06/06/22  0215   BLOODCX  No growth at 24 hours  No growth at 24 hours     Lab Results   Component Value Date    TSH 1.760 11/13/2019     Estimated Creatinine Clearance: 74.4 mL/min (by C-G formula based on SCr of 0.75 mg/dL).         Imaging:  Imaging Results (Last 24 Hours)     ** No results found for the last 24 hours. **            Current Meds:   SCHEDULE  aspirin, 81 mg, Oral, Daily  azithromycin, 500 mg, Oral, Q24H  budesonide-formoterol, 2 puff, Inhalation, BID - RT  dilTIAZem CD, 120 mg, Oral, Daily  dilTIAZem CD, 180 mg, Oral, Q24H  enoxaparin, 40 mg, Subcutaneous, Daily  furosemide, 40 mg, Intravenous, BID - RT  gabapentin, 900 mg, Oral, Nightly  guaiFENesin, 1,200 mg, Oral, Q12H  ipratropium-albuterol, 3 mL, Nebulization, Q4H - RT  methylPREDNISolone sodium succinate, 40 mg, Intravenous, Q8H  metoprolol succinate XL, 50 mg, Oral, Q24H  metoprolol succinate XL, 50 mg, Oral, BID  montelukast, 10 mg, Oral, Nightly  pantoprazole, 40 mg, Oral, Q AM  potassium chloride, 20 mEq, Oral, Daily  rOPINIRole, 1 mg, Oral, BID  rosuvastatin, 10 mg, Oral, Nightly  rosuvastatin, 20 mg, Oral, Daily  sodium chloride, 10 mL, Intravenous, Q12H  theophylline, 400 mg, Oral, Q24H      Infusions     PRNs  •  acetaminophen **OR** acetaminophen **OR** acetaminophen  •  albuterol  •  aluminum-magnesium hydroxide-simethicone  •  ipratropium-albuterol  •   melatonin  •  nitroglycerin  •  ondansetron **OR** ondansetron  •  sodium chloride  •  sodium chloride        Nadia Ta, BERKLEY  6/7/2022  06:59 EDT      Much of this encounter note is an electronic transcription/translation of spoken language to printed text using Dragon Software.

## 2022-06-07 NOTE — PLAN OF CARE
Goal Outcome Evaluation:  Plan of Care Reviewed With: patient, spouse           Outcome Evaluation: 71 yo female adm 6/5/22 for acute hypoxic respiratory failure. Pt uses 5LO2 at home. She lives w/ her  in a single story home w/ 2 stairs to enter. Normally ambulates household distances w/ rw.  Pt's strength is WFL today. She comes to stand and ambulates w/ cga/min assist. Pt can only amb 8 ft before her O2 level drops to 78% while on 35L/70% precision flow. Recovers quickly to 85%, then slowly to 91%. Scheduled for bronch tomorrow. Recommend home w/ family when medically stable. Will follow.

## 2022-06-07 NOTE — THERAPY EVALUATION
Patient Name: Michelle Francis  : 1950    MRN: [id number]                              Today's Date: 2022       Admit Date: 2022    Visit Dx:     ICD-10-CM ICD-9-CM   1. Acute respiratory failure with hypoxemia (HCC)  J96.01 518.81   2. Acute congestive heart failure, unspecified heart failure type (HCC)  I50.9 428.0     Patient Active Problem List   Diagnosis   • Acute on chronic respiratory failure with hypoxia (HCC)   • Mixed hyperlipidemia   • Essential hypertension   • Sleep apnea   • Restless leg syndrome   • Arthritis   • Osteoporosis   • COPD with acute exacerbation (HCC)   • Acute bronchitis due to respiratory syncytial virus (RSV)   • Acute pulmonary edema (HCC)   • Obesity (BMI 30-39.9)   • Tachycardia   • CKD (chronic kidney disease) stage 2, GFR 60-89 ml/min   • Depression   • Chest pain, atypical   • Abdominal hernia   • Disorder of vitamin B12   • Gastroesophageal reflux disease   • Vitamin D deficiency   • Right lower lobe pneumonia   • Hyponatremia   • Hyperglycemia   • Elevated AST (SGOT)   • Chronic respiratory failure with hypercapnia (HCC)   • Leg edema   • Acute respiratory failure with hypoxemia (HCC)   • Acute congestive heart failure (HCC)   • Thrombocytopenia (HCC)     Past Medical History:   Diagnosis Date   • Arthritis    • Colon polyps     Dr Bates   • COPD (chronic obstructive pulmonary disease) (HCC)    • History of emphysema    • Hyperlipidemia    • Hypertension    • Kidney disease    • Osteoporosis    • Restless leg syndrome     sees Dr Seipel   • Sleep apnea     npsg [facility] , ahi 5.9, on auto bipap min 8 max, PS 2-8-Alcaraz's, nasal mask     Past Surgical History:   Procedure Laterality Date   • CARDIOVASCULAR STRESS TEST     • CATARACT EXTRACTION      Dr Moyer   • CEREBRAL ANEURYSM REPAIR      Brain Aneurysm approx , treated with stents and coils, Dr. Fraga   • COLON SURGERY      partial colectomy- Dr Valladares- due to multiple large polyps   • ECHO - CONVERTED   2019   • EXPLORATORY LAPAROTOMY      lysis of intra abdominal adhesions, primary ventral hernia repair 08/01/2018 Dr West   • WRIST SURGERY      wrist fracture - Dr Patton      General Information     Row Name 06/07/22 1640          Physical Therapy Time and Intention    Document Type evaluation  -CM     Mode of Treatment physical therapy  -CM     Row Name 06/07/22 1640          General Information    Patient Profile Reviewed yes  -CM     Prior Level of Function independent:;all household mobility;gait  uses w/c to go in/out of MD appts; 5L home O2  -CM     Existing Precautions/Restrictions fall;oxygen therapy device and L/min  -CM     Barriers to Rehab medically complex  -CM     Row Name 06/07/22 1640          Living Environment    People in Home spouse  -CM     Row Name 06/07/22 1640          Home Main Entrance    Number of Stairs, Main Entrance two  -CM     Stair Railings, Main Entrance none  -CM     Row Name 06/07/22 1640          Stairs Within Home, Primary    Number of Stairs, Within Home, Primary none  -CM     Row Name 06/07/22 1640          Cognition    Orientation Status (Cognition) oriented x 4  -CM     Row Name 06/07/22 1640          Safety Issues, Functional Mobility    Impairments Affecting Function (Mobility) balance;endurance/activity tolerance;shortness of breath  -CM           User Key  (r) = Recorded By, (t) = Taken By, (c) = Cosigned By    Initials Name Provider Type    CM Lore Marquez, PT Physical Therapist               Mobility     Row Name 06/07/22 1641          Bed Mobility    Bed Mobility bed mobility (all) activities  -CM     All Activities, Saline (Bed Mobility) not tested  -CM     Comment, (Bed Mobility) sitting at eob when PT arrived  -CM     Row Name 06/07/22 1641          Bed-Chair Transfer    Bed-Chair Saline (Transfers) contact guard  -CM     Row Name 06/07/22 1641          Sit-Stand Transfer    Sit-Stand Saline (Transfers) contact guard  -CM     Row Name  06/07/22 1641          Gait/Stairs (Locomotion)    Zahl Level (Gait) minimum assist (75% patient effort);1 person assist;1 person to manage equipment  -CM     Assistive Device (Gait) other (see comments)  one hand held, gt belt, non skid socks  -CM     Distance in Feet (Gait) 8 ft; severe soa and O2 sats drop to 78%, but quickly recover to 85%; recovered to 91% w/ seated rest. Pt on 35L/70% precision flow O2  -CM     Deviations/Abnormal Patterns (Gait) bilateral deviations;base of support, wide  -CM           User Key  (r) = Recorded By, (t) = Taken By, (c) = Cosigned By    Initials Name Provider Type    Lore Luz, PT Physical Therapist               Obj/Interventions     Row Name 06/07/22 1643          Range of Motion Comprehensive    General Range of Motion no range of motion deficits identified  -CM     Row Name 06/07/22 1643          Strength Comprehensive (MMT)    General Manual Muscle Testing (MMT) Assessment no strength deficits identified  -CM     Row Name 06/07/22 1643          Balance    Balance Assessment sitting static balance;standing static balance  -CM     Static Sitting Balance independent  -CM     Static Standing Balance contact guard  -CM     Row Name 06/07/22 1643          Sensory Assessment (Somatosensory)    Sensory Assessment (Somatosensory) sensation intact  -CM           User Key  (r) = Recorded By, (t) = Taken By, (c) = Cosigned By    Initials Name Provider Type    Lore uLz, PT Physical Therapist               Goals/Plan     Row Name 06/07/22 1646          Bed Mobility Goal 1 (PT)    Activity/Assistive Device (Bed Mobility Goal 1, PT) bed mobility activities, all  -CM     Zahl Level/Cues Needed (Bed Mobility Goal 1, PT) independent  -CM     Time Frame (Bed Mobility Goal 1, PT) 2 weeks  -CM     Row Name 06/07/22 1646          Transfer Goal 1 (PT)    Activity/Assistive Device (Transfer Goal 1, PT) transfers, all  -CM     Zahl Level/Cues Needed  (Transfer Goal 1, PT) independent  -CM     Time Frame (Transfer Goal 1, PT) 2 weeks  -     Row Name 06/07/22 1646          Gait Training Goal 1 (PT)    Activity/Assistive Device (Gait Training Goal 1, PT) gait (walking locomotion);other (see comments)  rw as needed  -CM     [place] Level (Gait Training Goal 1, PT) supervision required  -CM     Distance (Gait Training Goal 1, PT) 75 ft w/o soa or loss of balance  -     Row Name 06/07/22 1646          Therapy Assessment/Plan (PT)    Planned Therapy Interventions (PT) balance training;bed mobility training;gait training;home exercise program;patient/family education;strengthening;ROM (range of motion);postural re-education;transfer training  -           User Key  (r) = Recorded By, (t) = Taken By, (c) = Cosigned By    Initials Name Provider Type    Lore Luz, PT Physical Therapist               Clinical Impression     Row Name 06/07/22 1643          Pain    Pretreatment Pain Rating 0/10 - no pain  -CM     Posttreatment Pain Rating 0/10 - no pain  -CM     Pain Intervention(s) Ambulation/increased activity;Distraction;Emotional support;Therapeutic presence  -     Row Name 06/07/22 1643          Plan of Care Review    Plan of Care Reviewed With patient;spouse  -     Outcome Evaluation 73 yo female adm 6/5/22 for acute hypoxic respiratory failure. Pt uses 5LO2 at home. She lives w/ her  in a single story home w/ 2 stairs to enter. Normally ambulates household distances w/ rw.  Pt's strength is WFL today. She comes to stand and ambulates w/ cga/min assist. Pt can only amb 8 ft before her O2 level drops to 78% while on 35L/70% precision flow. Recovers quickly to 85%, then slowly to 91%. Scheduled for bronch tomorrow. Recommend home w/ family when medically stable. Will follow.  -     Row Name 06/07/22 1643          Therapy Assessment/Plan (PT)    Patient/Family Therapy Goals Statement (PT) agreeable to home at d/c. .  -     Rehab  Potential (PT) good, to achieve stated therapy goals  -CM     Criteria for Skilled Interventions Met (PT) yes;meets criteria;skilled treatment is necessary  -CM     Therapy Frequency (PT) 5 times/wk  -CM     Predicted Duration of Therapy Intervention (PT) until d/c or goals met  -CM     Row Name 06/07/22 1643          Positioning and Restraints    Pre-Treatment Position in bed  sitting eob  -CM     Post Treatment Position chair  -CM     In Chair notified nsg;sitting;call light within reach;encouraged to call for assist;exit alarm on;with other staff;with family/caregiver  -CM           User Key  (r) = Recorded By, (t) = Taken By, (c) = Cosigned By    Initials Name Provider Type    Lore Luz, PT Physical Therapist               Outcome Measures     Row Name 06/07/22 1647          How much help from another person do you currently need...    Turning from your back to your side while in flat bed without using bedrails? 3  -CM     Moving from lying on back to sitting on the side of a flat bed without bedrails? 3  -CM     Moving to and from a bed to a chair (including a wheelchair)? 3  -CM     Standing up from a chair using your arms (e.g., wheelchair, bedside chair)? 3  -CM     Climbing 3-5 steps with a railing? 1  -CM     To walk in hospital room? 2  -CM     AM-PAC 6 Clicks Score (PT) 15  -CM     Highest level of mobility 4 --> Transferred to chair/commode  -CM     Row Name 06/07/22 1647          Functional Assessment    Outcome Measure Options AM-PAC 6 Clicks Basic Mobility (PT)  -CM           User Key  (r) = Recorded By, (t) = Taken By, (c) = Cosigned By    Initials Name Provider Type    Lore Luz, PT Physical Therapist                             Physical Therapy Education                 Title: PT OT SLP Therapies (Done)     Topic: Physical Therapy (Done)     Point: Mobility training (Done)     Learning Progress Summary           Patient Acceptance, E,TB, VU by ANDRES at 6/7/2022 2480    Significant Other Acceptance, E,TB, VU by  at 6/7/2022 1648                               User Key     Initials Effective Dates Name Provider Type Discipline     06/16/21 -  Lore Marquez, PT Physical Therapist PT              PT Recommendation and Plan  Planned Therapy Interventions (PT): balance training, bed mobility training, gait training, home exercise program, patient/family education, strengthening, ROM (range of motion), postural re-education, transfer training  Plan of Care Reviewed With: patient, spouse  Outcome Evaluation: 71 yo female adm 6/5/22 for acute hypoxic respiratory failure. Pt uses 5LO2 at home. She lives w/ her  in a single story home w/ 2 stairs to enter. Normally ambulates household distances w/ rw.  Pt's strength is WFL today. She comes to stand and ambulates w/ cga/min assist. Pt can only amb 8 ft before her O2 level drops to 78% while on 35L/70% precision flow. Recovers quickly to 85%, then slowly to 91%. Scheduled for bronch tomorrow. Recommend home w/ family when medically stable. Will follow.     Time Calculation:    PT Charges     Row Name 06/07/22 1648             Time Calculation    Start Time 1450  -CM      Stop Time 1505  -CM      Time Calculation (min) 15 min  -CM      PT Received On 06/07/22  -CM      PT - Next Appointment 06/08/22  -CM      PT Goal Re-Cert Due Date 06/21/22  -CM              Time Calculation- PT    Total Timed Code Minutes- PT 0 minute(s)  -CM            User Key  (r) = Recorded By, (t) = Taken By, (c) = Cosigned By    Initials Name Provider Type     Lore Marquez, PT Physical Therapist              Therapy Charges for Today     Code Description Service Date Service Provider Modifiers Qty    [id number] HC PT EVAL HIGH COMPLEXITY 3 6/7/2022 Lore Marquez, PT GP 1          PT G-Codes  Outcome Measure Options: AM-PAC 6 Clicks Basic Mobility (PT)  AM-PAC 6 Clicks Score (PT): 15    Lore Marquez PT  6/7/2022

## 2022-06-07 NOTE — CONSULTS
Palliative Care Consultation    Patient Name: Michelle Francis  : 1950  MRN: [id number]  Allergies: Patient has no known allergies.    Requesting clinician:  Ankur  Reason for consult: Consultation for clarification of goals of care and code status.      Patient Code Status:   Code Status and Medical Interventions:   Ordered at: 22 1036     Level Of Support Discussed With:    Patient     Code Status (Patient has no pulse and is not breathing):    No CPR (Do Not Attempt to Resuscitate)     Medical Interventions (Patient has pulse or is breathing):    Full Support           Chief Complaint:    Shortness of breath    History of Present Illness    Michelle Francis is a 72 y.o. female who presented to [facility] ED on  with reports of shortness of breath. Patient reported that her symptoms started 3 days prior to presentation. She typically wears 5L at home but had been using 7L to supplement. Symptoms were worse with exertion. She also reported a productive cough. She denied nausea, vomiting, fevers, chills, or chest pain.     In ED: WBC 12.1, Hgb 12.5, Platelets 123, Lactic 1.4, Na 135, K 4, Creatinine 0.8, Calcium 9    Chest x-ray:  Lung markings are mildly accentuated. There are small effusions.  There is mild peribronchial cuffing.    Patient started on IV lasix, steroids, and antibiotics in the ED for pulmonary edema. She was started on high flow nasal canula for respiratory support. Pulmonary was consulted.      Palliative consult on an acutely ill patient with underlying chronic conditions.     VITAL SIGNS:   Temp:  [98 °F (36.7 °C)-98.8 °F (37.1 °C)] 98.7 °F (37.1 °C)  Heart Rate:  [] 105  Resp:  [14-26] 20  BP: (104-126)/(41-95) 113/49       PMH: HTN, HLD, CKD, COPD    Past Surgical History:   Procedure Laterality Date   • CARDIOVASCULAR STRESS TEST     • CATARACT EXTRACTION      Dr Moyer   • CEREBRAL ANEURYSM REPAIR      Brain Aneurysm approx , treated with stents and coils, Dr. Fraga   •  COLON SURGERY      partial colectomy-2013 Dr Valladares- due to multiple large polyps   • ECHO - CONVERTED  2019   • EXPLORATORY LAPAROTOMY      lysis of intra abdominal adhesions, primary ventral hernia repair 08/01/2018 Dr West   • WRIST SURGERY      wrist fracture - Dr Patton       Family History   Problem Relation Age of Onset   • Diabetes Mother    • Hypertension Mother    • Colon cancer Father    • Colon cancer Sister    • Arthritis Brother    • Hypertension Brother    • Allergies Other        Social History     Tobacco Use   • Smoking status: Former Smoker   • Smokeless tobacco: Never Used   Vaping Use   • Vaping Use: Never used   Substance Use Topics   • Alcohol use: Yes   • Drug use: No           LABS:    Results from last 7 days   Lab Units 06/06/22  1347   WBC 10*3/mm3 7.80   HEMOGLOBIN g/dL 12.4   HEMATOCRIT % 38.1   PLATELETS 10*3/mm3 124*     Results from last 7 days   Lab Units 06/06/22  1347   SODIUM mmol/L 134*   POTASSIUM mmol/L 3.6   CHLORIDE mmol/L 92*   CO2 mmol/L 29.0   BUN mg/dL 18   CREATININE mg/dL 0.75   GLUCOSE mg/dL 150*   CALCIUM mg/dL 8.9     Results from last 7 days   Lab Units 06/06/22  1347 06/06/22  0013   SODIUM mmol/L 134* 135*   POTASSIUM mmol/L 3.6 4.0   CHLORIDE mmol/L 92* 93*   CO2 mmol/L 29.0 28.0   BUN mg/dL 18 12   CREATININE mg/dL 0.75 0.81   CALCIUM mg/dL 8.9 9.0   BILIRUBIN mg/dL  --  0.6   ALK PHOS U/L  --  84   ALT (SGPT) U/L  --  5   AST (SGOT) U/L  --  25   GLUCOSE mg/dL 150* 114*         IMAGING STUDIES:  XR Chest 1 View    Result Date: 6/6/2022  Findings are suggestive of congestive heart failure similar to the prior study. Electronically signed by:  Sascha Resendiz M.D.  6/6/2022 12:11 AM        I reviewed the patient's new clinical results including labs, imaging, and vitals.        Scheduled Meds:  aspirin, 81 mg, Oral, Daily  azithromycin, 500 mg, Oral, Q24H  budesonide-formoterol, 2 puff, Inhalation, BID - RT  dilTIAZem CD, 120 mg, Oral, Daily  enoxaparin, 40 mg,  Subcutaneous, Daily  furosemide, 40 mg, Intravenous, BID - RT  gabapentin, 900 mg, Oral, Nightly  guaiFENesin, 1,200 mg, Oral, Q12H  ipratropium-albuterol, 3 mL, Nebulization, 4x Daily - RT  methylPREDNISolone sodium succinate, 40 mg, Intravenous, Q8H  metoprolol succinate XL, 50 mg, Oral, BID  montelukast, 10 mg, Oral, Nightly  pantoprazole, 40 mg, Oral, Q AM  potassium chloride, 20 mEq, Oral, Daily  rOPINIRole, 1 mg, Oral, BID  rosuvastatin, 20 mg, Oral, Daily  sodium chloride, 10 mL, Intravenous, Q12H  theophylline, 400 mg, Oral, Q24H      Continuous Infusions:       I have reviewed patient's current medication list.     Review of Systems      Physical Exam        PROBLEM LIST:    Acute on chronic respiratory failure with hypoxia (HCC)    Mixed hyperlipidemia    Essential hypertension    COPD with acute exacerbation (HCC)    Obesity (BMI 30-39.9)    Acute respiratory failure with hypoxemia (HCC)    Acute congestive heart failure (HCC)    Thrombocytopenia (HCC)          ASSESSMENT/PLAN:    Acute on chronic respiratory failure: patient wears 5L at baseline for COPD. Patient started on treatment for COPD exacerbation with steroids, antibiotics. Pulmonary consulted. Requiring precision flow.     CHF exacerbation: chest x-ray showed pleural effusions, started on lasix. BNP normal. Echo ordered.    HTN/HLD/CKD/RLS: chronic conditions per primary.     These  illnesses and their management contribute to the need for a palliative consult and advanced care planning.      ADVANCED CARE PLANNIN/7 There are three separate documented conversations regarding code status on this patient. She has stated each time that she does not want CPR but would be agreeable to intubation. I reached out to spouse to provide emotional support and answer questions. He states that the patient seems to be improving. We discussed her current code status and concern for possible need for intubation. He states that he thought the patient  wouldn't want to go on the ventilator, or atleast not long term. They have never completed any advanced directives, but he states he would be comfortable making decisions to take her off the ventilator if she was not improving. He is hopeful that she will improve and she will be able to more actively participate in conversations. He is on his way to the hospital and plans to discuss her code status in more detail with the patient in private. He understands that she has decided she would be willing to go on the ventilator and in an emergent situation, we would put her on it. He asks that I follow up tomorrow after they have had a chance to discuss in more detail. This information was shared with nursing.       Advanced Directives: Patient does not have advance directive  Health Care Directive on file: No  Health Care Surrogate:      Palliative Performance Scale Score:    Comments:           Decisional Capacity: yes  Patient's understanding of illness: adequate  Patient goals of care:  DNR/ full intervention      Thank you for this consult and allowing us to participate in patient's plan of care. Palliative Care Team will continue to follow patient.       I spent 59 minutes reviewing medical and medication records, assessing and examining patient, discussing with family, answering questions, providing some guidance about a plan and documentation of care, and coordinating care with other healthcare members. More than 50% of time spent face to face discussing disease education, current clinical status, and medication management.     I spent an additional 25 minutes on advanced care planning, goals of care, and code status discussion.  I obtained consent for ACP discussion.     Jessica Carpenter, APRN  6/7/2022

## 2022-06-07 NOTE — NURSING NOTE
Pt is currently placed as comfort measures in interventions. Spoke with hospitalist who stated that comfort measures was part of an order set with DNR code status. Pt wants aggressive treatment, is not actively comfort measures.     RN spoke with pt about intubation wishes and explained that if the heart stops, intubation alone would not be an effective intervention. Pt stated she does not want chest compressions but still wants to be intubated in case she stops breathing. DNR only per patient.

## 2022-06-08 ENCOUNTER — ANESTHESIA (OUTPATIENT)
Dept: GASTROENTEROLOGY | Facility: HOSPITAL | Age: 72
End: 2022-06-08

## 2022-06-08 ENCOUNTER — ANESTHESIA EVENT (OUTPATIENT)
Dept: GASTROENTEROLOGY | Facility: HOSPITAL | Age: 72
End: 2022-06-08

## 2022-06-08 VITALS
DIASTOLIC BLOOD PRESSURE: 60 MMHG | SYSTOLIC BLOOD PRESSURE: 129 MMHG | RESPIRATION RATE: 12 BRPM | HEART RATE: 94 BPM | OXYGEN SATURATION: 90 %

## 2022-06-08 LAB
B PARAPERT DNA SPEC QL NAA+PROBE: NOT DETECTED
B PERT DNA SPEC QL NAA+PROBE: NOT DETECTED
BILIRUB UR QL STRIP: NEGATIVE
C PNEUM DNA NPH QL NAA+NON-PROBE: NOT DETECTED
CLARITY UR: ABNORMAL
COLOR UR: YELLOW
FLUAV SUBTYP SPEC NAA+PROBE: NOT DETECTED
FLUBV RNA ISLT QL NAA+PROBE: NOT DETECTED
GLUCOSE UR STRIP-MCNC: NEGATIVE MG/DL
HADV DNA SPEC NAA+PROBE: NOT DETECTED
HCOV 229E RNA SPEC QL NAA+PROBE: NOT DETECTED
HCOV HKU1 RNA SPEC QL NAA+PROBE: NOT DETECTED
HCOV NL63 RNA SPEC QL NAA+PROBE: NOT DETECTED
HCOV OC43 RNA SPEC QL NAA+PROBE: NOT DETECTED
HGB UR QL STRIP.AUTO: NEGATIVE
HMPV RNA NPH QL NAA+NON-PROBE: NOT DETECTED
HPIV1 RNA ISLT QL NAA+PROBE: NOT DETECTED
HPIV2 RNA SPEC QL NAA+PROBE: NOT DETECTED
HPIV3 RNA NPH QL NAA+PROBE: NOT DETECTED
HPIV4 P GENE NPH QL NAA+PROBE: NOT DETECTED
INR PPP: 1.04 (ref 0.93–1.1)
KETONES UR QL STRIP: NEGATIVE
LEUKOCYTE ESTERASE UR QL STRIP.AUTO: NEGATIVE
M PNEUMO IGG SER IA-ACNC: NOT DETECTED
NITRITE UR QL STRIP: NEGATIVE
PH UR STRIP.AUTO: <=5 [PH] (ref 5–8)
PROT UR QL STRIP: NEGATIVE
PROTHROMBIN TIME: 10.7 SECONDS (ref 9.6–11.7)
RHINOVIRUS RNA SPEC NAA+PROBE: NOT DETECTED
RSV RNA NPH QL NAA+NON-PROBE: NOT DETECTED
SARS-COV-2 RNA NPH QL NAA+NON-PROBE: NOT DETECTED
SP GR UR STRIP: 1.02 (ref 1–1.03)
URATE SERPL-MCNC: 7.7 MG/DL (ref 2.4–5.7)
UROBILINOGEN UR QL STRIP: ABNORMAL

## 2022-06-08 PROCEDURE — 25010000002 ENOXAPARIN PER 10 MG: Performed by: INTERNAL MEDICINE

## 2022-06-08 PROCEDURE — 94761 N-INVAS EAR/PLS OXIMETRY MLT: CPT

## 2022-06-08 PROCEDURE — 94799 UNLISTED PULMONARY SVC/PX: CPT

## 2022-06-08 PROCEDURE — 85610 PROTHROMBIN TIME: CPT

## 2022-06-08 PROCEDURE — 81003 URINALYSIS AUTO W/O SCOPE: CPT | Performed by: HOSPITALIST

## 2022-06-08 PROCEDURE — 88108 CYTOPATH CONCENTRATE TECH: CPT | Performed by: INTERNAL MEDICINE

## 2022-06-08 PROCEDURE — 0B9J8ZX DRAINAGE OF LEFT LOWER LUNG LOBE, VIA NATURAL OR ARTIFICIAL OPENING ENDOSCOPIC, DIAGNOSTIC: ICD-10-PCS | Performed by: INTERNAL MEDICINE

## 2022-06-08 PROCEDURE — 0202U NFCT DS 22 TRGT SARS-COV-2: CPT | Performed by: INTERNAL MEDICINE

## 2022-06-08 PROCEDURE — 87102 FUNGUS ISOLATION CULTURE: CPT | Performed by: INTERNAL MEDICINE

## 2022-06-08 PROCEDURE — 99232 SBSQ HOSP IP/OBS MODERATE 35: CPT | Performed by: HOSPITALIST

## 2022-06-08 PROCEDURE — 87070 CULTURE OTHR SPECIMN AEROBIC: CPT | Performed by: INTERNAL MEDICINE

## 2022-06-08 PROCEDURE — 87116 MYCOBACTERIA CULTURE: CPT | Performed by: INTERNAL MEDICINE

## 2022-06-08 PROCEDURE — 87798 DETECT AGENT NOS DNA AMP: CPT | Performed by: INTERNAL MEDICINE

## 2022-06-08 PROCEDURE — 94664 DEMO&/EVAL PT USE INHALER: CPT

## 2022-06-08 PROCEDURE — 87305 ASPERGILLUS AG IA: CPT | Performed by: INTERNAL MEDICINE

## 2022-06-08 PROCEDURE — 25010000002 METHYLPREDNISOLONE PER 40 MG: Performed by: INTERNAL MEDICINE

## 2022-06-08 PROCEDURE — 84550 ASSAY OF BLOOD/URIC ACID: CPT | Performed by: HOSPITALIST

## 2022-06-08 PROCEDURE — 87205 SMEAR GRAM STAIN: CPT | Performed by: INTERNAL MEDICINE

## 2022-06-08 PROCEDURE — 94660 CPAP INITIATION&MGMT: CPT

## 2022-06-08 PROCEDURE — 25010000002 PROPOFOL 200 MG/20ML EMULSION

## 2022-06-08 PROCEDURE — 25010000002 FUROSEMIDE PER 20 MG: Performed by: INTERNAL MEDICINE

## 2022-06-08 PROCEDURE — 87206 SMEAR FLUORESCENT/ACID STAI: CPT | Performed by: INTERNAL MEDICINE

## 2022-06-08 RX ORDER — LIDOCAINE HYDROCHLORIDE 10 MG/ML
INJECTION, SOLUTION EPIDURAL; INFILTRATION; INTRACAUDAL; PERINEURAL AS NEEDED
Status: DISCONTINUED | OUTPATIENT
Start: 2022-06-08 | End: 2022-06-08 | Stop reason: SURG

## 2022-06-08 RX ORDER — DOCUSATE SODIUM 100 MG/1
100 CAPSULE, LIQUID FILLED ORAL 2 TIMES DAILY
Status: DISCONTINUED | OUTPATIENT
Start: 2022-06-08 | End: 2022-06-12 | Stop reason: HOSPADM

## 2022-06-08 RX ORDER — LIDOCAINE 50 MG/G
OINTMENT TOPICAL AS NEEDED
Status: DISCONTINUED | OUTPATIENT
Start: 2022-06-08 | End: 2022-06-08 | Stop reason: HOSPADM

## 2022-06-08 RX ORDER — SODIUM CHLORIDE 9 MG/ML
INJECTION, SOLUTION INTRAVENOUS CONTINUOUS PRN
Status: DISCONTINUED | OUTPATIENT
Start: 2022-06-08 | End: 2022-06-08 | Stop reason: SURG

## 2022-06-08 RX ORDER — LIDOCAINE HYDROCHLORIDE 20 MG/ML
INJECTION, SOLUTION INFILTRATION; PERINEURAL AS NEEDED
Status: DISCONTINUED | OUTPATIENT
Start: 2022-06-08 | End: 2022-06-08 | Stop reason: HOSPADM

## 2022-06-08 RX ORDER — PROPOFOL 10 MG/ML
INJECTION, EMULSION INTRAVENOUS AS NEEDED
Status: DISCONTINUED | OUTPATIENT
Start: 2022-06-08 | End: 2022-06-08 | Stop reason: SURG

## 2022-06-08 RX ADMIN — METHYLPREDNISOLONE SODIUM SUCCINATE 40 MG: 40 INJECTION, POWDER, FOR SOLUTION INTRAMUSCULAR; INTRAVENOUS at 17:47

## 2022-06-08 RX ADMIN — BUDESONIDE AND FORMOTEROL FUMARATE DIHYDRATE 2 PUFF: 160; 4.5 AEROSOL RESPIRATORY (INHALATION) at 19:18

## 2022-06-08 RX ADMIN — ROPINIROLE HYDROCHLORIDE 1 MG: 1 TABLET, FILM COATED ORAL at 20:32

## 2022-06-08 RX ADMIN — SODIUM CHLORIDE: 9 INJECTION, SOLUTION INTRAVENOUS at 14:20

## 2022-06-08 RX ADMIN — POTASSIUM CHLORIDE 20 MEQ: 1500 TABLET, EXTENDED RELEASE ORAL at 08:31

## 2022-06-08 RX ADMIN — DOCUSATE SODIUM 100 MG: 100 CAPSULE, LIQUID FILLED ORAL at 10:06

## 2022-06-08 RX ADMIN — IPRATROPIUM BROMIDE AND ALBUTEROL SULFATE 3 ML: .5; 3 SOLUTION RESPIRATORY (INHALATION) at 15:34

## 2022-06-08 RX ADMIN — IPRATROPIUM BROMIDE AND ALBUTEROL SULFATE 3 ML: .5; 3 SOLUTION RESPIRATORY (INHALATION) at 07:00

## 2022-06-08 RX ADMIN — LIDOCAINE HYDROCHLORIDE 50 MG: 10 INJECTION, SOLUTION EPIDURAL; INFILTRATION; INTRACAUDAL; PERINEURAL at 14:30

## 2022-06-08 RX ADMIN — METOPROLOL SUCCINATE 50 MG: 50 TABLET, EXTENDED RELEASE ORAL at 20:32

## 2022-06-08 RX ADMIN — THEOPHYLLINE ANHYDROUS 400 MG: 200 CAPSULE, EXTENDED RELEASE ORAL at 08:31

## 2022-06-08 RX ADMIN — ROSUVASTATIN 20 MG: 10 TABLET, FILM COATED ORAL at 20:31

## 2022-06-08 RX ADMIN — AZITHROMYCIN MONOHYDRATE 500 MG: 250 TABLET ORAL at 08:31

## 2022-06-08 RX ADMIN — PROPOFOL 50 MG: 10 INJECTION, EMULSION INTRAVENOUS at 14:30

## 2022-06-08 RX ADMIN — PANTOPRAZOLE SODIUM 40 MG: 40 TABLET, DELAYED RELEASE ORAL at 05:34

## 2022-06-08 RX ADMIN — METHYLPREDNISOLONE SODIUM SUCCINATE 40 MG: 40 INJECTION, POWDER, FOR SOLUTION INTRAMUSCULAR; INTRAVENOUS at 10:05

## 2022-06-08 RX ADMIN — IPRATROPIUM BROMIDE AND ALBUTEROL SULFATE 3 ML: .5; 3 SOLUTION RESPIRATORY (INHALATION) at 02:21

## 2022-06-08 RX ADMIN — MONTELUKAST 10 MG: 10 TABLET, FILM COATED ORAL at 20:32

## 2022-06-08 RX ADMIN — IPRATROPIUM BROMIDE AND ALBUTEROL SULFATE 3 ML: .5; 3 SOLUTION RESPIRATORY (INHALATION) at 19:21

## 2022-06-08 RX ADMIN — BUDESONIDE AND FORMOTEROL FUMARATE DIHYDRATE 2 PUFF: 160; 4.5 AEROSOL RESPIRATORY (INHALATION) at 07:07

## 2022-06-08 RX ADMIN — METHYLPREDNISOLONE SODIUM SUCCINATE 40 MG: 40 INJECTION, POWDER, FOR SOLUTION INTRAMUSCULAR; INTRAVENOUS at 02:13

## 2022-06-08 RX ADMIN — FUROSEMIDE 40 MG: 10 INJECTION, SOLUTION INTRAMUSCULAR; INTRAVENOUS at 16:23

## 2022-06-08 RX ADMIN — GUAIFENESIN 1200 MG: 600 TABLET, EXTENDED RELEASE ORAL at 20:32

## 2022-06-08 RX ADMIN — ENOXAPARIN SODIUM 40 MG: 100 INJECTION SUBCUTANEOUS at 16:25

## 2022-06-08 RX ADMIN — DOCUSATE SODIUM 100 MG: 100 CAPSULE, LIQUID FILLED ORAL at 20:32

## 2022-06-08 RX ADMIN — IPRATROPIUM BROMIDE AND ALBUTEROL SULFATE 3 ML: .5; 3 SOLUTION RESPIRATORY (INHALATION) at 10:44

## 2022-06-08 RX ADMIN — METOPROLOL SUCCINATE 50 MG: 50 TABLET, EXTENDED RELEASE ORAL at 08:31

## 2022-06-08 RX ADMIN — DILTIAZEM HYDROCHLORIDE 120 MG: 120 CAPSULE, COATED, EXTENDED RELEASE ORAL at 08:31

## 2022-06-08 RX ADMIN — GABAPENTIN 900 MG: 300 CAPSULE ORAL at 20:32

## 2022-06-08 RX ADMIN — GUAIFENESIN 1200 MG: 600 TABLET, EXTENDED RELEASE ORAL at 08:31

## 2022-06-08 RX ADMIN — Medication 10 ML: at 08:34

## 2022-06-08 RX ADMIN — Medication 10 ML: at 20:33

## 2022-06-08 NOTE — CONSULTS
Nutrition Services    Patient Name: Michelle Francis  YOB: 1950  MRN: [id number]  Admission date: 6/5/2022      PPE Documentation        PPE Worn By Provider Did not enter room for this encounter    PPE Worn By Patient  None      Nutrition Counseling & Education       Reason for Visit: Physician Consult      Patient had just returned from bronch during RD visit and was about to get a breathing treatment from RT.       Session topic: Diet rationale, Key food habit change and Weight Loss      Session comments: Patient was accepting of General Healthy Eating handout, RD placed with patient belongings.  Patient about to get breathing treatment at time of RD visit so unable to speak to patient at length.  Patient states her appetite is good, no N/V, no chewing/swallowing issues noted.       Provided print material via: [employer]-Nutrition Care Manual      Goals: Increase knowledge on diet/nutrition effects on condition/status      Monitoring/Follow Up: RD to follow up PRN    Patient to follow up PRN on outpatient basis       Electronically signed by:  Shelley Pineda RD  06/08/22 16:25 EDT

## 2022-06-08 NOTE — CASE MANAGEMENT/SOCIAL WORK
Continued Stay Note   Basilio     Patient Name: Michelle Francis  MRN: [id number]  Today's Date: 6/8/2022    Admit Date: 6/5/2022     Discharge Plan     Row Name 06/08/22 1216       Plan    Plan DC Plan: Anticipate routine home with spouse and [facility] (current, BALAJI order per MD). Has home O2 through [employer].    Plan Comments PT recommending home with assist at this time. DC Barriers: O2 requirements (currently on 30L), pulmonology following, scheduled to have bronchoscopy performed today.              Phone communication or documentation only - no physical contact with patient or family.    Eunice Quiroz RN     Office Phone: [phone]  Office Cell: [phone]

## 2022-06-08 NOTE — SIGNIFICANT NOTE
06/08/22 1440   OTHER   Discipline physical therapist   Rehab Time/Intention   Session Not Performed patient unavailable for treatment  (Pt in endoscopy. Will attempt tomorrow.)   Recommendation   PT - Next Appointment 06/09/22

## 2022-06-08 NOTE — ANESTHESIA PREPROCEDURE EVALUATION
Anesthesia Evaluation     Patient summary reviewed and Nursing notes reviewed   NPO Solid Status: > 8 hours  NPO Liquid Status: > 8 hours           Airway   Mallampati: II  TM distance: >3 FB  Neck ROM: full  Dental    (+) upper dentures        Pulmonary    (+) pneumonia stable , COPD severe, sleep apnea on CPAP, rhonchi,   Cardiovascular - normal exam  Exercise tolerance: poor (<4 METS)    (+) hypertension, CHF Systolic <55%, hyperlipidemia,       Neuro/Psych  (+) psychiatric history Depression,    GI/Hepatic/Renal/Endo    (+) obesity,  GERD,  renal disease CRI,     Musculoskeletal     Abdominal    Substance History      OB/GYN          Other   arthritis,      ROS/Med Hx Other: TTE pending, last EF normal 2019       Palliative Care Consultation    Patient Name: Michelle Francis  : 1950  MRN: [id number]  Allergies: Patient has no known allergies.    Requesting clinician:  Ankur  Reason for consult: Consultation for clarification of goals of care and code status.      Patient Code Status:   Code Status and Medical Interventions:   Ordered at: 22 1036     Level Of Support Discussed With:    Patient     Code Status (Patient has no pulse and is not breathing):    No CPR (Do Not Attempt to Resuscitate)     Medical Interventions (Patient has pulse or is breathing):    Full Support           Chief Complaint:    Shortness of breath    History of Present Illness    Michelle Francis is a 72 y.o. female who presented to [facility] ED on  with reports of shortness of breath. Patient reported that her symptoms started 3 days prior to presentation. She typically wears 5L at home but had been using 7L to supplement. Symptoms were worse with exertion. She also reported a productive cough. She denied nausea, vomiting, fevers, chills, or chest pain.     In ED: WBC 12.1, Hgb 12.5, Platelets 123, Lactic 1.4, Na 135, K 4, Creatinine 0.8, Calcium 9    Chest x-ray:  Lung markings are mildly accentuated. There are small effusions.   There is mild peribronchial cuffing.    Patient started on IV lasix, steroids, and antibiotics in the ED for pulmonary edema. She was started on high flow nasal canula for respiratory support. Pulmonary was consulted.     6/7 Palliative consult on an acutely ill patient with underlying chronic conditions.     VITAL SIGNS:   Temp:  [98 °F (36.7 °C)-98.8 °F (37.1 °C)] 98.7 °F (37.1 °C)  Heart Rate:  [] 105  Resp:  [14-26] 20  BP: (104-126)/(41-95) 113/49       PMH: HTN, HLD, CKD, COPD    Past Surgical History:   Procedure Laterality Date   • CARDIOVASCULAR STRESS TEST  2020   • CATARACT EXTRACTION      Dr Moyer   • CEREBRAL ANEURYSM REPAIR      Brain Aneurysm approx 2009, treated with stents and coils, Dr. Fraga   • COLON SURGERY      partial colectomy-2013 Dr Valladares- due to multiple large polyps   • ECHO - CONVERTED  2019   • EXPLORATORY LAPAROTOMY      lysis of intra abdominal adhesions, primary ventral hernia repair 08/01/2018 Dr West   • WRIST SURGERY      wrist fracture - Dr Patton       Family History   Problem Relation Age of Onset   • Diabetes Mother    • Hypertension Mother    • Colon cancer Father    • Colon cancer Sister    • Arthritis Brother    • Hypertension Brother    • Allergies Other        Social History     Tobacco Use   • Smoking status: Former Smoker   • Smokeless tobacco: Never Used   Vaping Use   • Vaping Use: Never used   Substance Use Topics   • Alcohol use: Yes   • Drug use: No           LABS:    Results from last 7 days   Lab Units 06/06/22  1347   WBC 10*3/mm3 7.80   HEMOGLOBIN g/dL 12.4   HEMATOCRIT % 38.1   PLATELETS 10*3/mm3 124*     Results from last 7 days   Lab Units 06/06/22  1347   SODIUM mmol/L 134*   POTASSIUM mmol/L 3.6   CHLORIDE mmol/L 92*   CO2 mmol/L 29.0   BUN mg/dL 18   CREATININE mg/dL 0.75   GLUCOSE mg/dL 150*   CALCIUM mg/dL 8.9     Results from last 7 days   Lab Units 06/06/22  1347 06/06/22  0013   SODIUM mmol/L 134* 135*   POTASSIUM mmol/L 3.6 4.0   CHLORIDE  mmol/L 92* 93*   CO2 mmol/L 29.0 28.0   BUN mg/dL 18 12   CREATININE mg/dL 0.75 0.81   CALCIUM mg/dL 8.9 9.0   BILIRUBIN mg/dL  --  0.6   ALK PHOS U/L  --  84   ALT (SGPT) U/L  --  5   AST (SGOT) U/L  --  25   GLUCOSE mg/dL 150* 114*         IMAGING STUDIES:  XR Chest 1 View    Result Date: 6/6/2022  Findings are suggestive of congestive heart failure similar to the prior study. Electronically signed by:  Sascha Resendiz M.D.  6/6/2022 12:11 AM        I reviewed the patient's new clinical results including labs, imaging, and vitals.        Scheduled Meds:  aspirin, 81 mg, Oral, Daily  azithromycin, 500 mg, Oral, Q24H  budesonide-formoterol, 2 puff, Inhalation, BID - RT  dilTIAZem CD, 120 mg, Oral, Daily  enoxaparin, 40 mg, Subcutaneous, Daily  furosemide, 40 mg, Intravenous, BID - RT  gabapentin, 900 mg, Oral, Nightly  guaiFENesin, 1,200 mg, Oral, Q12H  ipratropium-albuterol, 3 mL, Nebulization, 4x Daily - RT  methylPREDNISolone sodium succinate, 40 mg, Intravenous, Q8H  metoprolol succinate XL, 50 mg, Oral, BID  montelukast, 10 mg, Oral, Nightly  pantoprazole, 40 mg, Oral, Q AM  potassium chloride, 20 mEq, Oral, Daily  rOPINIRole, 1 mg, Oral, BID  rosuvastatin, 20 mg, Oral, Daily  sodium chloride, 10 mL, Intravenous, Q12H  theophylline, 400 mg, Oral, Q24H      Continuous Infusions:       I have reviewed patient's current medication list.     Review of Systems      Physical Exam        PROBLEM LIST:    Acute on chronic respiratory failure with hypoxia (HCC)    Mixed hyperlipidemia    Essential hypertension    COPD with acute exacerbation (HCC)    Obesity (BMI 30-39.9)    Acute respiratory failure with hypoxemia (HCC)    Acute congestive heart failure (HCC)    Thrombocytopenia (HCC)          ASSESSMENT/PLAN:    Acute on chronic respiratory failure: patient wears 5L at baseline for COPD. Patient started on treatment for COPD exacerbation with steroids, antibiotics. Pulmonary consulted. Requiring precision flow.     CHF  exacerbation: chest x-ray showed pleural effusions, started on lasix. BNP normal. Echo ordered.    HTN/HLD/CKD/RLS: chronic conditions per primary.     These  illnesses and their management contribute to the need for a palliative consult and advanced care planning.      ADVANCED CARE PLANNIN/7 There are three separate documented conversations regarding code status on this patient. She has stated each time that she does not want CPR but would be agreeable to intubation. I reached out to spouse to provide emotional support and answer questions. He states that the patient seems to be improving. We discussed her current code status and concern for possible need for intubation. He states that he thought the patient wouldn't want to go on the ventilator, or atleast not long term. They have never completed any advanced directives, but he states he would be comfortable making decisions to take her off the ventilator if she was not improving. He is hopeful that she will improve and she will be able to more actively participate in conversations. He is on his way to the hospital and plans to discuss her code status in more detail with the patient in private. He understands that she has decided she would be willing to go on the ventilator and in an emergent situation, we would put her on it. He asks that I follow up tomorrow after they have had a chance to discuss in more detail. This information was shared with nursing.       Advanced Directives: Patient does not have advance directive  Health Care Directive on file: No  Health Care Surrogate:      Palliative Performance Scale Score:    Comments:           Decisional Capacity: yes  Patient's understanding of illness: adequate  Patient goals of care:  DNR/ full intervention      Thank you for this consult and allowing us to participate in patient's plan of care. Palliative Care Team will continue to follow patient.       I spent 59 minutes reviewing medical and  medication records, assessing and examining patient, discussing with family, answering questions, providing some guidance about a plan and documentation of care, and coordinating care with other healthcare members. More than 50% of time spent face to face discussing disease education, current clinical status, and medication management.     I spent an additional 25 minutes on advanced care planning, goals of care, and code status discussion.  I obtained consent for ACP discussion.     Jessica Carpenter, APRN  6/7/2022         Anesthesia Plan    ASA 4     MAC     intravenous induction     Anesthetic plan, risks, benefits, and alternatives have been provided, discussed and informed consent has been obtained with: patient.    Plan discussed with CRNA and CAA.        CODE STATUS:    Level Of Support Discussed With: Patient  Code Status (Patient has no pulse and is not breathing): No CPR (Do Not Attempt to Resuscitate)  Medical Interventions (Patient has pulse or is breathing): Full Support

## 2022-06-08 NOTE — PROGRESS NOTES
[facility] Medicine Services Daily Progress Note    Patient Name: Michelle Francis  : 1950  MRN: [id number]  Primary Care Physician:  Avani Montoya MD  Date of admission: 2022      Subjective      Chief Complaint: Shortness of air      Patient Reports   2022: The patient reports continued shortness of air which is improved at rest on the high flow oxygen.    ROS   All other systems were reviewed and were negative.      Objective      Vitals:   Temp:  [97.6 °F (36.4 °C)-98.7 °F (37.1 °C)] 97.8 °F (36.6 °C)  Heart Rate:  [] 91  Resp:  [18-22] 21  BP: (104-122)/(52-66) 117/53  Flow (L/min):  [30-40] 30    Physical Exam   Vital signs and nurses notes reviewed.  Well-developed over-nourished female comfortable on recession below O2 in no acute distress sitting up in bed awake and alert; mucous membranes moist; sclerae anicteric; lungs clear decreased air entry bilaterally; CV regular rate and rhythm; abdomen soft nontender nondistended; extremities with no edema, cyanosis or calf tenderness; palpable pedal pulses bilaterally; no Huertas catheter.       Result Review    Result Review:  I have personally reviewed the results from the time of this admission to 2022 10:47 EDT and agree with these findings:  [x]  Laboratory  [x]  Microbiology  [x]  Radiology  [x]  EKG/Telemetry   [x]  Cardiology/Vascular   []  Pathology  [x]  Old records  []  Other:  Most notable findings discussed in the assessment and plan.          Assessment & Plan      Brief Patient Summary:  Michelle Francis is a 72 y.o. female with a history of hypertension, hyperlipidemia, chronic kidney disease stage II, restless leg syndrome and end-stage COPD dependent on 5 L nasal cannula O2 continuously who presented to the emergency room because of recent increased congestion and cough and significant or shortness of air despite home O2.    Current inpatient medications include:  aspirin, 81 mg, Oral,  Daily  azithromycin, 500 mg, Oral, Q24H  budesonide-formoterol, 2 puff, Inhalation, BID - RT  dilTIAZem CD, 120 mg, Oral, Daily  docusate sodium, 100 mg, Oral, BID  enoxaparin, 40 mg, Subcutaneous, Daily  furosemide, 40 mg, Intravenous, BID - RT  gabapentin, 900 mg, Oral, Nightly  guaiFENesin, 1,200 mg, Oral, Q12H  ipratropium-albuterol, 3 mL, Nebulization, 4x Daily - RT  methylPREDNISolone sodium succinate, 40 mg, Intravenous, Q8H  metoprolol succinate XL, 50 mg, Oral, BID  montelukast, 10 mg, Oral, Nightly  pantoprazole, 40 mg, Oral, Q AM  potassium chloride, 20 mEq, Oral, Daily  rOPINIRole, 1 mg, Oral, BID  rosuvastatin, 20 mg, Oral, Daily  sodium chloride, 10 mL, Intravenous, Q12H  theophylline, 400 mg, Oral, Q24H             Active Hospital Problems:  Active Hospital Problems    Diagnosis    • COPD with acute exacerbation (HCC)    • Acute on chronic respiratory failure with hypoxia (HCC)    • Essential hypertension    • Mixed hyperlipidemia    • Acute respiratory failure with hypoxemia (HCC)    • Acute congestive heart failure (HCC)    • Thrombocytopenia (HCC)    • Right lower lobe pneumonia    • Obesity (BMI 30-39.9)      Plan:   Acute hypoxic respiratory failure secondary to acute exacerbation of severe COPD and bilateral lower lobe pneumonia/sleep apnea with CPAP compliance  -Pulmonary consulting and performed bronchoscopy which showed bilateral lower lobe pneumonia with significant inflammation suggestive of chronic aspiration possibly from GERD  -Continue Zithromax, Symbicort, DuoNebs, Singulair, theophylline, guaifenesin and IV methylprednisolone  -Patient was extensively counseled and all questions were answered  -Titrate O2 to maintain sat greater than 89%    Essential hypertension, chronic and controlled  -Continue diltiazem, Lasix and metoprolol    Chronic kidney disease stage II secondary to hypertension  Creatinine 1.0 and current creatinine 0.75 which is better than baseline  -Monitor renal  function and avoid nephrotoxic medications    Hyperlipidemia  -Continue Crestor    Congestive heart failure is felt to have been ruled out  -proBNP and LVEF are normal with no report of elevated RVSP    GERD  -Continue PPI    Overweight  -Lifestyle modification counseling    Restless leg syndrome  -Continue Requip and gabapentin    DVT prophylaxis:  Medical DVT prophylaxis orders are present.    CODE STATUS:    Level Of Support Discussed With: Patient  Code Status (Patient has no pulse and is not breathing): No CPR (Do Not Attempt to Resuscitate)  Medical Interventions (Patient has pulse or is breathing): Full Support      Disposition:  I expect patient to be discharged depending on clinical course.    This patient has been examined wearing appropriate Personal Protective Equipment and discussed with hospital infection control department. 06/08/22      Electronically signed by Virgen Pascual MD, 06/08/22, 10:47 EDT.  [facility]

## 2022-06-08 NOTE — PLAN OF CARE
Problem: Adult Inpatient Plan of Care  Goal: Plan of Care Review  Outcome: Ongoing, Progressing  Goal: Patient-Specific Goal (Individualized)  Outcome: Ongoing, Progressing  Goal: Absence of Hospital-Acquired Illness or Injury  Outcome: Ongoing, Progressing  Intervention: Identify and Manage Fall Risk  Recent Flowsheet Documentation  Taken 6/8/2022 0338 by Saeed Carreon RN  Safety Promotion/Fall Prevention:   activity supervised   assistive device/personal items within reach   clutter free environment maintained   fall prevention program maintained   nonskid shoes/slippers when out of bed   room organization consistent   safety round/check completed  Taken 6/8/2022 0200 by Saeed Carreon RN  Safety Promotion/Fall Prevention:   activity supervised   assistive device/personal items within reach   clutter free environment maintained   fall prevention program maintained   nonskid shoes/slippers when out of bed   room organization consistent   safety round/check completed  Taken 6/8/2022 0019 by Saeed Carreon RN  Safety Promotion/Fall Prevention:   activity supervised   assistive device/personal items within reach   clutter free environment maintained   fall prevention program maintained   nonskid shoes/slippers when out of bed   room organization consistent   safety round/check completed  Taken 6/8/2022 0000 by Saeed Carreon RN  Safety Promotion/Fall Prevention:   activity supervised   assistive device/personal items within reach   clutter free environment maintained   fall prevention program maintained   nonskid shoes/slippers when out of bed   room organization consistent   safety round/check completed  Taken 6/7/2022 2200 by Saeed Carreon RN  Safety Promotion/Fall Prevention:   activity supervised   assistive device/personal items within reach   clutter free environment maintained   fall prevention program maintained   nonskid shoes/slippers when out of bed   room organization consistent   safety  round/check completed  Taken 6/7/2022 2005 by Saeed Carreon RN  Safety Promotion/Fall Prevention:   activity supervised   assistive device/personal items within reach   clutter free environment maintained   fall prevention program maintained   nonskid shoes/slippers when out of bed   room organization consistent   safety round/check completed  Taken 6/7/2022 2000 by Saeed Carreon RN  Safety Promotion/Fall Prevention:   activity supervised   assistive device/personal items within reach   clutter free environment maintained   fall prevention program maintained   nonskid shoes/slippers when out of bed   room organization consistent   safety round/check completed  Intervention: Prevent Skin Injury  Recent Flowsheet Documentation  Taken 6/8/2022 0338 by Saeed Carreon RN  Body Position: position changed independently  Skin Protection:   adhesive use limited   incontinence pads utilized   skin-to-device areas padded   skin-to-skin areas padded   transparent dressing maintained   tubing/devices free from skin contact  Taken 6/8/2022 0019 by Saeed Carreon RN  Body Position: position changed independently  Skin Protection:   adhesive use limited   incontinence pads utilized   skin-to-device areas padded   transparent dressing maintained   skin-to-skin areas padded   tubing/devices free from skin contact  Taken 6/7/2022 2005 by Saeed Carreon RN  Body Position: position changed independently  Skin Protection:   adhesive use limited   incontinence pads utilized   skin-to-device areas padded   skin-to-skin areas padded   transparent dressing maintained   tubing/devices free from skin contact  Intervention: Prevent and Manage VTE (Venous Thromboembolism) Risk  Recent Flowsheet Documentation  Taken 6/8/2022 0338 by Saeed Carreon RN  Activity Management: activity adjusted per tolerance  VTE Prevention/Management: bleeding risk factor(s) identified  Range of Motion: active ROM (range of motion) encouraged  Taken  6/8/2022 0019 by Saeed Carreon RN  Activity Management: activity adjusted per tolerance  VTE Prevention/Management: bleeding risk factor(s) identified  Range of Motion: active ROM (range of motion) encouraged  Taken 6/7/2022 2005 by Saeed Carreon RN  Activity Management: activity adjusted per tolerance  VTE Prevention/Management: bleeding risk factor(s) identified  Range of Motion: active ROM (range of motion) encouraged  Intervention: Prevent Infection  Recent Flowsheet Documentation  Taken 6/8/2022 0338 by Saeed Carreon RN  Infection Prevention:   environmental surveillance performed   hand hygiene promoted   personal protective equipment utilized   rest/sleep promoted   single patient room provided  Taken 6/8/2022 0019 by Saeed Carreon RN  Infection Prevention:   environmental surveillance performed   hand hygiene promoted   personal protective equipment utilized   single patient room provided   rest/sleep promoted  Taken 6/7/2022 2005 by Saeed Carreon RN  Infection Prevention:   environmental surveillance performed   hand hygiene promoted   personal protective equipment utilized   rest/sleep promoted   single patient room provided  Goal: Optimal Comfort and Wellbeing  Outcome: Ongoing, Progressing  Intervention: Provide Person-Centered Care  Recent Flowsheet Documentation  Taken 6/8/2022 0338 by Saeed Carreon RN  Trust Relationship/Rapport:   care explained   choices provided   emotional support provided   empathic listening provided   questions answered   questions encouraged   reassurance provided   thoughts/feelings acknowledged  Taken 6/8/2022 0019 by Saeed Carreon RN  Trust Relationship/Rapport:   care explained   choices provided   emotional support provided   empathic listening provided   questions answered   questions encouraged   reassurance provided   thoughts/feelings acknowledged  Taken 6/7/2022 2005 by Saeed Carreon RN  Trust Relationship/Rapport:   care explained   choices  provided   emotional support provided   empathic listening provided   questions answered   questions encouraged   reassurance provided   thoughts/feelings acknowledged  Goal: Readiness for Transition of Care  Outcome: Ongoing, Progressing     Problem: Asthma Comorbidity  Goal: Maintenance of Asthma Control  Outcome: Ongoing, Progressing  Intervention: Maintain Asthma Symptom Control  Recent Flowsheet Documentation  Taken 6/8/2022 0338 by Saeed Carreon RN  Medication Review/Management: medications reviewed  Taken 6/8/2022 0019 by Saeed Carreon RN  Medication Review/Management: medications reviewed  Taken 6/7/2022 2005 by Saeed Carreon RN  Medication Review/Management: medications reviewed     Problem: COPD (Chronic Obstructive Pulmonary Disease) Comorbidity  Goal: Maintenance of COPD Symptom Control  Outcome: Ongoing, Progressing  Intervention: Maintain COPD-Symptom Control  Recent Flowsheet Documentation  Taken 6/8/2022 0338 by Saeed Carreon RN  Supportive Measures:   active listening utilized   decision-making supported   self-care encouraged   relaxation techniques promoted  Medication Review/Management: medications reviewed  Taken 6/8/2022 0019 by Saeed Carreon RN  Supportive Measures:   active listening utilized   decision-making supported   self-care encouraged   relaxation techniques promoted  Medication Review/Management: medications reviewed  Taken 6/7/2022 2005 by Saeed Carreon RN  Supportive Measures:   active listening utilized   decision-making supported   self-care encouraged   relaxation techniques promoted  Medication Review/Management: medications reviewed     Problem: Obstructive Sleep Apnea Risk or Actual Comorbidity Management  Goal: Unobstructed Breathing During Sleep  Outcome: Ongoing, Progressing  Intervention: Monitor and Manage Obstructive Sleep Apnea  Recent Flowsheet Documentation  Taken 6/8/2022 0338 by Saeed Carreon RN  NPPV/CPAP Maintenance:   nasal prongs adjusted    nasal prongs secure  Taken 6/8/2022 0019 by Saeed Carreon, RN  NPPV/CPAP Maintenance:   nasal prongs adjusted   nasal prongs secure  Taken 6/7/2022 2005 by Saeed Carreon RN  NPPV/CPAP Maintenance:   nasal prongs adjusted   nasal prongs secure     Problem: Pain Chronic (Persistent) (Comorbidity Management)  Goal: Acceptable Pain Control and Functional Ability  Outcome: Ongoing, Progressing  Intervention: Manage Persistent Pain  Recent Flowsheet Documentation  Taken 6/8/2022 0338 by Saeed Carreon RN  Medication Review/Management: medications reviewed  Taken 6/8/2022 0019 by Saeed Carreon RN  Medication Review/Management: medications reviewed  Taken 6/7/2022 2005 by Saeed Carreon RN  Sleep/Rest Enhancement:   awakenings minimized   consistent schedule promoted   noise level reduced   regular sleep/rest pattern promoted   relaxation techniques promoted   room darkened  Medication Review/Management: medications reviewed  Intervention: Optimize Psychosocial Wellbeing  Recent Flowsheet Documentation  Taken 6/8/2022 0338 by Saeed Carreon RN  Supportive Measures:   active listening utilized   decision-making supported   self-care encouraged   relaxation techniques promoted  Diversional Activities: television  Family/Support System Care:   self-care encouraged   support provided  Taken 6/8/2022 0019 by Saeed Carreon RN  Supportive Measures:   active listening utilized   decision-making supported   self-care encouraged   relaxation techniques promoted  Diversional Activities: television  Family/Support System Care:   self-care encouraged   support provided  Taken 6/7/2022 2005 by Saeed Carreon RN  Supportive Measures:   active listening utilized   decision-making supported   self-care encouraged   relaxation techniques promoted  Diversional Activities: television  Family/Support System Care:   self-care encouraged   support provided     Problem: Skin Injury Risk Increased  Goal: Skin Health and  Integrity  Outcome: Ongoing, Progressing  Intervention: Optimize Skin Protection  Recent Flowsheet Documentation  Taken 6/8/2022 0338 by Saeed Carreon RN  Pressure Reduction Techniques:   frequent weight shift encouraged   pressure points protected  Head of Bed (HOB) Positioning: hospitals elevated  Pressure Reduction Devices:   positioning supports utilized   pressure-redistributing mattress utilized  Skin Protection:   adhesive use limited   incontinence pads utilized   skin-to-device areas padded   skin-to-skin areas padded   transparent dressing maintained   tubing/devices free from skin contact  Taken 6/8/2022 0019 by Saeed Carreon RN  Pressure Reduction Techniques:   frequent weight shift encouraged   pressure points protected  Head of Bed (HOB) Positioning: hospitals elevated  Pressure Reduction Devices:   pressure-redistributing mattress utilized   positioning supports utilized  Skin Protection:   adhesive use limited   incontinence pads utilized   skin-to-device areas padded   transparent dressing maintained   skin-to-skin areas padded   tubing/devices free from skin contact  Taken 6/7/2022 2005 by Saeed Carreon RN  Pressure Reduction Techniques:   frequent weight shift encouraged   pressure points protected  Head of Bed (HOB) Positioning: hospitals elevated  Pressure Reduction Devices:   pressure-redistributing mattress utilized   positioning supports utilized  Skin Protection:   adhesive use limited   incontinence pads utilized   skin-to-device areas padded   skin-to-skin areas padded   transparent dressing maintained   tubing/devices free from skin contact     Problem: Fall Injury Risk  Goal: Absence of Fall and Fall-Related Injury  Outcome: Ongoing, Progressing  Intervention: Identify and Manage Contributors  Recent Flowsheet Documentation  Taken 6/8/2022 0338 by Saeed Carreon RN  Medication Review/Management: medications reviewed  Self-Care Promotion:   independence encouraged   BADL personal objects within  reach  Taken 6/8/2022 0019 by Saeed Carreon RN  Medication Review/Management: medications reviewed  Self-Care Promotion:   independence encouraged   BADL personal objects within reach  Taken 6/7/2022 2005 by Saeed Carreon RN  Medication Review/Management: medications reviewed  Self-Care Promotion:   independence encouraged   BADL personal objects within reach  Intervention: Promote Injury-Free Environment  Recent Flowsheet Documentation  Taken 6/8/2022 0338 by Saeed Carreon RN  Safety Promotion/Fall Prevention:   activity supervised   assistive device/personal items within reach   clutter free environment maintained   fall prevention program maintained   nonskid shoes/slippers when out of bed   room organization consistent   safety round/check completed  Taken 6/8/2022 0200 by Saeed Carreon RN  Safety Promotion/Fall Prevention:   activity supervised   assistive device/personal items within reach   clutter free environment maintained   fall prevention program maintained   nonskid shoes/slippers when out of bed   room organization consistent   safety round/check completed  Taken 6/8/2022 0019 by Saeed Carreon RN  Safety Promotion/Fall Prevention:   activity supervised   assistive device/personal items within reach   clutter free environment maintained   fall prevention program maintained   nonskid shoes/slippers when out of bed   room organization consistent   safety round/check completed  Taken 6/8/2022 0000 by Saeed Carreon RN  Safety Promotion/Fall Prevention:   activity supervised   assistive device/personal items within reach   clutter free environment maintained   fall prevention program maintained   nonskid shoes/slippers when out of bed   room organization consistent   safety round/check completed  Taken 6/7/2022 2200 by Saeed Carreon RN  Safety Promotion/Fall Prevention:   activity supervised   assistive device/personal items within reach   clutter free environment maintained   fall  prevention program maintained   nonskid shoes/slippers when out of bed   room organization consistent   safety round/check completed  Taken 6/7/2022 2005 by Saeed Carreon RN  Safety Promotion/Fall Prevention:   activity supervised   assistive device/personal items within reach   clutter free environment maintained   fall prevention program maintained   nonskid shoes/slippers when out of bed   room organization consistent   safety round/check completed  Taken 6/7/2022 2000 by Saeed Carreon RN  Safety Promotion/Fall Prevention:   activity supervised   assistive device/personal items within reach   clutter free environment maintained   fall prevention program maintained   nonskid shoes/slippers when out of bed   room organization consistent   safety round/check completed   Goal Outcome Evaluation:

## 2022-06-08 NOTE — PLAN OF CARE
Goal Outcome Evaluation:  Plan of Care Reviewed With: patient, spouse did well today. Had bronch with Dr. Sifuentes, tolerated well. Up to chair post procedure with no distress, awaiting dinner tray.

## 2022-06-08 NOTE — PROGRESS NOTES
Daily Progress Note        Acute on chronic respiratory failure with hypoxia (HCC)    Mixed hyperlipidemia    Essential hypertension    COPD with acute exacerbation (HCC)    Obesity (BMI 30-39.9)    Acute respiratory failure with hypoxemia (HCC)    Acute congestive heart failure (HCC)    Thrombocytopenia (HCC)      Assessment    Left lower lobe pneumonia, complete consolidation and air bronchograms worsened from previous exam 12/2021  Lateral lower lobe air bronchograms on chest CT    Acute on chronic respiratory failure  -ABG pH 7.418, PCO2 44.6, PO2 110.2, HCO3 28.8     Chronic obstructive pulmonary disease, on home oxygen at 5 L  -PFTs December 2019, FEV1 0.8 L which is  36%, improved to 0.9 L 40% after bronchodilators, FEV1/FVC ratio 45, TLC 91%, %, DLCO 28%     Obstructive sleep apnea, not wearing BiPAP due to recall     Chronic kidney disease, stage II     Hypertension  Hyperlipidemia  Restless leg syndrome     Office note 5/31/2022  IRIS, last download residual AHI 0.6, No snoring, no leak, Compliance 51.7% > 4 HOURS.  Not wearing d/t recall has registered machine. O2 wearing 4-5L continuous     PAH, RVSP 44.  no   COPD, Severe PFTs December 2019, FEV1 0.8 L which is  36%, improved to 0.9 L 40% after bronchodilators, FEV1/FVC ratio 45, TLC 91%, %, DLCO 28%  Hypoxemia  O2 to 7-8 L at HS while not wearing BiPAP.  Patient to monitor O2 sats  Patient is compliant and benefits from oxygen therapy  Cont theophylline  order nebulizer supplies send to [facility] [place]   stopped Trelegy due to SE swelling. , duo nebs / albuterol nebs  samples of Starla made her worse  off  Daliresp 500 mcg due to diarrhea every other day  following nephrology for history of chronic kidney disease and edema  budesonide nebulized, off Dulera  Discussed [id number]  20-10 CM H2O with pressure support 3-7 CM H2O,   REVIEW SUPPLIER FOR SA DEVICE  ADEQUATE SLEEP HYGEINE  SAFETY DRIVING  DISCUSSED CARDIOVASCULAR & METABOLIC SIDE  EFFECTS OF UNTREATED IRIS  PATIENT IS COMPLIANT USING MACHINE AND BENEFITS FROM THERAPY, PATIENT HAS NASAL DRYNESS AND WILL NEED HEATED HUMIDITY WITH PAP     Recommendations:     Bronchoscopy today due to worsening left lower lobe pneumonia and air bronchograms.  Patient requiring high flow oxygen  -INR 1.04  -Platelets 124     Continue to titrate oxygen  - Currently requiring precision flow 40\70%  -Wears 5 L at home     Antibiotic azithromycin p.o. x5 days, first dose 6/6/2022    2D echo pending     Lasix 40 mg twice daily  Solu-Medrol 40 mg every 8  Bronchodilator\inhaled corticosteroid  Mucinex  Singulair  Theophylline  GI prophylaxis Protonix     Patient is a DNR at this time.  Does want to be intubated if necessary and continue aggressive treatment          LOS: 2 days     Subjective         Objective     Vital signs for last 24 hours:  Vitals:    06/08/22 0204 06/08/22 0221 06/08/22 0227 06/08/22 0512   BP: 111/52   113/64   BP Location: Right arm   Right arm   Patient Position: Lying   Lying   Pulse: 81 81 86 79   Resp: 20 20  20   Temp: 98.7 °F (37.1 °C)   97.6 °F (36.4 °C)   TempSrc: Oral   Oral   SpO2: 93% 94% 93%    Weight:    88.4 kg (194 lb 14.2 oz)   Height:           Intake/Output last 3 shifts:  I/O last 3 completed shifts:  In: 840 [P.O.:840]  Out: 1700 [Urine:1700]  Intake/Output this shift:  I/O this shift:  In: 0   Out: 1000 [Urine:1000]      Radiology  Imaging Results (Last 24 Hours)     Procedure Component Value Units Date/Time    CT Chest Without Contrast Diagnostic [[id number]] Collected: 06/07/22 1601     Updated: 06/07/22 1609    Narrative:         DATE OF EXAM:  6/7/2022 3:40 PM     PROCEDURE:  CT CHEST WO CONTRAST DIAGNOSTIC-     INDICATIONS:   Exacerbation of COPD, acute respiratory failure with hypoxia, heart  failure, shortness of breath.     COMPARISON:   12/27/2021.     TECHNIQUE:  Routine transaxial slices were obtained through the chest without the  administration of intravenous  contrast. Reconstructed coronal and  sagittal images were also obtained. Automated exposure control and  iterative construction methods were used.      FINDINGS:  There is left lower lobe volume loss with completely consolidation and  air bronchograms which has worsened from the previous exam. There is no  definite obstructing endobronchial lesion. The patient now has developed  a right lower lobe volume loss with similar airspace disease and air  bronchograms. There is no definitive obstructing endobronchial lesion.  The patient has a advanced emphysematous changes. There is a 6 mm nodule  in the right middle lobe on image 64 series 5 which is unchanged. The  heart size is normal. There are atherosclerotic vascular calcifications  including involvement the coronary arteries. There are no enlarged  mediastinal lymph nodes. The hilar regions are difficult to assess  without contrast, especially given the bilateral lower lobe  consolidation. There are tiny calcified gallstones versus sludge. There  are no acute findings beneath the hemidiaphragms. There are no  suspicious osteolytic or sclerotic lesions within the bony thorax.        Impression:         1. Chronic left lower lobe volume loss with consolidation and air  bronchograms. This is worsened from the previous exam. There is no  definite obstructing endobronchial lesion.  2. Interval development of right lower lobe volume loss with similar  airspace disease and air bronchograms. There is no definite obstructing  endobronchial lesion.  3. Stable right middle lobe lung nodule measuring 6 mm.  4. Advanced emphysematous changes.  5. Coronary artery atherosclerotic vascular disease.  6. Tiny calcified gallstones versus sludge.     Electronically Signed By-Onofre Lockwood MD On:6/7/2022 4:07 PM  This report was finalized on [id number] by  Onofre Lockwood MD.          Labs:  Results from last 7 days   Lab Units 06/06/22  1347   WBC 10*3/mm3 7.80   HEMOGLOBIN g/dL 12.4    HEMATOCRIT % 38.1   PLATELETS 10*3/mm3 124*     Results from last 7 days   Lab Units 06/06/22  1347 06/06/22  0013   SODIUM mmol/L 134* 135*   POTASSIUM mmol/L 3.6 4.0   CHLORIDE mmol/L 92* 93*   CO2 mmol/L 29.0 28.0   BUN mg/dL 18 12   CREATININE mg/dL 0.75 0.81   CALCIUM mg/dL 8.9 9.0   BILIRUBIN mg/dL  --  0.6   ALK PHOS U/L  --  84   ALT (SGPT) U/L  --  5   AST (SGOT) U/L  --  25   GLUCOSE mg/dL 150* 114*     Results from last 7 days   Lab Units 06/06/22  0121   PH, ARTERIAL pH units 7.418   PO2 ART mm Hg 110.2*   PCO2, ARTERIAL mm Hg 44.6   HCO3 ART mmol/L 28.8*     Results from last 7 days   Lab Units 06/06/22  0013   ALBUMIN g/dL 4.00     Results from last 7 days   Lab Units 06/06/22  0013   TROPONIN T ng/mL <0.010         Results from last 7 days   Lab Units 06/07/22  1812   MAGNESIUM mg/dL 2.3                   Meds:   SCHEDULE  aspirin, 81 mg, Oral, Daily  azithromycin, 500 mg, Oral, Q24H  budesonide-formoterol, 2 puff, Inhalation, BID - RT  dilTIAZem CD, 120 mg, Oral, Daily  enoxaparin, 40 mg, Subcutaneous, Daily  furosemide, 40 mg, Intravenous, BID - RT  gabapentin, 900 mg, Oral, Nightly  guaiFENesin, 1,200 mg, Oral, Q12H  ipratropium-albuterol, 3 mL, Nebulization, 4x Daily - RT  methylPREDNISolone sodium succinate, 40 mg, Intravenous, Q8H  metoprolol succinate XL, 50 mg, Oral, BID  montelukast, 10 mg, Oral, Nightly  pantoprazole, 40 mg, Oral, Q AM  potassium chloride, 20 mEq, Oral, Daily  rOPINIRole, 1 mg, Oral, BID  rosuvastatin, 20 mg, Oral, Daily  sodium chloride, 10 mL, Intravenous, Q12H  theophylline, 400 mg, Oral, Q24H      Infusions     PRNs  •  acetaminophen **OR** acetaminophen **OR** acetaminophen  •  albuterol  •  aluminum-magnesium hydroxide-simethicone  •  ipratropium-albuterol  •  melatonin  •  nitroglycerin  •  ondansetron **OR** ondansetron  •  sodium chloride  •  sodium chloride    Physical Exam:  Physical Exam  Pulmonary:      Breath sounds: Wheezing, rhonchi and rales present.          ROS  Review of Systems   Respiratory: Positive for cough and shortness of breath.    Cardiovascular: Positive for palpitations and leg swelling.       I have reviewed the patient's new clinical results.    Electronically signed by BERKLEY Hickman.

## 2022-06-08 NOTE — OP NOTE
Bronchoscopy Procedure Note    Timeout was done appropriately by staff    Procedure:  1. Bronchoscopy, Diagnostic  2. Bronchoscopy, Therapeutic thick mucous plugs from bilateral lower lobes especially left lower lobe  3. Left lower lobe washing    Preoperative Diagnosis: Bilateral lower lobe pneumonia worsening suspect reflux disease    Postoperative Diagnosis:   Copious amount of mucopurulent secretions were suctioned therapeutically   Due to severe inflammatory changes noted suggestive of chronic inflammation/infection   therapeutic left lower lung washing was done    Anesthesia: Moderate Sedation    Procedure Details: Patient was consented for the procedure with all risks and benefits of the procedure explained in detail.  Patient was given the opportunity to ask questions and all concerns were answered.  The bronchocope was inserted into the main airway via the oropharynx. An anatomical survey was done of the main airways and the subsegmental bronchus to at least the first subsegmental level of all five lobes of both lungs.  The findings are reported below.  A bronchoalveolar lavage was performed using aliquots of normal saline instilled into the airways then aspirated back.    Findings:  Bronchoscope passed into oral cavity to the level of the vocal cords.  Lidocaine used for local anesthetic over vocal cords.  Bronchoscope was passed between the vocal cords into the trachea.  All airways were visualized to at least the first subsegment level of all 5 lobes of both lungs.     Copious amount of mucopurulent secretions were suctioned therapeutically   Due to severe inflammatory changes noted suggestive of chronic inflammation/infection   therapeutic left lower lung washing was done    Patient tolerated procedure well    Estimated Blood Loss:  Minimal           Specimens:  Sent purulent fluid                Complications:  None; patient tolerated the procedure well.           Disposition: PACU - hemodynamically  stable.      Patient tolerated the procedure well.    Hawa Sifuentes MD  6/8/2022  15:24 EDT

## 2022-06-09 PROBLEM — J20.5 ACUTE BRONCHITIS DUE TO RESPIRATORY SYNCYTIAL VIRUS (RSV): Status: RESOLVED | Noted: 2019-11-13 | Resolved: 2022-06-09

## 2022-06-09 PROBLEM — J81.0 ACUTE PULMONARY EDEMA: Status: RESOLVED | Noted: 2019-11-13 | Resolved: 2022-06-09

## 2022-06-09 PROBLEM — J18.9 LEFT LOWER LOBE PNEUMONIA: Status: ACTIVE | Noted: 2022-06-09

## 2022-06-09 PROBLEM — E87.1 HYPONATREMIA: Status: RESOLVED | Noted: 2021-12-27 | Resolved: 2022-06-09

## 2022-06-09 PROCEDURE — 94799 UNLISTED PULMONARY SVC/PX: CPT

## 2022-06-09 PROCEDURE — 97530 THERAPEUTIC ACTIVITIES: CPT

## 2022-06-09 PROCEDURE — 25010000002 FUROSEMIDE PER 20 MG: Performed by: INTERNAL MEDICINE

## 2022-06-09 PROCEDURE — 63710000001 PREDNISONE PER 5 MG: Performed by: INTERNAL MEDICINE

## 2022-06-09 PROCEDURE — 25010000002 ENOXAPARIN PER 10 MG: Performed by: INTERNAL MEDICINE

## 2022-06-09 PROCEDURE — 94761 N-INVAS EAR/PLS OXIMETRY MLT: CPT

## 2022-06-09 PROCEDURE — 99232 SBSQ HOSP IP/OBS MODERATE 35: CPT | Performed by: HOSPITALIST

## 2022-06-09 PROCEDURE — 94664 DEMO&/EVAL PT USE INHALER: CPT

## 2022-06-09 PROCEDURE — 25010000002 METHYLPREDNISOLONE PER 40 MG: Performed by: INTERNAL MEDICINE

## 2022-06-09 PROCEDURE — 94660 CPAP INITIATION&MGMT: CPT

## 2022-06-09 RX ORDER — AMOXICILLIN AND CLAVULANATE POTASSIUM 875; 125 MG/1; MG/1
1 TABLET, FILM COATED ORAL EVERY 12 HOURS SCHEDULED
Status: DISCONTINUED | OUTPATIENT
Start: 2022-06-09 | End: 2022-06-12 | Stop reason: HOSPADM

## 2022-06-09 RX ORDER — PREDNISONE 10 MG/1
10 TABLET ORAL DAILY
Status: DISCONTINUED | OUTPATIENT
Start: 2022-06-13 | End: 2022-06-12 | Stop reason: HOSPADM

## 2022-06-09 RX ORDER — PREDNISONE 20 MG/1
20 TABLET ORAL DAILY
Status: COMPLETED | OUTPATIENT
Start: 2022-06-11 | End: 2022-06-12

## 2022-06-09 RX ADMIN — GUAIFENESIN 1200 MG: 600 TABLET, EXTENDED RELEASE ORAL at 09:07

## 2022-06-09 RX ADMIN — GABAPENTIN 900 MG: 300 CAPSULE ORAL at 20:34

## 2022-06-09 RX ADMIN — METOPROLOL SUCCINATE 50 MG: 50 TABLET, EXTENDED RELEASE ORAL at 20:34

## 2022-06-09 RX ADMIN — GUAIFENESIN 1200 MG: 600 TABLET, EXTENDED RELEASE ORAL at 20:34

## 2022-06-09 RX ADMIN — BUDESONIDE AND FORMOTEROL FUMARATE DIHYDRATE 2 PUFF: 160; 4.5 AEROSOL RESPIRATORY (INHALATION) at 07:15

## 2022-06-09 RX ADMIN — Medication 10 ML: at 20:35

## 2022-06-09 RX ADMIN — IPRATROPIUM BROMIDE AND ALBUTEROL SULFATE 3 ML: .5; 3 SOLUTION RESPIRATORY (INHALATION) at 14:44

## 2022-06-09 RX ADMIN — METOPROLOL SUCCINATE 50 MG: 50 TABLET, EXTENDED RELEASE ORAL at 09:07

## 2022-06-09 RX ADMIN — AMOXICILLIN AND CLAVULANATE POTASSIUM 1 TABLET: 875; 125 TABLET, FILM COATED ORAL at 20:35

## 2022-06-09 RX ADMIN — PANTOPRAZOLE SODIUM 40 MG: 40 TABLET, DELAYED RELEASE ORAL at 06:10

## 2022-06-09 RX ADMIN — MONTELUKAST 10 MG: 10 TABLET, FILM COATED ORAL at 20:34

## 2022-06-09 RX ADMIN — AMOXICILLIN AND CLAVULANATE POTASSIUM 1 TABLET: 875; 125 TABLET, FILM COATED ORAL at 09:07

## 2022-06-09 RX ADMIN — Medication 10 ML: at 09:10

## 2022-06-09 RX ADMIN — ENOXAPARIN SODIUM 40 MG: 100 INJECTION SUBCUTANEOUS at 16:29

## 2022-06-09 RX ADMIN — IPRATROPIUM BROMIDE AND ALBUTEROL SULFATE 3 ML: .5; 3 SOLUTION RESPIRATORY (INHALATION) at 20:07

## 2022-06-09 RX ADMIN — FUROSEMIDE 40 MG: 10 INJECTION, SOLUTION INTRAMUSCULAR; INTRAVENOUS at 20:35

## 2022-06-09 RX ADMIN — DILTIAZEM HYDROCHLORIDE 120 MG: 120 CAPSULE, COATED, EXTENDED RELEASE ORAL at 09:07

## 2022-06-09 RX ADMIN — METHYLPREDNISOLONE SODIUM SUCCINATE 40 MG: 40 INJECTION, POWDER, FOR SOLUTION INTRAMUSCULAR; INTRAVENOUS at 03:04

## 2022-06-09 RX ADMIN — ROSUVASTATIN 20 MG: 10 TABLET, FILM COATED ORAL at 20:34

## 2022-06-09 RX ADMIN — BUDESONIDE AND FORMOTEROL FUMARATE DIHYDRATE 2 PUFF: 160; 4.5 AEROSOL RESPIRATORY (INHALATION) at 20:07

## 2022-06-09 RX ADMIN — POTASSIUM CHLORIDE 20 MEQ: 1500 TABLET, EXTENDED RELEASE ORAL at 09:08

## 2022-06-09 RX ADMIN — THEOPHYLLINE ANHYDROUS 400 MG: 200 CAPSULE, EXTENDED RELEASE ORAL at 09:07

## 2022-06-09 RX ADMIN — DOCUSATE SODIUM 100 MG: 100 CAPSULE, LIQUID FILLED ORAL at 09:07

## 2022-06-09 RX ADMIN — ROPINIROLE HYDROCHLORIDE 1 MG: 1 TABLET, FILM COATED ORAL at 20:35

## 2022-06-09 RX ADMIN — FUROSEMIDE 40 MG: 10 INJECTION, SOLUTION INTRAMUSCULAR; INTRAVENOUS at 09:08

## 2022-06-09 RX ADMIN — IPRATROPIUM BROMIDE AND ALBUTEROL SULFATE 3 ML: .5; 3 SOLUTION RESPIRATORY (INHALATION) at 10:56

## 2022-06-09 RX ADMIN — ASPIRIN 81 MG: 81 TABLET, COATED ORAL at 09:07

## 2022-06-09 RX ADMIN — PREDNISONE 30 MG: 20 TABLET ORAL at 13:00

## 2022-06-09 RX ADMIN — IPRATROPIUM BROMIDE AND ALBUTEROL SULFATE 3 ML: .5; 3 SOLUTION RESPIRATORY (INHALATION) at 07:10

## 2022-06-09 NOTE — CASE MANAGEMENT/SOCIAL WORK
Continued Stay Note  [facility]     Patient Name: Michelle Francis  MRN: [id number]  Today's Date: 6/9/2022    Admit Date: 6/5/2022     Discharge Plan     Row Name 06/09/22 1010       Plan    Plan D/C Plan: Anticipate routine home with [facility], [facility] per MD). Current home O2 with [employer].    Plan Comments Barrier to D/C: 3L HF O2, bronch cultures pending.                    Expected Discharge Date and Time     Expected Discharge Date Expected Discharge Time    Jun 13, 2022         Phone communication or documentation only - no physical contact with patient or family.    RADHA [place]field[place] [address]    Office: [phone]  Fax: [phone]

## 2022-06-09 NOTE — ANESTHESIA POSTPROCEDURE EVALUATION
Patient: Michelle Francis    Procedure Summary     Date: 06/08/22 Room / Location: [facility] END[facility]    Anesthesia Start: 1420 Anesthesia Stop: 1443    Procedure: BRONCHOSCOPY with left lower lobe wash (N/A Bronchus) Diagnosis:       Acute respiratory failure with hypoxemia (HCC)      Pneumonia of left lower lobe due to infectious organism      (Acute respiratory failure with hypoxemia (HCC) [J96.01])      (Pneumonia of left lower lobe due to infectious organism [J18.9])    Surgeons: Hawa Sifuentes MD Provider: Lubna Eli MD    Anesthesia Type: MAC ASA Status: 4          Anesthesia Type: MAC    Vitals  Vitals Value Taken Time   /67 06/08/22 1513   Temp     Pulse 84 06/08/22 1534   Resp 20 06/08/22 1534   SpO2 93 % 06/08/22 1534   Vitals shown include unvalidated device data.        Post Anesthesia Care and Evaluation    Patient location during evaluation: PACU  Patient participation: complete - patient participated  Level of consciousness: awake and alert  Pain management: satisfactory to patient    Airway patency: patent  Anesthetic complications: No anesthetic complications  PONV Status: none  Cardiovascular status: acceptable  Respiratory status: acceptable  Hydration status: acceptable

## 2022-06-09 NOTE — PROGRESS NOTES
Daily Progress Note        Acute on chronic respiratory failure with hypoxia (HCC)    Mixed hyperlipidemia    Essential hypertension    Sleep apnea    Restless leg syndrome    COPD with acute exacerbation (HCC)    Obesity (BMI 30-39.9)    CKD (chronic kidney disease) stage 2, GFR 60-89 ml/min    Gastroesophageal reflux disease    Right lower lobe pneumonia    Acute congestive heart failure (HCC)    Thrombocytopenia (HCC)    Left lower lobe pneumonia      Assessment    Left lower lobe pneumonia, complete consolidation and air bronchograms worsened from previous exam 12/2021  Bilateral lower lobe pneumonia  Status post bronchoscopy 6/8/2022 with evidence of mucopurulent secretions and inflammatory changes highly suggestive of chronic acid reflux disease    Acute on chronic respiratory failure  -ABG pH 7.418, PCO2 44.6, PO2 110.2, HCO3 28.8     Chronic obstructive pulmonary disease, on home oxygen at 5 L  -PFTs December 2019, FEV1 0.8 L which is  36%, improved to 0.9 L 40% after bronchodilators, FEV1/FVC ratio 45, TLC 91%, %, DLCO 28%     Obstructive sleep apnea, not wearing BiPAP due to recall     Chronic kidney disease, stage II     Hypertension  Hyperlipidemia  Restless leg syndrome     Office note 5/31/2022  IRIS, last download residual AHI 0.6, No snoring, no leak, Compliance 51.7% > 4 HOURS.  Not wearing d/t recall has registered machine. O2 wearing 4-5L continuous     PAH, RVSP 44.  no   COPD, Severe PFTs December 2019, FEV1 0.8 L which is  36%, improved to 0.9 L 40% after bronchodilators, FEV1/FVC ratio 45, TLC 91%, %, DLCO 28%  Hypoxemia  O2 to 7-8 L at HS while not wearing BiPAP.  Patient to monitor O2 sats  Patient is compliant and benefits from oxygen therapy  Cont theophylline  order nebulizer supplies send to [facility]   stopped Trelegy due to SE swelling. , duo nebs / albuterol nebs  samples of Starla made her worse  off  Daliresp 500 mcg due to diarrhea every other day  following nephrology for  history of chronic kidney disease and edema  budesonide nebulized, off Dulera  Discussed [id number]  20-10 CM H2O with pressure support 3-7 CM H2O,   REVIEW SUPPLIER FOR SA DEVICE  ADEQUATE SLEEP HYGEINE  SAFETY DRIVING  DISCUSSED CARDIOVASCULAR & METABOLIC SIDE EFFECTS OF UNTREATED IRIS  PATIENT IS COMPLIANT USING MACHINE AND BENEFITS FROM THERAPY, PATIENT HAS NASAL DRYNESS AND WILL NEED HEATED HUMIDITY WITH PAP     Recommendations:     Continue to titrate oxygen  -Wears 5 L at home     Antibiotic DC azithromycin   P.o. Augmentin for 7 days  Continue to monitor BAL    Steroids wean  DC IV Solu-Medrol  Start p.o. prednisone 6-day taper    2D echo pending     Diuresis Lasix 40 mg twice daily    Bronchodilator\inhaled corticosteroid  Mucinex  Singulair  Theophylline  GI prophylaxis Protonix     Patient is a DNR at this time.  Does want to be intubated if necessary and continue aggressive treatment          LOS: 3 days     Subjective         Objective     Vital signs for last 24 hours:  Vitals:    06/09/22 0710 06/09/22 0715 06/09/22 0717 06/09/22 0718   BP:       BP Location:       Patient Position:       Pulse: 66 67 83 85   Resp: 18 18 18 18   Temp:       TempSrc:       SpO2: 91% 93% 91% (!) 87%   Weight:       Height:           Intake/Output last 3 shifts:  I/O last 3 completed shifts:  In: 920 [P.O.:720; I.V.:200]  Out: 1950 [Urine:1950]  Intake/Output this shift:  No intake/output data recorded.      Radiology  Imaging Results (Last 24 Hours)     ** No results found for the last 24 hours. **          Labs:  Results from last 7 days   Lab Units 06/06/22  1347   WBC 10*3/mm3 7.80   HEMOGLOBIN g/dL 12.4   HEMATOCRIT % 38.1   PLATELETS 10*3/mm3 124*     Results from last 7 days   Lab Units 06/06/22  1347 06/06/22  0013   SODIUM mmol/L 134* 135*   POTASSIUM mmol/L 3.6 4.0   CHLORIDE mmol/L 92* 93*   CO2 mmol/L 29.0 28.0   BUN mg/dL 18 12   CREATININE mg/dL 0.75 0.81   CALCIUM mg/dL 8.9 9.0   BILIRUBIN mg/dL  --   0.6   ALK PHOS U/L  --  84   ALT (SGPT) U/L  --  5   AST (SGOT) U/L  --  25   GLUCOSE mg/dL 150* 114*     Results from last 7 days   Lab Units 06/06/22  0121   PH, ARTERIAL pH units 7.418   PO2 ART mm Hg 110.2*   PCO2, ARTERIAL mm Hg 44.6   HCO3 ART mmol/L 28.8*     Results from last 7 days   Lab Units 06/06/22  0013   ALBUMIN g/dL 4.00     Results from last 7 days   Lab Units 06/06/22  0013   TROPONIN T ng/mL <0.010         Results from last 7 days   Lab Units 06/07/22  1812   MAGNESIUM mg/dL 2.3     Results from last 7 days   Lab Units 06/08/22  0723   INR  1.04               Meds:   SCHEDULE  aspirin, 81 mg, Oral, Daily  azithromycin, 500 mg, Oral, Q24H  budesonide-formoterol, 2 puff, Inhalation, BID - RT  dilTIAZem CD, 120 mg, Oral, Daily  docusate sodium, 100 mg, Oral, BID  enoxaparin, 40 mg, Subcutaneous, Daily  furosemide, 40 mg, Intravenous, BID - RT  gabapentin, 900 mg, Oral, Nightly  guaiFENesin, 1,200 mg, Oral, Q12H  ipratropium-albuterol, 3 mL, Nebulization, 4x Daily - RT  methylPREDNISolone sodium succinate, 40 mg, Intravenous, Q8H  metoprolol succinate XL, 50 mg, Oral, BID  montelukast, 10 mg, Oral, Nightly  pantoprazole, 40 mg, Oral, Q AM  potassium chloride, 20 mEq, Oral, Daily  rOPINIRole, 1 mg, Oral, BID  rosuvastatin, 20 mg, Oral, Daily  sodium chloride, 10 mL, Intravenous, Q12H  theophylline, 400 mg, Oral, Q24H      Infusions     PRNs  •  acetaminophen **OR** acetaminophen **OR** acetaminophen  •  albuterol  •  aluminum-magnesium hydroxide-simethicone  •  ipratropium-albuterol  •  melatonin  •  nitroglycerin  •  ondansetron **OR** ondansetron  •  sodium chloride  •  sodium chloride    Physical Exam:  Physical Exam  Pulmonary:      Breath sounds: Wheezing, rhonchi and rales present.         ROS  Review of Systems   Respiratory: Positive for cough and shortness of breath.    Cardiovascular: Positive for palpitations and leg swelling.       I have reviewed the patient's new clinical  results.    Electronically signed by BERKLEY Hickman.

## 2022-06-09 NOTE — PLAN OF CARE
Goal Outcome Evaluation:            Assessment: Michelle Francis presents with functional mobility impairments which indicate the need for skilled intervention. Tolerating session today without incident; progressing with gait and maintenance of SpO2, however pt remains below baseline of independent household mobility. Will continue to follow and progress as tolerated.      Plan/Recommendations:   No ongoing therapy recommended post-acute care. No therapy needs.. Pt requires no DME at discharge.      Pt desires Home with family assist at discharge. Pt cooperative; agreeable to therapeutic recommendations and plan of care.

## 2022-06-09 NOTE — PROGRESS NOTES
[facility] Medicine Services Daily Progress Note    Patient Name: Michelle Francis  : 1950  MRN: [id number]  Primary Care Physician:  Avani Montoya MD  Date of admission: 2022      Subjective      Chief Complaint: Shortness of air      Patient Reports   2022: The patient reports continued shortness of air which is improved at rest on the high flow oxygen.  2022: Patient reports feeling somewhat better with gradual improvement of her shortness of breath at rest.  There was no family at the bedside.  She had several questions and all questions were answered.      ROS   All other systems were reviewed and were negative except for ongoing shortness of breath.    Objective      Vitals:   Temp:  [97.9 °F (36.6 °C)-98.9 °F (37.2 °C)] 98.3 °F (36.8 °C)  Heart Rate:  [66-89] 79  Resp:  [16-22] 18  BP: (106-150)/(42-99) 121/50  Flow (L/min):  [25-30] 25    Physical Exam   Vital signs and nurses notes reviewed.  Well-developed over-nourished female comfortable on recession below O2 in no acute distress sitting up in bed awake and alert; mucous membranes moist; sclerae anicteric; lungs clear decreased air entry bilaterally; CV regular rate and rhythm; abdomen soft nontender nondistended; extremities with no edema, cyanosis or calf tenderness; palpable pedal pulses bilaterally; no Huertas catheter.  Exam unchanged from 2022.    Result Review    Result Review:  I have personally reviewed the results from the time of this admission to 2022 16:41 EDT and agree with these findings:  [x]  Laboratory  [x]  Microbiology  [x]  Radiology  [x]  EKG/Telemetry   [x]  Cardiology/Vascular   []  Pathology  [x]  Old records  []  Other:  Most notable findings discussed in the assessment and plan.          Assessment & Plan      Brief Patient Summary:  Michelle Francis is a 72 y.o. female with a history of hypertension, hyperlipidemia, chronic kidney disease stage II, restless leg syndrome and  end-stage COPD dependent on 5 L nasal cannula O2 continuously who presented to the emergency room because of recent increased congestion and cough and significant or shortness of air despite home O2.    Current inpatient medications include:  amoxicillin-clavulanate, 1 tablet, Oral, Q12H  aspirin, 81 mg, Oral, Daily  budesonide-formoterol, 2 puff, Inhalation, BID - RT  dilTIAZem CD, 120 mg, Oral, Daily  docusate sodium, 100 mg, Oral, BID  enoxaparin, 40 mg, Subcutaneous, Daily  furosemide, 40 mg, Intravenous, BID - RT  gabapentin, 900 mg, Oral, Nightly  guaiFENesin, 1,200 mg, Oral, Q12H  ipratropium-albuterol, 3 mL, Nebulization, 4x Daily - RT  metoprolol succinate XL, 50 mg, Oral, BID  montelukast, 10 mg, Oral, Nightly  pantoprazole, 40 mg, Oral, Q AM  potassium chloride, 20 mEq, Oral, Daily  predniSONE, 30 mg, Oral, Daily   Followed by  [START ON 6/11/2022] predniSONE, 20 mg, Oral, Daily   Followed by  [START ON 6/13/2022] predniSONE, 10 mg, Oral, Daily  rOPINIRole, 1 mg, Oral, BID  rosuvastatin, 20 mg, Oral, Daily  sodium chloride, 10 mL, Intravenous, Q12H  theophylline, 400 mg, Oral, Q24H             Active Hospital Problems:  Active Hospital Problems    Diagnosis    • Left lower lobe pneumonia    • Right lower lobe pneumonia    • COPD with acute exacerbation (HCC)    • Sleep apnea      [facility] 2014, ahi 5.9, on auto bipap min 8 max, PS 2-8-Alcaraz's, nasal mask     • Acute on chronic respiratory failure with hypoxia (HCC)    • Essential hypertension    • Mixed hyperlipidemia    • Restless leg syndrome      sees Dr Seipel     • Acute congestive heart failure (HCC)    • Thrombocytopenia (HCC)    • CKD (chronic kidney disease) stage 2, GFR 60-89 ml/min      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     • Obesity (BMI 30-39.9)    • Gastroesophageal reflux disease      Plan:   Acute hypoxic respiratory failure secondary to acute exacerbation of severe COPD and bilateral  lower lobe pneumonia/sleep apnea with CPAP compliance  -Pulmonary consulting and performed bronchoscopy which showed bilateral lower lobe pneumonia with significant inflammation suggestive of chronic aspiration possibly from GERD  -Continue Zithromax, Symbicort, DuoNebs, Singulair, theophylline, guaifenesin and IV methylprednisolone  -Patient was extensively counseled and all questions were answered  -Titrate O2 to maintain sat greater than 89%    Essential hypertension, chronic and controlled  -Continue diltiazem, Lasix and metoprolol    Chronic kidney disease stage II secondary to hypertension  Creatinine 1.0 and current creatinine 0.75 which is better than baseline  -Monitor renal function and avoid nephrotoxic medications    Hyperlipidemia  -Continue Crestor    Congestive heart failure is felt to have been ruled out  -proBNP and LVEF are normal with no report of elevated RVSP    GERD  -Continue PPI    Overweight  -Lifestyle modification counseling    Restless leg syndrome  -Continue Requip and gabapentin    DVT prophylaxis:  Medical DVT prophylaxis orders are present.    CODE STATUS:    Level Of Support Discussed With: Patient  Code Status (Patient has no pulse and is not breathing): No CPR (Do Not Attempt to Resuscitate)  Medical Interventions (Patient has pulse or is breathing): Full Support      Disposition:  I expect patient to be discharged depending on clinical course.    This patient has been examined wearing appropriate Personal Protective Equipment and discussed with hospital infection control department. 06/09/22      Electronically signed by Virgen Pascual MD, 06/09/22, 16:41 EDT.  [facility] Hospitalist Team

## 2022-06-09 NOTE — PLAN OF CARE
Goal Outcome Evaluation:  Plan of Care Reviewed With: patient, Dr glaser made changes in antibotics and steroids, up to chair all morning post bath. Feeling well.

## 2022-06-09 NOTE — PLAN OF CARE
Goal Outcome Evaluation:    Patient has been resting in bed this shift.  Patient remains on precision flow.  VSS at this time.

## 2022-06-09 NOTE — THERAPY TREATMENT NOTE
Subjective: Pt agreeable to therapeutic plan of care.    Objective:     Bed mobility - N/A or Not attempted. Pt up in chair  Transfers - SBA - CGA  Ambulation - 20 feet CGA/ HHA of 1, maintaining SpO2 at 93% on 30L/65% highflow    Seated ex: AP, heel raises, LAQ all performed x 10 BLEs, 30 second bouts of marching x 2 reps. Sit to standd x 5 reps with BUE support.    Vitals: HR increased to 138 with seated ex but returned to 94 within 15 seconds of rest.    Pain: 0 VAS  Education: Provided education on importance of mobility and skilled verbal / tactile cueing throughout intervention.     Assessment: Michelle Francis presents with functional mobility impairments which indicate the need for skilled intervention. Tolerating session today without incident; progressing with gait and maintenance of SpO2, however pt remains below baseline of independent household mobility. Will continue to follow and progress as tolerated.     Plan/Recommendations:   No ongoing therapy recommended post-acute care. No therapy needs.. Pt requires no DME at discharge.     Pt desires Home with family assist at discharge. Pt cooperative; agreeable to therapeutic recommendations and plan of care.         Basic Mobility 6-click:  Rollin = Total, A lot = 2, A little = 3; 4 = None  Supine>Sit:   1 = Total, A lot = 2, A little = 3; 4 = None   Sit>Stand with arms:  1 = Total, A lot = 2, A little = 3; 4 = None  Bed>Chair:   1 = Total, A lot = 2, A little = 3; 4 = None  Ambulate in room:  1 = Total, A lot = 2, A little = 3; 4 = None  3-5 Steps with railin = Total, A lot = 2, A little = 3; 4 = None  Score: 16    Modified Kipling: N/A = No pre-op stroke/TIA    Post-Tx Position: Up in Chair, Alarms activated and Call light and personal items within reach  PPE: gloves, surgical mask, eyewear protection

## 2022-06-10 LAB
BACTERIA SPEC RESP CULT: NORMAL
GRAM STN SPEC: NORMAL
GRAM STN SPEC: NORMAL
LAB AP CASE REPORT: NORMAL
PATH REPORT.FINAL DX SPEC: NORMAL
PATH REPORT.GROSS SPEC: NORMAL

## 2022-06-10 PROCEDURE — 94664 DEMO&/EVAL PT USE INHALER: CPT

## 2022-06-10 PROCEDURE — 63710000001 PREDNISONE PER 1 MG: Performed by: INTERNAL MEDICINE

## 2022-06-10 PROCEDURE — 94799 UNLISTED PULMONARY SVC/PX: CPT

## 2022-06-10 PROCEDURE — 94660 CPAP INITIATION&MGMT: CPT

## 2022-06-10 PROCEDURE — 99232 SBSQ HOSP IP/OBS MODERATE 35: CPT | Performed by: HOSPITALIST

## 2022-06-10 PROCEDURE — 25010000002 ENOXAPARIN PER 10 MG: Performed by: INTERNAL MEDICINE

## 2022-06-10 PROCEDURE — 63710000001 PREDNISONE PER 5 MG: Performed by: INTERNAL MEDICINE

## 2022-06-10 PROCEDURE — 25010000002 FUROSEMIDE PER 20 MG: Performed by: INTERNAL MEDICINE

## 2022-06-10 PROCEDURE — 94761 N-INVAS EAR/PLS OXIMETRY MLT: CPT

## 2022-06-10 PROCEDURE — 94762 N-INVAS EAR/PLS OXIMTRY CONT: CPT

## 2022-06-10 RX ADMIN — GUAIFENESIN 1200 MG: 600 TABLET, EXTENDED RELEASE ORAL at 09:02

## 2022-06-10 RX ADMIN — IPRATROPIUM BROMIDE AND ALBUTEROL SULFATE 3 ML: .5; 3 SOLUTION RESPIRATORY (INHALATION) at 11:31

## 2022-06-10 RX ADMIN — FUROSEMIDE 40 MG: 10 INJECTION, SOLUTION INTRAMUSCULAR; INTRAVENOUS at 20:23

## 2022-06-10 RX ADMIN — FUROSEMIDE 40 MG: 10 INJECTION, SOLUTION INTRAMUSCULAR; INTRAVENOUS at 09:03

## 2022-06-10 RX ADMIN — METOPROLOL SUCCINATE 50 MG: 50 TABLET, EXTENDED RELEASE ORAL at 20:23

## 2022-06-10 RX ADMIN — THEOPHYLLINE ANHYDROUS 400 MG: 200 CAPSULE, EXTENDED RELEASE ORAL at 09:03

## 2022-06-10 RX ADMIN — POTASSIUM CHLORIDE 20 MEQ: 1500 TABLET, EXTENDED RELEASE ORAL at 09:01

## 2022-06-10 RX ADMIN — AMOXICILLIN AND CLAVULANATE POTASSIUM 1 TABLET: 875; 125 TABLET, FILM COATED ORAL at 20:22

## 2022-06-10 RX ADMIN — DOCUSATE SODIUM 100 MG: 100 CAPSULE, LIQUID FILLED ORAL at 20:22

## 2022-06-10 RX ADMIN — METOPROLOL SUCCINATE 50 MG: 50 TABLET, EXTENDED RELEASE ORAL at 09:02

## 2022-06-10 RX ADMIN — IPRATROPIUM BROMIDE AND ALBUTEROL SULFATE 3 ML: .5; 3 SOLUTION RESPIRATORY (INHALATION) at 07:17

## 2022-06-10 RX ADMIN — ROSUVASTATIN 20 MG: 10 TABLET, FILM COATED ORAL at 20:23

## 2022-06-10 RX ADMIN — Medication 5 MG: at 20:24

## 2022-06-10 RX ADMIN — AMOXICILLIN AND CLAVULANATE POTASSIUM 1 TABLET: 875; 125 TABLET, FILM COATED ORAL at 09:03

## 2022-06-10 RX ADMIN — Medication 10 ML: at 09:04

## 2022-06-10 RX ADMIN — DOCUSATE SODIUM 100 MG: 100 CAPSULE, LIQUID FILLED ORAL at 09:03

## 2022-06-10 RX ADMIN — ROPINIROLE HYDROCHLORIDE 1 MG: 1 TABLET, FILM COATED ORAL at 20:23

## 2022-06-10 RX ADMIN — PANTOPRAZOLE SODIUM 40 MG: 40 TABLET, DELAYED RELEASE ORAL at 06:07

## 2022-06-10 RX ADMIN — IPRATROPIUM BROMIDE AND ALBUTEROL SULFATE 3 ML: .5; 3 SOLUTION RESPIRATORY (INHALATION) at 14:37

## 2022-06-10 RX ADMIN — GUAIFENESIN 1200 MG: 600 TABLET, EXTENDED RELEASE ORAL at 20:23

## 2022-06-10 RX ADMIN — DILTIAZEM HYDROCHLORIDE 120 MG: 120 CAPSULE, COATED, EXTENDED RELEASE ORAL at 09:03

## 2022-06-10 RX ADMIN — BUDESONIDE AND FORMOTEROL FUMARATE DIHYDRATE 2 PUFF: 160; 4.5 AEROSOL RESPIRATORY (INHALATION) at 07:23

## 2022-06-10 RX ADMIN — MONTELUKAST 10 MG: 10 TABLET, FILM COATED ORAL at 20:23

## 2022-06-10 RX ADMIN — PREDNISONE 30 MG: 20 TABLET ORAL at 09:02

## 2022-06-10 RX ADMIN — IPRATROPIUM BROMIDE AND ALBUTEROL SULFATE 3 ML: .5; 3 SOLUTION RESPIRATORY (INHALATION) at 19:03

## 2022-06-10 RX ADMIN — ENOXAPARIN SODIUM 40 MG: 100 INJECTION SUBCUTANEOUS at 16:35

## 2022-06-10 RX ADMIN — GABAPENTIN 900 MG: 300 CAPSULE ORAL at 20:23

## 2022-06-10 RX ADMIN — ASPIRIN 81 MG: 81 TABLET, COATED ORAL at 09:03

## 2022-06-10 RX ADMIN — BUDESONIDE AND FORMOTEROL FUMARATE DIHYDRATE 2 PUFF: 160; 4.5 AEROSOL RESPIRATORY (INHALATION) at 19:01

## 2022-06-10 RX ADMIN — Medication 10 ML: at 20:24

## 2022-06-10 NOTE — PROGRESS NOTES
[facility] Medicine Services Daily Progress Note    Patient Name: Michelle Francis  : 1950  MRN: [id number]  Primary Care Physician:  Avani Montoya MD  Date of admission: 2022      Subjective      Chief Complaint: Shortness of air      Patient Reports   2022: The patient reports continued shortness of air which is improved at rest on the high flow oxygen.  2022: Patient reports feeling somewhat better with gradual improvement of her shortness of breath at rest.  There was no family at the bedside.  She had several questions and all questions were answered.  6/10/2022: The patient reports feeling much better.  She has been weaned down to 12 L nasal cannula O2.    ROS   All other systems were reviewed and were negative except for ongoing shortness of breath.    Objective      Vitals:   Temp:  [97.8 °F (36.6 °C)-98.8 °F (37.1 °C)] 97.8 °F (36.6 °C)  Heart Rate:  [70-95] 79  Resp:  [18-20] 18  BP: (101-129)/(46-84) 126/53  Flow (L/min):  [12-25] 12    Physical Exam   Vital signs and nurses notes reviewed.  Well-developed over-nourished female comfortable on recession below O2 in no acute distress sitting up in bed awake and alert; mucous membranes moist; sclerae anicteric; lungs clear to auscultation with decreased air entry bilaterally; CV regular rate and rhythm; abdomen soft nontender nondistended; extremities with no edema, cyanosis or calf tenderness; palpable pedal pulses bilaterally; no Huertas catheter.  Exam unchanged from 2022.    Result Review    Result Review:  I have personally reviewed the results from the time of this admission to 6/10/2022 18:20 EDT and agree with these findings:  [x]  Laboratory  [x]  Microbiology  [x]  Radiology  [x]  EKG/Telemetry   [x]  Cardiology/Vascular   []  Pathology  [x]  Old records  []  Other:  Most notable findings discussed in the assessment and plan.          Assessment & Plan      Brief Patient Summary:  Michelle Francis is a 72  y.o. female with a history of hypertension, hyperlipidemia, chronic kidney disease stage II, restless leg syndrome and end-stage COPD dependent on 5 L nasal cannula O2 continuously who presented to the emergency room because of recent increased congestion and cough and significant or shortness of air despite home O2.    Current inpatient medications include:  amoxicillin-clavulanate, 1 tablet, Oral, Q12H  aspirin, 81 mg, Oral, Daily  budesonide-formoterol, 2 puff, Inhalation, BID - RT  dilTIAZem CD, 120 mg, Oral, Daily  docusate sodium, 100 mg, Oral, BID  enoxaparin, 40 mg, Subcutaneous, Daily  furosemide, 40 mg, Intravenous, BID - RT  gabapentin, 900 mg, Oral, Nightly  guaiFENesin, 1,200 mg, Oral, Q12H  ipratropium-albuterol, 3 mL, Nebulization, 4x Daily - RT  metoprolol succinate XL, 50 mg, Oral, BID  montelukast, 10 mg, Oral, Nightly  pantoprazole, 40 mg, Oral, Q AM  potassium chloride, 20 mEq, Oral, Daily  [START ON 6/11/2022] predniSONE, 20 mg, Oral, Daily   Followed by  [START ON 6/13/2022] predniSONE, 10 mg, Oral, Daily  rOPINIRole, 1 mg, Oral, BID  rosuvastatin, 20 mg, Oral, Daily  sodium chloride, 10 mL, Intravenous, Q12H  theophylline, 400 mg, Oral, Q24H             Active Hospital Problems:  Active Hospital Problems    Diagnosis    • Left lower lobe pneumonia    • Right lower lobe pneumonia    • COPD with acute exacerbation (HCC)    • Sleep apnea      [facility] 2014, ahi 5.9, on auto bipap min 8 max, PS 2-8-Alcaraz's, nasal mask     • Acute on chronic respiratory failure with hypoxia (HCC)    • Essential hypertension    • Mixed hyperlipidemia    • Restless leg syndrome      sees Dr Seipel     • Acute congestive heart failure (HCC)    • Thrombocytopenia (HCC)    • CKD (chronic kidney disease) stage 2, GFR 60-89 ml/min      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     • Obesity (BMI 30-39.9)    • Gastroesophageal reflux disease      Plan:   Acute hypoxic respiratory  failure secondary to acute exacerbation of severe COPD and bilateral lower lobe pneumonia/sleep apnea with CPAP compliance  -Pulmonary consulting and performed bronchoscopy which showed bilateral lower lobe pneumonia with significant inflammation suggestive of chronic aspiration possibly from GERD  -Continue Zithromax, Symbicort, DuoNebs, Singulair, theophylline, guaifenesin and IV methylprednisolone  -Pulmonary recommended changing to oral steroids and antibiotics 6/10/2022  -Titrate O2 to maintain sat greater than 89% and wean as tolerated  -Echocardiogram pending    Essential hypertension, chronic and controlled  -Continue diltiazem, Lasix and metoprolol    Chronic kidney disease stage II secondary to hypertension  Creatinine 1.0 and current creatinine 0.75 which is better than baseline  -Monitor renal function and avoid nephrotoxic medications    Hyperlipidemia  -Continue Crestor    Congestive heart failure is felt to have been ruled out  -proBNP and LVEF are normal with no report of elevated RVSP    GERD  -Continue PPI  -Patient extensively counseled on a daily basis on reflux precautions.  She says she is always in a recliner and never lays flat.  She understands to eat more frequent smaller meals and avoid greasy and fried foods.    Overweight  -Lifestyle modification counseling    Restless leg syndrome  -Continue Requip and gabapentin    DVT prophylaxis:  Medical DVT prophylaxis orders are present.    CODE STATUS:    Level Of Support Discussed With: Patient  Code Status (Patient has no pulse and is not breathing): No CPR (Do Not Attempt to Resuscitate)  Medical Interventions (Patient has pulse or is breathing): Full Support      Disposition:  I expect patient to be discharged depending on clinical course.    This patient has been examined wearing appropriate Personal Protective Equipment and discussed with hospital infection control department. 06/10/22      Electronically signed by Virgen Pascual MD,  06/10/22, 18:20 EDT.  [facility] Hospitalist Team

## 2022-06-10 NOTE — CASE MANAGEMENT/SOCIAL WORK
Continued Stay Note  [facility]     Patient Name: Michelle Francis  MRN: [id number]  Today's Date: 6/10/2022    Admit Date: 6/5/2022     Discharge Plan     Row Name 06/10/22 1119       Plan    Plan [facility] Current home O2 with [employer], 5L at home.    Plan Comments Barrier to D/C: 20L PF O2, bronch cultures pending.              Expected Discharge Date and Time     Expected Discharge Date Expected Discharge Time    Jun 13, 2022         Phone communication or documentation only - no physical contact with patient or family.      Xenia Morales RN

## 2022-06-10 NOTE — CONSULTS
The pt shared an overview of her condition and prognosis.  She is hoping to be discharged soon.  She was feeling good today.  She mainly wanted prayer and asked if I could prayer for her.  We held hand and prayed and she thanked me for stopping in to see her.  I shared my availability and encouraged her to let the staff know if she needed further support and I would follow up back up with her.

## 2022-06-10 NOTE — PLAN OF CARE
Goal Outcome Evaluation:    Patient has been resting in bed this shift with no complaints of pain. Patient remains on precision flow, VSS at this time.

## 2022-06-10 NOTE — PROGRESS NOTES
Daily Progress Note        Acute on chronic respiratory failure with hypoxia (HCC)    Mixed hyperlipidemia    Essential hypertension    Sleep apnea    Restless leg syndrome    COPD with acute exacerbation (HCC)    Obesity (BMI 30-39.9)    CKD (chronic kidney disease) stage 2, GFR 60-89 ml/min    Gastroesophageal reflux disease    Right lower lobe pneumonia    Acute congestive heart failure (HCC)    Thrombocytopenia (HCC)    Left lower lobe pneumonia      Assessment    Left lower lobe pneumonia, complete consolidation and air bronchograms worsened from previous exam 12/2021  Bilateral lower lobe pneumonia    Status post bronchoscopy 6/8/2022 with evidence of mucopurulent secretions and inflammatory changes highly suggestive of chronic acid reflux disease    Acute on chronic respiratory failure  -ABG pH 7.418, PCO2 44.6, PO2 110.2, HCO3 28.8     Chronic obstructive pulmonary disease, on home oxygen at 5 L  -PFTs December 2019, FEV1 0.8 L which is  36%, improved to 0.9 L 40% after bronchodilators, FEV1/FVC ratio 45, TLC 91%, %, DLCO 28%     Obstructive sleep apnea, not wearing BiPAP due to recall     Chronic kidney disease, stage II     Hypertension  Hyperlipidemia  Restless leg syndrome     Office note 5/31/2022  IRIS, last download residual AHI 0.6, No snoring, no leak, Compliance 51.7% > 4 HOURS.  Not wearing d/t recall has registered machine. O2 wearing 4-5L continuous     PAH, RVSP 44.  no   COPD, Severe PFTs December 2019, FEV1 0.8 L which is  36%, improved to 0.9 L 40% after bronchodilators, FEV1/FVC ratio 45, TLC 91%, %, DLCO 28%  Hypoxemia  O2 to 7-8 L at HS while not wearing BiPAP.  Patient to monitor O2 sats  Patient is compliant and benefits from oxygen therapy  Cont theophylline  order nebulizer supplies send to [facility]   stopped Trelegy due to SE swelling. , duo nebs / albuterol nebs  samples of Starla made her worse  off  Daliresp 500 mcg due to diarrhea every other day  following nephrology  for history of chronic kidney disease and edema  budesonide nebulized, off Dulera  Discussed [id number]  20-10 CM H2O with pressure support 3-7 CM H2O,   REVIEW SUPPLIER FOR SA DEVICE  ADEQUATE SLEEP HYGEINE  SAFETY DRIVING  DISCUSSED CARDIOVASCULAR & METABOLIC SIDE EFFECTS OF UNTREATED IRIS  PATIENT IS COMPLIANT USING MACHINE AND BENEFITS FROM THERAPY, PATIENT HAS NASAL DRYNESS AND WILL NEED HEATED HUMIDITY WITH PAP     Recommendations:     Continue to titrate oxygen  -Wears 5 L at home     Antibiotic DC azithromycin   P.o. Augmentin for 7 days  Continue to monitor BAL    p.o. prednisone 6-day taper    2D echo pending     Diuresis Lasix 40 mg twice daily    Bronchodilator\inhaled corticosteroid  Mucinex  Singulair  Theophylline  GI prophylaxis Protonix     Patient is a DNR at this time.  Does want to be intubated if necessary and continue aggressive treatment          LOS: 4 days     Subjective         Objective     Vital signs for last 24 hours:  Vitals:    06/09/22 2012 06/09/22 2121 06/10/22 0154 06/10/22 0541   BP:  129/49 111/46    BP Location:  Left arm Left arm    Patient Position:  Lying Lying    Pulse: 78 85 86 74   Resp: 18 20 18 20   Temp:  98.8 °F (37.1 °C) 98.5 °F (36.9 °C) 98.8 °F (37.1 °C)   TempSrc:  Oral Oral Oral   SpO2: 93% 94% 96% 95%   Weight:    89.2 kg (196 lb 10.4 oz)   Height:           Intake/Output last 3 shifts:  I/O last 3 completed shifts:  In: 1760 [P.O.:1560; I.V.:200]  Out: 1050 [Urine:1050]  Intake/Output this shift:  I/O this shift:  In: -   Out: 1300 [Urine:1300]      Radiology  Imaging Results (Last 24 Hours)     ** No results found for the last 24 hours. **          Labs:  Results from last 7 days   Lab Units 06/06/22  1347   WBC 10*3/mm3 7.80   HEMOGLOBIN g/dL 12.4   HEMATOCRIT % 38.1   PLATELETS 10*3/mm3 124*     Results from last 7 days   Lab Units 06/06/22  1347 06/06/22  0013   SODIUM mmol/L 134* 135*   POTASSIUM mmol/L 3.6 4.0   CHLORIDE mmol/L 92* 93*   CO2 mmol/L  29.0 28.0   BUN mg/dL 18 12   CREATININE mg/dL 0.75 0.81   CALCIUM mg/dL 8.9 9.0   BILIRUBIN mg/dL  --  0.6   ALK PHOS U/L  --  84   ALT (SGPT) U/L  --  5   AST (SGOT) U/L  --  25   GLUCOSE mg/dL 150* 114*     Results from last 7 days   Lab Units 06/06/22  0121   PH, ARTERIAL pH units 7.418   PO2 ART mm Hg 110.2*   PCO2, ARTERIAL mm Hg 44.6   HCO3 ART mmol/L 28.8*     Results from last 7 days   Lab Units 06/06/22  0013   ALBUMIN g/dL 4.00     Results from last 7 days   Lab Units 06/06/22  0013   TROPONIN T ng/mL <0.010         Results from last 7 days   Lab Units 06/07/22  1812   MAGNESIUM mg/dL 2.3     Results from last 7 days   Lab Units 06/08/22  0723   INR  1.04               Meds:   SCHEDULE  amoxicillin-clavulanate, 1 tablet, Oral, Q12H  aspirin, 81 mg, Oral, Daily  budesonide-formoterol, 2 puff, Inhalation, BID - RT  dilTIAZem CD, 120 mg, Oral, Daily  docusate sodium, 100 mg, Oral, BID  enoxaparin, 40 mg, Subcutaneous, Daily  furosemide, 40 mg, Intravenous, BID - RT  gabapentin, 900 mg, Oral, Nightly  guaiFENesin, 1,200 mg, Oral, Q12H  ipratropium-albuterol, 3 mL, Nebulization, 4x Daily - RT  metoprolol succinate XL, 50 mg, Oral, BID  montelukast, 10 mg, Oral, Nightly  pantoprazole, 40 mg, Oral, Q AM  potassium chloride, 20 mEq, Oral, Daily  predniSONE, 30 mg, Oral, Daily   Followed by  [START ON 6/11/2022] predniSONE, 20 mg, Oral, Daily   Followed by  [START ON 6/13/2022] predniSONE, 10 mg, Oral, Daily  rOPINIRole, 1 mg, Oral, BID  rosuvastatin, 20 mg, Oral, Daily  sodium chloride, 10 mL, Intravenous, Q12H  theophylline, 400 mg, Oral, Q24H      Infusions     PRNs  •  acetaminophen **OR** acetaminophen **OR** acetaminophen  •  albuterol  •  aluminum-magnesium hydroxide-simethicone  •  ipratropium-albuterol  •  melatonin  •  nitroglycerin  •  ondansetron **OR** ondansetron  •  sodium chloride  •  sodium chloride    Physical Exam:  Physical Exam  Vitals reviewed.   Pulmonary:      Breath sounds: Wheezing,  rhonchi and rales present.   Skin:     General: Skin is warm and dry.   Neurological:      Mental Status: She is alert and oriented to person, place, and time.         ROS  Review of Systems   Respiratory: Positive for cough and shortness of breath.    Cardiovascular: Positive for palpitations and leg swelling.       I have reviewed the patient's new clinical results.    Electronically signed by BERKLEY Hickman.

## 2022-06-10 NOTE — PLAN OF CARE
Goal Outcome Evaluation:  Plan of Care Reviewed With: patient, o2 demand down to 12 liters. Been up to chair all day, gave self bath wanting to go home. To continue weaning o2 down to home use which is 5 liters. Family been at bedside most day

## 2022-06-11 LAB
BACTERIA SPEC AEROBE CULT: NORMAL
BACTERIA SPEC AEROBE CULT: NORMAL
BH CV ECHO MEAS - ACS: 2.27 CM
BH CV ECHO MEAS - AO MAX PG: 9.6 MMHG
BH CV ECHO MEAS - AO MEAN PG: 4.7 MMHG
BH CV ECHO MEAS - AO ROOT DIAM: 3.4 CM
BH CV ECHO MEAS - AO V2 MAX: 155.3 CM/SEC
BH CV ECHO MEAS - AO V2 VTI: 29.6 CM
BH CV ECHO MEAS - AVA(I,D): 2.7 CM2
BH CV ECHO MEAS - EDV(CUBED): 54.6 ML
BH CV ECHO MEAS - EDV(MOD-SP4): 81.7 ML
BH CV ECHO MEAS - EF(MOD-BP): 55 %
BH CV ECHO MEAS - EF(MOD-SP4): 55.1 %
BH CV ECHO MEAS - ESV(CUBED): 16.1 ML
BH CV ECHO MEAS - ESV(MOD-SP4): 36.6 ML
BH CV ECHO MEAS - FS: 33.5 %
BH CV ECHO MEAS - IVS/LVPW: 0.91 CM
BH CV ECHO MEAS - IVSD: 0.89 CM
BH CV ECHO MEAS - LA A2CS (ATRIAL LENGTH): 3.6 CM
BH CV ECHO MEAS - LV DIASTOLIC VOL/BSA (35-75): 40.4 CM2
BH CV ECHO MEAS - LV MASS(C)D: 105.9 GRAMS
BH CV ECHO MEAS - LV MAX PG: 5.7 MMHG
BH CV ECHO MEAS - LV MEAN PG: 2.6 MMHG
BH CV ECHO MEAS - LV SYSTOLIC VOL/BSA (12-30): 18.1 CM2
BH CV ECHO MEAS - LV V1 MAX: 119.8 CM/SEC
BH CV ECHO MEAS - LV V1 VTI: 22.3 CM
BH CV ECHO MEAS - LVIDD: 3.8 CM
BH CV ECHO MEAS - LVIDS: 2.5 CM
BH CV ECHO MEAS - LVOT AREA: 3.5 CM2
BH CV ECHO MEAS - LVOT DIAM: 2.11 CM
BH CV ECHO MEAS - LVPWD: 0.97 CM
BH CV ECHO MEAS - MV A MAX VEL: 113.4 CM/SEC
BH CV ECHO MEAS - MV DEC SLOPE: 389.6 CM/SEC2
BH CV ECHO MEAS - MV DEC TIME: 0.2 MSEC
BH CV ECHO MEAS - MV E MAX VEL: 79.4 CM/SEC
BH CV ECHO MEAS - MV E/A: 0.7
BH CV ECHO MEAS - MV MAX PG: 3.4 MMHG
BH CV ECHO MEAS - MV MEAN PG: 1.78 MMHG
BH CV ECHO MEAS - MV V2 VTI: 18.3 CM
BH CV ECHO MEAS - MVA(VTI): 4.3 CM2
BH CV ECHO MEAS - PA V2 MAX: 116.8 CM/SEC
BH CV ECHO MEAS - RV MAX PG: 3.2 MMHG
BH CV ECHO MEAS - RV V1 MAX: 89.1 CM/SEC
BH CV ECHO MEAS - RV V1 VTI: 15.4 CM
BH CV ECHO MEAS - RVDD: 3.3 CM
BH CV ECHO MEAS - SI(MOD-SP4): 22.3 ML/M2
BH CV ECHO MEAS - SV(LVOT): 78.4 ML
BH CV ECHO MEAS - SV(MOD-SP4): 45 ML
BH CV ECHO MEAS - TR MAX PG: 19.6 MMHG
BH CV ECHO MEAS - TR MAX VEL: 221.5 CM/SEC
MAXIMAL PREDICTED HEART RATE: 147 BPM
STRESS TARGET HR: 125 BPM

## 2022-06-11 PROCEDURE — 99232 SBSQ HOSP IP/OBS MODERATE 35: CPT | Performed by: HOSPITALIST

## 2022-06-11 PROCEDURE — 94664 DEMO&/EVAL PT USE INHALER: CPT

## 2022-06-11 PROCEDURE — 94799 UNLISTED PULMONARY SVC/PX: CPT

## 2022-06-11 PROCEDURE — 25010000002 ENOXAPARIN PER 10 MG: Performed by: INTERNAL MEDICINE

## 2022-06-11 PROCEDURE — 63710000001 PREDNISONE PER 1 MG: Performed by: INTERNAL MEDICINE

## 2022-06-11 PROCEDURE — 97116 GAIT TRAINING THERAPY: CPT

## 2022-06-11 PROCEDURE — 94660 CPAP INITIATION&MGMT: CPT

## 2022-06-11 PROCEDURE — 25010000002 FUROSEMIDE PER 20 MG: Performed by: INTERNAL MEDICINE

## 2022-06-11 RX ADMIN — GUAIFENESIN 1200 MG: 600 TABLET, EXTENDED RELEASE ORAL at 08:49

## 2022-06-11 RX ADMIN — DOCUSATE SODIUM 100 MG: 100 CAPSULE, LIQUID FILLED ORAL at 08:49

## 2022-06-11 RX ADMIN — ROSUVASTATIN 20 MG: 10 TABLET, FILM COATED ORAL at 21:43

## 2022-06-11 RX ADMIN — MONTELUKAST 10 MG: 10 TABLET, FILM COATED ORAL at 21:43

## 2022-06-11 RX ADMIN — ROPINIROLE HYDROCHLORIDE 1 MG: 1 TABLET, FILM COATED ORAL at 18:52

## 2022-06-11 RX ADMIN — POTASSIUM CHLORIDE 20 MEQ: 1500 TABLET, EXTENDED RELEASE ORAL at 08:49

## 2022-06-11 RX ADMIN — DOCUSATE SODIUM 100 MG: 100 CAPSULE, LIQUID FILLED ORAL at 21:43

## 2022-06-11 RX ADMIN — IPRATROPIUM BROMIDE AND ALBUTEROL SULFATE 3 ML: .5; 3 SOLUTION RESPIRATORY (INHALATION) at 18:58

## 2022-06-11 RX ADMIN — PANTOPRAZOLE SODIUM 40 MG: 40 TABLET, DELAYED RELEASE ORAL at 05:39

## 2022-06-11 RX ADMIN — BUDESONIDE AND FORMOTEROL FUMARATE DIHYDRATE 2 PUFF: 160; 4.5 AEROSOL RESPIRATORY (INHALATION) at 18:55

## 2022-06-11 RX ADMIN — Medication 10 ML: at 21:44

## 2022-06-11 RX ADMIN — GABAPENTIN 900 MG: 300 CAPSULE ORAL at 21:43

## 2022-06-11 RX ADMIN — DILTIAZEM HYDROCHLORIDE 120 MG: 120 CAPSULE, COATED, EXTENDED RELEASE ORAL at 08:49

## 2022-06-11 RX ADMIN — ENOXAPARIN SODIUM 40 MG: 100 INJECTION SUBCUTANEOUS at 15:45

## 2022-06-11 RX ADMIN — ROPINIROLE HYDROCHLORIDE 1 MG: 1 TABLET, FILM COATED ORAL at 21:44

## 2022-06-11 RX ADMIN — GUAIFENESIN 1200 MG: 600 TABLET, EXTENDED RELEASE ORAL at 21:43

## 2022-06-11 RX ADMIN — FUROSEMIDE 40 MG: 10 INJECTION, SOLUTION INTRAMUSCULAR; INTRAVENOUS at 21:44

## 2022-06-11 RX ADMIN — IPRATROPIUM BROMIDE AND ALBUTEROL SULFATE 3 ML: .5; 3 SOLUTION RESPIRATORY (INHALATION) at 11:20

## 2022-06-11 RX ADMIN — FUROSEMIDE 40 MG: 10 INJECTION, SOLUTION INTRAMUSCULAR; INTRAVENOUS at 08:49

## 2022-06-11 RX ADMIN — ASPIRIN 81 MG: 81 TABLET, COATED ORAL at 08:49

## 2022-06-11 RX ADMIN — Medication 10 ML: at 08:48

## 2022-06-11 RX ADMIN — AMOXICILLIN AND CLAVULANATE POTASSIUM 1 TABLET: 875; 125 TABLET, FILM COATED ORAL at 08:49

## 2022-06-11 RX ADMIN — IPRATROPIUM BROMIDE AND ALBUTEROL SULFATE 3 ML: .5; 3 SOLUTION RESPIRATORY (INHALATION) at 06:50

## 2022-06-11 RX ADMIN — METOPROLOL SUCCINATE 50 MG: 50 TABLET, EXTENDED RELEASE ORAL at 21:43

## 2022-06-11 RX ADMIN — METOPROLOL SUCCINATE 50 MG: 50 TABLET, EXTENDED RELEASE ORAL at 08:49

## 2022-06-11 RX ADMIN — THEOPHYLLINE ANHYDROUS 400 MG: 200 CAPSULE, EXTENDED RELEASE ORAL at 08:49

## 2022-06-11 RX ADMIN — BUDESONIDE AND FORMOTEROL FUMARATE DIHYDRATE 2 PUFF: 160; 4.5 AEROSOL RESPIRATORY (INHALATION) at 06:54

## 2022-06-11 RX ADMIN — AMOXICILLIN AND CLAVULANATE POTASSIUM 1 TABLET: 875; 125 TABLET, FILM COATED ORAL at 21:44

## 2022-06-11 RX ADMIN — PREDNISONE 20 MG: 20 TABLET ORAL at 08:49

## 2022-06-11 RX ADMIN — IPRATROPIUM BROMIDE AND ALBUTEROL SULFATE 3 ML: .5; 3 SOLUTION RESPIRATORY (INHALATION) at 15:00

## 2022-06-11 NOTE — PLAN OF CARE
Goal Outcome Evaluation:           Progress: improving  Outcome Evaluation: Patient rested comfortably all night with no issues.

## 2022-06-11 NOTE — THERAPY TREATMENT NOTE
Subjective: Pt agreeable to therapeutic plan of care. Pt wanting to go home. Stated on 5L at home and will incr to 7L with activity.    Objective:     Bed mobility - N/A or Not attempted.  Transfers - SBA  Ambulation - 60x2 feet SBA    Vitals: Desaturates O2 incr form 11>15 L to amb and sats still dropped from 93>87%. HR 88>98. Took several mins to recover to 90's. Plans on dc home with assist.     Pain: 0 VAS  Education: Provided education on importance of mobility and skilled verbal / tactile cueing throughout intervention.     Assessment: Michelle Francis presents with functional mobility impairments which indicate the need for skilled intervention. Tolerating session today without incident. Pt seems to know her limitations but does decrease in sats with any activity and O2 can only be incr to 10L at home.  Will continue to follow and progress as tolerated.     Plan/Recommendations:   No ongoing therapy recommended post-acute care. No therapy needs.. Pt requires no DME at discharge.     Pt desires Home with family assist at discharge. Pt cooperative; agreeable to therapeutic recommendations and plan of care.         Basic Mobility 6-click:  Rollin = Total, A lot = 2, A little = 3; 4 = None  Supine>Sit:   1 = Total, A lot = 2, A little = 3; 4 = None   Sit>Stand with arms:  1 = Total, A lot = 2, A little = 3; 4 = None  Bed>Chair:   1 = Total, A lot = 2, A little = 3; 4 = None  Ambulate in room:  1 = Total, A lot = 2, A little = 3; 4 = None  3-5 Steps with railin = Total, A lot = 2, A little = 3; 4 = None  Score: 18    Post-Tx Position: Up in Chair and Call light and personal items within reach  PPE: gloves, surgical mask, eyewear protection

## 2022-06-11 NOTE — PROGRESS NOTES
Daily Progress Note        Acute on chronic respiratory failure with hypoxia (HCC)    Mixed hyperlipidemia    Essential hypertension    Sleep apnea    Restless leg syndrome    COPD with acute exacerbation (HCC)    Obesity (BMI 30-39.9)    CKD (chronic kidney disease) stage 2, GFR 60-89 ml/min    Gastroesophageal reflux disease    Right lower lobe pneumonia    Acute congestive heart failure (HCC)    Thrombocytopenia (HCC)    Left lower lobe pneumonia      Assessment    Left lower lobe pneumonia, complete consolidation and air bronchograms worsened from previous exam 12/2021    Status post bronchoscopy 6/8/2022 with evidence of mucopurulent secretions and inflammatory changes highly suggestive of chronic acid reflux disease    Acute on chronic respiratory failure     Chronic obstructive pulmonary disease, on home oxygen at 5 L,      -PFTs December 2019, FEV1 0.8 L which is  36%, improved to 0.9 L 40% after bronchodilators, FEV1/FVC ratio 45, TLC 91%, %, DLCO 28%     Obstructive sleep apnea, not wearing BiPAP due to recall     Chronic kidney disease, stage II     Hypertension  Hyperlipidemia  Restless leg syndrome     Office note 5/31/2022  IRIS, last download residual AHI 0.6, No snoring, no leak, Compliance 51.7% > 4 HOURS.  Not wearing d/t recall has registered machine. O2 wearing 4-5L continuous     PAH, RVSP 44.  no   COPD, Severe PFTs December 2019, FEV1 0.8 L which is  36%, improved to 0.9 L 40% after bronchodilators, FEV1/FVC ratio 45, TLC 91%, %, DLCO 28%  Hypoxemia  O2 to 7-8 L at HS while not wearing BiPAP.  Patient to monitor O2 sats  Patient is compliant and benefits from oxygen therapy  Cont theophylline  order nebulizer supplies send to [facility]   stopped Trelegy due to SE swelling. , duo nebs / albuterol nebs  samples of Starla made her worse  off  Daliresp 500 mcg due to diarrhea every other day  following nephrology for history of chronic kidney disease and edema  budesonide nebulized, off  Dulera  Discussed [id number]  20-10 CM H2O with pressure support 3-7 CM H2O,   REVIEW SUPPLIER FOR SA DEVICE  ADEQUATE SLEEP HYGEINE  SAFETY DRIVING  DISCUSSED CARDIOVASCULAR & METABOLIC SIDE EFFECTS OF UNTREATED IRIS  PATIENT IS COMPLIANT USING MACHINE AND BENEFITS FROM THERAPY, PATIENT HAS NASAL DRYNESS AND WILL NEED HEATED HUMIDITY WITH PAP     Plan:     Continue to titrate oxygen  -Wears 5 L at home, currently requiring 12 liters per high flow nasal cannula     P.o. Augmentin for 7 days  Continue to monitor BAL    p.o. prednisone 6-day taper    2D echo: Ejection fraction 55%     Diuresis Lasix 40 mg twice daily    Bronchodilator\inhaled corticosteroid  Mucinex  Singulair  Theophylline  GI prophylaxis Protonix     Patient is a DNR at this time.  Does want to be intubated if necessary and continue aggressive treatment          LOS: 5 days     Subjective     Cough    Objective     Vital signs for last 24 hours:  Vitals:    06/11/22 0653 06/11/22 0654 06/11/22 0658 06/11/22 0849   BP:    114/43   Pulse: 67 73 68 83   Resp: 20 20 20    Temp:       TempSrc:       SpO2: 92% 93% 96%    Weight:       Height:           Intake/Output last 3 shifts:  I/O last 3 completed shifts:  In: 1200 [P.O.:1200]  Out: 2500 [Urine:2500]  Intake/Output this shift:  No intake/output data recorded.      Radiology  Imaging Results (Last 24 Hours)     ** No results found for the last 24 hours. **          Labs:  Results from last 7 days   Lab Units 06/06/22  1347   WBC 10*3/mm3 7.80   HEMOGLOBIN g/dL 12.4   HEMATOCRIT % 38.1   PLATELETS 10*3/mm3 124*     Results from last 7 days   Lab Units 06/06/22  1347 06/06/22  0013   SODIUM mmol/L 134* 135*   POTASSIUM mmol/L 3.6 4.0   CHLORIDE mmol/L 92* 93*   CO2 mmol/L 29.0 28.0   BUN mg/dL 18 12   CREATININE mg/dL 0.75 0.81   CALCIUM mg/dL 8.9 9.0   BILIRUBIN mg/dL  --  0.6   ALK PHOS U/L  --  84   ALT (SGPT) U/L  --  5   AST (SGOT) U/L  --  25   GLUCOSE mg/dL 150* 114*     Results from last 7  days   Lab Units 06/06/22  0121   PH, ARTERIAL pH units 7.418   PO2 ART mm Hg 110.2*   PCO2, ARTERIAL mm Hg 44.6   HCO3 ART mmol/L 28.8*     Results from last 7 days   Lab Units 06/06/22  0013   ALBUMIN g/dL 4.00     Results from last 7 days   Lab Units 06/06/22  0013   TROPONIN T ng/mL <0.010         Results from last 7 days   Lab Units 06/07/22  1812   MAGNESIUM mg/dL 2.3     Results from last 7 days   Lab Units 06/08/22  0723   INR  1.04               Meds:   SCHEDULE  amoxicillin-clavulanate, 1 tablet, Oral, Q12H  aspirin, 81 mg, Oral, Daily  budesonide-formoterol, 2 puff, Inhalation, BID - RT  dilTIAZem CD, 120 mg, Oral, Daily  docusate sodium, 100 mg, Oral, BID  enoxaparin, 40 mg, Subcutaneous, Daily  furosemide, 40 mg, Intravenous, BID - RT  gabapentin, 900 mg, Oral, Nightly  guaiFENesin, 1,200 mg, Oral, Q12H  ipratropium-albuterol, 3 mL, Nebulization, 4x Daily - RT  metoprolol succinate XL, 50 mg, Oral, BID  montelukast, 10 mg, Oral, Nightly  pantoprazole, 40 mg, Oral, Q AM  potassium chloride, 20 mEq, Oral, Daily  predniSONE, 20 mg, Oral, Daily   Followed by  [START ON 6/13/2022] predniSONE, 10 mg, Oral, Daily  rOPINIRole, 1 mg, Oral, BID  rosuvastatin, 20 mg, Oral, Daily  sodium chloride, 10 mL, Intravenous, Q12H  theophylline, 400 mg, Oral, Q24H      Infusions     PRNs  •  acetaminophen **OR** acetaminophen **OR** acetaminophen  •  albuterol  •  aluminum-magnesium hydroxide-simethicone  •  ipratropium-albuterol  •  melatonin  •  nitroglycerin  •  ondansetron **OR** ondansetron  •  sodium chloride  •  sodium chloride    Physical Exam:  General Appearance:  Alert   HEENT:  Normocephalic, without obvious abnormality, Conjunctiva/corneas clear,.   Nares normal, no drainage     Neck:  Supple, symmetrical, trachea midline. No JVD.  Lungs /Chest wall:   Bilateral basal rhonchi and wheezing, respirations unlabored, symmetrical wall movement.     Heart:  Regular rate and rhythm, S1 S2 normal  Abdomen: Soft,  non-tender, no masses, no organomegaly.    Extremities: No edema, no clubbing or cyanosis  ROS  Review of Systems   Respiratory: Positive for cough and shortness of breath.    Cardiovascular: Positive for palpitations and leg swelling.       I have reviewed the patient's new clinical results.

## 2022-06-11 NOTE — PLAN OF CARE
Assessment: Michelle Francis presents with functional mobility impairments which indicate the need for skilled intervention. Tolerating session today without incident. Pt seems to know her limitations but does decrease in sats with any activity and O2 can only be incr to 10L at home.  Will continue to follow and progress as tolerated.

## 2022-06-11 NOTE — PLAN OF CARE
Goal Outcome Evaluation:  Plan of Care Reviewed With: patient, spouse        Progress: improving  Outcome Evaluation: Sitting up in chair.  Weaning O2 as tolerated.  Currently on 9L.  Soa with activity but recovers.

## 2022-06-11 NOTE — PROGRESS NOTES
[facility] Medicine Services Daily Progress Note    Patient Name: Michelle Francis  : 1950  MRN: [id number]  Primary Care Physician:  Avani Montoya MD  Date of admission: 2022      Subjective      Chief Complaint: Shortness of air      Patient Reports   2022: The patient reports continued shortness of air which is improved at rest on the high flow oxygen.  2022: Patient reports feeling somewhat better with gradual improvement of her shortness of breath at rest.  There was no family at the bedside.  She had several questions and all questions were answered.  6/10/2022: The patient reports feeling much better.  She has been weaned down to 12 L nasal cannula O2.  2022: The patient was hopeful to discharge home however her oxygen requirements are still too high with minimal activity.    ROS   All other systems were reviewed and were negative except for ongoing shortness of breath.    Objective      Vitals:   Temp:  [97.8 °F (36.6 °C)-98.8 °F (37.1 °C)] 98.2 °F (36.8 °C)  Heart Rate:  [67-90] 71  Resp:  [18-20] 20  BP: ()/(34-84) 115/44  Flow (L/min):  [12-20] 12    Physical Exam   Vital signs and nurses notes reviewed.  Well-developed over-nourished female comfortable on recession below O2 in no acute distress sitting up in bed awake and alert; mucous membranes moist; sclerae anicteric; lungs clear to auscultation with decreased air entry bilaterally; CV regular rate and rhythm; abdomen soft nontender nondistended; extremities with no edema, cyanosis or calf tenderness; palpable pedal pulses bilaterally; no Huertas catheter.  Exam unchanged from 6/10/2022.    Result Review    Result Review:  I have personally reviewed the results from the time of this admission to 2022 10:53 EDT and agree with these findings:  [x]  Laboratory  [x]  Microbiology  [x]  Radiology  [x]  EKG/Telemetry   [x]  Cardiology/Vascular   []  Pathology  [x]  Old records  []  Other:  Most  notable findings discussed in the assessment and plan.          Assessment & Plan      Brief Patient Summary:  Michelle Francis is a 72 y.o. female with a history of hypertension, hyperlipidemia, chronic kidney disease stage II, restless leg syndrome and end-stage COPD dependent on 5 L nasal cannula O2 continuously who presented to the emergency room because of recent increased congestion and cough and significant or shortness of air despite home O2.    Current inpatient medications include:  amoxicillin-clavulanate, 1 tablet, Oral, Q12H  aspirin, 81 mg, Oral, Daily  budesonide-formoterol, 2 puff, Inhalation, BID - RT  dilTIAZem CD, 120 mg, Oral, Daily  docusate sodium, 100 mg, Oral, BID  enoxaparin, 40 mg, Subcutaneous, Daily  furosemide, 40 mg, Intravenous, BID - RT  gabapentin, 900 mg, Oral, Nightly  guaiFENesin, 1,200 mg, Oral, Q12H  ipratropium-albuterol, 3 mL, Nebulization, 4x Daily - RT  metoprolol succinate XL, 50 mg, Oral, BID  montelukast, 10 mg, Oral, Nightly  pantoprazole, 40 mg, Oral, Q AM  potassium chloride, 20 mEq, Oral, Daily  predniSONE, 20 mg, Oral, Daily   Followed by  [START ON 6/13/2022] predniSONE, 10 mg, Oral, Daily  rOPINIRole, 1 mg, Oral, BID  rosuvastatin, 20 mg, Oral, Daily  sodium chloride, 10 mL, Intravenous, Q12H  theophylline, 400 mg, Oral, Q24H             Active Hospital Problems:  Active Hospital Problems    Diagnosis    • Left lower lobe pneumonia    • Right lower lobe pneumonia    • COPD with acute exacerbation (HCC)    • Sleep apnea      npsg [facility] 2014, ahi 5.9, on auto bipap min 8 max, PS 2-8-Alcaraz's, nasal mask     • Acute on chronic respiratory failure with hypoxia (HCC)    • Essential hypertension    • Mixed hyperlipidemia    • Restless leg syndrome      sees Dr Seipel     • Acute congestive heart failure (HCC)    • Thrombocytopenia (HCC)    • CKD (chronic kidney disease) stage 2, GFR 60-89 ml/min      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 with baseline  creatinine 1.1.     • Obesity (BMI 30-39.9)    • Gastroesophageal reflux disease      Plan:   Acute hypoxic respiratory failure secondary to acute exacerbation of severe COPD and bilateral lower lobe pneumonia probably from aspiration related to GERD/Sleep apnea with CPAP compliance  -Pulmonary consulting and performed bronchoscopy which showed bilateral lower lobe pneumonia with significant inflammation suggestive of chronic aspiration possibly from GERD  -Continue Zithromax, Symbicort, DuoNebs, Singulair, theophylline, guaifenesin and IV methylprednisolone  -Pulmonary recommended changing to oral steroids and antibiotics 6/10/2022  -Titrate O2 to maintain sat greater than 89% and wean as tolerated  -Echocardiogram pending    Essential hypertension, chronic and controlled  -Continue diltiazem, Lasix and metoprolol    Chronic kidney disease stage II secondary to hypertension  Creatinine 1.0 and current creatinine 0.75 which is better than baseline  -Monitor renal function and avoid nephrotoxic medications    Hyperlipidemia  -Continue Crestor    Congestive heart failure is felt to have been ruled out  -proBNP and LVEF are normal with no report of elevated RVSP    GERD  -Continue PPI  -Patient extensively counseled on a daily basis on reflux precautions.  She says she is always in a recliner and never lays flat.  She understands to eat more frequent smaller meals and avoid greasy and fried foods.    Overweight  -Lifestyle modification counseling    Restless leg syndrome  -Continue Requip and gabapentin    DVT prophylaxis:  Medical DVT prophylaxis orders are present.    CODE STATUS:    Level Of Support Discussed With: Patient  Code Status (Patient has no pulse and is not breathing): No CPR (Do Not Attempt to Resuscitate)  Medical Interventions (Patient has pulse or is breathing): Full Support      Disposition:  I expect patient to be discharged depending on clinical course.    This patient has been examined wearing  appropriate Personal Protective Equipment and discussed with hospital infection control department. 06/11/22      Electronically signed by Virgen Pascual MD, 06/11/22, 10:53 EDT.  [facility]ist Team

## 2022-06-12 VITALS
DIASTOLIC BLOOD PRESSURE: 52 MMHG | OXYGEN SATURATION: 90 % | RESPIRATION RATE: 18 BRPM | TEMPERATURE: 98.6 F | HEIGHT: 66 IN | SYSTOLIC BLOOD PRESSURE: 117 MMHG | BODY MASS INDEX: 31.99 KG/M2 | WEIGHT: 199.08 LBS | HEART RATE: 85 BPM

## 2022-06-12 PROBLEM — I50.32 CHRONIC DIASTOLIC CHF (CONGESTIVE HEART FAILURE) (HCC): Status: ACTIVE | Noted: 2022-06-12

## 2022-06-12 PROCEDURE — 94799 UNLISTED PULMONARY SVC/PX: CPT

## 2022-06-12 PROCEDURE — 94618 PULMONARY STRESS TESTING: CPT

## 2022-06-12 PROCEDURE — 99239 HOSP IP/OBS DSCHRG MGMT >30: CPT | Performed by: HOSPITALIST

## 2022-06-12 PROCEDURE — 25010000002 ENOXAPARIN PER 10 MG: Performed by: INTERNAL MEDICINE

## 2022-06-12 PROCEDURE — 63710000001 PREDNISONE PER 1 MG: Performed by: INTERNAL MEDICINE

## 2022-06-12 PROCEDURE — 94660 CPAP INITIATION&MGMT: CPT

## 2022-06-12 PROCEDURE — 25010000002 FUROSEMIDE PER 20 MG: Performed by: INTERNAL MEDICINE

## 2022-06-12 PROCEDURE — 94664 DEMO&/EVAL PT USE INHALER: CPT

## 2022-06-12 RX ORDER — PREDNISONE 10 MG/1
10 TABLET ORAL DAILY
Qty: 2 TABLET | Refills: 0 | Status: SHIPPED | OUTPATIENT
Start: 2022-06-13 | End: 2022-06-15

## 2022-06-12 RX ORDER — GUAIFENESIN 600 MG/1
1200 TABLET, EXTENDED RELEASE ORAL EVERY 12 HOURS SCHEDULED
Qty: 120 TABLET | Refills: 0 | Status: SHIPPED | OUTPATIENT
Start: 2022-06-12 | End: 2022-12-31

## 2022-06-12 RX ORDER — AMOXICILLIN AND CLAVULANATE POTASSIUM 875; 125 MG/1; MG/1
1 TABLET, FILM COATED ORAL EVERY 12 HOURS SCHEDULED
Qty: 7 TABLET | Refills: 0 | Status: SHIPPED | OUTPATIENT
Start: 2022-06-12 | End: 2022-06-16

## 2022-06-12 RX ORDER — ALBUTEROL SULFATE 90 UG/1
2 AEROSOL, METERED RESPIRATORY (INHALATION) EVERY 4 HOURS PRN
Qty: 18 G | Refills: 0 | Status: SHIPPED | OUTPATIENT
Start: 2022-06-12 | End: 2022-06-12 | Stop reason: HOSPADM

## 2022-06-12 RX ADMIN — DILTIAZEM HYDROCHLORIDE 120 MG: 120 CAPSULE, COATED, EXTENDED RELEASE ORAL at 09:05

## 2022-06-12 RX ADMIN — AMOXICILLIN AND CLAVULANATE POTASSIUM 1 TABLET: 875; 125 TABLET, FILM COATED ORAL at 09:05

## 2022-06-12 RX ADMIN — BUDESONIDE AND FORMOTEROL FUMARATE DIHYDRATE 2 PUFF: 160; 4.5 AEROSOL RESPIRATORY (INHALATION) at 07:55

## 2022-06-12 RX ADMIN — Medication 10 ML: at 09:11

## 2022-06-12 RX ADMIN — IPRATROPIUM BROMIDE AND ALBUTEROL SULFATE 3 ML: .5; 3 SOLUTION RESPIRATORY (INHALATION) at 07:50

## 2022-06-12 RX ADMIN — METOPROLOL SUCCINATE 50 MG: 50 TABLET, EXTENDED RELEASE ORAL at 09:05

## 2022-06-12 RX ADMIN — PANTOPRAZOLE SODIUM 40 MG: 40 TABLET, DELAYED RELEASE ORAL at 06:12

## 2022-06-12 RX ADMIN — IPRATROPIUM BROMIDE AND ALBUTEROL SULFATE 3 ML: .5; 3 SOLUTION RESPIRATORY (INHALATION) at 10:40

## 2022-06-12 RX ADMIN — IPRATROPIUM BROMIDE AND ALBUTEROL SULFATE 3 ML: .5; 3 SOLUTION RESPIRATORY (INHALATION) at 14:30

## 2022-06-12 RX ADMIN — FUROSEMIDE 40 MG: 10 INJECTION, SOLUTION INTRAMUSCULAR; INTRAVENOUS at 09:05

## 2022-06-12 RX ADMIN — DOCUSATE SODIUM 100 MG: 100 CAPSULE, LIQUID FILLED ORAL at 09:05

## 2022-06-12 RX ADMIN — ENOXAPARIN SODIUM 40 MG: 100 INJECTION SUBCUTANEOUS at 16:25

## 2022-06-12 RX ADMIN — ASPIRIN 81 MG: 81 TABLET, COATED ORAL at 09:05

## 2022-06-12 RX ADMIN — THEOPHYLLINE ANHYDROUS 400 MG: 200 CAPSULE, EXTENDED RELEASE ORAL at 09:04

## 2022-06-12 RX ADMIN — GUAIFENESIN 1200 MG: 600 TABLET, EXTENDED RELEASE ORAL at 09:05

## 2022-06-12 RX ADMIN — PREDNISONE 20 MG: 20 TABLET ORAL at 09:05

## 2022-06-12 RX ADMIN — POTASSIUM CHLORIDE 20 MEQ: 1500 TABLET, EXTENDED RELEASE ORAL at 09:05

## 2022-06-12 NOTE — PROGRESS NOTES
Exercise Oximetry    Patient Name:Michelle Francis   MRN: [id number]   Date: 06/12/22             ROOM AIR BASELINE   SpO2% 84   Heart Rate 85   Blood Pressure      EXERCISE ON ROOM AIR SpO2% EXERCISE ON O2 @ 8 LPM SpO2%   1 MINUTE  1 MINUTE 87  6LPM   2 MINUTES  2 MINUTES 89  8LPM   3 MINUTES  3 MINUTES 90  8LPM   4 MINUTES  4 MINUTES 90  8 LPM   5 MINUTES  5 MINUTES 90  8LPM   6 MINUTES  6 MINUTES 90  8LPM              Distance Walked  6 minutes Distance Walked   Dyspnea (Moreno Scale)   Dyspnea (Moreno Scale)   Fatigue (Moreno Scale)   Fatigue ([place] Scale)   SpO2% Post Exercise  90 SpO2% Post Exercise   HR Post Exercise  92 HR Post Exercise   Time to Recovery   Time to Recovery     Comments: Patient required oxygen during six minute walk test. Patient was 87% on 6LPM, placed patient on 8LPM and saturation stayed above 90%.

## 2022-06-12 NOTE — PLAN OF CARE
Goal Outcome Evaluation:  Plan of Care Reviewed With: patient    Plan:  Oxygen titrated from 9L down to 8L, patient wears 5L at home.    Skin: Scattered scabs and bruising.      Fall risk:  no, patient is steady on her feet.    Problem: Adult Inpatient Plan of Care  Goal: Plan of Care Review  Outcome: Ongoing, Progressing  Flowsheets (Taken 6/12/2022 0647)  Plan of Care Reviewed With: patient  Goal: Patient-Specific Goal (Individualized)  Outcome: Ongoing, Progressing  Goal: Absence of Hospital-Acquired Illness or Injury  Outcome: Ongoing, Progressing  Intervention: Identify and Manage Fall Risk  Recent Flowsheet Documentation  Taken 6/12/2022 0409 by Mely Connors RN  Safety Promotion/Fall Prevention:   assistive device/personal items within reach   clutter free environment maintained   fall prevention program maintained   nonskid shoes/slippers when out of bed   safety round/check completed  Intervention: Prevent Skin Injury  Recent Flowsheet Documentation  Taken 6/12/2022 0409 by Mely Connors RN  Body Position: position changed independently  Intervention: Prevent and Manage VTE (Venous Thromboembolism) Risk  Recent Flowsheet Documentation  Taken 6/12/2022 0409 by Mely Connors RN  Activity Management: activity encouraged  VTE Prevention/Management: (lovenox) other (see comments)  Goal: Optimal Comfort and Wellbeing  Outcome: Ongoing, Progressing  Intervention: Provide Person-Centered Care  Recent Flowsheet Documentation  Taken 6/12/2022 0409 by Mely Connors RN  Trust Relationship/Rapport:   care explained   thoughts/feelings acknowledged   empathic listening provided  Goal: Readiness for Transition of Care  Outcome: Ongoing, Progressing     Problem: Asthma Comorbidity  Goal: Maintenance of Asthma Control  Outcome: Ongoing, Progressing  Intervention: Maintain Asthma Symptom Control  Recent Flowsheet Documentation  Taken 6/12/2022 0409 by Mely Connors RN  Medication Review/Management: medications  reviewed     Problem: COPD (Chronic Obstructive Pulmonary Disease) Comorbidity  Goal: Maintenance of COPD Symptom Control  Outcome: Ongoing, Progressing  Intervention: Maintain COPD-Symptom Control  Recent Flowsheet Documentation  Taken 6/12/2022 0409 by Mely Connors RN  Medication Review/Management: medications reviewed     Problem: Skin Injury Risk Increased  Goal: Skin Health and Integrity  Outcome: Ongoing, Progressing  Intervention: Optimize Skin Protection  Recent Flowsheet Documentation  Taken 6/12/2022 0409 by Mely Connors RN  Pressure Reduction Techniques: frequent weight shift encouraged

## 2022-06-12 NOTE — DISCHARGE SUMMARY
[facility] Medicine Services  DISCHARGE SUMMARY    Patient Name: Michelle Francis  : 1950  MRN: [id number]    Date of Admission: 2022  Date of Discharge: 2022  Primary Care Physician: Avani Montoya MD      Presenting Problem:   Acute respiratory failure with hypoxemia (HCC) [J96.01]  Acute congestive heart failure, unspecified heart failure type (HCC) [I50.9]    Active and Resolved Hospital Problems:  Active Hospital Problems    Diagnosis POA   • Left lower lobe pneumonia [J18.9] Yes     Priority: High   • Right lower lobe pneumonia [J18.9] Yes     Priority: High   • COPD with acute exacerbation (HCC) [J44.1] Yes     Priority: High   • Sleep apnea [G47.30] Yes     Priority: High   • Acute on chronic respiratory failure with hypoxia (HCC) [J96.21] Yes     Priority: High   • Essential hypertension [I10] Yes     Priority: Medium   • Mixed hyperlipidemia [E78.2] Yes     Priority: Low   • Restless leg syndrome [G25.81] Yes     Priority: Low   • Chronic diastolic CHF (congestive heart failure) (HCC) [I50.32] Yes   • Acute congestive heart failure (HCC) [I50.9] Yes   • Thrombocytopenia (HCC) [D69.6] Yes   • CKD (chronic kidney disease) stage 2, GFR 60-89 ml/min [N18.2] Yes   • Obesity (BMI 30-39.9) [E66.9] Yes   • Gastroesophageal reflux disease [K21.9] Yes      Resolved Hospital Problems   No resolved problems to display.     Acute hypoxic respiratory failure secondary to acute exacerbation of severe COPD and bilateral lower lobe pneumonia probably from aspiration related to GERD/Sleep apnea with CPAP compliance  -Pulmonary consulted and performed bronchoscopy which showed bilateral lower lobe pneumonia with significant inflammation suggestive of chronic aspiration possibly from GERD  -Patient was treated with Zithromax, Symbicort, DuoNebs, Singulair, theophylline, guaifenesin and IV methylprednisolone  -Pulmonary recommended changing to oral steroids and antibiotics  6/10/2022  -Increase supplemental O2 above the patient's baseline was required to maintain sat greater than 89% and gradually weaned as tolerated  -Echocardiogram showed LVEF of 55 to 60%; borderline concentric left ventricular hypertrophy; grade 1 left ventricular diastolic dysfunction with impaired relaxation     Essential hypertension, chronic and controlled  -Continue diltiazem, Lasix and metoprolol     Chronic kidney disease stage II secondary to hypertension  Creatinine 1.0 and creatinine in the hospital was 0.75 which is better than baseline     Hyperlipidemia  -Continue Crestor     Chronic diastolic congestive heart failure secondary to hypertensive heart disease/  Acute congestive heart failure is felt to have been ruled out  -proBNP and LVEF are normal with no report of elevated RVSP     GERD  -Continue PPI  -Patient was repeatedly extensively counseled each day while in the hospital on reflux precautions.  She says she is always in a recliner and never lays flat.  She understands to eat more frequent smaller meals and avoid greasy and fried foods.     Overweight  -Lifestyle modification counseling     Restless leg syndrome  -Continue Requip and gabapentin       Hospital Course     Hospital Course:  Michelle Francis is a 72 y.o. female with a history of hypertension, hyperlipidemia, chronic kidney disease stage II, restless leg syndrome and end-stage COPD dependent on 5 L nasal cannula O2 continuously who presented to the emergency room because of recent increased congestion and cough and significant or shortness of air despite home O2.  She had increased oxygen requirements through the course of the hospital stay compared to her home O2 requirements.  Fortunately she did gradually improve with treatment detailed in the assessment and plan.  The patient is now felt to be stable for discharge.      Day of Discharge     Vital Signs:  Temp:  [97.8 °F (36.6 °C)-98.6 °F (37 °C)] 98.6 °F (37 °C)  Heart Rate:   [] 81  Resp:  [18-24] 18  BP: (100-134)/(43-58) 117/52  Flow (L/min):  [6-9] 6    Physical Exam:  Physical Exam   Vital signs and nurses notes reviewed.  Well-developed over-nourished female in no acute distress sitting up in bed awake and alert; mucous membranes moist; sclerae anicteric; lungs clear to auscultation with decreased air entry bilaterally; CV regular rate and rhythm; abdomen soft nontender nondistended; extremities with no edema, cyanosis or calf tenderness; palpable pedal pulses bilaterally; neurologic exam grossly nonfocal; no Huertas catheter.      Pertinent  and/or Most Recent Results     LAB RESULTS:      Lab 06/08/22  0723 06/06/22  1347 06/06/22  0128 06/06/22  0013   WBC  --  7.80  --  12.10*   HEMOGLOBIN  --  12.4  --  12.5   HEMATOCRIT  --  38.1  --  39.7   PLATELETS  --  124*  --  123*   NEUTROS ABS  --  6.60  --  9.30*   LYMPHS ABS  --  1.00  --  1.70   MONOS ABS  --  0.20  --  1.00*   EOS ABS  --  0.00  --  0.00   MCV  --  80.3  --  81.5   PROCALCITONIN  --   --   --  0.23   LACTATE  --   --  1.4  --    PROTIME 10.7  --   --   --          Lab 06/07/22  1812 06/06/22  1347 06/06/22  0013   SODIUM  --  134* 135*   POTASSIUM  --  3.6 4.0   CHLORIDE  --  92* 93*   CO2  --  29.0 28.0   ANION GAP  --  13.0 14.0   BUN  --  18 12   CREATININE  --  0.75 0.81   EGFR  --  84.2 76.8   GLUCOSE  --  150* 114*   CALCIUM  --  8.9 9.0   MAGNESIUM 2.3  --   --          Lab 06/06/22  0013   TOTAL PROTEIN 6.8   ALBUMIN 4.00   GLOBULIN 2.8   ALT (SGPT) 5   AST (SGOT) 25   BILIRUBIN 0.6   ALK PHOS 84         Lab 06/08/22  0723 06/06/22  0013   PROBNP  --  326.2   TROPONIN T  --  <0.010   PROTIME 10.7  --    INR 1.04  --                  Lab 06/06/22  0121   PH, ARTERIAL 7.418   PCO2, ARTERIAL 44.6   PO2 .2*   O2 SATURATION ART 98.3*   FIO2 100   HCO3 ART 28.8*   BASE EXCESS ART 3.6*     Brief Urine Lab Results  (Last result in the past 365 days)      Color   Clarity   Blood   Leuk Est   Nitrite    Protein   CREAT   Urine HCG        06/08/22 0505 Yellow   Cloudy   Negative   Negative   Negative   Negative               Microbiology Results (last 10 days)     Procedure Component Value - Date/Time    AFB Culture - Wash, Lung, Left Lower Lobe [[id number]] Collected: 06/08/22 1431    Lab Status: Preliminary result Specimen: Wash from Lung, Left Lower Lobe Updated: 06/09/22 1222     AFB Stain No acid fast bacilli seen    Respiratory Culture - Wash, Lung, Left Lower Lobe [[id number]] Collected: 06/08/22 1431    Lab Status: Final result Specimen: Wash from Lung, Left Lower Lobe Updated: 06/10/22 1009     Respiratory Culture Light growth (2+) Normal respiratory ava. No S. aureus or Pseudomonas aeruginosa detected. Final report.     Gram Stain Moderate (3+) WBCs seen      Rare (1+) Gram negative bacilli    Respiratory Panel PCR w/COVID-19(SARS-CoV-2) ZOILA/RAFAELA/OSCAR/PAD/COR/MAD/RASHAD In-House, NP Swab in UTM/VTM, 3-4 HR TAT - Wash, Lung, Left Lower Lobe [[id number]]  (Normal) Collected: 06/08/22 1431    Lab Status: Final result Specimen: Wash from Lung, Left Lower Lobe Updated: 06/08/22 1552     ADENOVIRUS, PCR Not Detected     Coronavirus 229E Not Detected     Coronavirus HKU1 Not Detected     Coronavirus NL63 Not Detected     Coronavirus OC43 Not Detected     COVID19 Not Detected     Human Metapneumovirus Not Detected     Human Rhinovirus/Enterovirus Not Detected     Influenza A PCR Not Detected     Influenza B PCR Not Detected     Parainfluenza Virus 1 Not Detected     Parainfluenza Virus 2 Not Detected     Parainfluenza Virus 3 Not Detected     Parainfluenza Virus 4 Not Detected     RSV, PCR Not Detected     Bordetella pertussis pcr Not Detected     Bordetella parapertussis PCR Not Detected     Chlamydophila pneumoniae PCR Not Detected     Mycoplasma pneumo by PCR Not Detected    Narrative:      In the setting of a positive respiratory panel with a viral infection PLUS a negative procalcitonin without other underlying  concern for bacterial infection, consider observing off antibiotics or discontinuation of antibiotics and continue supportive care. If the respiratory panel is positive for atypical bacterial infection (Bordetella pertussis, Chlamydophila pneumoniae, or Mycoplasma pneumoniae), consider antibiotic de-escalation to target atypical bacterial infection.    Blood Culture - Blood, Blood, Venous Line [[id number]]  (Normal) Collected: 06/06/22 0215    Lab Status: Final result Specimen: Blood, Venous Line Updated: 06/11/22 0233     Blood Culture No growth at 5 days    Blood Culture - Blood, Blood, Venous Line [[id number]]  (Normal) Collected: 06/06/22 0215    Lab Status: Final result Specimen: Blood, Venous Line Updated: 06/11/22 0233     Blood Culture No growth at 5 days    Respiratory Panel PCR w/COVID-19(SARS-CoV-2) ZOILA/RAFAELA/OSCAR/PAD/COR/MAD/RASHAD In-House, NP Swab in UTM/VTM, 3-4 HR TAT - Swab, Nasopharynx [[id number]]  (Normal) Collected: 06/06/22 0129    Lab Status: Final result Specimen: Swab from Nasopharynx Updated: 06/06/22 0257     ADENOVIRUS, PCR Not Detected     Coronavirus 229E Not Detected     Coronavirus HKU1 Not Detected     Coronavirus NL63 Not Detected     Coronavirus OC43 Not Detected     COVID19 Not Detected     Human Metapneumovirus Not Detected     Human Rhinovirus/Enterovirus Not Detected     Influenza A PCR Not Detected     Influenza B PCR Not Detected     Parainfluenza Virus 1 Not Detected     Parainfluenza Virus 2 Not Detected     Parainfluenza Virus 3 Not Detected     Parainfluenza Virus 4 Not Detected     RSV, PCR Not Detected     Bordetella pertussis pcr Not Detected     Bordetella parapertussis PCR Not Detected     Chlamydophila pneumoniae PCR Not Detected     Mycoplasma pneumo by PCR Not Detected    Narrative:      In the setting of a positive respiratory panel with a viral infection PLUS a negative procalcitonin without other underlying concern for bacterial infection, consider observing off  antibiotics or discontinuation of antibiotics and continue supportive care. If the respiratory panel is positive for atypical bacterial infection (Bordetella pertussis, Chlamydophila pneumoniae, or Mycoplasma pneumoniae), consider antibiotic de-escalation to target atypical bacterial infection.          CT Chest Without Contrast Diagnostic    Result Date: 6/7/2022  Impression:  1. Chronic left lower lobe volume loss with consolidation and air bronchograms. This is worsened from the previous exam. There is no definite obstructing endobronchial lesion. 2. Interval development of right lower lobe volume loss with similar airspace disease and air bronchograms. There is no definite obstructing endobronchial lesion. 3. Stable right middle lobe lung nodule measuring 6 mm. 4. Advanced emphysematous changes. 5. Coronary artery atherosclerotic vascular disease. 6. Tiny calcified gallstones versus sludge.  Electronically Signed By-Onofre Lockwood MD On:6/7/2022 4:07 PM This report was finalized on [id number] by  Onofre Lockwood MD.    XR Chest 1 View    Result Date: 6/6/2022  Impression: Findings are suggestive of congestive heart failure similar to the prior study. Electronically signed by:  Sascha Resendiz M.D.  6/6/2022 12:11 AM              Results for orders placed during the hospital encounter of 06/05/22    Adult Transthoracic Echo Complete W/ Cont if Necessary Per Protocol    Interpretation Summary  · Left ventricular systolic function is normal.  · Left ventricular ejection fraction is 55 to 60%  · Left ventricular wall thickness is consistent with borderline concentric hypertrophy.  · Left ventricular diastolic function is consistent with (grade I) impaired relaxation.      Labs Pending at Discharge:  Pending Labs     Order Current Status    Aspergillus Galactomannan Antigen - Wash, Lung, Left Lower Lobe In process    Fungus Culture - Wash, Lung, Left Lower Lobe In process    Pneumocystis PCR - Wash, Lung, Left Lower Lobe  In process    AFB Culture - Wash, Lung, Left Lower Lobe Preliminary result          Procedures Performed  Procedure(s):  BRONCHOSCOPY with left lower lobe wash         Consults:   Consults     Date and Time Order Name Status Description    6/6/2022  4:46 AM Inpatient Pulmonology Consult Completed     6/6/2022  2:55 AM Inpatient Hospitalist Consult              Discharge Details        Discharge Medications      New Medications      Instructions Start Date   amoxicillin-clavulanate 875-125 MG per tablet  Commonly known as: AUGMENTIN   1 tablet, Oral, Every 12 Hours Scheduled      guaiFENesin 600 MG 12 hr tablet  Commonly known as: MUCINEX   1,200 mg, Oral, Every 12 Hours Scheduled      predniSONE 10 MG tablet  Commonly known as: DELTASONE   10 mg, Oral, Daily   Start Date: June 13, 2022        Continue These Medications      Instructions Start Date   albuterol sulfate  (90 Base) MCG/ACT inhaler  Commonly known as: PROVENTIL HFA;VENTOLIN HFA;PROAIR HFA   1 puff, Inhalation, Every 4 Hours PRN, Q4-6H      aspirin 81 MG chewable tablet   81 mg, Oral, Daily      Cardizem  MG 24 hr capsule  Generic drug: dilTIAZem CD   120 mg, Oral, Daily      esomeprazole 40 MG capsule  Commonly known as: nexIUM   TAKE 1 CAPSULE BY MOUTH EVERY DAY      furosemide 40 MG tablet  Commonly known as: LASIX   40 mg, Oral, Daily      gabapentin 300 MG capsule  Commonly known as: NEURONTIN   900 mg, Oral, Nightly      ipratropium-albuterol 0.5-2.5 mg/3 ml nebulizer  Commonly known as: DUO-NEB   3 mL, Nebulization, Every 4 Hours PRN, Every 4-6h PRN       metoprolol succinate XL 50 MG 24 hr tablet  Commonly known as: TOPROL-XL   TAKE ONE TABLET BY MOUTH TWICE DAILY      montelukast 10 MG tablet  Commonly known as: SINGULAIR   10 mg, Oral, Nightly      potassium chloride 20 MEQ tablet controlled-release ER tablet  Commonly known as: K-DUR,KLOR-CON   20 mEq, Oral, Daily      risedronate 35 MG tablet  Commonly known as: ACTONEL   35 mg,  Oral, Every 7 Days, On Thursday       rOPINIRole 1 MG tablet  Commonly known as: REQUIP   1 mg, Oral, 2 Times Daily, At 19:00 and 21:00       rosuvastatin 20 MG tablet  Commonly known as: CRESTOR   20 mg, Oral, Daily      theophylline 400 MG 24 hr tablet  Commonly known as: UNIPHYL   400 mg, Oral, Daily      tiotropium bromide-olodaterol 2.5-2.5 MCG/ACT aerosol solution inhaler  Commonly known as: STIOLTO RESPIMAT   1 puff, Inhalation, Daily - RT             No Known Allergies      Discharge Disposition:   Home or Self Care    Diet:  Hospital:  Diet Order   Procedures   • Diet Regular         Discharge Activity:   Activity Instructions     Activity as Tolerated              CODE STATUS:  Code Status and Medical Interventions:   Ordered at: 06/07/22 1036     Level Of Support Discussed With:    Patient     Code Status (Patient has no pulse and is not breathing):    No CPR (Do Not Attempt to Resuscitate)     Medical Interventions (Patient has pulse or is breathing):    Full Support         Future Appointments   Date Time Provider Department Center   9/8/2022  2:30 PM Ryne Staton MD [facility]       Additional Instructions for the Follow-ups that You Need to Schedule     Ambulatory Referral to Home Health   As directed      Face to Face Visit Date: 6/10/2022    Follow-up provider for Plan of Care?: I treated the patient in an acute care facility and will not continue treatment after discharge.    Follow-up provider: MAGALY HOLDEN [[id number]]    Reason/Clinical Findings: Acute on chronic hypoxic respiratory failure secondary to advanced COPD    Describe mobility limitations that make leaving home difficult: Taxing to leave the home    Nursing/Therapeutic Services Requested: Skilled Nursing Physical Therapy    Skilled nursing orders: Medication education O2 instruction Mini-nebs COPD management Telehealth    PT orders: Strengthening    Frequency: 1 Week 1         Call MD With Problems / Concerns   As  directed      Instructions: Call [phone] or email [email or web address] for problems or concerns.    Order Comments: Instructions: Call [phone] or email [email or web address] for problems or concerns.          Discharge Follow-up with PCP   As directed       Currently Documented PCP:    Avani Montoya MD    PCP Phone Number:    [phone]     Follow Up Details: 2-3 business days via telehealth if possible               Time spent on Discharge including face to face service:  35 minutes    This patient has been examined wearing appropriate Personal Protective Equipment and discussed with hospital infection control department. 06/12/22      Signature: Electronically signed by Virgen Pascual MD, 06/12/22, 3:59 PM EDT.

## 2022-06-12 NOTE — PROGRESS NOTES
[facility] Medicine Services Daily Progress Note    Patient Name: Michelle Francis  : 1950  MRN: [id number]  Primary Care Physician:  Avani Montoya MD  Date of admission: 2022      Subjective      Chief Complaint: Shortness of air      Patient Reports   2022: The patient reports continued shortness of air which is improved at rest on the high flow oxygen.  2022: Patient reports feeling somewhat better with gradual improvement of her shortness of breath at rest.  There was no family at the bedside.  She had several questions and all questions were answered.  6/10/2022: The patient reports feeling much better.  She has been weaned down to 12 L nasal cannula O2.  2022: The patient was hopeful to discharge home however her oxygen requirements are still too high with minimal activity.  2022: Patient's oxygenation is gradually improving.  She denies other complaints at this time.    ROS   All other systems were reviewed and were negative except for ongoing but gradually improving shortness of breath.     Objective      Vitals:   Temp:  [97.8 °F (36.6 °C)-98.5 °F (36.9 °C)] 98.4 °F (36.9 °C)  Heart Rate:  [] 88  Resp:  [18-24] 18  BP: (100-134)/(43-58) 115/57  Flow (L/min):  [6-9] 6    Physical Exam   Vital signs and nurses notes reviewed.  Well-developed over-nourished female comfortable on O2 in no acute distress sitting up in bed awake and alert; mucous membranes moist; sclerae anicteric; lungs clear to auscultation with decreased air entry bilaterally; CV regular rate and rhythm; abdomen soft nontender nondistended; extremities with no edema, cyanosis or calf tenderness; palpable pedal pulses bilaterally; no Huertas catheter.  Exam unchanged from 2022.    Result Review    Result Review:  I have personally reviewed the results from the time of this admission to 2022 11:47 EDT and agree with these findings:  [x]  Laboratory  [x]  Microbiology  [x]   Radiology  [x]  EKG/Telemetry   [x]  Cardiology/Vascular   []  Pathology  [x]  Old records  []  Other:  Most notable findings discussed in the assessment and plan.          Assessment & Plan      Brief Patient Summary:  Michelle Francis is a 72 y.o. female with a history of hypertension, hyperlipidemia, chronic kidney disease stage II, restless leg syndrome and end-stage COPD dependent on 5 L nasal cannula O2 continuously who presented to the emergency room because of recent increased congestion and cough and significant or shortness of air despite home O2.    Current inpatient medications include:  amoxicillin-clavulanate, 1 tablet, Oral, Q12H  aspirin, 81 mg, Oral, Daily  budesonide-formoterol, 2 puff, Inhalation, BID - RT  dilTIAZem CD, 120 mg, Oral, Daily  docusate sodium, 100 mg, Oral, BID  enoxaparin, 40 mg, Subcutaneous, Daily  furosemide, 40 mg, Intravenous, BID - RT  gabapentin, 900 mg, Oral, Nightly  guaiFENesin, 1,200 mg, Oral, Q12H  ipratropium-albuterol, 3 mL, Nebulization, 4x Daily - RT  metoprolol succinate XL, 50 mg, Oral, BID  montelukast, 10 mg, Oral, Nightly  pantoprazole, 40 mg, Oral, Q AM  potassium chloride, 20 mEq, Oral, Daily  [START ON 6/13/2022] predniSONE, 10 mg, Oral, Daily  rOPINIRole, 1 mg, Oral, BID  rosuvastatin, 20 mg, Oral, Daily  sodium chloride, 10 mL, Intravenous, Q12H  theophylline, 400 mg, Oral, Q24H             Active Hospital Problems:  Active Hospital Problems    Diagnosis    • Left lower lobe pneumonia    • Right lower lobe pneumonia    • COPD with acute exacerbation (HCC)    • Sleep apnea      npsg [facility] 2014, ahi 5.9, on auto bipap min 8 max, PS 2-8-Alcaraz's, nasal mask     • Acute on chronic respiratory failure with hypoxia (HCC)    • Essential hypertension    • Mixed hyperlipidemia    • Restless leg syndrome      sees Dr Seipel     • Acute congestive heart failure (HCC)    • Thrombocytopenia (HCC)    • CKD (chronic kidney disease) stage 2, GFR 60-89 ml/min      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     • Obesity (BMI 30-39.9)    • Gastroesophageal reflux disease      Plan:   Acute hypoxic respiratory failure secondary to acute exacerbation of severe COPD and bilateral lower lobe pneumonia probably from aspiration related to GERD/Sleep apnea with CPAP compliance  -Pulmonary consulting and performed bronchoscopy which showed bilateral lower lobe pneumonia with significant inflammation suggestive of chronic aspiration possibly from GERD  -Continue Zithromax, Symbicort, DuoNebs, Singulair, theophylline, guaifenesin and IV methylprednisolone  -Pulmonary recommended changing to oral steroids and antibiotics 6/10/2022  -Titrate O2 to maintain sat greater than 89% and wean as tolerated  -Echocardiogram showed normal LVEF with grade 1 diastolic dysfunction  -Pro BNP was 326.2    Essential hypertension, chronic and controlled  -Continue diltiazem, Lasix and metoprolol    Chronic kidney disease stage II secondary to hypertension  Creatinine 1.0 and current creatinine 0.75 which is better than baseline  -Monitor renal function and avoid nephrotoxic medications    Hyperlipidemia  -Continue Crestor    Congestive heart failure is felt to have been ruled out  -proBNP and LVEF are normal with no report of elevated RVSP    GERD  -Continue PPI  -Patient extensively counseled on a daily basis on reflux precautions.  She says she is always in a recliner and never lays flat.  She understands to eat more frequent smaller meals and avoid greasy and fried foods.    Overweight  -Lifestyle modification counseling    Restless leg syndrome  -Continue Requip and gabapentin    DVT prophylaxis:  Medical DVT prophylaxis orders are present.    CODE STATUS:    Level Of Support Discussed With: Patient  Code Status (Patient has no pulse and is not breathing): No CPR (Do Not Attempt to Resuscitate)  Medical Interventions (Patient has pulse or is breathing): Full  Support      Disposition:  I expect patient to be discharged depending on clinical course.    This patient has been examined wearing appropriate Personal Protective Equipment and discussed with hospital infection control department. 06/12/22      Electronically signed by Virgen Pascual MD, 06/12/22, 11:47 EDT.  [facility], [place], operated by [facility] Hospitalist Team

## 2022-06-12 NOTE — PLAN OF CARE
Problem: Adult Inpatient Plan of Care  Goal: Plan of Care Review  Outcome: Ongoing, Progressing  Flowsheets  Taken 6/12/2022 1316 by Leah Baptiste RN  Progress: improving  Taken 6/12/2022 0647 by Mely Connors RN  Plan of Care Reviewed With: patient  Goal: Patient-Specific Goal (Individualized)  Outcome: Ongoing, Progressing  Goal: Absence of Hospital-Acquired Illness or Injury  Outcome: Ongoing, Progressing  Intervention: Identify and Manage Fall Risk  Recent Flowsheet Documentation  Taken 6/12/2022 1230 by Leah Baptiste RN  Safety Promotion/Fall Prevention: safety round/check completed  Taken 6/12/2022 1015 by Leah Baptiste RN  Safety Promotion/Fall Prevention: safety round/check completed  Taken 6/12/2022 0855 by Leah Baptiste RN  Safety Promotion/Fall Prevention: safety round/check completed  Intervention: Prevent and Manage VTE (Venous Thromboembolism) Risk  Recent Flowsheet Documentation  Taken 6/12/2022 0855 by Leah Baptiste RN  Activity Management:   up in chair   up ad rk  Intervention: Prevent Infection  Recent Flowsheet Documentation  Taken 6/12/2022 1230 by Leah Baptiste RN  Infection Prevention: hand hygiene promoted  Taken 6/12/2022 1015 by Leah Baptiste RN  Infection Prevention: hand hygiene promoted  Taken 6/12/2022 0855 by Leah Baptiste RN  Infection Prevention: hand hygiene promoted  Goal: Optimal Comfort and Wellbeing  Outcome: Ongoing, Progressing  Goal: Readiness for Transition of Care  Outcome: Ongoing, Progressing     Problem: Asthma Comorbidity  Goal: Maintenance of Asthma Control  Outcome: Ongoing, Progressing  Intervention: Maintain Asthma Symptom Control  Recent Flowsheet Documentation  Taken 6/12/2022 0855 by Leah Baptiste RN  Medication Review/Management: medications reviewed     Problem: COPD (Chronic Obstructive Pulmonary Disease) Comorbidity  Goal: Maintenance of COPD Symptom Control  Outcome: Ongoing, Progressing  Intervention:  Maintain COPD-Symptom Control  Recent Flowsheet Documentation  Taken 6/12/2022 0855 by Leah Baptiste RN  Medication Review/Management: medications reviewed     Problem: Obstructive Sleep Apnea Risk or Actual Comorbidity Management  Goal: Unobstructed Breathing During Sleep  Outcome: Ongoing, Progressing     Problem: Pain Chronic (Persistent) (Comorbidity Management)  Goal: Acceptable Pain Control and Functional Ability  Outcome: Ongoing, Progressing  Intervention: Manage Persistent Pain  Recent Flowsheet Documentation  Taken 6/12/2022 0855 by Leah Baptiste RN  Medication Review/Management: medications reviewed     Problem: Skin Injury Risk Increased  Goal: Skin Health and Integrity  Outcome: Ongoing, Progressing     Problem: Fall Injury Risk  Goal: Absence of Fall and Fall-Related Injury  Outcome: Ongoing, Progressing  Intervention: Identify and Manage Contributors  Recent Flowsheet Documentation  Taken 6/12/2022 0855 by Leah Baptiste RN  Medication Review/Management: medications reviewed  Intervention: Promote Injury-Free Environment  Recent Flowsheet Documentation  Taken 6/12/2022 1230 by Leah Baptiste RN  Safety Promotion/Fall Prevention: safety round/check completed  Taken 6/12/2022 1015 by Leah Baptiste RN  Safety Promotion/Fall Prevention: safety round/check completed  Taken 6/12/2022 0855 by Leah Baptiste RN  Safety Promotion/Fall Prevention: safety round/check completed   Goal Outcome Evaluation:           Progress: improving

## 2022-06-12 NOTE — PROGRESS NOTES
Daily Progress Note        Acute on chronic respiratory failure with hypoxia (HCC)    Mixed hyperlipidemia    Essential hypertension    Sleep apnea    Restless leg syndrome    COPD with acute exacerbation (HCC)    Obesity (BMI 30-39.9)    CKD (chronic kidney disease) stage 2, GFR 60-89 ml/min    Gastroesophageal reflux disease    Right lower lobe pneumonia    Acute congestive heart failure (HCC)    Thrombocytopenia (HCC)    Left lower lobe pneumonia    Chronic diastolic CHF (congestive heart failure) (HCC)      Assessment    Left lower lobe pneumonia, complete consolidation and air bronchograms worsened from previous exam 12/2021    Status post bronchoscopy 6/8/2022 with evidence of mucopurulent secretions and inflammatory changes highly suggestive of chronic acid reflux disease    Acute on chronic respiratory failure     Chronic obstructive pulmonary disease, on home oxygen at 5 L,      -PFTs December 2019, FEV1 0.8 L which is  36%, improved to 0.9 L 40% after bronchodilators, FEV1/FVC ratio 45, TLC 91%, %, DLCO 28%     Obstructive sleep apnea, not wearing BiPAP due to recall     Chronic kidney disease, stage II     Hypertension  Hyperlipidemia  Restless leg syndrome     Office note 5/31/2022  IRIS, last download residual AHI 0.6, No snoring, no leak, Compliance 51.7% > 4 HOURS.  Not wearing d/t recall has registered machine. O2 wearing 4-5L continuous     PAH, RVSP 44.  no   COPD, Severe PFTs December 2019, FEV1 0.8 L which is  36%, improved to 0.9 L 40% after bronchodilators, FEV1/FVC ratio 45, TLC 91%, %, DLCO 28%  Hypoxemia  O2 to 7-8 L at HS while not wearing BiPAP.  Patient to monitor O2 sats  Patient is compliant and benefits from oxygen therapy  Cont theophylline  order nebulizer supplies send to [facility]   stopped Trelegy due to SE swelling. , duo nebs / albuterol nebs  samples of Starla made her worse  off  Daliresp 500 mcg due to diarrhea every other day  following nephrology for history of  chronic kidney disease and edema  budesonide nebulized, off Dulera  Discussed [id number]  20-10 CM H2O with pressure support 3-7 CM H2O,   REVIEW SUPPLIER FOR SA DEVICE  ADEQUATE SLEEP HYGEINE  SAFETY DRIVING  DISCUSSED CARDIOVASCULAR & METABOLIC SIDE EFFECTS OF UNTREATED IRIS  PATIENT IS COMPLIANT USING MACHINE AND BENEFITS FROM THERAPY, PATIENT HAS NASAL DRYNESS AND WILL NEED HEATED HUMIDITY WITH PAP     Plan:     Continue to titrate oxygen  -Wears 5 L at home, currently requiring 5-6 liters per high flow nasal cannula     P.o. Augmentin for 7 days    p.o. prednisone 6-day taper    2D echo: Ejection fraction 55%     Diuresis Lasix 40 mg twice daily    Bronchodilator\inhaled corticosteroid  Mucinex  Singulair  Theophylline  GI prophylaxis Protonix     Patient is a DNR at this time.  Does want to be intubated if necessary and continue aggressive treatment    She is stable from pulmonary standpoint for discharge          LOS: 6 days     Subjective     Cough has improved    Objective     Vital signs for last 24 hours:  Vitals:    06/12/22 1411 06/12/22 1430 06/12/22 1435 06/12/22 1650   BP: 117/52      Pulse: 91 91 81 85   Resp: 18 18 18    Temp: 98.6 °F (37 °C)      TempSrc: Oral      SpO2: 92% (!) 89% 92% 90%   Weight:       Height:           Intake/Output last 3 shifts:  I/O last 3 completed shifts:  In: 840 [P.O.:840]  Out: 3100 [Urine:3100]  Intake/Output this shift:  I/O this shift:  In: 480 [P.O.:480]  Out: -       Radiology  Imaging Results (Last 24 Hours)     ** No results found for the last 24 hours. **          Labs:  Results from last 7 days   Lab Units 06/06/22  1347   WBC 10*3/mm3 7.80   HEMOGLOBIN g/dL 12.4   HEMATOCRIT % 38.1   PLATELETS 10*3/mm3 124*     Results from last 7 days   Lab Units 06/06/22  1347 06/06/22  0013   SODIUM mmol/L 134* 135*   POTASSIUM mmol/L 3.6 4.0   CHLORIDE mmol/L 92* 93*   CO2 mmol/L 29.0 28.0   BUN mg/dL 18 12   CREATININE mg/dL 0.75 0.81   CALCIUM mg/dL 8.9 9.0    BILIRUBIN mg/dL  --  0.6   ALK PHOS U/L  --  84   ALT (SGPT) U/L  --  5   AST (SGOT) U/L  --  25   GLUCOSE mg/dL 150* 114*     Results from last 7 days   Lab Units 06/06/22  0121   PH, ARTERIAL pH units 7.418   PO2 ART mm Hg 110.2*   PCO2, ARTERIAL mm Hg 44.6   HCO3 ART mmol/L 28.8*     Results from last 7 days   Lab Units 06/06/22  0013   ALBUMIN g/dL 4.00     Results from last 7 days   Lab Units 06/06/22  0013   TROPONIN T ng/mL <0.010         Results from last 7 days   Lab Units 06/07/22  1812   MAGNESIUM mg/dL 2.3     Results from last 7 days   Lab Units 06/08/22  0723   INR  1.04               Meds:   SCHEDULE  amoxicillin-clavulanate, 1 tablet, Oral, Q12H  aspirin, 81 mg, Oral, Daily  budesonide-formoterol, 2 puff, Inhalation, BID - RT  dilTIAZem CD, 120 mg, Oral, Daily  docusate sodium, 100 mg, Oral, BID  enoxaparin, 40 mg, Subcutaneous, Daily  furosemide, 40 mg, Intravenous, BID - RT  gabapentin, 900 mg, Oral, Nightly  guaiFENesin, 1,200 mg, Oral, Q12H  ipratropium-albuterol, 3 mL, Nebulization, 4x Daily - RT  metoprolol succinate XL, 50 mg, Oral, BID  montelukast, 10 mg, Oral, Nightly  pantoprazole, 40 mg, Oral, Q AM  potassium chloride, 20 mEq, Oral, Daily  [START ON 6/13/2022] predniSONE, 10 mg, Oral, Daily  rOPINIRole, 1 mg, Oral, BID  rosuvastatin, 20 mg, Oral, Daily  sodium chloride, 10 mL, Intravenous, Q12H  theophylline, 400 mg, Oral, Q24H      Infusions     PRNs  •  acetaminophen **OR** acetaminophen **OR** acetaminophen  •  albuterol  •  aluminum-magnesium hydroxide-simethicone  •  ipratropium-albuterol  •  melatonin  •  nitroglycerin  •  ondansetron **OR** ondansetron  •  sodium chloride  •  sodium chloride    Physical Exam:  General Appearance:  Alert   HEENT:  Normocephalic, without obvious abnormality, Conjunctiva/corneas clear,.   Nares normal, no drainage     Neck:  Supple, symmetrical, trachea midline. No JVD.  Lungs /Chest wall: Significant improvement in the bilateral basal rhonchi and  wheezing, respirations unlabored, symmetrical wall movement.     Heart:  Regular rate and rhythm, S1 S2 normal  Abdomen: Soft, non-tender, no masses, no organomegaly.    Extremities: No edema, no clubbing or cyanosis  ROS  Constitutional: Negative for chills, fever and malaise/fatigue.   HENT: Negative.    Eyes: Negative.    Cardiovascular: Negative.    Respiratory: Positive for improvement cough and shortness of breath.    Skin: Negative.    Musculoskeletal: Negative.    Gastrointestinal: Negative.    Genitourinary: Negative.    Neurological: Negative.    Psychiatric/Behavioral: Negative.  I have reviewed the patient's new clinical results.

## 2022-06-13 ENCOUNTER — READMISSION MANAGEMENT (OUTPATIENT)
Dept: CALL CENTER | Facility: HOSPITAL | Age: 72
End: 2022-06-13

## 2022-06-13 LAB
P JIROVECII DNA L RESP QL NAA+NON-PROBE: NEGATIVE
REF LAB TEST METHOD: NORMAL
REF LAB TEST METHOD: NORMAL

## 2022-06-13 NOTE — CASE MANAGEMENT/SOCIAL WORK
Case Management Discharge Note      Final Note: Home with [facility]         Home Medical Care Coordination complete.    Service Provider Selected Services Address Phone Fax Patient Preferred    [facility]  Home Health Services [address] [place] [place] [address] [phone] --              Transportation Services  Private: Car    Final Discharge Disposition Code: 06 - home with home health care

## 2022-06-13 NOTE — OUTREACH NOTE
Prep Survey    Flowsheet Row Responses   [facility]? [facility]   Is LACE score < 7 ? No   Emergency Room discharge w/ pulse ox? No   Eligibility Readm Mgmt   Discharge diagnosis COPD exacerbation,  bilateral lower lobe pneumonia    Does the patient have one of the following disease processes/diagnoses(primary or secondary)? COPD/Pneumonia   Does the patient have Home health ordered? Yes   What is the Home health agency?  [facility]    Is there a DME ordered? Yes   What DME was ordered? O2- current with [facility]   Prep survey completed? Yes          RANDELL CARLTON - Registered Nurse

## 2022-06-14 RX ORDER — DILTIAZEM HYDROCHLORIDE 180 MG/1
180 CAPSULE, COATED, EXTENDED RELEASE ORAL DAILY
Qty: 90 CAPSULE | Refills: 1 | Status: SHIPPED | OUTPATIENT
Start: 2022-06-14

## 2022-06-15 ENCOUNTER — READMISSION MANAGEMENT (OUTPATIENT)
Dept: CALL CENTER | Facility: HOSPITAL | Age: 72
End: 2022-06-15

## 2022-06-15 NOTE — OUTREACH NOTE
COPD/PN Week 1 Survey    Flowsheet Row Responses   [facility] patient discharged from? [facility]   Does the patient have one of the following disease processes/diagnoses(primary or secondary)? COPD/Pneumonia   Was the primary reason for admission: Pneumonia   Week 1 attempt successful? Yes   Call start time 1536   Call end time 1550   Discharge diagnosis COPD exacerbation,  bilateral lower lobe pneumonia    Person spoke with today (if not patient) and relationship patient   Meds reviewed with patient/caregiver? Yes   Is the patient having any side effects they believe may be caused by any medication additions or changes? No   Does the patient have all medications ordered at discharge? Yes   Is the patient taking all medications as directed (includes completed medication regime)? Yes   Does the patient have a primary care provider?  Yes   Does the patient have an appointment with their PCP or specialist within 7 days of discharge? Yes   Comments regarding PCP 6/16/22   Has the patient kept scheduled appointments due by today? N/A   What is the Home health agency?  [facility]    Has home health visited the patient within 72 hours of discharge? Yes   What DME was ordered? O2- current with [facility]   Has all DME been delivered? Yes   Pulse Ox monitoring Intermittent   Pulse Ox device source Patient   Psychosocial issues? No   Did the patient receive a copy of their discharge instructions? Yes   Nursing interventions Reviewed instructions with patient   What is the patient's perception of their health status since discharge? Improving   Nursing Interventions Nurse provided patient education   Are the patient's immunizations up to date?  Yes   Is the patient/caregiver able to teach back the hierarchy of who to call/visit for symptoms/problems? PCP, Specialist, Home health nurse, Urgent Care, ED, 911 Yes   Patient reports what zone on this call? Green Zone   Green Zone Reports doing well, Sleeping  well, Appetite is good, Usual amount of phlegm/mucus without difficulty coughing up  [SOB on exertion]   Green Zone interventions: Take daily medications, Use oxygen as prescribed, Avoid indoor/outdoor triggers  [5LO2]   Is the patient/caregiver able to teach back signs and symptoms of worsening condition: Fever/chills, Shortness of breath, Chest pain   Is the patient/caregiver able to teach back importance of completing antibiotic course of treatment? Yes   Week 1 call completed? Yes   Wrap up additional comments Pt states she is doing better, and is back to baseline. Pt adds she continues to have SOB on exertion w/cont 2.5LO2. Pt has a PCP fu appt tomorrow, 6/16/22, and encouraged to take questions/concerns to ask PCP. Pt shares she just learned she had CHF at Nephrology appt. Pt was educated on CHF s/s, and the importance of weighing daily/when to call for medical help. Pt verbalized understanding.           DAVE OCHOA - Registered Nurse

## 2022-06-22 ENCOUNTER — READMISSION MANAGEMENT (OUTPATIENT)
Dept: CALL CENTER | Facility: HOSPITAL | Age: 72
End: 2022-06-22

## 2022-06-22 NOTE — OUTREACH NOTE
COPD/PN Week 2 Survey    Flowsheet Row Responses   [facility] patient discharged from? [facility]   Does the patient have one of the following disease processes/diagnoses(primary or secondary)? COPD/Pneumonia   Was the primary reason for admission: Pneumonia   Week 2 attempt successful? Yes   Call start time 1646   Call end time 1649   Discharge diagnosis COPD exacerbation,  bilateral lower lobe pneumonia    Meds reviewed with patient/caregiver? Yes   Is the patient having any side effects they believe may be caused by any medication additions or changes? No   Does the patient have all medications ordered at discharge? Yes   Is the patient taking all medications as directed (includes completed medication regime)? Yes   Does the patient have a primary care provider?  Yes   Does the patient have an appointment with their PCP or specialist within 7 days of discharge? Yes   Has the patient kept scheduled appointments due by today? Yes   What is the Home health agency?  [facility]    Has home health visited the patient within 72 hours of discharge? Yes   Pulse Ox monitoring Intermittent   Pulse Ox device source Patient   O2 Sat comments 90-95% on O2 continuously   O2 Sat: education provided Sat levels, When to seek care   Psychosocial issues? No   Did the patient receive a copy of their discharge instructions? Yes   Nursing interventions Reviewed instructions with patient   What is the patient's perception of their health status since discharge? Improving   Nursing Interventions Nurse provided patient education   Is the patient/caregiver able to teach back the hierarchy of who to call/visit for symptoms/problems? PCP, Specialist, Home health nurse, Urgent Care, ED, 911 Yes   Is the patient/caregiver able to teach back signs and symptoms of worsening condition: Fever/chills, Shortness of breath, Chest pain   Is the patient/caregiver able to teach back importance of completing antibiotic course of treatment?  Yes   Week 2 call completed? Yes          ERIKA OCHOA - Registered Nurse

## 2022-06-28 ENCOUNTER — LAB REQUISITION (OUTPATIENT)
Dept: LAB | Facility: HOSPITAL | Age: 72
End: 2022-06-28

## 2022-06-28 DIAGNOSIS — I10 ESSENTIAL (PRIMARY) HYPERTENSION: ICD-10-CM

## 2022-06-28 LAB
ANION GAP SERPL CALCULATED.3IONS-SCNC: 12 MMOL/L (ref 5–15)
BUN SERPL-MCNC: 7 MG/DL (ref 8–23)
BUN/CREAT SERPL: 9.2 (ref 7–25)
CALCIUM SPEC-SCNC: 9.9 MG/DL (ref 8.6–10.5)
CHLORIDE SERPL-SCNC: 94 MMOL/L (ref 98–107)
CO2 SERPL-SCNC: 34 MMOL/L (ref 22–29)
CREAT SERPL-MCNC: 0.76 MG/DL (ref 0.57–1)
EGFRCR SERPLBLD CKD-EPI 2021: 82.9 ML/MIN/1.73
GLUCOSE SERPL-MCNC: 62 MG/DL (ref 65–99)
MAGNESIUM SERPL-MCNC: 1.9 MG/DL (ref 1.6–2.4)
POTASSIUM SERPL-SCNC: 3.3 MMOL/L (ref 3.5–5.2)
SODIUM SERPL-SCNC: 140 MMOL/L (ref 136–145)

## 2022-06-28 PROCEDURE — 80048 BASIC METABOLIC PNL TOTAL CA: CPT | Performed by: NURSE PRACTITIONER

## 2022-06-28 PROCEDURE — 83735 ASSAY OF MAGNESIUM: CPT | Performed by: NURSE PRACTITIONER

## 2022-06-30 ENCOUNTER — READMISSION MANAGEMENT (OUTPATIENT)
Dept: CALL CENTER | Facility: HOSPITAL | Age: 72
End: 2022-06-30

## 2022-06-30 NOTE — OUTREACH NOTE
COPD/PN Week 3 Survey    Flowsheet Row Responses   [facility] patient discharged from? [facility]   Does the patient have one of the following disease processes/diagnoses(primary or secondary)? COPD/Pneumonia   Was the primary reason for admission: Pneumonia   Week 3 attempt successful? Yes   Call start time 1405   Call end time 1409   Meds reviewed with patient/caregiver? Yes   Is the patient having any side effects they believe may be caused by any medication additions or changes? No   Does the patient have all medications ordered at discharge? Yes   Is the patient taking all medications as directed (includes completed medication regime)? Yes   Does the patient have a primary care provider?  Yes   Does the patient have an appointment with their PCP or specialist within 7 days of discharge? Yes   Has the patient kept scheduled appointments due by today? Yes   Comments States has seen all of her doctors since discharge.   Has home health visited the patient within 72 hours of discharge? Yes   Pulse Ox monitoring Intermittent   Pulse Ox device source Patient   O2 Sat comments 93-95% on 5L continuous home O2.   O2 Sat: education provided Sat levels, Monitoring frequency, When to seek care   Psychosocial issues? No   What is the patient's perception of their health status since discharge? Improving   Nursing Interventions Nurse provided patient education   If the patient is a current smoker, are they able to teach back resources for cessation? Not a smoker   Is the patient/caregiver able to teach back the hierarchy of who to call/visit for symptoms/problems? PCP, Specialist, Home health nurse, Urgent Care, ED, 911 Yes   Is the patient able to teach back COPD zones? Yes   Patient reports what zone on this call? Green Zone   Green Zone Reports doing well, Sleeping well, Usual amount of phlegm/mucus without difficulty coughing up, Appetite is good   Green Zone interventions: Take daily medications, Use oxygen as  prescribed, Avoid indoor/outdoor triggers   Week 3 call completed? Yes   Wrap up additional comments States is feeling much better. Continues with HH nurse visiting once per week. Denies any needs today.          GISELA HAMMOND - Registered Nurse

## 2022-07-06 LAB — FUNGUS WND CULT: NORMAL

## 2022-07-11 ENCOUNTER — READMISSION MANAGEMENT (OUTPATIENT)
Dept: CALL CENTER | Facility: HOSPITAL | Age: 72
End: 2022-07-11

## 2022-07-11 NOTE — OUTREACH NOTE
COPD/PN Week 4 Survey    Flowsheet Row Responses   [facility] patient discharged from? [facility]   Does the patient have one of the following disease processes/diagnoses(primary or secondary)? COPD/Pneumonia   Was the primary reason for admission: Pneumonia   Week 4 attempt successful? Yes   Call start time 1106   Call end time 1109   Discharge diagnosis COPD exacerbation,  bilateral lower lobe pneumonia    Meds reviewed with patient/caregiver? Yes   Is the patient taking all medications as directed (includes completed medication regime)? Yes   Has the patient kept scheduled appointments due by today? Yes   Pulse Ox monitoring Intermittent   Pulse Ox device source Patient   O2 Sat comments 93% on 5L continuous home O2.   O2 Sat: education provided Sat levels, Monitoring frequency   O2 Sat education comments Keep sats at 90%    Psychosocial issues? No   What is the patient's perception of their health status since discharge? Returned to baseline/stable   Is the patient/caregiver able to teach back the hierarchy of who to call/visit for symptoms/problems? PCP, Specialist, Home health nurse, Urgent Care, ED, 911 Yes   Patient reports what zone on this call? Green Zone   Green Zone Reports doing well, Breathing without shortness of breath, Usual activity and exercise level   Green Zone interventions: Take daily medications, Use oxygen as prescribed, Avoid indoor/outdoor triggers   Week 4 call completed? Yes   Would the patient like one additional call? No   Graduated Yes   Is the patient interested in additional calls from an ambulatory ?  NOTE:  applies to high risk patients requiring additional follow-up. No   Did the patient feel the follow up calls were helpful during their recovery period? Yes   Was the number of calls appropriate? Yes   Wrap up additional comments Pt back to baseline          RADHA MASON - Registered Nurse

## 2022-07-20 LAB
MYCOBACTERIUM SPEC CULT: NORMAL
NIGHT BLUE STAIN TISS: NORMAL

## 2022-07-27 ENCOUNTER — LAB REQUISITION (OUTPATIENT)
Dept: LAB | Facility: HOSPITAL | Age: 72
End: 2022-07-27

## 2022-07-27 DIAGNOSIS — E87.79 OTHER FLUID OVERLOAD: ICD-10-CM

## 2022-07-27 LAB
ANION GAP SERPL CALCULATED.3IONS-SCNC: 9.9 MMOL/L (ref 5–15)
BUN SERPL-MCNC: 8 MG/DL (ref 8–23)
BUN/CREAT SERPL: 10.8 (ref 7–25)
CALCIUM SPEC-SCNC: 9.9 MG/DL (ref 8.6–10.5)
CHLORIDE SERPL-SCNC: 97 MMOL/L (ref 98–107)
CO2 SERPL-SCNC: 33.1 MMOL/L (ref 22–29)
CREAT SERPL-MCNC: 0.74 MG/DL (ref 0.57–1)
EGFRCR SERPLBLD CKD-EPI 2021: 85.6 ML/MIN/1.73
GLUCOSE SERPL-MCNC: 102 MG/DL (ref 65–99)
MAGNESIUM SERPL-MCNC: 2 MG/DL (ref 1.6–2.4)
POTASSIUM SERPL-SCNC: 3.6 MMOL/L (ref 3.5–5.2)
SODIUM SERPL-SCNC: 140 MMOL/L (ref 136–145)

## 2022-07-27 PROCEDURE — 83735 ASSAY OF MAGNESIUM: CPT | Performed by: INTERNAL MEDICINE

## 2022-07-27 PROCEDURE — 80048 BASIC METABOLIC PNL TOTAL CA: CPT | Performed by: INTERNAL MEDICINE

## 2022-09-13 ENCOUNTER — OFFICE VISIT (OUTPATIENT)
Dept: CARDIOLOGY | Facility: CLINIC | Age: 72
End: 2022-09-13

## 2022-09-13 VITALS
SYSTOLIC BLOOD PRESSURE: 120 MMHG | OXYGEN SATURATION: 89 % | DIASTOLIC BLOOD PRESSURE: 68 MMHG | BODY MASS INDEX: 30.05 KG/M2 | HEART RATE: 89 BPM | HEIGHT: 66 IN | WEIGHT: 187 LBS

## 2022-09-13 DIAGNOSIS — I10 ESSENTIAL HYPERTENSION: Primary | ICD-10-CM

## 2022-09-13 DIAGNOSIS — I50.32 CHRONIC DIASTOLIC CHF (CONGESTIVE HEART FAILURE): ICD-10-CM

## 2022-09-13 DIAGNOSIS — E78.2 MIXED HYPERLIPIDEMIA: ICD-10-CM

## 2022-09-13 DIAGNOSIS — J96.12 CHRONIC RESPIRATORY FAILURE WITH HYPERCAPNIA: ICD-10-CM

## 2022-09-13 PROCEDURE — 99213 OFFICE O/P EST LOW 20 MIN: CPT | Performed by: NURSE PRACTITIONER

## 2022-09-13 PROCEDURE — 93000 ELECTROCARDIOGRAM COMPLETE: CPT | Performed by: NURSE PRACTITIONER

## 2022-09-13 RX ORDER — BUDESONIDE, GLYCOPYRROLATE, AND FORMOTEROL FUMARATE 160; 9; 4.8 UG/1; UG/1; UG/1
2 AEROSOL, METERED RESPIRATORY (INHALATION) 2 TIMES DAILY
COMMUNITY
Start: 2022-09-06

## 2022-09-13 RX ORDER — BUDESONIDE 0.5 MG/2ML
0.5 INHALANT ORAL EVERY 4 HOURS PRN
COMMUNITY
Start: 2022-09-09

## 2022-09-13 RX ORDER — PREDNISONE 10 MG/1
10 TABLET ORAL DAILY
COMMUNITY
Start: 2022-09-06

## 2022-09-13 NOTE — PROGRESS NOTES
Cardiology Office Follow Up Visit      Primary Care Provider:  Avani Montoya MD    Reason for f/u:     Hypertension  Chronic diastolic heart failure  Dyslipidemia  COPD      Subjective     CC:    Chronic dyspnea with exertion    History of Present Illness       Michelle Francis is a 72 y.o. female.  Patient is a pleasant 72-year-old female who is known to have hypertension, dyslipidemia COPD and previous diastolic heart failure.    In November 2019 the patient was admitted to [facility] with symptoms of COPD exacerbation.  Echocardiogram at that time showed her ejection fraction to be 60 to 65% there was no significant valvular flow abnormalities detected.    In February 2020 the patient had a stress Myoview which showed no reversible ischemia or MI.    Patient presents today for routine follow-up.  She requires home oxygen.  She has chronic dyspnea with exertion that she says is at her baseline.  She is having no chest pain.  She denies PND, orthopnea, palpitations, near syncope, worsening lower extremity edema or feelings of her heart racing.  She reports compliance with medical therapy          ASSESSMENT/PLAN:      Diagnoses and all orders for this visit:    1. Essential hypertension (Primary)  Comments:  Stable on current medical therapy    2. Chronic diastolic CHF (congestive heart failure) (HCC)  Comments:  Volume status appears stable    3. Mixed hyperlipidemia  Comments:  Treated with statin therapy    4. Chronic respiratory failure with hypercapnia (HCC)  Comments:  Severe COPD with home oxygen dependence            MEDICAL DECISION MAKING:    Patient appears to be at her baseline.  She has chronic respiratory failure and COPD.  She requires home oxygen.    Her volume status appears stable.  EKG shows sinus rhythm with a heart rate of 80.  Her blood pressure is stable.  I made no changes in her medication.  We will continue the current treatment.  If she develops any new or worsening problems  of asked her to return sooner otherwise we will keep scheduled follow-up in 9 months        Past Medical History:   Diagnosis Date   • Arthritis    • Colon polyps     Dr Bates   • COPD (chronic obstructive pulmonary disease) (HCC)    • History of emphysema    • Hyperlipidemia    • Hypertension    • Kidney disease    • Osteoporosis    • Restless leg syndrome     sees Dr Seipel   • Sleep apnea     npsg [facility] 2014, ahi 5.9, on auto bipap min 8 max, PS 2-8-Alcaraz's, nasal mask       Past Surgical History:   Procedure Laterality Date   • BRONCHOSCOPY N/A 6/8/2022    Procedure: BRONCHOSCOPY with left lower lobe wash;  Surgeon: Hawa Sifuentes MD;  Location: [facility] ENDOSCOPY;  Service: Pulmonary;  Laterality: N/A;  pneumonia   • CARDIOVASCULAR STRESS TEST  2020   • CATARACT EXTRACTION      Dr Moyer   • CEREBRAL ANEURYSM REPAIR      Brain Aneurysm approx 2009, treated with stents and coils, Dr. Fraga   • COLON SURGERY      partial colectomy-2013 Dr Valladares- due to multiple large polyps   • ECHO - CONVERTED  2019   • EXPLORATORY LAPAROTOMY      lysis of intra abdominal adhesions, primary ventral hernia repair 08/01/2018 Dr West   • WRIST SURGERY      wrist fracture - Dr Patton         Current Outpatient Medications:   •  albuterol sulfate  (90 Base) MCG/ACT inhaler, Inhale 1 puff Every 4 (Four) Hours As Needed for Wheezing or Shortness of Air. Q4-6H, Disp: , Rfl:   •  aspirin 81 MG chewable tablet, Chew 81 mg Daily., Disp: , Rfl:   •  Breztri Aerosphere 160-9-4.8 MCG/ACT aerosol inhaler, , Disp: , Rfl:   •  budesonide (PULMICORT) 0.5 MG/2ML nebulizer solution, , Disp: , Rfl:   •  dilTIAZem CD (Cardizem CD) 180 MG 24 hr capsule, Take 1 capsule by mouth Daily., Disp: 90 capsule, Rfl: 1  •  esomeprazole (nexIUM) 40 MG capsule, TAKE 1 CAPSULE BY MOUTH EVERY DAY, Disp: 90 capsule, Rfl: 3  •  furosemide (LASIX) 40 MG tablet, Take 40 mg by mouth 2 (Two) Times a Day., Disp: , Rfl:   •  gabapentin (NEURONTIN) 300 MG capsule, Take 900  mg by mouth Every Night., Disp: , Rfl:   •  guaiFENesin (MUCINEX) 600 MG 12 hr tablet, Take 2 tablets by mouth Every 12 (Twelve) Hours., Disp: 120 tablet, Rfl: 0  •  ipratropium-albuterol (DUO-NEB) 0.5-2.5 mg/3 ml nebulizer, Take 3 mL by nebulization Every 4 (Four) Hours As Needed for Wheezing or Shortness of Air. Every 4-6h PRN, Disp: , Rfl:   •  metoprolol succinate XL (TOPROL-XL) 50 MG 24 hr tablet, TAKE ONE TABLET BY MOUTH TWICE DAILY, Disp: 180 tablet, Rfl: 0  •  montelukast (SINGULAIR) 10 MG tablet, Take 1 tablet by mouth Every Night., Disp: 30 tablet, Rfl: 0  •  potassium chloride (K-DUR,KLOR-CON) 20 MEQ tablet controlled-release ER tablet, Take 20 mEq by mouth 2 (Two) Times a Day., Disp: , Rfl:   •  predniSONE (DELTASONE) 10 MG tablet, , Disp: , Rfl:   •  risedronate (ACTONEL) 35 MG tablet, Take 35 mg by mouth Every 7 (Seven) Days. On Thursday, Disp: , Rfl:   •  rOPINIRole (REQUIP) 1 MG tablet, Take 1 mg by mouth 2 (Two) Times a Day. At 19:00 and 21:00, Disp: , Rfl:   •  rosuvastatin (CRESTOR) 20 MG tablet, Take 20 mg by mouth Daily., Disp: , Rfl:   •  theophylline (UNIPHYL) 400 MG 24 hr tablet, Take 400 mg by mouth Daily., Disp: , Rfl:     Social History     Socioeconomic History   • Marital status:    Tobacco Use   • Smoking status: Former Smoker   • Smokeless tobacco: Never Used   Vaping Use   • Vaping Use: Never used   Substance and Sexual Activity   • Alcohol use: Yes   • Drug use: No   • Sexual activity: Defer       Family History   Problem Relation Age of Onset   • Diabetes Mother    • Hypertension Mother    • Colon cancer Father    • Colon cancer Sister    • Arthritis Brother    • Hypertension Brother    • Allergies Other        The following portions of the patient's history were reviewed and updated as appropriate: allergies, current medications, past family history, past medical history, past social history, past surgical history and problem list.    Review of Systems   Constitutional:  "Positive for malaise/fatigue.   Cardiovascular: Positive for dyspnea on exertion.   Respiratory: Positive for cough, shortness of breath and sleep disturbances due to breathing.    Neurological: Positive for weakness.   All other systems reviewed and are negative.    /68   Pulse 89   Ht 167.6 cm (65.98\")   Wt 84.8 kg (187 lb)   SpO2 (!) 89% Comment: 4-5 liters  BMI 30.20 kg/m² .  Objective     Vitals reviewed.   Constitutional:       General: Not in acute distress.     Appearance: Normal appearance. Well-developed.   Eyes:      Pupils: Pupils are equal, round, and reactive to light.   HENT:      Head: Normocephalic and atraumatic.   Neck:      Vascular: No JVD.   Pulmonary:      Effort: Pulmonary effort is normal.      Breath sounds: Decreased breath sounds present.   Cardiovascular:      Normal rate. Regular rhythm.   Pulses:     Intact distal pulses.   Edema:     Peripheral edema absent.   Abdominal:      General: There is no distension.      Palpations: Abdomen is soft.      Tenderness: There is no abdominal tenderness.   Musculoskeletal: Normal range of motion.      Cervical back: Normal range of motion and neck supple. Skin:     General: Skin is warm and dry.   Neurological:      Mental Status: Alert and oriented to person, place, and time.             ECG 12 Lead    Date/Time: 9/13/2022 1:04 PM  Performed by: Akanksha Villalobos APRN  Authorized by: Akanksha Villalobos APRN   Comparison: not compared with previous ECG   Rhythm: sinus rhythm and sinus arrhythmia  BPM: 89  Q waves: V1 and V2      Clinical impression: abnormal EKG            EKG ordered by and reviewed by me in office                        "

## 2022-12-31 ENCOUNTER — APPOINTMENT (OUTPATIENT)
Dept: CT IMAGING | Facility: HOSPITAL | Age: 72
DRG: 865 | End: 2022-12-31
Payer: MEDICARE

## 2022-12-31 ENCOUNTER — HOSPITAL ENCOUNTER (INPATIENT)
Facility: HOSPITAL | Age: 72
LOS: 6 days | Discharge: HOME | DRG: 865 | End: 2023-01-06
Attending: EMERGENCY MEDICINE | Admitting: FAMILY MEDICINE
Payer: MEDICARE

## 2022-12-31 DIAGNOSIS — J20.9 ACUTE BRONCHITIS, UNSPECIFIED ORGANISM: ICD-10-CM

## 2022-12-31 DIAGNOSIS — B34.2 CORONAVIRUS INFECTION: Primary | ICD-10-CM

## 2022-12-31 DIAGNOSIS — F06.4 ANXIETY DISORDER DUE TO MEDICAL CONDITION: Chronic | ICD-10-CM

## 2022-12-31 DIAGNOSIS — J44.1 ACUTE EXACERBATION OF CHRONIC OBSTRUCTIVE PULMONARY DISEASE (COPD): ICD-10-CM

## 2022-12-31 DIAGNOSIS — D69.6 THROMBOCYTOPENIA, UNSPECIFIED: ICD-10-CM

## 2022-12-31 PROBLEM — G47.33 OBSTRUCTIVE SLEEP APNEA: Status: ACTIVE | Noted: 2019-11-13

## 2022-12-31 LAB
ALBUMIN SERPL-MCNC: 4.1 G/DL (ref 3.5–5.2)
ALBUMIN/GLOB SERPL: 1.7 G/DL
ALP SERPL-CCNC: 78 U/L (ref 39–117)
ALT SERPL W P-5'-P-CCNC: 11 U/L (ref 1–33)
ANION GAP SERPL CALCULATED.3IONS-SCNC: 10 MMOL/L (ref 5–15)
ARTERIAL PATENCY WRIST A: POSITIVE
AST SERPL-CCNC: 23 U/L (ref 1–32)
ATMOSPHERIC PRESS: ABNORMAL MM[HG]
B PARAPERT DNA SPEC QL NAA+PROBE: NOT DETECTED
B PERT DNA SPEC QL NAA+PROBE: NOT DETECTED
BASE EXCESS BLDA CALC-SCNC: 9.6 MMOL/L (ref 0–3)
BASOPHILS # BLD AUTO: 0.1 10*3/MM3 (ref 0–0.2)
BASOPHILS NFR BLD AUTO: 0.6 % (ref 0–1.5)
BDY SITE: ABNORMAL
BILIRUB SERPL-MCNC: 0.4 MG/DL (ref 0–1.2)
BUN SERPL-MCNC: 15 MG/DL (ref 8–23)
BUN/CREAT SERPL: 17.9 (ref 7–25)
C PNEUM DNA NPH QL NAA+NON-PROBE: NOT DETECTED
CALCIUM SPEC-SCNC: 9.7 MG/DL (ref 8.6–10.5)
CHLORIDE SERPL-SCNC: 89 MMOL/L (ref 98–107)
CO2 BLDA-SCNC: 42.2 MMOL/L (ref 22–29)
CO2 SERPL-SCNC: 37 MMOL/L (ref 22–29)
CREAT SERPL-MCNC: 0.84 MG/DL (ref 0.57–1)
D-LACTATE SERPL-SCNC: 1.5 MMOL/L (ref 0.5–2)
DEPRECATED RDW RBC AUTO: 45.5 FL (ref 37–54)
EGFRCR SERPLBLD CKD-EPI 2021: 73.5 ML/MIN/1.73
EOSINOPHIL # BLD AUTO: 0 10*3/MM3 (ref 0–0.4)
EOSINOPHIL NFR BLD AUTO: 0.1 % (ref 0.3–6.2)
ERYTHROCYTE [DISTWIDTH] IN BLOOD BY AUTOMATED COUNT: 15.1 % (ref 12.3–15.4)
FLUAV SUBTYP SPEC NAA+PROBE: NOT DETECTED
FLUBV RNA ISLT QL NAA+PROBE: NOT DETECTED
GLOBULIN UR ELPH-MCNC: 2.4 GM/DL
GLUCOSE SERPL-MCNC: 163 MG/DL (ref 65–99)
HADV DNA SPEC NAA+PROBE: NOT DETECTED
HCO3 BLDA-SCNC: 39.7 MMOL/L (ref 21–28)
HCOV 229E RNA SPEC QL NAA+PROBE: NOT DETECTED
HCOV HKU1 RNA SPEC QL NAA+PROBE: NOT DETECTED
HCOV NL63 RNA SPEC QL NAA+PROBE: NOT DETECTED
HCOV OC43 RNA SPEC QL NAA+PROBE: DETECTED
HCT VFR BLD AUTO: 40.1 % (ref 34–46.6)
HEMODILUTION: NO
HGB BLD-MCNC: 12.7 G/DL (ref 12–15.9)
HMPV RNA NPH QL NAA+NON-PROBE: NOT DETECTED
HOLD SPECIMEN: NORMAL
HOLD SPECIMEN: NORMAL
HPIV1 RNA ISLT QL NAA+PROBE: NOT DETECTED
HPIV2 RNA SPEC QL NAA+PROBE: NOT DETECTED
HPIV3 RNA NPH QL NAA+PROBE: NOT DETECTED
HPIV4 P GENE NPH QL NAA+PROBE: NOT DETECTED
INHALED O2 CONCENTRATION: 80 %
LYMPHOCYTES # BLD AUTO: 0.6 10*3/MM3 (ref 0.7–3.1)
LYMPHOCYTES NFR BLD AUTO: 6.1 % (ref 19.6–45.3)
M PNEUMO IGG SER IA-ACNC: NOT DETECTED
MCH RBC QN AUTO: 27.5 PG (ref 26.6–33)
MCHC RBC AUTO-ENTMCNC: 31.8 G/DL (ref 31.5–35.7)
MCV RBC AUTO: 86.6 FL (ref 79–97)
MODALITY: ABNORMAL
MONOCYTES # BLD AUTO: 0.5 10*3/MM3 (ref 0.1–0.9)
MONOCYTES NFR BLD AUTO: 4.5 % (ref 5–12)
NEUTROPHILS NFR BLD AUTO: 88.7 % (ref 42.7–76)
NEUTROPHILS NFR BLD AUTO: 9.2 10*3/MM3 (ref 1.7–7)
NRBC BLD AUTO-RTO: 0 /100 WBC (ref 0–0.2)
NT-PROBNP SERPL-MCNC: 196 PG/ML (ref 0–900)
PATHOLOGY REVIEW: YES
PCO2 BLDA: 79.9 MM HG (ref 35–48)
PH BLDA: 7.3 PH UNITS (ref 7.35–7.45)
PLATELET # BLD AUTO: 17 10*3/MM3 (ref 140–450)
PMV BLD AUTO: 9 FL (ref 6–12)
PO2 BLDA: 93 MM HG (ref 83–108)
POTASSIUM SERPL-SCNC: 4.1 MMOL/L (ref 3.5–5.2)
PROT SERPL-MCNC: 6.5 G/DL (ref 6–8.5)
RBC # BLD AUTO: 4.63 10*6/MM3 (ref 3.77–5.28)
RBC MORPH BLD: NORMAL
RHINOVIRUS RNA SPEC NAA+PROBE: NOT DETECTED
RSV RNA NPH QL NAA+NON-PROBE: NOT DETECTED
SAO2 % BLDCOA: 95.7 % (ref 94–98)
SARS-COV-2 RNA NPH QL NAA+NON-PROBE: NOT DETECTED
SMALL PLATELETS BLD QL SMEAR: NORMAL
SODIUM SERPL-SCNC: 136 MMOL/L (ref 136–145)
TROPONIN T SERPL-MCNC: <0.01 NG/ML (ref 0–0.03)
WBC MORPH BLD: NORMAL
WBC NRBC COR # BLD: 10.4 10*3/MM3 (ref 3.4–10.8)
WHOLE BLOOD HOLD COAG: NORMAL
WHOLE BLOOD HOLD SPECIMEN: NORMAL

## 2022-12-31 PROCEDURE — 71250 CT THORAX DX C-: CPT

## 2022-12-31 PROCEDURE — 93005 ELECTROCARDIOGRAM TRACING: CPT

## 2022-12-31 PROCEDURE — 25010000002 CEFEPIME PER 500 MG: Performed by: EMERGENCY MEDICINE

## 2022-12-31 PROCEDURE — 83880 ASSAY OF NATRIURETIC PEPTIDE: CPT | Performed by: EMERGENCY MEDICINE

## 2022-12-31 PROCEDURE — 94640 AIRWAY INHALATION TREATMENT: CPT

## 2022-12-31 PROCEDURE — G0378 HOSPITAL OBSERVATION PER HR: HCPCS

## 2022-12-31 PROCEDURE — 83605 ASSAY OF LACTIC ACID: CPT

## 2022-12-31 PROCEDURE — 82803 BLOOD GASES ANY COMBINATION: CPT

## 2022-12-31 PROCEDURE — 85007 BL SMEAR W/DIFF WBC COUNT: CPT | Performed by: EMERGENCY MEDICINE

## 2022-12-31 PROCEDURE — 94799 UNLISTED PULMONARY SVC/PX: CPT

## 2022-12-31 PROCEDURE — 82607 VITAMIN B-12: CPT | Performed by: NURSE PRACTITIONER

## 2022-12-31 PROCEDURE — 84484 ASSAY OF TROPONIN QUANT: CPT | Performed by: EMERGENCY MEDICINE

## 2022-12-31 PROCEDURE — 82746 ASSAY OF FOLIC ACID SERUM: CPT | Performed by: NURSE PRACTITIONER

## 2022-12-31 PROCEDURE — 25010000002 THIAMINE PER 100 MG: Performed by: EMERGENCY MEDICINE

## 2022-12-31 PROCEDURE — 25010000002 METHYLPREDNISOLONE PER 125 MG: Performed by: EMERGENCY MEDICINE

## 2022-12-31 PROCEDURE — 94761 N-INVAS EAR/PLS OXIMETRY MLT: CPT

## 2022-12-31 PROCEDURE — 36600 WITHDRAWAL OF ARTERIAL BLOOD: CPT

## 2022-12-31 PROCEDURE — 93005 ELECTROCARDIOGRAM TRACING: CPT | Performed by: EMERGENCY MEDICINE

## 2022-12-31 PROCEDURE — 25010000002 ONDANSETRON PER 1 MG: Performed by: EMERGENCY MEDICINE

## 2022-12-31 PROCEDURE — 80053 COMPREHEN METABOLIC PANEL: CPT | Performed by: EMERGENCY MEDICINE

## 2022-12-31 PROCEDURE — 87040 BLOOD CULTURE FOR BACTERIA: CPT | Performed by: EMERGENCY MEDICINE

## 2022-12-31 PROCEDURE — 85025 COMPLETE CBC W/AUTO DIFF WBC: CPT | Performed by: EMERGENCY MEDICINE

## 2022-12-31 PROCEDURE — 94660 CPAP INITIATION&MGMT: CPT

## 2022-12-31 PROCEDURE — 0202U NFCT DS 22 TRGT SARS-COV-2: CPT | Performed by: EMERGENCY MEDICINE

## 2022-12-31 PROCEDURE — 99285 EMERGENCY DEPT VISIT HI MDM: CPT

## 2022-12-31 RX ORDER — ONDANSETRON 2 MG/ML
4 INJECTION INTRAMUSCULAR; INTRAVENOUS ONCE
Status: COMPLETED | OUTPATIENT
Start: 2022-12-31 | End: 2022-12-31

## 2022-12-31 RX ORDER — IPRATROPIUM BROMIDE AND ALBUTEROL SULFATE 2.5; .5 MG/3ML; MG/3ML
3 SOLUTION RESPIRATORY (INHALATION)
Status: DISCONTINUED | OUTPATIENT
Start: 2023-01-01 | End: 2023-01-06 | Stop reason: HOSPADM

## 2022-12-31 RX ORDER — BISACODYL 5 MG/1
5 TABLET, DELAYED RELEASE ORAL DAILY PRN
Status: DISCONTINUED | OUTPATIENT
Start: 2022-12-31 | End: 2023-01-06 | Stop reason: HOSPADM

## 2022-12-31 RX ORDER — ACETAMINOPHEN 325 MG/1
650 TABLET ORAL EVERY 4 HOURS PRN
Status: DISCONTINUED | OUTPATIENT
Start: 2022-12-31 | End: 2023-01-06 | Stop reason: HOSPADM

## 2022-12-31 RX ORDER — BISACODYL 10 MG
10 SUPPOSITORY, RECTAL RECTAL DAILY PRN
Status: DISCONTINUED | OUTPATIENT
Start: 2022-12-31 | End: 2023-01-06 | Stop reason: HOSPADM

## 2022-12-31 RX ORDER — AZITHROMYCIN 250 MG/1
250 TABLET, FILM COATED ORAL 3 TIMES WEEKLY
COMMUNITY

## 2022-12-31 RX ORDER — AZITHROMYCIN 250 MG/1
500 TABLET, FILM COATED ORAL ONCE
Status: COMPLETED | OUTPATIENT
Start: 2022-12-31 | End: 2022-12-31

## 2022-12-31 RX ORDER — ALBUTEROL SULFATE 2.5 MG/3ML
2.5 SOLUTION RESPIRATORY (INHALATION) EVERY 6 HOURS PRN
Status: DISCONTINUED | OUTPATIENT
Start: 2022-12-31 | End: 2023-01-06 | Stop reason: HOSPADM

## 2022-12-31 RX ORDER — ALUMINA, MAGNESIA, AND SIMETHICONE 2400; 2400; 240 MG/30ML; MG/30ML; MG/30ML
15 SUSPENSION ORAL EVERY 6 HOURS PRN
Status: DISCONTINUED | OUTPATIENT
Start: 2022-12-31 | End: 2023-01-06 | Stop reason: HOSPADM

## 2022-12-31 RX ORDER — SODIUM CHLORIDE 9 MG/ML
40 INJECTION, SOLUTION INTRAVENOUS AS NEEDED
Status: DISCONTINUED | OUTPATIENT
Start: 2022-12-31 | End: 2023-01-06 | Stop reason: HOSPADM

## 2022-12-31 RX ORDER — POTASSIUM CHLORIDE 20 MEQ/1
40 TABLET, EXTENDED RELEASE ORAL EVERY MORNING
COMMUNITY

## 2022-12-31 RX ORDER — SODIUM CHLORIDE 0.9 % (FLUSH) 0.9 %
10 SYRINGE (ML) INJECTION AS NEEDED
Status: DISCONTINUED | OUTPATIENT
Start: 2022-12-31 | End: 2023-01-06 | Stop reason: HOSPADM

## 2022-12-31 RX ORDER — BUDESONIDE 0.5 MG/2ML
0.5 INHALANT ORAL
Status: DISCONTINUED | OUTPATIENT
Start: 2022-12-31 | End: 2022-12-31

## 2022-12-31 RX ORDER — METHYLPREDNISOLONE SODIUM SUCCINATE 125 MG/2ML
125 INJECTION, POWDER, LYOPHILIZED, FOR SOLUTION INTRAMUSCULAR; INTRAVENOUS ONCE
Status: COMPLETED | OUTPATIENT
Start: 2022-12-31 | End: 2022-12-31

## 2022-12-31 RX ORDER — PHENOL 1.4 %
600 AEROSOL, SPRAY (ML) MUCOUS MEMBRANE 2 TIMES DAILY
COMMUNITY

## 2022-12-31 RX ORDER — POTASSIUM CHLORIDE 20 MEQ/1
20 TABLET, EXTENDED RELEASE ORAL EVERY EVENING
COMMUNITY

## 2022-12-31 RX ORDER — ARFORMOTEROL TARTRATE 15 UG/2ML
15 SOLUTION RESPIRATORY (INHALATION)
Status: DISCONTINUED | OUTPATIENT
Start: 2022-12-31 | End: 2022-12-31

## 2022-12-31 RX ORDER — ACETAMINOPHEN 650 MG/1
650 SUPPOSITORY RECTAL EVERY 4 HOURS PRN
Status: DISCONTINUED | OUTPATIENT
Start: 2022-12-31 | End: 2023-01-06 | Stop reason: HOSPADM

## 2022-12-31 RX ORDER — METHYLPREDNISOLONE SODIUM SUCCINATE 40 MG/ML
40 INJECTION, POWDER, LYOPHILIZED, FOR SOLUTION INTRAMUSCULAR; INTRAVENOUS EVERY 12 HOURS
Status: DISCONTINUED | OUTPATIENT
Start: 2022-12-31 | End: 2023-01-02

## 2022-12-31 RX ORDER — ONDANSETRON 2 MG/ML
4 INJECTION INTRAMUSCULAR; INTRAVENOUS EVERY 6 HOURS PRN
Status: DISCONTINUED | OUTPATIENT
Start: 2022-12-31 | End: 2023-01-06 | Stop reason: HOSPADM

## 2022-12-31 RX ORDER — OXYCODONE HYDROCHLORIDE 5 MG/1
5 TABLET ORAL EVERY 4 HOURS PRN
Status: DISCONTINUED | OUTPATIENT
Start: 2022-12-31 | End: 2023-01-06 | Stop reason: HOSPADM

## 2022-12-31 RX ORDER — FUROSEMIDE 40 MG/1
40 TABLET ORAL EVERY EVENING
COMMUNITY

## 2022-12-31 RX ORDER — IPRATROPIUM BROMIDE AND ALBUTEROL SULFATE 2.5; .5 MG/3ML; MG/3ML
3 SOLUTION RESPIRATORY (INHALATION) EVERY 4 HOURS PRN
Status: DISCONTINUED | OUTPATIENT
Start: 2022-12-31 | End: 2023-01-06 | Stop reason: HOSPADM

## 2022-12-31 RX ORDER — GUAIFENESIN 600 MG/1
1200 TABLET, EXTENDED RELEASE ORAL EVERY EVENING
COMMUNITY

## 2022-12-31 RX ORDER — IPRATROPIUM BROMIDE AND ALBUTEROL SULFATE 2.5; .5 MG/3ML; MG/3ML
3 SOLUTION RESPIRATORY (INHALATION) ONCE
Status: COMPLETED | OUTPATIENT
Start: 2022-12-31 | End: 2022-12-31

## 2022-12-31 RX ORDER — POLYETHYLENE GLYCOL 3350 17 G/17G
17 POWDER, FOR SOLUTION ORAL DAILY PRN
Status: DISCONTINUED | OUTPATIENT
Start: 2022-12-31 | End: 2023-01-06 | Stop reason: HOSPADM

## 2022-12-31 RX ORDER — FUROSEMIDE 80 MG
80 TABLET ORAL EVERY MORNING
COMMUNITY

## 2022-12-31 RX ORDER — BUDESONIDE 0.5 MG/2ML
0.5 INHALANT ORAL
Status: DISCONTINUED | OUTPATIENT
Start: 2023-01-01 | End: 2023-01-06 | Stop reason: HOSPADM

## 2022-12-31 RX ORDER — ACETAMINOPHEN 160 MG/5ML
650 SOLUTION ORAL EVERY 4 HOURS PRN
Status: DISCONTINUED | OUTPATIENT
Start: 2022-12-31 | End: 2023-01-06 | Stop reason: HOSPADM

## 2022-12-31 RX ORDER — IPRATROPIUM BROMIDE AND ALBUTEROL SULFATE 2.5; .5 MG/3ML; MG/3ML
3 SOLUTION RESPIRATORY (INHALATION)
Status: DISCONTINUED | OUTPATIENT
Start: 2022-12-31 | End: 2022-12-31

## 2022-12-31 RX ORDER — THIAMINE HYDROCHLORIDE 100 MG/ML
100 INJECTION, SOLUTION INTRAMUSCULAR; INTRAVENOUS ONCE
Status: COMPLETED | OUTPATIENT
Start: 2022-12-31 | End: 2022-12-31

## 2022-12-31 RX ORDER — SODIUM CHLORIDE 0.9 % (FLUSH) 0.9 %
10 SYRINGE (ML) INJECTION EVERY 12 HOURS SCHEDULED
Status: DISCONTINUED | OUTPATIENT
Start: 2022-12-31 | End: 2023-01-06 | Stop reason: HOSPADM

## 2022-12-31 RX ORDER — AMOXICILLIN 250 MG
2 CAPSULE ORAL 2 TIMES DAILY
Status: DISCONTINUED | OUTPATIENT
Start: 2022-12-31 | End: 2023-01-06 | Stop reason: HOSPADM

## 2022-12-31 RX ADMIN — CEFEPIME 2 G: 2 INJECTION, POWDER, FOR SOLUTION INTRAVENOUS at 15:31

## 2022-12-31 RX ADMIN — ONDANSETRON 4 MG: 2 INJECTION INTRAMUSCULAR; INTRAVENOUS at 13:20

## 2022-12-31 RX ADMIN — AZITHROMYCIN MONOHYDRATE 500 MG: 250 TABLET ORAL at 15:31

## 2022-12-31 RX ADMIN — THIAMINE HYDROCHLORIDE 100 MG: 100 INJECTION, SOLUTION INTRAMUSCULAR; INTRAVENOUS at 15:31

## 2022-12-31 RX ADMIN — ALBUTEROL SULFATE 2.5 MG: 2.5 SOLUTION RESPIRATORY (INHALATION) at 17:21

## 2022-12-31 RX ADMIN — ALBUTEROL SULFATE 2.5 MG: 2.5 SOLUTION RESPIRATORY (INHALATION) at 18:59

## 2022-12-31 RX ADMIN — IPRATROPIUM BROMIDE AND ALBUTEROL SULFATE 3 ML: .5; 3 SOLUTION RESPIRATORY (INHALATION) at 13:20

## 2022-12-31 RX ADMIN — SENNOSIDES AND DOCUSATE SODIUM 2 TABLET: 50; 8.6 TABLET ORAL at 20:30

## 2022-12-31 RX ADMIN — BUDESONIDE 0.5 MG: 0.5 SUSPENSION RESPIRATORY (INHALATION) at 18:59

## 2022-12-31 RX ADMIN — METHYLPREDNISOLONE SODIUM SUCCINATE 125 MG: 125 INJECTION, POWDER, FOR SOLUTION INTRAMUSCULAR; INTRAVENOUS at 13:19

## 2022-12-31 NOTE — ED PROVIDER NOTES
Subjective   History of Present Illness  72-year-old female complaining of increasing shortness of breath for the last 2 days.  She states she has O2 saturations in the low 80s at home and had to turn up her usual 5 L/min nasal cannula flow to 7-1/2 to not feel severely short of breath.  She states that she has been coughing up yellow sputum.  She states she has had subjective fever and shaking chills in the last 24 hours she reports pleuritic chest discomfort for a similar duration.  The patient denies orthopnea or leg swelling does report severe dyspnea on exertion for the last 24 hours.  The patient states that she is 2 days status post finishing a course of doxycycline and is 4 days after a course of prednisone which she states she has been very tachypneic today        Review of Systems   Constitutional: Positive for chills, fatigue and unexpected weight change.   HENT: Negative for facial swelling, nosebleeds, sore throat and trouble swallowing.    Eyes: Negative for discharge and redness.   Respiratory: Positive for cough, shortness of breath and wheezing. Negative for choking, chest tightness and stridor.    Cardiovascular: Positive for chest pain. Negative for palpitations.   Gastrointestinal: Negative for anal bleeding, blood in stool and nausea.   Endocrine: Positive for polydipsia and polyuria.   Genitourinary: Negative for hematuria and vaginal bleeding.   Musculoskeletal: Positive for arthralgias and myalgias.   Skin: Negative for pallor.   Allergic/Immunologic: Positive for environmental allergies.   Neurological: Negative for syncope, numbness and headaches.   Hematological: Does not bruise/bleed easily.   All other systems reviewed and are negative.      Past Medical History:   Diagnosis Date   • Arthritis    • Colon polyps     Dr Bates   • COPD (chronic obstructive pulmonary disease) (HCC)    • History of emphysema    • Hyperlipidemia    • Hypertension    • Kidney disease    • Osteoporosis    •  Restless leg syndrome     sees Dr Seipel   • Sleep apnea     npsg [facility] 2014, ahi 5.9, on auto bipap min 8 max, PS 2-8-Alcaraz's, nasal mask       No Known Allergies    Past Surgical History:   Procedure Laterality Date   • BRONCHOSCOPY N/A 6/8/2022    Procedure: BRONCHOSCOPY with left lower lobe wash;  Surgeon: Hawa Sifuentes MD;  Location: [facility] ENDOSCOPY;  Service: Pulmonary;  Laterality: N/A;  pneumonia   • CARDIOVASCULAR STRESS TEST  2020   • CATARACT EXTRACTION      Dr Moyer   • CEREBRAL ANEURYSM REPAIR      Brain Aneurysm approx 2009, treated with stents and coils, Dr. Fraga   • COLON SURGERY      partial colectomy-2013 Dr Valladares- due to multiple large polyps   • ECHO - CONVERTED  2019   • EXPLORATORY LAPAROTOMY      lysis of intra abdominal adhesions, primary ventral hernia repair 08/01/2018 Dr West   • WRIST SURGERY      wrist fracture - Dr Patton       Family History   Problem Relation Age of Onset   • Diabetes Mother    • Hypertension Mother    • Colon cancer Father    • Colon cancer Sister    • Arthritis Brother    • Hypertension Brother    • Allergies Other        Social History     Socioeconomic History   • Marital status:    Tobacco Use   • Smoking status: Former   • Smokeless tobacco: Never   Vaping Use   • Vaping Use: Never used   Substance and Sexual Activity   • Alcohol use: Yes   • Drug use: No   • Sexual activity: Defer           Objective   Physical Exam  Alert [place] Coma Scale 15 tachypneic, moderate respiratory distress   HEENT: Pupils equal and reactive to light. Conjunctivae are not injected. Normal tympanic membranes. Oropharynx and nares are normal.   Neck: Supple. Midline trachea. No JVD. No goiter.   Chest: Inspiratory and expiratory wheezes are noted bilaterally there is extensive rhonchi noted bilaterally and equal breath sounds bilaterally, regular rate and rhythm without murmur or rub.   Abdomen: Positive bowel sounds, nontender, nondistended. No rebound or peritoneal signs. No  CVA tenderness.   Extremities no clubbing. cyanosis or edema. Motor sensory exam is normal. The full range of motion is intact   Skin: Warm and dry, no rashes or petechia.   Lymphatic: No regional lymphadenopathy. No calf pain, swelling or Homans sign    Procedures           ED Course           Labs Reviewed   RESPIRATORY PANEL PCR W/ COVID-19 (SARS-COV-2) ZOILA/RAFAELA/OSCAR/PAD/COR/MAD/RASHAD IN-HOUSE, NP SWAB IN UTM/VTP, 3-4 HR TAT - Abnormal; Notable for the following components:       Result Value    Coronavirus OC43 Detected (*)     All other components within normal limits    Narrative:     In the setting of a positive respiratory panel with a viral infection PLUS a negative procalcitonin without other underlying concern for bacterial infection, consider observing off antibiotics or discontinuation of antibiotics and continue supportive care. If the respiratory panel is positive for atypical bacterial infection (Bordetella pertussis, Chlamydophila pneumoniae, or Mycoplasma pneumoniae), consider antibiotic de-escalation to target atypical bacterial infection.   COMPREHENSIVE METABOLIC PANEL - Abnormal; Notable for the following components:    Glucose 163 (*)     Chloride 89 (*)     CO2 37.0 (*)     All other components within normal limits    Narrative:     GFR Normal >60  Chronic Kidney Disease <60  Kidney Failure <15    The GFR formula is only valid for adults with stable renal function between ages 18 and 70.   CBC WITH AUTO DIFFERENTIAL - Abnormal; Notable for the following components:    Platelets 17 (*)     Neutrophil % 88.7 (*)     Lymphocyte % 6.1 (*)     Monocyte % 4.5 (*)     Eosinophil % 0.1 (*)     Neutrophils, Absolute 9.20 (*)     Lymphocytes, Absolute 0.60 (*)     All other components within normal limits   TROPONIN (IN-HOUSE) - Normal    Narrative:     Troponin T Reference Range:  <= 0.03 ng/mL-   Negative for AMI  >0.03 ng/mL-     Abnormal for myocardial necrosis.  Clinicians would have to utilize  clinical acumen, EKG, Troponin and serial changes to determine if it is an Acute Myocardial Infarction or myocardial injury due to an underlying chronic condition.       Results may be falsely decreased if patient taking Biotin.     BNP (IN-HOUSE) - Normal    Narrative:     Among patients with dyspnea, NT-proBNP is highly sensitive for the detection of acute congestive heart failure. In addition NT-proBNP of <300 pg/ml effectively rules out acute congestive heart failure with 99% negative predictive value.     POC LACTATE - Normal   BLOOD CULTURE   RAINBOW DRAW    Narrative:     The following orders were created for panel order [place] Draw.  Procedure                               Abnormality         Status                     ---------                               -----------         ------                     Green Top (Gel)[[id number]]                                  Final result               Lavender Top[[id number]]                                     Final result               Gold Top - SST[[id number]]                                   Final result               Light Blue Top[[id number]]                                   Final result                 Please view results for these tests on the individual orders.   SCAN SLIDE   PATH CONSULT REFLEX    Narrative:     Platelets <25   POC LACTATE   PATHOLOGY CONSULTATION   CBC AND DIFFERENTIAL    Narrative:     The following orders were created for panel order CBC & Differential.  Procedure                               Abnormality         Status                     ---------                               -----------         ------                     CBC Auto Differential[[id number]]        Abnormal            Final result               Scan Slide[[id number]]                                       Final result               Path Consult Reflex[[id number]]                              Final result                 Please view results for these tests on the individual  orders.   GREEN TOP   LAVENDER TOP   GOLD TOP - SST   LIGHT BLUE TOP     Medications   sodium chloride 0.9 % flush 10 mL (has no administration in time range)   ipratropium-albuterol (DUO-NEB) nebulizer solution 3 mL (3 mL Nebulization Given by Other 12/31/22 1320)   methylPREDNISolone sodium succinate (SOLU-Medrol) injection 125 mg (125 mg Intravenous Given 12/31/22 1319)   ondansetron (ZOFRAN) injection 4 mg (4 mg Intravenous Given 12/31/22 1320)   thiamine (B-1) injection 100 mg (100 mg Intravenous Given 12/31/22 1531)   azithromycin (ZITHROMAX) tablet 500 mg (500 mg Oral Given 12/31/22 1531)   cefepime 2 gm IVPB in 100 ml NS (MBP) (2 g Intravenous New Bag 12/31/22 1531)     CT Chest Without Contrast Diagnostic    Result Date: 12/31/2022  1. Findings of advanced centrilobular emphysema with central bronchial wall thickening which can be seen with chronic bronchitis or reactive airway disease. 2. Stable 5 mm pulmonary nodule within the right middle lobe.    Electronically Signed By-Kj Chavez MD On:12/31/2022 3:19 PM This report was finalized on [id number] by  Kj Chavez MD.                                    Medical Decision Making  Patient states she felt better with ER therapy.  Chart review showed no evidence of previous thrombocytopenia.  The patient's screen was positive for non-COVID coronavirus.  The patient was injected with steroids and placed on IV Zithromax and cefepime at discharge due to the risks of concomitant infection.  The patient will be admitted and will have serial platelet levels.  The patient will need follow-up troponin.  The patient was agreeable to this plan of treatment    Amount and/or Complexity of Data Reviewed  Labs: ordered. Decision-making details documented in ED Course.     Details: Significant thrombocytopenia as noted above without evidence of spontaneous hemorrhage  Radiology: ordered and independent interpretation performed. Decision-making details documented  in ED Course.  ECG/medicine tests: ordered and independent interpretation performed. Decision-making details documented in ED Course.     Details: No acute ischemic or injury pattern      Risk  Prescription drug management.  Drug therapy requiring intensive monitoring for toxicity.  Decision regarding hospitalization.    Risk Details: Risk of progression of respiratory disease and spontaneous hemorrhage due to thrombocytopenia.  The patient oxygen was titrated in the emergency department and was able to achieve saturations after initial therapy over 90% on her baseline 5 L    Please note that 33 minutes were spent with care and stabilization this patient for critical care time exclusive of any procedure performed    Final diagnoses:   Coronavirus infection   Acute exacerbation of chronic obstructive pulmonary disease (COPD) (HCC)   Acute bronchitis, unspecified organism   Thrombocytopenia, unspecified (HCC)       ED Disposition  ED Disposition     ED Disposition   Decision to Admit    Condition   --    Comment   Level of Care: Telemetry [5]   Diagnosis: Coronavirus infection [151353]   Admitting Physician: RIA STEVENS [[id number]]   Attending Physician: RIA STEVENS [[id number]]               No follow-up provider specified.       Medication List      No changes were made to your prescriptions during this visit.          Derek Zhu MD  12/31/22 [id number]

## 2022-12-31 NOTE — PROGRESS NOTES
Synopsis  Nursing report ED to floor  Michelle Francis  72 y.o.  female    HPI:   Chief Complaint   Patient presents with    Shortness of Breath       Admitting doctor:   Gaetano Wynn MD    Admitting diagnosis:   The primary encounter diagnosis was Coronavirus infection. Diagnoses of Acute exacerbation of chronic obstructive pulmonary disease (COPD) (HCC), Acute bronchitis, unspecified organism, and Thrombocytopenia, unspecified (HCC) were also pertinent to this visit.    Code status:   Current Code Status       Date Active Code Status Order ID Comments User Context       12/31/2022 1721 CPR (Attempt to Resuscitate) [id number]  Gaetano Wynn MD ED        Question Answer    Code Status (Patient has no pulse and is not breathing) CPR (Attempt to Resuscitate)    Medical Interventions (Patient has pulse or is breathing) Full Support    Level Of Support Discussed With Patient                    Allergies:   Patient has no known allergies.    Isolation:  No active isolations     Fall Risk:  Fall Risk Assessment was completed, and patient is at moderate risk for falls.   Predictive Model Details         20 (Low) Factor Value    Calculated 12/31/2022 18:06 Age 72    Risk of Fall Model Musculoskeletal Assessment WDL     Active Peripheral IV Present     Imaging order in this encounter Present     Respiratory Rate 24     Number of Distinct Medication Classes administered 7     Skin Assessment WDL     Financial Class Other     Magnesium not on file     Chloride 89 mmol/L     Drug Use No     Tobacco Use Quit     Diastolic BP 70     Berto Scale not on file     Peripheral Vascular Assessment WDL     Cardiac Assessment X     Gastrointestinal Assessment WDL     Albumin 4.1 g/dL     Days after Admission 0.224     Total Bilirubin 0.4 mg/dL     Creatinine 0.84 mg/dL     Potassium 4.1 mmol/L     ALT 11 U/L     Calcium 9.7 mg/dL         Weight:       12/31/22  1244   Weight: 84.8 kg (187 lb)       Intake and Output    Intake/Output  Summary (Last 24 hours) at 12/31/2022 1833  Last data filed at 12/31/2022 1700  Gross per 24 hour   Intake 100 ml   Output --   Net 100 ml       Diet:   Dietary Orders (From admission, onward)       Start     Ordered    12/31/22 1722  Diet: Regular/House Diet; Texture: Regular Texture (IDDSI 7); Fluid Consistency: Thin (IDDSI 0)  Diet Effective Now        References:    Diet Order [place]   Question Answer Comment   Diets: Regular/House Diet    Texture: Regular Texture (IDDSI 7)    Fluid Consistency: Thin (IDDSI 0)        12/31/22 1721                     Most recent vitals:   Vitals:    12/31/22 1737 12/31/22 1747 12/31/22 1816 12/31/22 1825   BP:  127/70 142/66    Pulse: 106 105 94 103   Resp:       Temp:       SpO2: 97% 95% 98% 97%   Weight:       Height:           Active LDAs/IV Access:   Lines, Drains & Airways       Active LDAs       Name Placement date Placement time Site Days    Peripheral IV Left Antecubital --  --  Antecubital  --    External Urinary Catheter 06/06/22 2150  --  207                    Skin Condition:   Skin Assessments (last day)       None             Labs (abnormal labs have a star):   Labs Reviewed   RESPIRATORY PANEL PCR W/ COVID-19 (SARS-COV-2) ZOILA/RAFAELA/OSCAR/PAD/COR/MAD/RASHAD IN-HOUSE, NP SWAB IN UTM/VTP, 3-4 HR TAT - Abnormal; Notable for the following components:       Result Value    Coronavirus OC43 Detected (*)     All other components within normal limits    Narrative:     In the setting of a positive respiratory panel with a viral infection PLUS a negative procalcitonin without other underlying concern for bacterial infection, consider observing off antibiotics or discontinuation of antibiotics and continue supportive care. If the respiratory panel is positive for atypical bacterial infection (Bordetella pertussis, Chlamydophila pneumoniae, or Mycoplasma pneumoniae), consider antibiotic de-escalation to target atypical bacterial infection.   COMPREHENSIVE METABOLIC PANEL -  Abnormal; Notable for the following components:    Glucose 163 (*)     Chloride 89 (*)     CO2 37.0 (*)     All other components within normal limits    Narrative:     GFR Normal >60  Chronic Kidney Disease <60  Kidney Failure <15    The GFR formula is only valid for adults with stable renal function between ages 18 and 70.   CBC WITH AUTO DIFFERENTIAL - Abnormal; Notable for the following components:    Platelets 17 (*)     Neutrophil % 88.7 (*)     Lymphocyte % 6.1 (*)     Monocyte % 4.5 (*)     Eosinophil % 0.1 (*)     Neutrophils, Absolute 9.20 (*)     Lymphocytes, Absolute 0.60 (*)     All other components within normal limits   TROPONIN (IN-HOUSE) - Normal    Narrative:     Troponin T Reference Range:  <= 0.03 ng/mL-   Negative for AMI  >0.03 ng/mL-     Abnormal for myocardial necrosis.  Clinicians would have to utilize clinical acumen, EKG, Troponin and serial changes to determine if it is an Acute Myocardial Infarction or myocardial injury due to an underlying chronic condition.       Results may be falsely decreased if patient taking Biotin.     BNP (IN-HOUSE) - Normal    Narrative:     Among patients with dyspnea, NT-proBNP is highly sensitive for the detection of acute congestive heart failure. In addition NT-proBNP of <300 pg/ml effectively rules out acute congestive heart failure with 99% negative predictive value.     POC LACTATE - Normal   BLOOD CULTURE   RAINBOW DRAW    Narrative:     The following orders were created for panel order [facility] Draw.  Procedure                               Abnormality         Status                     ---------                               -----------         ------                     Green Top (Gel)[[id number]]                                  Final result               Lavender Top[[id number]]                                     Final result               Gold Top - SST[[id number]]                                   Final result               Light Blue Top[[id number]]                                    Final result                 Please view results for these tests on the individual orders.   SCAN SLIDE   PATH CONSULT REFLEX    Narrative:     Platelets <25   POC LACTATE   PATHOLOGY CONSULTATION   CBC AND DIFFERENTIAL    Narrative:     The following orders were created for panel order CBC & Differential.  Procedure                               Abnormality         Status                     ---------                               -----------         ------                     CBC Auto Differential[[id number]]        Abnormal            Final result               Scan Slide[[id number]]                                       Final result               Path Consult Reflex[[id number]]                              Final result                 Please view results for these tests on the individual orders.   GREEN TOP   LAVENDER TOP   GOLD TOP - SST   LIGHT BLUE TOP       LOC: Person, Place, Time, and Situation    Telemetry:  Med/Surg    Cardiac Monitoring Ordered: yes    EKG:   ECG 12 Lead Dyspnea   Preliminary Result   HEART RATE= 120  bpm   RR Interval= 500  ms   ND Interval= 131  ms   P Horizontal Axis= 1  deg   P Front Axis= 71  deg   QRSD Interval= 88  ms   QT Interval= 310  ms   QRS Axis= 68  deg   T Wave Axis= 46  deg   - OTHERWISE NORMAL ECG -   Sinus tachycardia   When compared with ECG of 05-Jun-2022 23:46:28,   Nonspecific significant change   Electronically Signed By:    Date and Time of Study: 2022-12-31 12:51:35          Medications Given in the ED:   Medications   sodium chloride 0.9 % flush 10 mL (has no administration in time range)   sodium chloride 0.9 % flush 10 mL (has no administration in time range)   sodium chloride 0.9 % flush 10 mL (has no administration in time range)   sodium chloride 0.9 % infusion 40 mL (has no administration in time range)   acetaminophen (TYLENOL) tablet 650 mg (has no administration in time range)     Or   acetaminophen (TYLENOL) 160 MG/5ML  solution 650 mg (has no administration in time range)     Or   acetaminophen (TYLENOL) suppository 650 mg (has no administration in time range)   oxyCODONE (ROXICODONE) immediate release tablet 5 mg (has no administration in time range)   sennosides-docusate (PERICOLACE) 8.6-50 MG per tablet 2 tablet (has no administration in time range)     And   polyethylene glycol (MIRALAX) packet 17 g (has no administration in time range)     And   bisacodyl (DULCOLAX) EC tablet 5 mg (has no administration in time range)     And   bisacodyl (DULCOLAX) suppository 10 mg (has no administration in time range)   ondansetron (ZOFRAN) injection 4 mg (has no administration in time range)   aluminum-magnesium hydroxide-simethicone (MAALOX MAX) 400-400-40 MG/5ML suspension 15 mL (has no administration in time range)   methylPREDNISolone sodium succinate (SOLU-Medrol) injection 40 mg (has no administration in time range)   albuterol (PROVENTIL) nebulizer solution 0.083% 2.5 mg/3mL (2.5 mg Nebulization Given 12/31/22 1721)   arformoterol (BROVANA) nebulizer solution 15 mcg (has no administration in time range)   budesonide (PULMICORT) nebulizer solution 0.5 mg (has no administration in time range)   ipratropium-albuterol (DUO-NEB) nebulizer solution 3 mL (has no administration in time range)   ipratropium-albuterol (DUO-NEB) nebulizer solution 3 mL (3 mL Nebulization Given by Other 12/31/22 1320)   methylPREDNISolone sodium succinate (SOLU-Medrol) injection 125 mg (125 mg Intravenous Given 12/31/22 1319)   ondansetron (ZOFRAN) injection 4 mg (4 mg Intravenous Given 12/31/22 1320)   thiamine (B-1) injection 100 mg (100 mg Intravenous Given 12/31/22 1531)   azithromycin (ZITHROMAX) tablet 500 mg (500 mg Oral Given 12/31/22 1531)   cefepime 2 gm IVPB in 100 ml NS (MBP) (0 g Intravenous Stopped 12/31/22 1700)       Imaging results:  CT Chest Without Contrast Diagnostic    Result Date: 12/31/2022  1. Findings of advanced centrilobular emphysema  with central bronchial wall thickening which can be seen with chronic bronchitis or reactive airway disease. 2. Stable 5 mm pulmonary nodule within the right middle lobe.    Electronically Signed By-Kj Chavez MD On:12/31/2022 3:19 PM This report was finalized on [id number] by  Kj Chavez MD.     Social issues:   Social History     Socioeconomic History    Marital status:    Tobacco Use    Smoking status: Former    Smokeless tobacco: Never   Vaping Use    Vaping Use: Never used   Substance and Sexual Activity    Alcohol use: Yes    Drug use: No    Sexual activity: Defer       NIH Stroke Scale:  Interval: (not recorded)  1a. Level of Consciousness: (not recorded)  1b. LOC Questions: (not recorded)  1c. LOC Commands: (not recorded)  2. Best Gaze: (not recorded)  3. Visual: (not recorded)  4. Facial Palsy: (not recorded)  5a. Motor Arm, Left: (not recorded)  5b. Motor Arm, Right: (not recorded)  6a. Motor Leg, Left: (not recorded)  6b. Motor Leg, Right: (not recorded)  7. Limb Ataxia: (not recorded)  8. Sensory: (not recorded)  9. Best Language: (not recorded)  10. Dysarthria: (not recorded)  11. Extinction and Inattention (formerly Neglect): (not recorded)    Total (NIH Stroke Scale): (not recorded)     Additional notable assessment information:    Nursing report ED to floor:  Graham Correia RN   12/31/22 18:33 EST        Other

## 2022-12-31 NOTE — Clinical Note
Level of Care: Telemetry [5]   Diagnosis: Coronavirus infection [110930]   Admitting Physician: RIA STEVENS [[id number]]   Attending Physician: IRA STEVENS [[id number]]

## 2023-01-01 LAB
ANION GAP SERPL CALCULATED.3IONS-SCNC: 10 MMOL/L (ref 5–15)
APTT PPP: 24.5 SECONDS (ref 24–31)
ARTERIAL PATENCY WRIST A: POSITIVE
ATMOSPHERIC PRESS: ABNORMAL MM[HG]
BASE EXCESS BLDA CALC-SCNC: 11.5 MMOL/L (ref 0–3)
BASOPHILS # BLD AUTO: 0 10*3/MM3 (ref 0–0.2)
BASOPHILS NFR BLD AUTO: 0.1 % (ref 0–1.5)
BDY SITE: ABNORMAL
BUN SERPL-MCNC: 24 MG/DL (ref 8–23)
BUN/CREAT SERPL: 27.9 (ref 7–25)
CALCIUM SPEC-SCNC: 9.6 MG/DL (ref 8.6–10.5)
CHLORIDE SERPL-SCNC: 92 MMOL/L (ref 98–107)
CO2 BLDA-SCNC: 43 MMOL/L (ref 22–29)
CO2 SERPL-SCNC: 38 MMOL/L (ref 22–29)
CREAT SERPL-MCNC: 0.86 MG/DL (ref 0.57–1)
D-LACTATE SERPL-SCNC: 1.9 MMOL/L (ref 0.5–2)
DEPRECATED RDW RBC AUTO: 45.1 FL (ref 37–54)
EGFRCR SERPLBLD CKD-EPI 2021: 71.4 ML/MIN/1.73
EOSINOPHIL # BLD AUTO: 0 10*3/MM3 (ref 0–0.4)
EOSINOPHIL NFR BLD AUTO: 0 % (ref 0.3–6.2)
ERYTHROCYTE [DISTWIDTH] IN BLOOD BY AUTOMATED COUNT: 14.9 % (ref 12.3–15.4)
FOLATE SERPL-MCNC: 11.7 NG/ML (ref 4.78–24.2)
GLUCOSE SERPL-MCNC: 155 MG/DL (ref 65–99)
HCO3 BLDA-SCNC: 40.6 MMOL/L (ref 21–28)
HCT VFR BLD AUTO: 38.5 % (ref 34–46.6)
HEMODILUTION: YES
HGB BLD-MCNC: 12.1 G/DL (ref 12–15.9)
INHALED O2 CONCENTRATION: 21 %
INR PPP: 1 (ref 0.93–1.1)
LYMPHOCYTES # BLD AUTO: 0.7 10*3/MM3 (ref 0.7–3.1)
LYMPHOCYTES NFR BLD AUTO: 6.4 % (ref 19.6–45.3)
MAGNESIUM SERPL-MCNC: 2.5 MG/DL (ref 1.6–2.4)
MCH RBC QN AUTO: 27.2 PG (ref 26.6–33)
MCHC RBC AUTO-ENTMCNC: 31.4 G/DL (ref 31.5–35.7)
MCV RBC AUTO: 86.8 FL (ref 79–97)
MODALITY: ABNORMAL
MONOCYTES # BLD AUTO: 0.4 10*3/MM3 (ref 0.1–0.9)
MONOCYTES NFR BLD AUTO: 3.6 % (ref 5–12)
NEUTROPHILS NFR BLD AUTO: 89.9 % (ref 42.7–76)
NEUTROPHILS NFR BLD AUTO: 9.4 10*3/MM3 (ref 1.7–7)
NRBC BLD AUTO-RTO: 0 /100 WBC (ref 0–0.2)
PCO2 BLDA: 76.3 MM HG (ref 35–48)
PEEP RESPIRATORY: 5 CM[H2O]
PH BLDA: 7.33 PH UNITS (ref 7.35–7.45)
PLATELET # BLD AUTO: 26 10*3/MM3 (ref 140–450)
PMV BLD AUTO: 9.9 FL (ref 6–12)
PO2 BLDA: 174.2 MM HG (ref 83–108)
POTASSIUM SERPL-SCNC: 4.3 MMOL/L (ref 3.5–5.2)
PROTHROMBIN TIME: 10.3 SECONDS (ref 9.6–11.7)
RBC # BLD AUTO: 4.43 10*6/MM3 (ref 3.77–5.28)
SAO2 % BLDCOA: 99.4 % (ref 94–98)
SODIUM SERPL-SCNC: 140 MMOL/L (ref 136–145)
VENTILATOR MODE: ABNORMAL
VIT B12 BLD-MCNC: 460 PG/ML (ref 211–946)
WBC NRBC COR # BLD: 10.5 10*3/MM3 (ref 3.4–10.8)

## 2023-01-01 PROCEDURE — 94799 UNLISTED PULMONARY SVC/PX: CPT

## 2023-01-01 PROCEDURE — 94660 CPAP INITIATION&MGMT: CPT

## 2023-01-01 PROCEDURE — 80048 BASIC METABOLIC PNL TOTAL CA: CPT | Performed by: INTERNAL MEDICINE

## 2023-01-01 PROCEDURE — 94761 N-INVAS EAR/PLS OXIMETRY MLT: CPT

## 2023-01-01 PROCEDURE — 25010000002 METHYLPREDNISOLONE PER 40 MG: Performed by: INTERNAL MEDICINE

## 2023-01-01 PROCEDURE — 87040 BLOOD CULTURE FOR BACTERIA: CPT | Performed by: INTERNAL MEDICINE

## 2023-01-01 PROCEDURE — 36415 COLL VENOUS BLD VENIPUNCTURE: CPT | Performed by: INTERNAL MEDICINE

## 2023-01-01 PROCEDURE — 36600 WITHDRAWAL OF ARTERIAL BLOOD: CPT

## 2023-01-01 PROCEDURE — 86645 CMV ANTIBODY IGM: CPT | Performed by: NURSE PRACTITIONER

## 2023-01-01 PROCEDURE — 86038 ANTINUCLEAR ANTIBODIES: CPT | Performed by: NURSE PRACTITIONER

## 2023-01-01 PROCEDURE — 83605 ASSAY OF LACTIC ACID: CPT | Performed by: INTERNAL MEDICINE

## 2023-01-01 PROCEDURE — 85730 THROMBOPLASTIN TIME PARTIAL: CPT | Performed by: NURSE PRACTITIONER

## 2023-01-01 PROCEDURE — 94664 DEMO&/EVAL PT USE INHALER: CPT

## 2023-01-01 PROCEDURE — 86665 EPSTEIN-BARR CAPSID VCA: CPT | Performed by: NURSE PRACTITIONER

## 2023-01-01 PROCEDURE — 82803 BLOOD GASES ANY COMBINATION: CPT

## 2023-01-01 PROCEDURE — 85025 COMPLETE CBC W/AUTO DIFF WBC: CPT | Performed by: INTERNAL MEDICINE

## 2023-01-01 PROCEDURE — 83735 ASSAY OF MAGNESIUM: CPT | Performed by: INTERNAL MEDICINE

## 2023-01-01 PROCEDURE — 85610 PROTHROMBIN TIME: CPT | Performed by: NURSE PRACTITIONER

## 2023-01-01 RX ORDER — DILTIAZEM HYDROCHLORIDE 180 MG/1
180 CAPSULE, COATED, EXTENDED RELEASE ORAL DAILY
Status: DISCONTINUED | OUTPATIENT
Start: 2023-01-01 | End: 2023-01-06 | Stop reason: HOSPADM

## 2023-01-01 RX ORDER — ALPRAZOLAM 0.5 MG/1
0.25 TABLET ORAL 3 TIMES DAILY PRN
Status: DISCONTINUED | OUTPATIENT
Start: 2023-01-01 | End: 2023-01-01

## 2023-01-01 RX ORDER — ROSUVASTATIN CALCIUM 10 MG/1
20 TABLET, COATED ORAL DAILY
Status: DISCONTINUED | OUTPATIENT
Start: 2023-01-01 | End: 2023-01-06 | Stop reason: HOSPADM

## 2023-01-01 RX ORDER — GABAPENTIN 300 MG/1
900 CAPSULE ORAL NIGHTLY
Status: DISCONTINUED | OUTPATIENT
Start: 2023-01-01 | End: 2023-01-06 | Stop reason: HOSPADM

## 2023-01-01 RX ORDER — ALBUTEROL SULFATE 2.5 MG/3ML
2.5 SOLUTION RESPIRATORY (INHALATION) EVERY 6 HOURS PRN
Status: DISCONTINUED | OUTPATIENT
Start: 2023-01-01 | End: 2023-01-01 | Stop reason: SDUPTHER

## 2023-01-01 RX ORDER — ALPRAZOLAM 0.25 MG/1
0.25 TABLET ORAL 3 TIMES DAILY PRN
Status: DISCONTINUED | OUTPATIENT
Start: 2023-01-01 | End: 2023-01-06 | Stop reason: HOSPADM

## 2023-01-01 RX ORDER — GUAIFENESIN 600 MG/1
1200 TABLET, EXTENDED RELEASE ORAL EVERY EVENING
Status: DISCONTINUED | OUTPATIENT
Start: 2023-01-01 | End: 2023-01-06 | Stop reason: HOSPADM

## 2023-01-01 RX ORDER — PANTOPRAZOLE SODIUM 40 MG/1
40 TABLET, DELAYED RELEASE ORAL EVERY MORNING
Status: DISCONTINUED | OUTPATIENT
Start: 2023-01-02 | End: 2023-01-06 | Stop reason: HOSPADM

## 2023-01-01 RX ORDER — ASPIRIN 81 MG/1
81 TABLET, CHEWABLE ORAL DAILY
Status: DISCONTINUED | OUTPATIENT
Start: 2023-01-01 | End: 2023-01-06 | Stop reason: HOSPADM

## 2023-01-01 RX ORDER — MONTELUKAST SODIUM 10 MG/1
10 TABLET ORAL NIGHTLY
Status: DISCONTINUED | OUTPATIENT
Start: 2023-01-01 | End: 2023-01-06 | Stop reason: HOSPADM

## 2023-01-01 RX ORDER — ROPINIROLE 1 MG/1
1 TABLET, FILM COATED ORAL 2 TIMES DAILY
Status: DISCONTINUED | OUTPATIENT
Start: 2023-01-01 | End: 2023-01-06 | Stop reason: HOSPADM

## 2023-01-01 RX ADMIN — DILTIAZEM HYDROCHLORIDE 180 MG: 180 CAPSULE, COATED, EXTENDED RELEASE ORAL at 18:14

## 2023-01-01 RX ADMIN — OXYCODONE 5 MG: 5 TABLET ORAL at 20:14

## 2023-01-01 RX ADMIN — GABAPENTIN 900 MG: 300 CAPSULE ORAL at 20:07

## 2023-01-01 RX ADMIN — ROSUVASTATIN 20 MG: 10 TABLET, FILM COATED ORAL at 18:14

## 2023-01-01 RX ADMIN — METHYLPREDNISOLONE SODIUM SUCCINATE 40 MG: 40 INJECTION, POWDER, FOR SOLUTION INTRAMUSCULAR; INTRAVENOUS at 00:09

## 2023-01-01 RX ADMIN — ASPIRIN 81 MG: 81 TABLET, CHEWABLE ORAL at 18:14

## 2023-01-01 RX ADMIN — SENNOSIDES AND DOCUSATE SODIUM 2 TABLET: 50; 8.6 TABLET ORAL at 20:07

## 2023-01-01 RX ADMIN — BUDESONIDE 0.5 MG: 0.5 INHALANT RESPIRATORY (INHALATION) at 19:02

## 2023-01-01 RX ADMIN — ALPRAZOLAM 0.25 MG: 0.5 TABLET ORAL at 11:12

## 2023-01-01 RX ADMIN — GUAIFENESIN 1200 MG: 600 TABLET, EXTENDED RELEASE ORAL at 18:14

## 2023-01-01 RX ADMIN — IPRATROPIUM BROMIDE AND ALBUTEROL SULFATE 3 ML: 2.5; .5 SOLUTION RESPIRATORY (INHALATION) at 19:02

## 2023-01-01 RX ADMIN — IPRATROPIUM BROMIDE AND ALBUTEROL SULFATE 3 ML: 2.5; .5 SOLUTION RESPIRATORY (INHALATION) at 15:45

## 2023-01-01 RX ADMIN — IPRATROPIUM BROMIDE AND ALBUTEROL SULFATE 3 ML: .5; 3 SOLUTION RESPIRATORY (INHALATION) at 00:51

## 2023-01-01 RX ADMIN — Medication 10 ML: at 20:07

## 2023-01-01 RX ADMIN — Medication 10 ML: at 08:31

## 2023-01-01 RX ADMIN — METHYLPREDNISOLONE SODIUM SUCCINATE 40 MG: 40 INJECTION, POWDER, FOR SOLUTION INTRAMUSCULAR; INTRAVENOUS at 11:12

## 2023-01-01 RX ADMIN — IPRATROPIUM BROMIDE AND ALBUTEROL SULFATE 3 ML: 2.5; .5 SOLUTION RESPIRATORY (INHALATION) at 11:30

## 2023-01-01 RX ADMIN — MONTELUKAST 10 MG: 10 TABLET, FILM COATED ORAL at 20:07

## 2023-01-01 RX ADMIN — THEOPHYLLINE ANHYDROUS 400 MG: 200 CAPSULE, EXTENDED RELEASE ORAL at 18:14

## 2023-01-01 RX ADMIN — SENNOSIDES AND DOCUSATE SODIUM 2 TABLET: 50; 8.6 TABLET ORAL at 08:31

## 2023-01-01 RX ADMIN — ROPINIROLE HYDROCHLORIDE 1 MG: 1 TABLET, FILM COATED ORAL at 18:14

## 2023-01-01 RX ADMIN — ROPINIROLE HYDROCHLORIDE 1 MG: 1 TABLET, FILM COATED ORAL at 20:13

## 2023-01-01 RX ADMIN — BUDESONIDE 0.5 MG: 0.5 INHALANT RESPIRATORY (INHALATION) at 07:53

## 2023-01-01 RX ADMIN — METHYLPREDNISOLONE SODIUM SUCCINATE 40 MG: 40 INJECTION, POWDER, FOR SOLUTION INTRAMUSCULAR; INTRAVENOUS at 23:38

## 2023-01-01 RX ADMIN — ALPRAZOLAM 0.25 MG: 0.5 TABLET ORAL at 18:22

## 2023-01-01 RX ADMIN — IPRATROPIUM BROMIDE AND ALBUTEROL SULFATE 3 ML: 2.5; .5 SOLUTION RESPIRATORY (INHALATION) at 07:48

## 2023-01-01 NOTE — H&P
[facility] Medicine Services      Patient Name: Michelle Francis  : 1950  MRN: [id number]  Primary Care Physician:  Avani Montoya MD  Date of admission: 2022      Subjective      Chief Complaint: Shortness of breath.    History of Present Illness: Michelle Francis is a 72 y.o. female who presented to [facility] on 2022 complaining of shortness of breath.  Patient is a 72-year-old female with past medical history of for COPD, osteoarthritis, hypertension, hyperlipidemia, osteoporosis, restless leg syndrome and obstructive sleep apnea who presented to the emergency room because of her worsening shortness of breath.  Patient was seen in the emergency room and was noted to be wheezing and also with rhonchorous sounds.  Patient was diagnosed with coronavirus infection.  Patient was also noted to have a platelet of 17,000.  Patient was diagnosed with thrombocytopenia, acute respiratory failure with hypoxia, and a COPD with acute exacerbation.  Patient was recommended for admission for further treatment and management.  Doxycycline and prednisone were given in the emergency room.    Patient reported no fever or chills, no headache or visual obscurations, no hot or cold intolerance, no chest pain or abdominal pain and no leg swelling or leg pain.        Review of Systems   Constitutional: Negative.   HENT: Negative.    Eyes: Negative.    Cardiovascular: Negative.    Respiratory: Positive for shortness of breath and wheezing.    Endocrine: Negative.    Hematologic/Lymphatic: Negative.    Skin: Negative.    Musculoskeletal: Negative.    Gastrointestinal: Negative.    Genitourinary: Negative.    Neurological: Negative.    Psychiatric/Behavioral: Negative.    Allergic/Immunologic: Negative.             Personal History     Past Medical History:   Diagnosis Date   • Arthritis    • Colon polyps     Dr Baets   • COPD (chronic obstructive pulmonary disease) (HCC)    • History of  emphysema    • Hyperlipidemia    • Hypertension    • Kidney disease    • Osteoporosis    • Restless leg syndrome     sees Dr Seipel   • Sleep apnea     npsg [facility] 2014, ahi 5.9, on auto bipap min 8 max, PS 2-8-Alcaraz's, nasal mask       Past Surgical History:   Procedure Laterality Date   • BRONCHOSCOPY N/A 6/8/2022    Procedure: BRONCHOSCOPY with left lower lobe wash;  Surgeon: Hawa Sifuentes MD;  Location: [facility] ENDOSCOPY;  Service: Pulmonary;  Laterality: N/A;  pneumonia   • CARDIOVASCULAR STRESS TEST  2020   • CATARACT EXTRACTION      Dr Moyer   • CEREBRAL ANEURYSM REPAIR      Brain Aneurysm approx 2009, treated with stents and coils, Dr. Fraga   • COLON SURGERY      partial colectomy-2013 Dr Valladares- due to multiple large polyps   • ECHO - CONVERTED  2019   • EXPLORATORY LAPAROTOMY      lysis of intra abdominal adhesions, primary ventral hernia repair 08/01/2018 Dr West   • WRIST SURGERY      wrist fracture - Dr Patton       Family History: family history includes Allergies in an other family member; Arthritis in her brother; Colon cancer in her father and sister; Diabetes in her mother; Hypertension in her brother and mother. Otherwise pertinent FHx was reviewed and not pertinent to current issue.    Social History:  reports that she has quit smoking. She has never used smokeless tobacco. She reports current alcohol use. She reports that she does not use drugs.    Home Medications:  Prior to Admission Medications     Prescriptions Last Dose Informant Patient Reported? Taking?    albuterol sulfate  (90 Base) MCG/ACT inhaler   Yes Yes    Inhale 1 puff Every 4 (Four) Hours As Needed for Wheezing or Shortness of Air. Q4-6H    aspirin 81 MG chewable tablet   Yes Yes    Chew 81 mg Daily.    azithromycin (ZITHROMAX) 250 MG tablet   Yes Yes    Take 250 mg by mouth 3 (Three) Times a Week. Monday , Wednesday and Friday    Breztri Aerosphere 160-9-4.8 MCG/ACT aerosol inhaler   Yes Yes    budesonide (PULMICORT) 0.5  MG/2ML nebulizer solution   Yes Yes    0.5 mg Every 4 (Four) Hours As Needed.    calcium carbonate (OS-BILLIE) 600 MG tablet   Yes Yes    Take 600 mg by mouth 2 (Two) Times a Day.    dilTIAZem CD (Cardizem CD) 180 MG 24 hr capsule   No Yes    Take 1 capsule by mouth Daily.    esomeprazole (nexIUM) 40 MG capsule   No Yes    TAKE 1 CAPSULE BY MOUTH EVERY DAY    furosemide (LASIX) 40 MG tablet   Yes Yes    Take 40 mg by mouth Every Evening.    furosemide (LASIX) 80 MG tablet   Yes Yes    Take 80 mg by mouth Every Morning.    gabapentin (NEURONTIN) 300 MG capsule   Yes Yes    Take 900 mg by mouth Every Night.    guaiFENesin (MUCINEX) 600 MG 12 hr tablet   Yes Yes    Take 1,200 mg by mouth Every Evening.    ipratropium-albuterol (DUO-NEB) 0.5-2.5 mg/3 ml nebulizer   Yes Yes    Take 3 mL by nebulization Every 4 (Four) Hours As Needed for Wheezing or Shortness of Air. Every 4-6h PRN    metoprolol succinate XL (TOPROL-XL) 50 MG 24 hr tablet   No Yes    TAKE ONE TABLET BY MOUTH TWICE DAILY    montelukast (SINGULAIR) 10 MG tablet   No Yes    Take 1 tablet by mouth Every Night.    potassium chloride (K-DUR,KLOR-CON) 20 MEQ CR tablet   Yes Yes    Take 40 mEq by mouth Every Morning.    potassium chloride (K-DUR,KLOR-CON) 20 MEQ CR tablet   Yes Yes    Take 20 mEq by mouth Every Evening.    predniSONE (DELTASONE) 10 MG tablet   Yes Yes    risedronate (ACTONEL) 35 MG tablet   Yes Yes    Take 35 mg by mouth Every 7 (Seven) Days. On Thursday    rOPINIRole (REQUIP) 1 MG tablet   Yes Yes    Take 1 mg by mouth 2 (Two) Times a Day. At 19:00 and 21:00    rosuvastatin (CRESTOR) 20 MG tablet   Yes Yes    Take 20 mg by mouth Daily.    theophylline (UNIPHYL) 400 MG 24 hr tablet   Yes Yes    Take 400 mg by mouth Daily.            Allergies:  No Known Allergies    Objective      Vitals:   Temp:  [98.4 °F (36.9 °C)] 98.4 °F (36.9 °C)  Heart Rate:  [] 115  Resp:  [18-30] 21  BP: (110-142)/(53-77) 142/66  Flow (L/min):  [5-9] 7    Physical  Exam  Vitals reviewed.   Constitutional:       General: She is not in acute distress.     Appearance: Normal appearance.   HENT:      Head: Normocephalic and atraumatic.      Nose: Nose normal. No congestion or rhinorrhea.      Mouth/Throat:      Mouth: Mucous membranes are moist.      Pharynx: Oropharynx is clear. No oropharyngeal exudate or posterior oropharyngeal erythema.   Eyes:      Pupils: Pupils are equal, round, and reactive to light.   Cardiovascular:      Rate and Rhythm: Normal rate and regular rhythm.      Pulses: Normal pulses.      Heart sounds: Normal heart sounds. No murmur heard.    No friction rub. No gallop.   Pulmonary:      Effort: Accessory muscle usage present. No respiratory distress.      Breath sounds: Wheezing present. No rhonchi or rales.      Comments: Decreased air entry bilaterally.  Poor air movement.  Positive wheezing.  Positive conversational dyspnea.  Chest:      Chest wall: No mass, deformity, tenderness, crepitus or edema. There is no dullness to percussion.   Abdominal:      General: Abdomen is flat. Bowel sounds are normal. There is no distension.      Palpations: Abdomen is soft.      Tenderness: There is no right CVA tenderness.   Musculoskeletal:         General: No swelling, tenderness, deformity or signs of injury.      Cervical back: Neck supple. No tenderness.      Right lower leg: No edema.      Left lower leg: No edema.   Skin:     Capillary Refill: Capillary refill takes less than 2 seconds.      Coloration: Skin is not jaundiced.      Findings: No bruising, lesion or rash.   Neurological:      Mental Status: She is alert.      Comments: No facial asymmetry noted.  Gait and station not tested.   Psychiatric:      Comments: No agitation.            Result Review    Result Review:  I have personally reviewed the results from the time of this admission to 12/31/2022 19:23 EST and agree with these findings:  []  Laboratory  []  Microbiology  []  Radiology  []   EKG/Telemetry   []  Cardiology/Vascular   []  Pathology  []  Old records  []  Other:  Most notable findings include:       Assessment & Plan        Active Hospital Problems:  Active Hospital Problems    Diagnosis    • **Coronavirus infection    • COPD with acute exacerbation (HCC)    • Acute on chronic respiratory failure with hypoxia (HCC)    • Chronic diastolic CHF (congestive heart failure) (HCC)    • Chronic respiratory failure with hypercapnia (HCC)    • Chronic depression    • Primary hypertension    • Restless leg syndrome    • Obstructive sleep apnea    • Mixed hyperlipidemia    • GERD without esophagitis          Plan:      -Continue appropriate patient's home medications for other chronic medical conditions.  -Continue the present level of care.  -Patient and family agreed with the plan of care.  -Treat COPD exacerbation with COPD protocol, nebs and steroids.  -Treat coronavirus infection with supportive care.  -Treat acute on chronic respiratory failure with hypoxia and hypercapnia with oxygen therapy and BiPAP.  -Complete pulmonary consult.  -Complete hematology/oncology consulted because of her thrombocytopenia.      DVT prophylaxis:  Mechanical DVT prophylaxis orders are present.    CODE STATUS:    Level Of Support Discussed With: Patient  Code Status (Patient has no pulse and is not breathing): CPR (Attempt to Resuscitate)  Medical Interventions (Patient has pulse or is breathing): Full Support    Admission Status:  I believe this patient meets inpatient status.    I discussed the patient's findings and my recommendations with patient, family, nursing staff, primary care team and consulting provider.    This patient has been examined wearing appropriate Personal Protective Equipment and discussed with hospital infection control department, [facility] department, infectious disease specialist and pulmonologist. 12/31/22      Signature: Electronically signed by Gaetano Wynn MD, FACP, 12/31/22,  7:24 PM EST.

## 2023-01-01 NOTE — PLAN OF CARE
Goal Outcome Evaluation:               Pt is resting at this time, no complaints of pain, vss, plan of care continues

## 2023-01-01 NOTE — CONSULTS
Hematology/Oncology Inpatient Consultation    Patient name: Michelle Francis  : 1950  MRN: [id number]  Referring Provider: Gaetano Wynn MD  Reason for Consultation: Thrombocytopenia    Chief complaint: Hypoxic respiratory failure with COVID-19 infection    History of present illness:    72 y.o. female admitted to [facility] through the ED on 2022 where she reported acute on chronic shortness of air.  She stated she had a history of COPD with increasing cough and shortness of air that led her to come to the ED.  She denied any chest pain or fever.  She did report a sore throat.  In the ED CT chest without contrast showed advanced emphysema with central bronchial wall thickening and a stable 5 mm pulmonary nodule in right middle lobe lung.  CBC revealed WBC 10.4, hemoglobin 12.7, and platelets 17,000. Creatinine was normal at 0.84 and LFTs were not elevated.  Troponin and BNP were both normal.  Respiratory viral panel was positive for coronavirus OC43 and was otherwise negative.  She received doxycycline and Solu-Medrol 125 mg IV x1 followed by Solu-Medrol 40 mg IV every 12 hours initiated 2022  At time of consultation 2023 platelets were 26,000. Chart review showed intermittent mild thrombocytopenia since 2018 and her last available labs from 2022 revealed platelets 124,000.  Med list reflected outpatient low-dose aspirin, Plavix and omeprazole.    23  Hematology/Oncology was consulted by the hospitalist group for thrombocytopenia.    Past Medical History: COPD, IRIS, arthritis, HTN, HLD, restless leg syndrome, and CKD all of long duration.  Surgical History: Partial colectomy in  secondary to multiple large polyps/obstruction.  Cataract removal, cerebral aneurysm repair in .  Ventral hernia repair with removal of adhesions in 2018.  Wrist surgery secondary to fracture in the past.  Social History: She lives in [place], [place] with her spouse.  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  She stopped smoking years ago.  She denies alcohol or recreational drug use.  Family History: Her sister had thyroid cancer.  Allergies: No known drug allergies.    PCP: Avani Montoya MD    History:  Past Medical History:   Diagnosis Date   • Arthritis    • Colon polyps     Dr Bates   • COPD (chronic obstructive pulmonary disease) (HCC)    • History of emphysema    • Hyperlipidemia    • Hypertension    • Kidney disease    • Osteoporosis    • Restless leg syndrome     sees Dr Seipel   • Sleep apnea     npsg [facility] 2014, ahi 5.9, on auto bipap min 8 max, PS 2-8-Alcaraz's, nasal mask   ,   Past Surgical History:   Procedure Laterality Date   • BRONCHOSCOPY N/A 6/8/2022    Procedure: BRONCHOSCOPY with left lower lobe wash;  Surgeon: Hawa Sifuentes MD;  [facility];  Service: Pulmonary;  Laterality: N/A;  pneumonia   • CARDIOVASCULAR STRESS TEST  2020   • CATARACT EXTRACTION      Dr Moyer   • CEREBRAL ANEURYSM REPAIR      Brain Aneurysm approx 2009, treated with stents and coils, Dr. Fraga   • COLON SURGERY      partial colectomy-2013 Dr Valladares- due to multiple large polyps   • ECHO - CONVERTED  2019   • EXPLORATORY LAPAROTOMY      lysis of intra abdominal adhesions, primary ventral hernia repair 08/01/2018 Dr West   • WRIST SURGERY      wrist fracture - Dr Patton   ,   Family History   Problem Relation Age of Onset   • Diabetes Mother    • Hypertension Mother    • Colon cancer Father    • Colon cancer Sister    • Arthritis Brother    • Hypertension Brother    • Allergies Other    ,   Social History     Tobacco Use   • Smoking status: Former   • Smokeless tobacco: Never   Vaping Use   • Vaping Use: Never used   Substance Use Topics   • Alcohol use: Yes   • Drug use: No   , (Not in a hospital admission)  , Scheduled Meds:  budesonide, 0.5 mg, Nebulization, BID - RT  ipratropium-albuterol, 3 mL, Nebulization, 4x Daily - RT  methylPREDNISolone sodium succinate, 40 mg,  "Intravenous, Q12H  senna-docusate sodium, 2 tablet, Oral, BID  sodium chloride, 10 mL, Intravenous, Q12H    , Continuous Infusions:   , PRN Meds:  •  acetaminophen **OR** acetaminophen **OR** acetaminophen  •  albuterol  •  ALPRAZolam  •  aluminum-magnesium hydroxide-simethicone  •  senna-docusate sodium **AND** polyethylene glycol **AND** bisacodyl **AND** bisacodyl  •  ipratropium-albuterol  •  ondansetron  •  oxyCODONE  •  sodium chloride  •  sodium chloride  •  sodium chloride   Allergies:  Patient has no known allergies.    ROS:  Review of Systems   Constitutional: Negative for activity change, chills, fatigue, fever and unexpected weight change.   HENT: Positive for sore throat. Negative for congestion, dental problem, hearing loss, mouth sores, nosebleeds and trouble swallowing.    Eyes: Negative for photophobia and visual disturbance.   Respiratory: Positive for cough and shortness of breath. Negative for chest tightness.    Cardiovascular: Negative for chest pain, palpitations and leg swelling.   Gastrointestinal: Negative for abdominal distention, abdominal pain, blood in stool, constipation, diarrhea, nausea and vomiting.   Endocrine: Negative for cold intolerance and heat intolerance.   Genitourinary: Negative for decreased urine volume, difficulty urinating, dysuria, frequency, hematuria and urgency.   Musculoskeletal: Negative for arthralgias and gait problem.   Skin: Negative for rash and wound.   Neurological: Negative for dizziness, tremors, weakness, light-headedness, numbness and headaches.   Hematological: Negative for adenopathy. Does not bruise/bleed easily.   Psychiatric/Behavioral: Negative for confusion and hallucinations. The patient is not nervous/anxious.    All other systems reviewed and are negative.       Objective     Vital Signs:   /67 (BP Location: Right arm, Patient Position: Sitting)   Pulse 78   Temp 97.3 °F (36.3 °C) (Axillary)   Resp 20   Ht 167.6 cm (66\")   Wt 84.8 " kg (187 lb)   SpO2 98%   BMI 30.18 kg/m²     Physical Exam:  Physical Exam  Vitals and nursing note reviewed.   Constitutional:       General: She is not in acute distress.     Appearance: Normal appearance. She is well-developed. She is not diaphoretic.   HENT:      Head: Normocephalic and atraumatic.      Right Ear: External ear normal.      Left Ear: External ear normal.      Nose: Nose normal.      Mouth/Throat:      Mouth: Mucous membranes are moist.      Pharynx: Oropharynx is clear. No oropharyngeal exudate or posterior oropharyngeal erythema.      Comments: BiPAP mask  Eyes:      General: No scleral icterus.     Extraocular Movements: Extraocular movements intact.      Conjunctiva/sclera: Conjunctivae normal.      Pupils: Pupils are equal, round, and reactive to light.   Cardiovascular:      Rate and Rhythm: Normal rate and regular rhythm.      Heart sounds: Normal heart sounds. No murmur heard.     Comments: Cardiac monitor leads  Pulmonary:      Effort: Pulmonary effort is normal. No respiratory distress.      Breath sounds: Rhonchi (Scattered rhonchi) present. No wheezing or rales.      Comments: BiPAP with AVAPS setting  Abdominal:      General: Bowel sounds are normal. There is no distension.      Palpations: Abdomen is soft. There is no mass.      Tenderness: There is no abdominal tenderness. There is no guarding.   Genitourinary:     Comments: Deferred   Musculoskeletal:         General: Swelling (Trace BLE edema) present. No tenderness or deformity. Normal range of motion.      Cervical back: Normal range of motion and neck supple.      Comments: LUE IV and right hand O2 monitor.   Lymphadenopathy:      Cervical: No cervical adenopathy.      Upper Body:      Right upper body: No supraclavicular adenopathy.      Left upper body: No supraclavicular adenopathy.   Skin:     General: Skin is warm and dry.      Coloration: Skin is not pale.      Findings: No bruising, erythema or rash.   Neurological:       General: No focal deficit present.      Mental Status: She is alert and oriented to person, place, and time.      Coordination: Coordination normal.   Psychiatric:         Mood and Affect: Mood normal.         Behavior: Behavior normal.         Thought Content: Thought content normal.          Results Review:  Lab Results (last 48 hours)     Procedure Component Value Units Date/Time    Blood Gas, Arterial - [[id number]] Collected: 01/01/23 0913    Specimen: Arterial Blood Updated: 01/01/23 0919    Basic Metabolic Panel [[id number]]  (Abnormal) Collected: 01/01/23 0706    Specimen: Blood Updated: 01/01/23 0905     Glucose 155 mg/dL      BUN 24 mg/dL      Creatinine 0.86 mg/dL      Sodium 140 mmol/L      Potassium 4.3 mmol/L      Chloride 92 mmol/L      CO2 38.0 mmol/L      Calcium 9.6 mg/dL      BUN/Creatinine Ratio 27.9     Anion Gap 10.0 mmol/L      eGFR 71.4 mL/min/1.73      Comment: National Kidney Foundation and American Society of Nephrology (ASN) Task Force recommended calculation based on the Chronic Kidney Disease Epidemiology Collaboration (CKD-EPI) equation refit without adjustment for race.       Narrative:      GFR Normal >60  Chronic Kidney Disease <60  Kidney Failure <15    The GFR formula is only valid for adults with stable renal function between ages 18 and 70.    Magnesium [[id number]]  (Abnormal) Collected: 01/01/23 0706    Specimen: Blood Updated: 01/01/23 0854     Magnesium 2.5 mg/dL     CBC & Differential [[id number]]  (Abnormal) Collected: 01/01/23 0706    Specimen: Blood Updated: 01/01/23 0753    Narrative:      The following orders were created for panel order CBC & Differential.  Procedure                               Abnormality         Status                     ---------                               -----------         ------                     CBC Auto Differential[[id number]]        Abnormal            Final result               Scan Slide[[id number]]                                                                     Please view results for these tests on the individual orders.    CBC Auto Differential [[id number]]  (Abnormal) Collected: 01/01/23 0706    Specimen: Blood Updated: 01/01/23 0753     WBC 10.50 10*3/mm3      RBC 4.43 10*6/mm3      Hemoglobin 12.1 g/dL      Hematocrit 38.5 %      MCV 86.8 fL      MCH 27.2 pg      MCHC 31.4 g/dL      RDW 14.9 %      RDW-SD 45.1 fl      MPV 9.9 fL      Platelets 26 10*3/mm3      Comment: Parameter reviewed in the past 30 days on 12/31/22 .          Neutrophil % 89.9 %      Lymphocyte % 6.4 %      Monocyte % 3.6 %      Eosinophil % 0.0 %      Basophil % 0.1 %      Neutrophils, Absolute 9.40 10*3/mm3      Lymphocytes, Absolute 0.70 10*3/mm3      Monocytes, Absolute 0.40 10*3/mm3      Eosinophils, Absolute 0.00 10*3/mm3      Basophils, Absolute 0.00 10*3/mm3      nRBC 0.0 /100 WBC     Blood Gas, Arterial - [[id number]]  (Abnormal) Collected: 12/31/22 1910    Specimen: Arterial Blood Updated: 12/31/22 1943     Site Right Radial     Emile's Test Positive     pH, Arterial 7.305 pH units      pCO2, Arterial 79.9 mm Hg      pO2, Arterial 93.0 mm Hg      HCO3, Arterial 39.7 mmol/L      Base Excess, Arterial 9.6 mmol/L      Comment: Serial Number: [id number]:  [id number]        O2 Saturation, Arterial 95.7 %      CO2 Content 42.2 mmol/L      Barometric Pressure for Blood Gas --     Comment: N/A        Modality HFNC     FIO2 80 %      Hemodilution No    Respiratory Panel PCR w/COVID-19(SARS-CoV-2) ZOILA/RAFAELA/OSCAR/PAD/COR/MAD/RASHAD In-House, NP Swab in UTM/VTM, 3-4 HR TAT - Swab, Nasopharynx [[id number]]  (Abnormal) Collected: 12/31/22 1313    Specimen: Swab from Nasopharynx Updated: 12/31/22 1410     ADENOVIRUS, PCR Not Detected     Coronavirus 229E Not Detected     Coronavirus HKU1 Not Detected     Coronavirus NL63 Not Detected     Coronavirus OC43 Detected     COVID19 Not Detected     Human Metapneumovirus Not Detected     Human Rhinovirus/Enterovirus Not Detected      Influenza A PCR Not Detected     Influenza B PCR Not Detected     Parainfluenza Virus 1 Not Detected     Parainfluenza Virus 2 Not Detected     Parainfluenza Virus 3 Not Detected     Parainfluenza Virus 4 Not Detected     RSV, PCR Not Detected     Bordetella pertussis pcr Not Detected     Bordetella parapertussis PCR Not Detected     Chlamydophila pneumoniae PCR Not Detected     Mycoplasma pneumo by PCR Not Detected    Narrative:      In the setting of a positive respiratory panel with a viral infection PLUS a negative procalcitonin without other underlying concern for bacterial infection, consider observing off antibiotics or discontinuation of antibiotics and continue supportive care. If the respiratory panel is positive for atypical bacterial infection (Bordetella pertussis, Chlamydophila pneumoniae, or Mycoplasma pneumoniae), consider antibiotic de-escalation to target atypical bacterial infection.    [place] Draw [[id number]] Collected: 12/31/22 1257    Specimen: Blood Updated: 12/31/22 1402    Narrative:      The following orders were created for panel order [place] Draw.  Procedure                               Abnormality         Status                     ---------                               -----------         ------                     Green Top (Gel)[[id number]]                                  Final result               Lavender Top[[id number]]                                     Final result               Gold Top - SST[[id number]]                                   Final result               Light Blue Top[[id number]]                                   Final result                 Please view results for these tests on the individual orders.    Green Top (Gel) [[id number]] Collected: 12/31/22 1257    Specimen: Blood Updated: 12/31/22 1402     Extra Tube Hold for add-ons.     Comment: Auto resulted.       Lavender Top [[id number]] Collected: 12/31/22 1257    Specimen: Blood Updated: 12/31/22 1402     Extra  Tube hold for add-on     Comment: Auto resulted       Light Blue Top [[id number]] Collected: 12/31/22 1257    Specimen: Blood Updated: 12/31/22 1402     Extra Tube Hold for add-ons.     Comment: Auto resulted       Gold Top - SST [[id number]] Collected: 12/31/22 1257    Specimen: Blood Updated: 12/31/22 1402     Extra Tube Hold for add-ons.     Comment: Auto resulted.       CBC & Differential [[id number]]  (Abnormal) Collected: 12/31/22 1257    Specimen: Blood Updated: 12/31/22 1358    Narrative:      The following orders were created for panel order CBC & Differential.  Procedure                               Abnormality         Status                     ---------                               -----------         ------                     CBC Auto Differential[[id number]]        Abnormal            Final result               Scan Slide[[id number]]                                       Final result               Path Consult Reflex[[id number]]                              Final result                 Please view results for these tests on the individual orders.    Path Consult Reflex [[id number]] Collected: 12/31/22 1257    Specimen: Blood Updated: 12/31/22 1358     Pathology Review Yes    Narrative:      Platelets <25    CBC Auto Differential [[id number]]  (Abnormal) Collected: 12/31/22 1257    Specimen: Blood Updated: 12/31/22 1358     WBC 10.40 10*3/mm3      RBC 4.63 10*6/mm3      Hemoglobin 12.7 g/dL      Hematocrit 40.1 %      MCV 86.6 fL      MCH 27.5 pg      MCHC 31.8 g/dL      RDW 15.1 %      RDW-SD 45.5 fl      MPV 9.0 fL      Platelets 17 10*3/mm3      Neutrophil % 88.7 %      Lymphocyte % 6.1 %      Monocyte % 4.5 %      Eosinophil % 0.1 %      Basophil % 0.6 %      Neutrophils, Absolute 9.20 10*3/mm3      Lymphocytes, Absolute 0.60 10*3/mm3      Monocytes, Absolute 0.50 10*3/mm3      Eosinophils, Absolute 0.00 10*3/mm3      Basophils, Absolute 0.10 10*3/mm3      nRBC 0.0 /100 WBC     Scan Slide [[id number]]  Collected: 12/31/22 1257    Specimen: Blood Updated: 12/31/22 1358     RBC Morphology Normal     WBC Morphology Normal     Platelet Estimate Decreased    BNP [[id number]]  (Normal) Collected: 12/31/22 1257    Specimen: Blood Updated: 12/31/22 1342     proBNP 196.0 pg/mL     Narrative:      Among patients with dyspnea, NT-proBNP is highly sensitive for the detection of acute congestive heart failure. In addition NT-proBNP of <300 pg/ml effectively rules out acute congestive heart failure with 99% negative predictive value.      Troponin [[id number]]  (Normal) Collected: 12/31/22 1257    Specimen: Blood Updated: 12/31/22 1342     Troponin T <0.010 ng/mL     Narrative:      Troponin T Reference Range:  <= 0.03 ng/mL-   Negative for AMI  >0.03 ng/mL-     Abnormal for myocardial necrosis.  Clinicians would have to utilize clinical acumen, EKG, Troponin and serial changes to determine if it is an Acute Myocardial Infarction or myocardial injury due to an underlying chronic condition.       Results may be falsely decreased if patient taking Biotin.      Comprehensive Metabolic Panel [[id number]]  (Abnormal) Collected: 12/31/22 1257    Specimen: Blood Updated: 12/31/22 1332     Glucose 163 mg/dL      BUN 15 mg/dL      Creatinine 0.84 mg/dL      Sodium 136 mmol/L      Potassium 4.1 mmol/L      Chloride 89 mmol/L      CO2 37.0 mmol/L      Calcium 9.7 mg/dL      Total Protein 6.5 g/dL      Albumin 4.1 g/dL      ALT (SGPT) 11 U/L      AST (SGOT) 23 U/L      Alkaline Phosphatase 78 U/L      Total Bilirubin 0.4 mg/dL      Globulin 2.4 gm/dL      A/G Ratio 1.7 g/dL      BUN/Creatinine Ratio 17.9     Anion Gap 10.0 mmol/L      eGFR 73.5 mL/min/1.73      Comment: National Kidney Foundation and American Society of Nephrology (ASN) Task Force recommended calculation based on the Chronic Kidney Disease Epidemiology Collaboration (CKD-EPI) equation refit without adjustment for race.       Narrative:      GFR Normal >60  Chronic Kidney  Disease <60  Kidney Failure <15    The GFR formula is only valid for adults with stable renal function between ages 18 and 70.    Blood Culture - Blood, Arm, Left [[id number]] Collected: 12/31/22 1257    Specimen: Blood from Arm, Left Updated: 12/31/22 1302    POC Lactate [[id number]]  (Normal) Collected: 12/31/22 1259    Specimen: Blood Updated: 12/31/22 1301     Lactate 1.5 mmol/L      Comment: Serial Number: [id number]:  [id number]              Pending Results: PT, PTT, CMV/EBV IgMs, Vit B12, folate, NIALL    Imaging Reviewed:   CT Chest Without Contrast Diagnostic    Result Date: 12/31/2022  1. Findings of advanced centrilobular emphysema with central bronchial wall thickening which can be seen with chronic bronchitis or reactive airway disease. 2. Stable 5 mm pulmonary nodule within the right middle lobe.    Electronically Signed By-jK Chavez MD On:12/31/2022 3:19 PM This report was finalized on [id number] by  Kj Chavez MD.      I have reviewed the patient's labs, imaging, reports, and other clinician documentation.         Assessment & Plan       ASSESSMENT  1. Acute on chronic thrombocytopenia- intermittent mild thrombocytopenia noted dating back to 2018.  LFTs were okay.  She received Solu-Medrol in the ED for her respiratory symptoms.  Platelets mildly improved today.  Respiratory viral panel was positive for coronavirus OC43 (not COVID-19).  We will send acute and chronic thrombocytopenia work-up to evaluate for coagulopathy, additional viral etiologies, concomitant vitamin deficiencies, and autoimmune etiologies.  2. Coronavirus OC43 infection/acute on chronic respiratory failure with hypoxia/exacerbation of COPD/IRIS- received 1 dose of doxycycline in the ED.  On Solu-Medrol and supportive care.  Pulmonary has been consulted by primary team    PLAN  1. Acute on chronic thrombocytopenia labs.  2. Plan to transfuse platelets as needed platelets less than 15,000 or with any active  bleeding.    Note prepared by ADAN Rodriguez.  Patient seen and examined by Sagar Perez MD.  Electronically signed by BERKLEY Carrion, 01/01/23, 12:52 PM EST.      I have personally performed a face-to-face diagnostic evaluation on this patient. I have performed a complete history and physical examination, reviewed laboratory studies, and radiographic examinations.  I have completed the majority and substantive portion of the medical decision making.  I have formulated the assessment on this patient and the plan of action as noted above. I have discussed the case with Simon Carter NP, have edited/reviewed the note, and agree with the care plan.  This patient with history of COPD was admitted with increasing shortness of air and cough.  On examination, she has a BiPAP apparatus on on AVAPS setting.  Labs are significant for platelet count of 17,000 yesterday which has increased to 26,000 today.  She has a history of mild thrombocytopenia.  Current drop in platelet count could be secondary to viral infection.  We will look for other causes and support her as needed.        I discussed the patient's findings and my recommendations with patient.    Thank you for this consult.  We will be happy to follow along in the care of this patient.     Part of this note may be an electronic transcription/translation of spoken language to printed text using the Dragon Dictation System.    Electronically signed by Sagar Perez MD, 01/01/23, 3:12 PM EST.

## 2023-01-01 NOTE — NURSING NOTE
Pt new to the floor today at 3 PM, pt is resting at this time, no complaints of pain, vss, plan of care continues

## 2023-01-01 NOTE — PROGRESS NOTES
[facility] Medicine Services Daily Progress Note    Patient Name: Michelle Francis  : 1950  MRN: [id number]  Primary Care Physician:  Avani Montoya MD  Date of admission: 2022      Subjective      Chief Complaint: Shortness of breath.      Patient Reports:      2023.  Patient was seen and examined.  Patient reported slight improvement in her symptoms.      Review of Systems   Constitutional: Negative.   HENT: Negative.    Eyes: Negative.    Cardiovascular: Negative.    Respiratory: Positive for shortness of breath.    Endocrine: Negative.    Hematologic/Lymphatic: Negative.    Skin: Negative.    Musculoskeletal: Negative.    Gastrointestinal: Negative.    Genitourinary: Negative.    Neurological: Negative.    Psychiatric/Behavioral: Negative.    Allergic/Immunologic: Negative.             Objective      Vitals:   Temp:  [97.1 °F (36.2 °C)-97.3 °F (36.3 °C)] 97.1 °F (36.2 °C)  Heart Rate:  [] 91  Resp:  [20-33] 24  BP: ()/(43-77) 119/63  Flow (L/min):  [4-50] 40    Physical Exam  Vitals reviewed.   Constitutional:       General: She is not in acute distress.     Appearance: Normal appearance.      Comments: Family is at the bedside.   HENT:      Head: Normocephalic and atraumatic.      Nose: Nose normal. No congestion or rhinorrhea.      Mouth/Throat:      Mouth: Mucous membranes are moist.      Pharynx: Oropharynx is clear. No oropharyngeal exudate or posterior oropharyngeal erythema.   Eyes:      Pupils: Pupils are equal, round, and reactive to light.   Cardiovascular:      Pulses: Normal pulses.      Heart sounds: Normal heart sounds. No murmur heard.    No friction rub. No gallop.      Comments: S1 and S2 present.  No tachycardia.  Pulmonary:      Effort: No respiratory distress.      Breath sounds: No wheezing, rhonchi or rales.   Chest:      Chest wall: No tenderness.   Abdominal:      General: Abdomen is flat. Bowel sounds are normal. There is no  distension.      Palpations: Abdomen is soft.      Tenderness: There is no right CVA tenderness.   Musculoskeletal:         General: No swelling, tenderness, deformity or signs of injury.      Cervical back: Neck supple. No tenderness.      Right lower leg: No edema.      Left lower leg: No edema.   Skin:     Capillary Refill: Capillary refill takes less than 2 seconds.      Coloration: Skin is not jaundiced.      Findings: No bruising, lesion or rash.   Neurological:      Mental Status: She is alert.      Comments: No facial asymmetry noted.  Gait and station not tested.   Psychiatric:      Comments: No agitation.               Result Review    Result Review:  I have personally reviewed the results from the time of this admission to 1/1/2023 14:30 EST and agree with these findings:  []  Laboratory  []  Microbiology  []  Radiology  []  EKG/Telemetry   []  Cardiology/Vascular   []  Pathology  []  Old records  []  Other:  Most notable findings include:          Assessment & Plan    From previous notes and with minor updates.    Brief Patient Summary:      Patient is a 72-year-old female with past medical history of for COPD, osteoarthritis, hypertension, hyperlipidemia, osteoporosis, restless leg syndrome and obstructive sleep apnea who presented to the emergency room because of her worsening shortness of breath.  Patient was seen in the emergency room and was noted to be wheezing and also with rhonchorous sounds.  Patient was diagnosed with coronavirus infection.  Patient was also noted to have a platelet of 17,000.  Patient was diagnosed with thrombocytopenia, acute respiratory failure with hypoxia, and a COPD with acute exacerbation.  Patient was recommended for admission for further treatment and management.  Doxycycline and prednisone were given in the emergency room.        budesonide, 0.5 mg, Nebulization, BID - RT  ipratropium-albuterol, 3 mL, Nebulization, 4x Daily - RT  methylPREDNISolone sodium succinate,  40 mg, Intravenous, Q12H  senna-docusate sodium, 2 tablet, Oral, BID  sodium chloride, 10 mL, Intravenous, Q12H             Active Hospital Problems:  Active Hospital Problems    Diagnosis    • **Coronavirus infection    • COPD with acute exacerbation (HCC)    • Acute on chronic respiratory failure with hypoxia (HCC)    • Chronic diastolic CHF (congestive heart failure) (HCC)    • Chronic respiratory failure with hypercapnia (HCC)    • Chronic depression    • Primary hypertension    • Restless leg syndrome    • Obstructive sleep apnea    • Mixed hyperlipidemia    • GERD without esophagitis          Plan:        -Continue appropriate patient's home medications for other chronic medical conditions.  -Continue the present level of care.  -Patient and family agreed with the plan of care.  -Treat coronavirus infection with supportive care.  -Treat COPD with acute exacerbation with a COPD protocol.  -Follow pulmonary recommendations.  -Treat acute on chronic respiratory failure with hypoxia with oxygen therapy.  -Treat GERD with Protonix.        DVT prophylaxis:  Mechanical DVT prophylaxis orders are present.    CODE STATUS:    Level Of Support Discussed With: Patient  Code Status (Patient has no pulse and is not breathing): CPR (Attempt to Resuscitate)  Medical Interventions (Patient has pulse or is breathing): Full Support      Disposition: This wonderful patient can discharge in the next 72 hours.    This patient has been examined wearing appropriate Personal Protective Equipment and discussed with hospital infection control department, [facility], infectious disease specialist and pulmonologist. 01/01/23      Electronically signed by Gaetano Wynn MD, FACP, 01/01/23, 14:30 BUBBA.      [facility]ist Team

## 2023-01-01 NOTE — CONSULTS
Group: [facility]         CONSULT NOTE    Patient Identification:  Michelle Francis  72 y.o.  female  1950  [id number]            Requesting physician: Attending physician    Reason for Consultation: Hypoxic respiratory failure, COPD exacerbation with non-COVID corona infection      History of Present Illness:  72-year-old female with history of COPD/emphysema who presented 12/31/2022 with complaints of increased shortness of breath with wheezing and coughing.  She tested positive for non-COVID coronavirus OC 43, and noted to have platelets of 17,000.  Patient had hypoxemia and required noninvasive ventilation overnight, this morning she is on high flow nasal cannula.    Assessment:  Coronavirus infection  Acute hypoxemic/hypercapnic respiratory failure: ABGs on admission pH 7.30, PCO2 79.9, PO2 93  Acute bronchitis  COPD with acute exacerbation  5 mm right middle lobe nodule  Thrombocytopenia  Chronic diastolic CHF  Essential hypertension  IRIS on home BiPAP  GERD  Obesity BMI of 30  RLS    Recommendations:  Systemic steroids  Oxygen supplement and titration to maintain saturation 90 to 95%: Currently requiring BiPAP with 40% FiO2  Bronchodilatores  Inhaled corticosteroids  DVT/GI prophylaxis  Check daily labs and correct electrolytes as needed    Patient will need repeated CT scan of the chest without contrast in 6 months to follow-up on the right middle lobe nodule      Review of Sytems:  Constitutional: Negative for chills, fever and malaise/fatigue.   HENT: Negative.    Eyes: Negative.    Cardiovascular: Negative.    Respiratory: Positive for cough and shortness of breath.    Skin: Negative.    Musculoskeletal: Negative.    Gastrointestinal: Negative.    Genitourinary: Negative.    Neurological: Negative.    Psychiatric/Behavioral: Negative.    Past Medical History:  Past Medical History:   Diagnosis Date   • Arthritis    • Colon polyps     Dr Bates   • COPD (chronic obstructive pulmonary disease)  "(HCC)    • History of emphysema    • Hyperlipidemia    • Hypertension    • Kidney disease    • Osteoporosis    • Restless leg syndrome     sees Dr Seipel   • Sleep apnea     npsg [facility] 2014, ahi 5.9, on auto bipap min 8 max, PS 2-8-Alcaraz's, nasal mask       Past Surgical History:  Past Surgical History:   Procedure Laterality Date   • BRONCHOSCOPY N/A 6/8/2022    Procedure: BRONCHOSCOPY with left lower lobe wash;  Surgeon: Hawa Sifuentes MD;  Location: [facility] ENDOSCOPY;  Service: Pulmonary;  Laterality: N/A;  pneumonia   • CARDIOVASCULAR STRESS TEST  2020   • CATARACT EXTRACTION      Dr Moyer   • CEREBRAL ANEURYSM REPAIR      Brain Aneurysm approx 2009, treated with stents and coils, Dr. Fraga   • COLON SURGERY      partial colectomy-2013 Dr Valladares- due to multiple large polyps   • ECHO - CONVERTED  2019   • EXPLORATORY LAPAROTOMY      lysis of intra abdominal adhesions, primary ventral hernia repair 08/01/2018 Dr West   • WRIST SURGERY      wrist fracture - Dr Patton        Home Meds:  (Not in a hospital admission)      Allergies:  No Known Allergies    Social History:   Social History     Socioeconomic History   • Marital status:    Tobacco Use   • Smoking status: Former   • Smokeless tobacco: Never   Vaping Use   • Vaping Use: Never used   Substance and Sexual Activity   • Alcohol use: Yes   • Drug use: No   • Sexual activity: Defer       Family History:  Family History   Problem Relation Age of Onset   • Diabetes Mother    • Hypertension Mother    • Colon cancer Father    • Colon cancer Sister    • Arthritis Brother    • Hypertension Brother    • Allergies Other        Physical Exam:  /67 (BP Location: Right arm, Patient Position: Sitting)   Pulse 78   Temp 97.3 °F (36.3 °C) (Axillary)   Resp 20   Ht 167.6 cm (66\")   Wt 84.8 kg (187 lb)   SpO2 98%   BMI 30.18 kg/m²  Body mass index is 30.18 kg/m². 98% 84.8 kg (187 lb)  General Appearance:  Alert   HEENT:  Normocephalic, without obvious " abnormality, Conjunctiva/corneas clear,.   Nares normal, no drainage     Neck:  Supple, symmetrical, trachea midline. No JVD.  Lungs /Chest wall:   Bilateral basal rhonchi, respirations unlabored, symmetrical wall movement.     Heart:  Regular rate and rhythm, S1 S2 normal  Abdomen: Soft, non-tender, no masses, no organomegaly.    Extremities: No edema, no clubbing or cyanosis    LABS:  Lab Results   Component Value Date    CALCIUM 9.6 01/01/2023    PHOS 4.0 08/05/2018     Results from last 7 days   Lab Units 01/01/23  0706 12/31/22  1257   MAGNESIUM mg/dL 2.5*  --    SODIUM mmol/L 140 136   POTASSIUM mmol/L 4.3 4.1   CHLORIDE mmol/L 92* 89*   CO2 mmol/L 38.0* 37.0*   BUN mg/dL 24* 15   CREATININE mg/dL 0.86 0.84   GLUCOSE mg/dL 155* 163*   CALCIUM mg/dL 9.6 9.7   WBC 10*3/mm3 10.50 10.40   HEMOGLOBIN g/dL 12.1 12.7   PLATELETS 10*3/mm3 26* 17*   ALT (SGPT) U/L  --  11   AST (SGOT) U/L  --  23   PROBNP pg/mL  --  196.0     Lab Results   Component Value Date    TROPONINT <0.010 12/31/2022     Results from last 7 days   Lab Units 12/31/22  1257   TROPONIN T ng/mL <0.010         Results from last 7 days   Lab Units 12/31/22  1259   LACTATE mmol/L 1.5     Results from last 7 days   Lab Units 12/31/22  1910   PH, ARTERIAL pH units 7.305*   PCO2, ARTERIAL mm Hg 79.9*   PO2 ART mm Hg 93.0   O2 SATURATION ART % 95.7   MODALITY  HFNC     Results from last 7 days   Lab Units 12/31/22  1313   ADENOVIRUS DETECTION BY PCR  Not Detected   CORONAVIRUS 229E  Not Detected   CORONAVIRUS HKU1  Not Detected   CORONAVIRUS NL63  Not Detected   CORONAVIRUS OC43  Detected*   HUMAN METAPNEUMOVIRUS  Not Detected   HUMAN RHINOVIRUS/ENTEROVIRUS  Not Detected   INFLUENZA B PCR  Not Detected   PARAINFLUENZA 1  Not Detected   PARAINFLUENZA VIRUS 2  Not Detected   PARAINFLUENZA VIRUS 3  Not Detected   PARAINFLUENZA VIRUS 4  Not Detected   BORDETELLA PERTUSSIS PCR  Not Detected   CHLAMYDOPHILA PNEUMONIAE PCR  Not Detected   MYCOPLAMA PNEUMO PCR   Not Detected   INFLUENZA A PCR  Not Detected   RSV, PCR  Not Detected             Lab Results   Component Value Date    TSH 1.760 11/13/2019     Estimated Creatinine Clearance: 63.9 mL/min (by C-G formula based on SCr of 0.86 mg/dL).         Imaging:  Imaging Results (Last 24 Hours)     Procedure Component Value Units Date/Time    CT Chest Without Contrast Diagnostic [[id number]] Collected: 12/31/22 1516     Updated: 12/31/22 1522    Narrative:      EXAM:CT CHEST WO CONTRAST DIAGNOSTIC-     DATE OF EXAM: 12/31/2022 3:00 PM     INDICATION: Fever, cough     COMPARISON: Chest CT dated 06/07/2022     TECHNIQUE: Serial and axial CT images of the chest were obtained.  Reconstructions in the coronal and sagittal planes were performed.   Automated exposure control and iterative reconstruction methods were  used.     FINDINGS:  The visualized soft tissue structures at the base of the neck including  the thyroid appear within normal limits. There is no lower cervical or  axillary adenopathy.     The heart size is normal. There is no pericardial effusion. The aorta is  normal in caliber without evidence of aneurysm formation. There is  coronary and aortic atherosclerotic disease. There is no mediastinal or  hilar lymphadenopathy. The esophagus is normal in course and caliber.      There is advanced upper lobe predominant centrilobular emphysema. There  is no evidence of acute infectious consolidation or pleural effusion.  There is no evidence of pneumothorax. There is central bronchial wall  thickening likely related to chronic bronchitis or reactive airway  disease. There is a stable 5 mm pulmonary nodule within the right middle  lobe best seen on image 67.     Visualized portions of the upper abdomen demonstrate colonic position  anterior to the right hepatic lobe. There is multilevel degenerative  disc disease of the thoracic spine with mild thoracic kyphosis.       Impression:      1. Findings of advanced centrilobular  emphysema with central bronchial  wall thickening which can be seen with chronic bronchitis or reactive  airway disease.  2. Stable 5 mm pulmonary nodule within the right middle lobe.           Electronically Signed By-Kj Chavez MD On:12/31/2022 3:19 PM  This report was finalized on [id number] by  Kj Chavez MD.            Current Meds:   SCHEDULE  budesonide, 0.5 mg, Nebulization, BID - RT  ipratropium-albuterol, 3 mL, Nebulization, 4x Daily - RT  methylPREDNISolone sodium succinate, 40 mg, Intravenous, Q12H  senna-docusate sodium, 2 tablet, Oral, BID  sodium chloride, 10 mL, Intravenous, Q12H      Infusions     PRNs  •  acetaminophen **OR** acetaminophen **OR** acetaminophen  •  albuterol  •  ALPRAZolam  •  aluminum-magnesium hydroxide-simethicone  •  senna-docusate sodium **AND** polyethylene glycol **AND** bisacodyl **AND** bisacodyl  •  ipratropium-albuterol  •  ondansetron  •  oxyCODONE  •  sodium chloride  •  sodium chloride  •  sodium chloride        Edin Young MD  1/1/2023  09:46 EST      Much of this encounter note is an electronic transcription/translation of spoken language to printed text using Dragon Software.

## 2023-01-02 PROBLEM — J96.22 ACUTE ON CHRONIC RESPIRATORY FAILURE WITH HYPOXIA AND HYPERCAPNIA (HCC): Status: ACTIVE | Noted: 2019-11-11

## 2023-01-02 PROBLEM — J12.82 PNEUMONIA DUE TO COVID-19 VIRUS: Status: ACTIVE | Noted: 2023-01-02

## 2023-01-02 PROBLEM — U07.1 PNEUMONIA DUE TO COVID-19 VIRUS: Status: ACTIVE | Noted: 2023-01-02

## 2023-01-02 PROBLEM — J96.11 CHRONIC RESPIRATORY FAILURE WITH HYPOXIA AND HYPERCAPNIA: Status: ACTIVE | Noted: 2021-12-27

## 2023-01-02 LAB
ANION GAP SERPL CALCULATED.3IONS-SCNC: 11 MMOL/L (ref 5–15)
BASOPHILS # BLD AUTO: 0.1 10*3/MM3 (ref 0–0.2)
BASOPHILS NFR BLD AUTO: 0.7 % (ref 0–1.5)
BUN SERPL-MCNC: 32 MG/DL (ref 8–23)
BUN/CREAT SERPL: 39 (ref 7–25)
CALCIUM SPEC-SCNC: 9.6 MG/DL (ref 8.6–10.5)
CHLORIDE SERPL-SCNC: 91 MMOL/L (ref 98–107)
CO2 SERPL-SCNC: 34 MMOL/L (ref 22–29)
CREAT SERPL-MCNC: 0.82 MG/DL (ref 0.57–1)
DEPRECATED RDW RBC AUTO: 45.5 FL (ref 37–54)
EGFRCR SERPLBLD CKD-EPI 2021: 75.6 ML/MIN/1.73
EOSINOPHIL # BLD AUTO: 0 10*3/MM3 (ref 0–0.4)
EOSINOPHIL NFR BLD AUTO: 0 % (ref 0.3–6.2)
ERYTHROCYTE [DISTWIDTH] IN BLOOD BY AUTOMATED COUNT: 15 % (ref 12.3–15.4)
GLUCOSE SERPL-MCNC: 133 MG/DL (ref 65–99)
HCT VFR BLD AUTO: 38.7 % (ref 34–46.6)
HGB BLD-MCNC: 12.2 G/DL (ref 12–15.9)
LYMPHOCYTES # BLD AUTO: 0.9 10*3/MM3 (ref 0.7–3.1)
LYMPHOCYTES NFR BLD AUTO: 5.1 % (ref 19.6–45.3)
MAGNESIUM SERPL-MCNC: 2.8 MG/DL (ref 1.6–2.4)
MCH RBC QN AUTO: 27.5 PG (ref 26.6–33)
MCHC RBC AUTO-ENTMCNC: 31.7 G/DL (ref 31.5–35.7)
MCV RBC AUTO: 86.8 FL (ref 79–97)
MONOCYTES # BLD AUTO: 0.8 10*3/MM3 (ref 0.1–0.9)
MONOCYTES NFR BLD AUTO: 4.8 % (ref 5–12)
NEUTROPHILS NFR BLD AUTO: 15 10*3/MM3 (ref 1.7–7)
NEUTROPHILS NFR BLD AUTO: 89.4 % (ref 42.7–76)
NRBC BLD AUTO-RTO: 0 /100 WBC (ref 0–0.2)
PLATELET # BLD AUTO: 38 10*3/MM3 (ref 140–450)
PMV BLD AUTO: 9.5 FL (ref 6–12)
POTASSIUM SERPL-SCNC: 4.5 MMOL/L (ref 3.5–5.2)
QT INTERVAL: 310 MS
RBC # BLD AUTO: 4.45 10*6/MM3 (ref 3.77–5.28)
SODIUM SERPL-SCNC: 136 MMOL/L (ref 136–145)
WBC NRBC COR # BLD: 16.8 10*3/MM3 (ref 3.4–10.8)

## 2023-01-02 PROCEDURE — 85025 COMPLETE CBC W/AUTO DIFF WBC: CPT | Performed by: INTERNAL MEDICINE

## 2023-01-02 PROCEDURE — 83735 ASSAY OF MAGNESIUM: CPT | Performed by: INTERNAL MEDICINE

## 2023-01-02 PROCEDURE — 94660 CPAP INITIATION&MGMT: CPT

## 2023-01-02 PROCEDURE — 94799 UNLISTED PULMONARY SVC/PX: CPT

## 2023-01-02 PROCEDURE — 94664 DEMO&/EVAL PT USE INHALER: CPT

## 2023-01-02 PROCEDURE — 36415 COLL VENOUS BLD VENIPUNCTURE: CPT | Performed by: INTERNAL MEDICINE

## 2023-01-02 PROCEDURE — 94761 N-INVAS EAR/PLS OXIMETRY MLT: CPT

## 2023-01-02 PROCEDURE — 80048 BASIC METABOLIC PNL TOTAL CA: CPT | Performed by: INTERNAL MEDICINE

## 2023-01-02 PROCEDURE — 25010000002 METHYLPREDNISOLONE PER 40 MG: Performed by: INTERNAL MEDICINE

## 2023-01-02 PROCEDURE — 97162 PT EVAL MOD COMPLEX 30 MIN: CPT

## 2023-01-02 RX ORDER — PREDNISONE 20 MG/1
20 TABLET ORAL DAILY
Status: COMPLETED | OUTPATIENT
Start: 2023-01-05 | End: 2023-01-06

## 2023-01-02 RX ORDER — PREDNISONE 10 MG/1
10 TABLET ORAL DAILY
Status: DISCONTINUED | OUTPATIENT
Start: 2023-01-07 | End: 2023-01-06 | Stop reason: HOSPADM

## 2023-01-02 RX ADMIN — THEOPHYLLINE ANHYDROUS 400 MG: 200 CAPSULE, EXTENDED RELEASE ORAL at 08:10

## 2023-01-02 RX ADMIN — SENNOSIDES AND DOCUSATE SODIUM 2 TABLET: 50; 8.6 TABLET ORAL at 20:45

## 2023-01-02 RX ADMIN — IPRATROPIUM BROMIDE AND ALBUTEROL SULFATE 3 ML: 2.5; .5 SOLUTION RESPIRATORY (INHALATION) at 19:34

## 2023-01-02 RX ADMIN — MONTELUKAST 10 MG: 10 TABLET, FILM COATED ORAL at 20:45

## 2023-01-02 RX ADMIN — BUDESONIDE 0.5 MG: 0.5 INHALANT RESPIRATORY (INHALATION) at 19:34

## 2023-01-02 RX ADMIN — SENNOSIDES AND DOCUSATE SODIUM 2 TABLET: 50; 8.6 TABLET ORAL at 08:07

## 2023-01-02 RX ADMIN — DILTIAZEM HYDROCHLORIDE 180 MG: 180 CAPSULE, COATED, EXTENDED RELEASE ORAL at 08:06

## 2023-01-02 RX ADMIN — IPRATROPIUM BROMIDE AND ALBUTEROL SULFATE 3 ML: 2.5; .5 SOLUTION RESPIRATORY (INHALATION) at 12:05

## 2023-01-02 RX ADMIN — Medication 10 ML: at 08:07

## 2023-01-02 RX ADMIN — ROPINIROLE HYDROCHLORIDE 1 MG: 1 TABLET, FILM COATED ORAL at 20:45

## 2023-01-02 RX ADMIN — METHYLPREDNISOLONE SODIUM SUCCINATE 40 MG: 40 INJECTION, POWDER, FOR SOLUTION INTRAMUSCULAR; INTRAVENOUS at 11:13

## 2023-01-02 RX ADMIN — ASPIRIN 81 MG: 81 TABLET, CHEWABLE ORAL at 08:07

## 2023-01-02 RX ADMIN — GUAIFENESIN 1200 MG: 600 TABLET, EXTENDED RELEASE ORAL at 17:08

## 2023-01-02 RX ADMIN — IPRATROPIUM BROMIDE AND ALBUTEROL SULFATE 3 ML: 2.5; .5 SOLUTION RESPIRATORY (INHALATION) at 08:35

## 2023-01-02 RX ADMIN — GABAPENTIN 900 MG: 300 CAPSULE ORAL at 20:45

## 2023-01-02 RX ADMIN — BUDESONIDE 0.5 MG: 0.5 INHALANT RESPIRATORY (INHALATION) at 08:39

## 2023-01-02 RX ADMIN — IPRATROPIUM BROMIDE AND ALBUTEROL SULFATE 3 ML: 2.5; .5 SOLUTION RESPIRATORY (INHALATION) at 15:59

## 2023-01-02 RX ADMIN — PANTOPRAZOLE SODIUM 40 MG: 40 TABLET, DELAYED RELEASE ORAL at 08:07

## 2023-01-02 RX ADMIN — ROSUVASTATIN 20 MG: 10 TABLET, FILM COATED ORAL at 08:07

## 2023-01-02 RX ADMIN — Medication 10 ML: at 20:45

## 2023-01-02 RX ADMIN — ROPINIROLE HYDROCHLORIDE 1 MG: 1 TABLET, FILM COATED ORAL at 19:09

## 2023-01-02 NOTE — PLAN OF CARE
Problem: Adult Inpatient Plan of Care  Goal: Plan of Care Review  Outcome: Ongoing, Progressing  Flowsheets (Taken 1/2/2023 1557)  Progress: no change  Plan of Care Reviewed With: patient  Outcome Evaluation: pt on BIPAP or 8L NC. Pt unable to tolerate being off BIPAP for long. Platelets have improved on todays labs. Will continue to monitor.  Goal: Patient-Specific Goal (Individualized)  Outcome: Ongoing, Progressing  Goal: Absence of Hospital-Acquired Illness or Injury  Outcome: Ongoing, Progressing  Intervention: Identify and Manage Fall Risk  Recent Flowsheet Documentation  Taken 1/2/2023 1400 by Ria Maier RN  Safety Promotion/Fall Prevention:   safety round/check completed   nonskid shoes/slippers when out of bed  Taken 1/2/2023 1200 by Ria Maier RN  Safety Promotion/Fall Prevention:   safety round/check completed   nonskid shoes/slippers when out of bed  Taken 1/2/2023 1000 by Ria Maier RN  Safety Promotion/Fall Prevention:   safety round/check completed   nonskid shoes/slippers when out of bed  Taken 1/2/2023 0800 by Ria Maier RN  Safety Promotion/Fall Prevention:   safety round/check completed   nonskid shoes/slippers when out of bed  Goal: Optimal Comfort and Wellbeing  Outcome: Ongoing, Progressing  Goal: Readiness for Transition of Care  Outcome: Ongoing, Progressing     Problem: Fall Injury Risk  Goal: Absence of Fall and Fall-Related Injury  Outcome: Ongoing, Progressing  Intervention: Promote Injury-Free Environment  Recent Flowsheet Documentation  Taken 1/2/2023 1400 by Ria Maier RN  Safety Promotion/Fall Prevention:   safety round/check completed   nonskid shoes/slippers when out of bed  Taken 1/2/2023 1200 by Ria Maier RN  Safety Promotion/Fall Prevention:   safety round/check completed   nonskid shoes/slippers when out of bed  Taken 1/2/2023 1000 by Ria Maier RN  Safety Promotion/Fall Prevention:   safety round/check completed   nonskid shoes/slippers when out of  bed  Taken 1/2/2023 0800 by Ria Maier, RN  Safety Promotion/Fall Prevention:   safety round/check completed   nonskid shoes/slippers when out of bed     Problem: Breathing Pattern Ineffective  Goal: Effective Breathing Pattern  Outcome: Ongoing, Progressing     Problem: Anxiety  Goal: Anxiety Reduction or Resolution  Outcome: Ongoing, Progressing   Goal Outcome Evaluation:  Plan of Care Reviewed With: patient        Progress: no change  Outcome Evaluation: pt on BIPAP or 8L NC. Pt unable to tolerate being off BIPAP for long. Platelets have improved on todays labs. Will continue to monitor.

## 2023-01-02 NOTE — PROGRESS NOTES
[facility] Daily Progress Note    Patient Name: Michelle Francis  : 1950  MRN: [id number]  Primary Care Physician:  Avani Montoya MD  Date of admission: 2022      Subjective      Chief Complaint: Admitted for worsening shortness of breath in the setting of chronic respiratory failure at home on 5 L of supplemental oxygen and CPAP    Doing okay since admission.  Mildly tachycardic, and has been intolerant of too much time off of continuous BiPAP.  She is on 5 L nasal cannula at home as well as nighttime CPAP.  Respiratory virus panel admission showed coronavirus infection but not COVID-19.  Extensive chart review for service turnover today. Bedside rounding completed with the nursing staff. No other acute concerns.     Objective      Vitals:   Temp:  [97.2 °F (36.2 °C)-98.9 °F (37.2 °C)] 98.9 °F (37.2 °C)  Heart Rate:  [] 99  Resp:  [16-28] 20  BP: (117-148)/(65-83) 117/65  Flow (L/min):  [6-8] 8    Physical Exam:  GEN: Elderly woman on BiPAP, no acute distress  HEENT: NCAT, PERRLA, dry mucous membranes  NECK: Supple, midline trachea  CARD: RRR, mildly tachycardic, no significant peripheral edema  PULM: Coarse bilateral breath sounds, occasional expiratory wheezing, diminished at the bases, non-distressed on BiPAP  ABD: soft, NTND, normoactive bowel sounds throughout  SKIN: See nursing notes for full skin survey  NEURO: Grossly intact, non-focal exam  PSYCH: Pleasant    Result Review    Result Review:  I have personally reviewed the results from the time of this admission to 2023 17:41 EST and agree with these findings:  [x]  Laboratory  [x]  Microbiology  [x]  Radiology  [x]  EKG/Telemetry   [x]  Cardiology/Vascular   []  Pathology  []  Old records  []  Other:      Wounds (last 24 hours)     LDA Wound     Row Name 23 1600 23 1200 23 0800       Wound 23 Right medial leg Skin Tear    Wound - Properties Group Placement Date:  01/01/23  -TJ Placement Time: 2023  -TJ Present on Hospital Admission: Y  -TJ Side: Right  -TJ Orientation: medial  -TJ Location: leg  -TJ Primary Wound Type: Skin tear  -TJ    Closure Open to air  -AD Open to air  -AD Open to air  -AD    Drainage Amount none  -AD none  -AD none  -AD    Retired Wound - Properties Group Placement Date: 01/01/23  -TJ Placement Time: 2023  -TJ Present on Hospital Admission: Y  -TJ Side: Right  -TJ Orientation: medial  -TJ Location: leg  -TJ Primary Wound Type: Skin tear  -TJ    Retired Wound - Properties Group Date first assessed: 01/01/23  -TJ Time first assessed: 2023  -TJ Present on Hospital Admission: Y  -TJ Side: Right  -TJ Location: leg  -TJ Primary Wound Type: Skin tear  -TJ       Wound 01/01/23 2027 Right anterior ankle    Wound - Properties Group Placement Date: 01/01/23  -TJ Placement Time: 2027  -TJ Side: Right  -TJ Orientation: anterior  -TJ Location: ankle  -TJ    Closure Open to air  -AD Open to air  -AD Open to air  -AD    Drainage Amount none  -AD none  -AD none  -AD    Retired Wound - Properties Group Placement Date: 01/01/23  -TJ Placement Time: 2027  -TJ Side: Right  -TJ Orientation: anterior  -TJ Location: ankle  -TJ    Retired Wound - Properties Group Date first assessed: 01/01/23  -TJ Time first assessed: 2027  -TJ Side: Right  -TJ Location: ankle  -TJ       Wound 01/01/23 2028 Right posterior plantar Blisters    Wound - Properties Group Placement Date: 01/01/23  -TJ Placement Time: 2028  -TJ Side: Right  -TJ Orientation: posterior  -TJ Location: plantar  -TJ Primary Wound Type: Blisters  -TJ    Closure Open to air  -AD Open to air  -AD Open to air  -AD    Drainage Amount none  -AD none  -AD none  -AD    Retired Wound - Properties Group Placement Date: 01/01/23  -TJ Placement Time: 2028  -TJ Side: Right  -TJ Orientation: posterior  -TJ Location: plantar  -TJ Primary Wound Type: Blisters  -TJ    Retired Wound - Properties Group Date first assessed: 01/01/23  -TJ  Time first assessed: 2028  -TJ Side: Right  -TJ Location: plantar  -TJ Primary Wound Type: Blisters  -TJ       Wound 01/01/23 2034 Right distal leg    Wound - Properties Group Placement Date: 01/01/23  -TJ Placement Time: 2034  -TJ Present on Hospital Admission: Y  -TJ Side: Right  -TJ Orientation: distal  -TJ Location: leg  -TJ    Closure Open to air  -AD Open to air  -AD Open to air  -AD    Drainage Amount none  -AD none  -AD none  -AD    Retired Wound - Properties Group Placement Date: 01/01/23  -TJ Placement Time: 2034  -TJ Present on Hospital Admission: Y  -TJ Side: Right  -TJ Orientation: distal  -TJ Location: leg  -TJ    Retired Wound - Properties Group Date first assessed: 01/01/23  -TJ Time first assessed: 2034  -TJ Present on Hospital Admission: Y  -TJ Side: Right  -TJ Location: leg  -TJ    Row Name 01/02/23 0432 01/02/23 0015 01/01/23 2034       Wound 01/01/23 2023 Right medial leg Skin Tear    Wound - Properties Group Placement Date: 01/01/23  -TJ Placement Time: 2023  -TJ Present on Hospital Admission: Y  -TJ Side: Right  -TJ Orientation: medial  -TJ Location: leg  -TJ Primary Wound Type: Skin tear  -TJ    Closure Open to air  -TJ Open to air  -TJ --    Drainage Amount none  -TJ none  -TJ --    Retired Wound - Properties Group Placement Date: 01/01/23  -TJ Placement Time: 2023  -TJ Present on Hospital Admission: Y  -TJ Side: Right  -TJ Orientation: medial  -TJ Location: leg  -TJ Primary Wound Type: Skin tear  -TJ    Retired Wound - Properties Group Date first assessed: 01/01/23  -TJ Time first assessed: 2023  -TJ Present on Hospital Admission: Y  -TJ Side: Right  -TJ Location: leg  -TJ Primary Wound Type: Skin tear  -TJ       Wound 01/01/23 2027 Right anterior ankle    Wound - Properties Group Placement Date: 01/01/23  -TJ Placement Time: 2027  -TJ Side: Right  -TJ Orientation: anterior  -TJ Location: ankle  -TJ    Closure Open to air  -TJ Open to air  -TJ --    Drainage Amount none  -TJ none  -TJ --     Retired Wound - Properties Group Placement Date: 01/01/23  -TJ Placement Time: 2027  -TJ Side: Right  -TJ Orientation: anterior  -TJ Location: ankle  -TJ    Retired Wound - Properties Group Date first assessed: 01/01/23  -TJ Time first assessed: 2027  -TJ Side: Right  -TJ Location: ankle  -TJ       Wound 01/01/23 2028 Right posterior plantar Blisters    Wound - Properties Group Placement Date: 01/01/23  -TJ Placement Time: 2028  -TJ Side: Right  -TJ Orientation: posterior  -TJ Location: plantar  -TJ Primary Wound Type: Blisters  -TJ    Closure Open to air  -TJ Open to air  -TJ --    Drainage Amount none  -TJ none  -TJ --    Retired Wound - Properties Group Placement Date: 01/01/23  -TJ Placement Time: 2028  -TJ Side: Right  -TJ Orientation: posterior  -TJ Location: plantar  -TJ Primary Wound Type: Blisters  -TJ    Retired Wound - Properties Group Date first assessed: 01/01/23  -TJ Time first assessed: 2028  -TJ Side: Right  -TJ Location: plantar  -TJ Primary Wound Type: Blisters  -TJ       Wound 01/01/23 2034 Right distal leg    Wound - Properties Group Placement Date: 01/01/23  -TJ Placement Time: 2034  -TJ Present on Hospital Admission: Y  -TJ Side: Right  -TJ Orientation: distal  -TJ Location: leg  -TJ    Wound Image -- -- Images linked: 1  -TJ    Closure Open to air  -TJ Open to air  -TJ --    Drainage Amount none  -TJ none  -TJ --    Retired Wound - Properties Group Placement Date: 01/01/23  -TJ Placement Time: 2034  -TJ Present on Hospital Admission: Y  -TJ Side: Right  -TJ Orientation: distal  -TJ Location: leg  -TJ    Retired Wound - Properties Group Date first assessed: 01/01/23  -TJ Time first assessed: 2034  -TJ Present on Hospital Admission: Y  -TJ Side: Right  -TJ Location: leg  -TJ    Row Name 01/01/23 2033 01/01/23 2032 01/01/23 2022       Wound 01/01/23 2023 Right medial leg Skin Tear    Wound - Properties Group Placement Date: 01/01/23  -TJ Placement Time: 2023  -TJ Present on Hospital Admission: Y   -TJ Side: Right  -TJ Orientation: medial  -TJ Location: leg  -TJ Primary Wound Type: Skin tear  -TJ    Wound Image -- Images linked: 1  -TJ --    Dressing Appearance -- -- open to air  -TJ    Closure -- -- Open to air  -TJ    Drainage Amount -- -- none  -TJ    Retired Wound - Properties Group Placement Date: 01/01/23  -TJ Placement Time: 2023  -TJ Present on Hospital Admission: Y  -TJ Side: Right  -TJ Orientation: medial  -TJ Location: leg  -TJ Primary Wound Type: Skin tear  -TJ    Retired Wound - Properties Group Date first assessed: 01/01/23  -TJ Time first assessed: 2023  -TJ Present on Hospital Admission: Y  -TJ Side: Right  -TJ Location: leg  -TJ Primary Wound Type: Skin tear  -TJ       Wound 01/01/23 2027 Right anterior ankle    Wound - Properties Group Placement Date: 01/01/23  -TJ Placement Time: 2027  -TJ Side: Right  -TJ Orientation: anterior  -TJ Location: ankle  -TJ    Wound Image Images linked: 1  -TJ -- --    Dressing Appearance -- -- open to air  -TJ    Closure -- -- Open to air  -TJ    Drainage Amount -- -- none  -TJ    Retired Wound - Properties Group Placement Date: 01/01/23  -TJ Placement Time: 2027  -TJ Side: Right  -TJ Orientation: anterior  -TJ Location: ankle  -TJ    Retired Wound - Properties Group Date first assessed: 01/01/23  -TJ Time first assessed: 2027  -TJ Side: Right  -TJ Location: ankle  -TJ       Wound 01/01/23 2028 Right posterior plantar Blisters    Wound - Properties Group Placement Date: 01/01/23  -TJ Placement Time: 2028  -TJ Side: Right  -TJ Orientation: posterior  -TJ Location: plantar  -TJ Primary Wound Type: Blisters  -TJ    Wound Image Images linked: 1  -TJ -- --    Dressing Appearance -- -- open to air  -TJ    Closure -- -- Open to air  -TJ    Drainage Amount -- -- none  -TJ    Retired Wound - Properties Group Placement Date: 01/01/23  -TJ Placement Time: 2028  -TJ Side: Right  -TJ Orientation: posterior  -TJ Location: plantar  -TJ Primary Wound Type: Blisters  -TJ     Retired Wound - Properties Group Date first assessed: 01/01/23  -TJ Time first assessed: 2028 -TJ Side: Right  -TJ Location: plantar  -TJ Primary Wound Type: Blisters  -TJ       Wound 01/01/23 2034 Right distal leg    Wound - Properties Group Placement Date: 01/01/23  -TJ Placement Time: 2034  -TJ Present on Hospital Admission: Y  -TJ Side: Right  -TJ Orientation: distal  -TJ Location: leg  -TJ    Dressing Appearance -- -- open to air  -TJ    Closure -- -- Open to air  -TJ    Drainage Amount -- -- none  -TJ    Retired Wound - Properties Group Placement Date: 01/01/23  -TJ Placement Time: 2034  -TJ Present on Hospital Admission: Y  -TJ Side: Right  -TJ Orientation: distal  -TJ Location: leg  -TJ    Retired Wound - Properties Group Date first assessed: 01/01/23  -TJ Time first assessed: 2034  -TJ Present on Hospital Admission: Y  -TJ Side: Right  -TJ Location: leg  -TJ          User Key  (r) = Recorded By, (t) = Taken By, (c) = Cosigned By    Initials Name Provider Type    Janna Meehan, RN Registered Nurse    Ria Tejada RN Registered Nurse                  Assessment & Plan    From previous notes and with minor updates.    Brief Patient Summary:      Patient is a 72-year-old female with past medical history of for COPD, osteoarthritis, hypertension, hyperlipidemia, osteoporosis, restless leg syndrome and obstructive sleep apnea who presented to the emergency room because of her worsening shortness of breath.  Patient was seen in the emergency room and was noted to be wheezing and also with rhonchorous sounds.  Patient was diagnosed with coronavirus infection.  Patient was also noted to have a platelet of 17,000.  Patient was diagnosed with thrombocytopenia, acute respiratory failure with hypoxia, and a COPD with acute exacerbation.  Patient was recommended for admission for further treatment and management.  Doxycycline and prednisone were given in the emergency room.    aspirin, 81 mg, Oral,  Daily  budesonide, 0.5 mg, Nebulization, BID - RT  dilTIAZem CD, 180 mg, Oral, Daily  gabapentin, 900 mg, Oral, Nightly  guaiFENesin, 1,200 mg, Oral, Q PM  ipratropium-albuterol, 3 mL, Nebulization, 4x Daily - RT  montelukast, 10 mg, Oral, Nightly  pantoprazole, 40 mg, Oral, QAM  [START ON 1/3/2023] predniSONE, 30 mg, Oral, Daily   Followed by  [START ON 1/5/2023] predniSONE, 20 mg, Oral, Daily   Followed by  [START ON 1/7/2023] predniSONE, 10 mg, Oral, Daily  rOPINIRole, 1 mg, Oral, BID  rosuvastatin, 20 mg, Oral, Daily  senna-docusate sodium, 2 tablet, Oral, BID  sodium chloride, 10 mL, Intravenous, Q12H  theophylline, 400 mg, Oral, Q24H             Active Hospital Problems:  Active Hospital Problems    Diagnosis    • Coronavirus infection    • Chronic diastolic CHF (congestive heart failure) (HCC)    • Chronic respiratory failure with hypoxia and hypercapnia (HCC)    • Chronic depression    • COPD with acute exacerbation (HCC)    • Primary hypertension    • Restless leg syndrome    • Obstructive sleep apnea    • Mixed hyperlipidemia    • Obesity (BMI 30-39.9)    • Acute on chronic respiratory failure with hypoxia and hypercapnia (HCC)    • GERD without esophagitis      Plan:    -Continue appropriate patient's home medications for other chronic medical conditions.  -Treat coronavirus infection with supportive care.  -Treat COPD with acute exacerbation with a COPD protocol.  -Follow pulmonary recommendations.  -Currently on systemic steroids, Singulair, Mucinex, theophylline, and inhaled bronchodilators and corticosteroids.  -Treat acute on chronic respiratory failure with hypoxia with oxygen therapy.  -Treat GERD with Protonix.  -PT/OT    DVT prophylaxis:  Mechanical DVT prophylaxis orders are present.    CODE STATUS:    Level Of Support Discussed With: Patient  Code Status (Patient has no pulse and is not breathing): CPR (Attempt to Resuscitate)  Medical Interventions (Patient has pulse or is breathing): Full  Support    Disposition: Anticipate discharge home once clinically improved over the course of the next few days    Electronically signed by Aristeo Patterson MD, 01/02/23, 17:41 EST.    [facility]

## 2023-01-02 NOTE — CASE MANAGEMENT/SOCIAL WORK
Discharge Planning Assessment  [facility]     Patient Name: Michelle Francis  MRN: [id number]  Today's Date: 1/2/2023    Admit Date: 12/31/2022    Plan: DC Plan: PT/OT evals pending. Anticipate routine home with spouse. Has home O2 and bipap through [facility].   Discharge Needs Assessment     Row Name 01/02/23 1407       Living Environment    People in Home spouse    Name(s) of People in Home Spouse- Geri    Current Living Arrangements home    Primary Care Provided by self    Provides Primary Care For no one    Family Caregiver if Needed spouse    Quality of Family Relationships supportive;helpful;involved    Able to Return to Prior Arrangements yes       Resource/Environmental Concerns    Resource/Environmental Concerns none    Transportation Concerns none       Transition Planning    Patient/Family Anticipates Transition to home with family    Transportation Anticipated family or friend will provide       Discharge Needs Assessment    Readmission Within the Last 30 Days no previous admission in last 30 days    Equipment Currently Used at Home shower chair;bipap;oxygen    Concerns to be Addressed care coordination/care conferences;discharge planning    Anticipated Changes Related to Illness none    Equipment Needed After Discharge none    Provided Post Acute Provider List? N/A               Discharge Plan     Row Name 01/02/23 8382       Plan    Plan DC Plan: PT/OT evals pending. Anticipate routine home with spouse. Has home O2 and bipap through [facility].    Patient/Family in Agreement with Plan yes    Plan Comments Patient asleep and on AVAPS. CM contacted patient’s spouse, Geri ([phone]) to discuss dc planning. PCP and pharmacy confirmed, reported no trouble affording medications, declined SNF and reported that he takes care of patient at home. PT/OT evals pending.               Demographic Summary     Row Name 01/02/23 2772       General Information    Admission Type inpatient    Required Notices Provided  Important Message from Medicare    Referral Source admission list    Reason for Consult discharge planning    Preferred Language English       Contact Information    Permission Granted to Share Info With                Functional Status     Row Name 01/02/23 1406       Functional Status    Usual Activity Tolerance fair    Current Activity Tolerance fair       Functional Status, IADL    Medications independent    Meal Preparation assistive equipment and person    Housekeeping assistive equipment and person    Laundry assistive equipment and person    Shopping assistive equipment and person              Phone communication or documentation only - no physical contact with patient or family.    Eunice Quiroz RN     Office Phone: [phone]  Office Cell: [phone]

## 2023-01-02 NOTE — PLAN OF CARE
Goal Outcome Evaluation:  Plan of Care Reviewed With: patient        Progress: no change  Outcome Evaluation: Patient new to floor, and breathing is labored, patient assumig orthopneic positioning in bed. Started on duo-nebs, iv steroids and antibiotics, will continue to monitor.

## 2023-01-02 NOTE — PROGRESS NOTES
Daily Progress Note        Coronavirus infection    Acute on chronic respiratory failure with hypoxia (HCC)    Mixed hyperlipidemia    Primary hypertension    Obstructive sleep apnea    Restless leg syndrome    COPD with acute exacerbation (HCC)    Chronic depression    GERD without esophagitis    Chronic respiratory failure with hypercapnia (HCC)    Chronic diastolic CHF (congestive heart failure) (HCC)      Assessment    Coronavirus infection  Acute hypoxemic/hypercapnic respiratory failure: ABGs on admission pH 7.30, PCO2 79.9, PO2 93  Acute bronchitis  COPD with acute exacerbation  5 mm right middle lobe nodule  IRIS on home BiPAP    Thrombocytopenia  Chronic diastolic CHF  Essential hypertension  GERD   Obesity BMI of 30  RLS     Recommendations:    Oxygen supplement and titration to maintain saturation 90 to 95%: Currently requiring 6 L high flow,     Adjusted AVAPS settings    Systemic steroids wean  Singulair  Mucinex twice daily  Theophylline  Bronchodilatores/Inhaled corticosteroids  DVT/GI prophylaxis  Check daily labs and correct electrolytes as needed     Patient will need repeated CT scan of the chest without contrast in 6 months to follow-up on the right middle lobe nodule          LOS: 2 days     Subjective         Objective     Vital signs for last 24 hours:  Vitals:    01/01/23 1905 01/01/23 2109 01/02/23 0130 01/02/23 0532   BP:  123/77     BP Location:  Right arm     Patient Position:  Lying     Pulse: 112  75 97   Resp: 18 16 22 22   Temp:  97.8 °F (36.6 °C) 98 °F (36.7 °C) 98.6 °F (37 °C)   TempSrc:  Oral Axillary Oral   SpO2: 94% 100% 98% 90%   Weight:    85.3 kg (188 lb 0.8 oz)   Height:           Intake/Output last 3 shifts:  No intake/output data recorded.  Intake/Output this shift:  No intake/output data recorded.      Radiology  Imaging Results (Last 24 Hours)     ** No results found for the last 24 hours. **          Labs:  Results from last 7 days   Lab Units 01/02/23  0015   WBC  10*3/mm3 16.80*   HEMOGLOBIN g/dL 12.2   HEMATOCRIT % 38.7   PLATELETS 10*3/mm3 38*     Results from last 7 days   Lab Units 01/02/23  0015 01/01/23  0706 12/31/22  1257   SODIUM mmol/L 136   < > 136   POTASSIUM mmol/L 4.5   < > 4.1   CHLORIDE mmol/L 91*   < > 89*   CO2 mmol/L 34.0*   < > 37.0*   BUN mg/dL 32*   < > 15   CREATININE mg/dL 0.82   < > 0.84   CALCIUM mg/dL 9.6   < > 9.7   BILIRUBIN mg/dL  --   --  0.4   ALK PHOS U/L  --   --  78   ALT (SGPT) U/L  --   --  11   AST (SGOT) U/L  --   --  23   GLUCOSE mg/dL 133*   < > 163*    < > = values in this interval not displayed.     Results from last 7 days   Lab Units 01/01/23  0913   PH, ARTERIAL pH units 7.334*   PO2 ART mm Hg 174.2*   PCO2, ARTERIAL mm Hg 76.3*   HCO3 ART mmol/L 40.6*     Results from last 7 days   Lab Units 12/31/22  1257   ALBUMIN g/dL 4.1     Results from last 7 days   Lab Units 12/31/22  1257   TROPONIN T ng/mL <0.010         Results from last 7 days   Lab Units 01/02/23  0015   MAGNESIUM mg/dL 2.8*     Results from last 7 days   Lab Units 01/01/23  1204   INR  1.00   APTT seconds 24.5               Meds:   SCHEDULE  aspirin, 81 mg, Oral, Daily  budesonide, 0.5 mg, Nebulization, BID - RT  dilTIAZem CD, 180 mg, Oral, Daily  gabapentin, 900 mg, Oral, Nightly  guaiFENesin, 1,200 mg, Oral, Q PM  ipratropium-albuterol, 3 mL, Nebulization, 4x Daily - RT  methylPREDNISolone sodium succinate, 40 mg, Intravenous, Q12H  montelukast, 10 mg, Oral, Nightly  pantoprazole, 40 mg, Oral, QAM  rOPINIRole, 1 mg, Oral, BID  rosuvastatin, 20 mg, Oral, Daily  senna-docusate sodium, 2 tablet, Oral, BID  sodium chloride, 10 mL, Intravenous, Q12H  theophylline, 400 mg, Oral, Q24H      Infusions     PRNs  •  acetaminophen **OR** acetaminophen **OR** acetaminophen  •  albuterol  •  ALPRAZolam  •  aluminum-magnesium hydroxide-simethicone  •  senna-docusate sodium **AND** polyethylene glycol **AND** bisacodyl **AND** bisacodyl  •  ipratropium-albuterol  •   ondansetron  •  oxyCODONE  •  sodium chloride  •  sodium chloride  •  sodium chloride    Physical Exam:  Physical Exam  Cardiovascular:      Heart sounds: Murmur heard.   Pulmonary:      Breath sounds: Wheezing and rhonchi present.         ROS  Review of Systems    I have reviewed the patient's new clinical results.    Electronically signed by Hawa Sifuentes MD

## 2023-01-02 NOTE — THERAPY EVALUATION
Patient Name: Michelle Francis  : 1950    MRN: [id number]                              Today's Date: 2023       Admit Date: 2022    Visit Dx:     ICD-10-CM ICD-9-CM   1. Coronavirus infection  B34.2 079.89   2. Acute exacerbation of chronic obstructive pulmonary disease (COPD) (Formerly [facility])  J44.1 491.21   3. Acute bronchitis, unspecified organism  J20.9 466.0   4. Thrombocytopenia, unspecified (Formerly [facility])  D69.6 287.5     Patient Active Problem List   Diagnosis   • Acute on chronic respiratory failure with hypoxia and hypercapnia (HCC)   • Mixed hyperlipidemia   • Primary hypertension   • Obstructive sleep apnea   • Restless leg syndrome   • Arthritis   • Osteoporosis   • COPD with acute exacerbation (HCC)   • Obesity (BMI 30-39.9)   • Tachycardia   • CKD (chronic kidney disease) stage 2, GFR 60-89 ml/min   • Chronic depression   • Chest pain, atypical   • Abdominal hernia   • Disorder of vitamin B12   • GERD without esophagitis   • Vitamin D deficiency   • Right lower lobe pneumonia   • Hyperglycemia   • Elevated AST (SGOT)   • Chronic respiratory failure with hypoxia and hypercapnia (HCC)   • Leg edema   • Acute congestive heart failure (HCC)   • Thrombocytopenia (HCC)   • Left lower lobe pneumonia   • Chronic diastolic CHF (congestive heart failure) (Formerly [facility])   • Coronavirus infection   • Pneumonia due to COVID-19 virus     Past Medical History:   Diagnosis Date   • Arthritis    • Colon polyps     Dr Bates   • COPD (chronic obstructive pulmonary disease) (HCC)    • History of emphysema    • Hyperlipidemia    • Hypertension    • Kidney disease    • Osteoporosis    • Pneumonia due to COVID-19 virus 2023   • Restless leg syndrome     sees Dr Seipel   • [facility] on auto bipap min 8 max, PS 2-8-Alcaraz's, nasal mask     Past Surgical History:   Procedure Laterality Date   • BRONCHOSCOPY N/A 2022    Procedure: BRONCHOSCOPY with left lower lobe wash;  Surgeon: Hawa Sifuentes MD;  Location:  [facility] ENDOSCOPY;  Service: Pulmonary;  Laterality: N/A;  pneumonia   • CARDIOVASCULAR STRESS TEST  2020   • CATARACT EXTRACTION      Dr Moyer   • CEREBRAL ANEURYSM REPAIR      Brain Aneurysm approx 2009, treated with stents and coils, Dr. Fraga   • COLON SURGERY      partial colectomy-2013 Dr Valladares- due to multiple large polyps   • ECHO - CONVERTED  2019   • EXPLORATORY LAPAROTOMY      lysis of intra abdominal adhesions, primary ventral hernia repair 08/01/2018 Dr West   • WRIST SURGERY      wrist fracture - Dr Patton      General Information     Row Name 01/02/23 1637          Physical Therapy Time and Intention    Document Type evaluation  -EL     Mode of Treatment individual therapy;physical therapy  -     Row Name 01/02/23 1637          General Information    Patient Profile Reviewed yes  -EL     Prior Level of Function independent:;all household mobility;min assist:;ADL's  -     Row Name 01/02/23 1637          Living Environment    People in Home spouse  -     Row Name 01/02/23 1637          Cognition    Orientation Status (Cognition) oriented x 4  -     Row Name 01/02/23 1637          Safety Issues, Functional Mobility    Impairments Affecting Function (Mobility) balance;strength;shortness of breath;endurance/activity tolerance  -EL           User Key  (r) = Recorded By, (t) = Taken By, (c) = Cosigned By    Initials Name Provider Type    EL Micky Okeefe PT Physical Therapist               Mobility     Row Name 01/02/23 1637          Bed Mobility    Bed Mobility supine-sit  -EL     Supine-Sit Coffee (Bed Mobility) minimum assist (75% patient effort)  -     Row Name 01/02/23 1637          Bed-Chair Transfer    Bed-Chair Coffee (Transfers) minimum assist (75% patient effort)  -EL     Comment, (Bed-Chair Transfer) HHA and assistance with line management.  -     Row Name 01/02/23 1637          Sit-Stand Transfer    Sit-Stand [place] (Transfers) minimum assist (75% patient effort)  -EL      Row Name 01/02/23 1637          Gait/Stairs (Locomotion)    [place] Level (Gait) minimum assist (75% patient effort)  -EL     Assistive Device (Gait) --  HHA  -EL     Distance in Feet (Gait) 6  -EL     Deviations/Abnormal Patterns (Gait) gait speed decreased;stride length decreased  -EL     Comment, (Gait/Stairs) Pt required HHA, will benefit from AD next treatment  -EL           User Key  (r) = Recorded By, (t) = Taken By, (c) = Cosigned By    Initials Name Provider Type    Micky Mitchell, PT Physical Therapist               Obj/Interventions     Row Name 01/02/23 1638          Range of Motion Comprehensive    General Range of Motion bilateral lower extremity ROM WFL  -EL     Row Name 01/02/23 1638          Strength Comprehensive (MMT)    General Manual Muscle Testing (MMT) Assessment lower extremity strength deficits identified  -EL     Comment, General Manual Muscle Testing (MMT) Assessment R hip flexion 3+/5, remainder of BLE 4-/5  -EL     Row Name 01/02/23 1638          Balance    Balance Assessment sitting static balance;sit to stand dynamic balance;standing static balance;standing dynamic balance  -EL     Static Sitting Balance independent  -EL     Sit to Stand Dynamic Balance minimal assist  -EL     Static Standing Balance minimal assist  -EL     Dynamic Standing Balance minimal assist  -EL           User Key  (r) = Recorded By, (t) = Taken By, (c) = Cosigned By    Initials Name Provider Type    Micky Mitchell, PT Physical Therapist               Goals/Plan     Row Name 01/02/23 1644          Bed Mobility Goal 1 (PT)    Activity/Assistive Device (Bed Mobility Goal 1, PT) bed mobility activities, all  -EL     [place] Level/Cues Needed (Bed Mobility Goal 1, PT) modified independence  -EL     Time Frame (Bed Mobility Goal 1, PT) long term goal (LTG);2 weeks  -EL     Row Name 01/02/23 1644          Transfer Goal 1 (PT)    Activity/Assistive Device (Transfer Goal 1, PT) transfers, all;walker, rolling   -EL     [place] Level/Cues Needed (Transfer Goal 1, PT) modified independence  -EL     Time Frame (Transfer Goal 1, PT) long term goal (LTG);2 weeks  -EL     Row Name 01/02/23 1644          Gait Training Goal 1 (PT)    Activity/Assistive Device (Gait Training Goal 1, PT) gait (walking locomotion);walker, rolling  -EL     [place] Level (Gait Training Goal 1, PT) modified independence  -EL     Distance (Gait Training Goal 1, PT) 150  -EL     Time Frame (Gait Training Goal 1, PT) long term goal (LTG);2 weeks  -EL     Row Name 01/02/23 1644          Therapy Assessment/Plan (PT)    Planned Therapy Interventions (PT) balance training;neuromuscular re-education;bed mobility training;transfer training;gait training;patient/family education;strengthening  -EL           User Key  (r) = Recorded By, (t) = Taken By, (c) = Cosigned By    Initials Name Provider Type    Micky Mitchell, PT Physical Therapist               Clinical Impression     Row Name 01/02/23 1639          Pain    Pretreatment Pain Rating 0/10 - no pain  -EL     Posttreatment Pain Rating 0/10 - no pain  -EL     Row Name 01/02/23 1639          Plan of Care Review    Plan of Care Reviewed With patient  -EL     Outcome Evaluation Pt admitted with significant SOA, with O2 saturations in the low 80s. Pt lives at home with spouse, typically independent with ambulation, states spouse occasionally assists with ADLs if needed. Pt has 2 steps to enter home and none inside house. Pt this date on continuous bipap and anxious about O2 saturations. Pt demonstrates good mobitliy this date though requiring MIN A for transfers and short distance ambluation, limited by bipap lines. Pt likely to progress well and recommendaiton is return home wiht family assist at d/c. Pt may benefit from HHPT, and RWx usage.  -EL     Row Name 01/02/23 6001          Therapy Assessment/Plan (PT)    Rehab Potential (PT) good, to achieve stated therapy goals  -EL     Criteria for Skilled  Interventions Met (PT) yes  -EL     Therapy Frequency (PT) 5 times/wk  -EL     Predicted Duration of Therapy Intervention (PT) Until d/c  -EL     Row Name 01/02/23 1639          Vital Signs    Pre Patient Position Supine  -EL     Intra Patient Position Standing  -EL     Post Patient Position Sitting  -EL     Row Name 01/02/23 1639          Positioning and Restraints    Pre-Treatment Position in bed  -EL     Post Treatment Position chair  -EL     In Chair notified nsg;sitting;call light within reach;encouraged to call for assist;exit alarm on  -EL           User Key  (r) = Recorded By, (t) = Taken By, (c) = Cosigned By    Initials Name Provider Type    Micky Mitchell, PT Physical Therapist               Outcome Measures     Row Name 01/02/23 1647          How much help from another person do you currently need...    Turning from your back to your side while in flat bed without using bedrails? 3  -EL     Moving from lying on back to sitting on the side of a flat bed without bedrails? 3  -EL     Moving to and from a bed to a chair (including a wheelchair)? 3  -EL     Standing up from a chair using your arms (e.g., wheelchair, bedside chair)? 3  -EL     Climbing 3-5 steps with a railing? 1  -EL     To walk in hospital room? 2  -EL     AM-PAC 6 Clicks Score (PT) 15  -EL     Highest level of mobility 4 --> Transferred to chair/commode  -EL     Row Name 01/02/23 1647          Functional Assessment    Outcome Measure Options AM-PAC 6 Clicks Basic Mobility (PT)  -EL           User Key  (r) = Recorded By, (t) = Taken By, (c) = Cosigned By    Initials Name Provider Type    Micky Mitchell, NBA Physical Therapist                             Physical Therapy Education     Title: PT OT SLP Therapies (Done)     Topic: Physical Therapy (Done)     Point: Mobility training (Done)     Learning Progress Summary           Patient Acceptance, E,TB, VU by MAZIN at 1/2/2023 1649                   Point: Precautions (Done)     Learning Progress  Summary           Patient Acceptance, E,TB, VU by  at 1/2/2023 1649                               User Key     Initials Effective Dates Name Provider Type Discipline     06/23/20 -  Micky Okeefe, PT Physical Therapist PT              PT Recommendation and Plan  Planned Therapy Interventions (PT): balance training, neuromuscular re-education, bed mobility training, transfer training, gait training, patient/family education, strengthening  Plan of Care Reviewed With: patient  Outcome Evaluation: Pt admitted with significant SOA, with O2 saturations in the low 80s. Pt lives at home with spouse, typically independent with ambulation, states spouse occasionally assists with ADLs if needed. Pt has 2 steps to enter home and none inside house. Pt this date on continuous bipap and anxious about O2 saturations. Pt demonstrates good mobitliy this date though requiring MIN A for transfers and short distance ambluation, limited by bipap lines. Pt likely to progress well and recommendaiton is return home wiht family assist at d/c. Pt may benefit from HHPT, and RWx usage.     Time Calculation:    PT Charges     Row Name 01/02/23 1649             Time Calculation    Start Time 1412  -EL      Stop Time 1430  -EL      Time Calculation (min) 18 min  -EL      PT Received On 01/02/23  -EL      PT - Next Appointment 01/03/23  -      PT Goal Re-Cert Due Date 01/16/23  -            User Key  (r) = Recorded By, (t) = Taken By, (c) = Cosigned By    Initials Name Provider Type     Micky Okeefe, PT Physical Therapist              Therapy Charges for Today     Code Description Service Date Service Provider Modifiers Qty    69985103468 HC PT EVAL MOD COMPLEXITY 3 1/2/2023 Micky Okeefe, PT GP 1          PT G-Codes  Outcome Measure Options: AM-PAC 6 Clicks Basic Mobility (PT)  AM-PAC 6 Clicks Score (PT): 15  PT Discharge Summary  Anticipated Discharge Disposition (PT): home with home health, home with assist    Micky Okeefe PT  1/2/2023

## 2023-01-02 NOTE — PLAN OF CARE
Goal Outcome Evaluation:  Plan of Care Reviewed With: patient           Outcome Evaluation: Pt admitted with significant SOA, with O2 saturations in the low 80s. Pt lives at home with spouse, typically independent with ambulation, states spouse occasionally assists with ADLs if needed. Pt has 2 steps to enter home and none inside house. Pt this date on continuous bipap and anxious about O2 saturations. Pt demonstrates good mobitliy this date though requiring MIN A for transfers and short distance ambluation, limited by bipap lines. Pt likely to progress well and recommendaiton is return home wiht family assist at d/c. Pt may benefit from HHPT, and RWx usage.

## 2023-01-02 NOTE — PROGRESS NOTES
Hematology/Oncology Inpatient Progress Note    PATIENT NAME: Michelle Francis  : 1950  MRN: [id number]    CHIEF COMPLAINT: Thrombocytopenia and COVID-19 infection    HISTORY OF PRESENT ILLNESS:    72 y.o. female admitted to [facility] through the ED on 2022 where she reported acute on chronic shortness of air.  She stated she had a history of COPD with increasing cough and shortness of air that led her to come to the ED.  She denied any chest pain or fever.  She did report a sore throat.  In the ED CT chest without contrast showed advanced emphysema with central bronchial wall thickening and a stable 5 mm pulmonary nodule in right middle lobe lung.  CBC revealed WBC 10.4, hemoglobin 12.7, and platelets 17,000. Creatinine was normal at 0.84 and LFTs were not elevated.  Troponin and BNP were both normal.  Respiratory viral panel was positive for coronavirus OC43 and was otherwise negative.  She received doxycycline and Solu-Medrol 125 mg IV x1 followed by Solu-Medrol 40 mg IV every 12 hours initiated 2022  At time of consultation 2023 platelets were 26,000. Chart review showed intermittent mild thrombocytopenia since 2018 and her last available labs from 2022 revealed platelets 124,000.  Med list reflected outpatient low-dose aspirin, Plavix and omeprazole.     23  Hematology/Oncology was consulted by the hospitalist group for thrombocytopenia.     Past Medical History: COPD, IRIS, arthritis, HTN, HLD, restless leg syndrome, and CKD all of long duration.  Surgical History: Partial colectomy in  secondary to multiple large polyps/obstruction.  Cataract removal, cerebral aneurysm repair in .  Ventral hernia repair with removal of adhesions in 2018.  Wrist surgery secondary to fracture in the past.  Social History: She lives in [place], [place] with her spouse.  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  She stopped smoking years ago.  She denies alcohol  or recreational drug use.  Family History: Her sister had thyroid cancer.  Allergies: No known drug allergies.     PCP: Avani Montoya MD    INTERVAL HISTORY:  • 1/2/2023- platelets 38,000, WBC 16.8.  PTT 24.5 (24-31) and PT 10.3 (9.6-11.7).  Folate 11.7 (4.78-24.2) and vitamin B12 460 (211-946).    History of present illness reviewed since last visit and changes noted on 01/02/23.    Subjective   She is complaining of shortness of breath.    ROS:  Review of Systems   Constitutional: Negative for activity change, chills, fatigue, fever and unexpected weight change.   HENT: Negative for congestion, dental problem, hearing loss, mouth sores, nosebleeds, sore throat and trouble swallowing.    Eyes: Negative for photophobia and visual disturbance.   Respiratory: Positive for shortness of breath. Negative for cough and chest tightness.    Cardiovascular: Negative for chest pain, palpitations and leg swelling.   Gastrointestinal: Negative for abdominal distention, abdominal pain, blood in stool, constipation, diarrhea, nausea and vomiting.   Endocrine: Negative for cold intolerance and heat intolerance.   Genitourinary: Negative for decreased urine volume, difficulty urinating, dysuria, frequency, hematuria and urgency.   Musculoskeletal: Negative for arthralgias and gait problem.   Skin: Negative for rash and wound.   Neurological: Negative for dizziness, tremors, weakness, light-headedness, numbness and headaches.   Hematological: Negative for adenopathy. Does not bruise/bleed easily.   Psychiatric/Behavioral: Negative for confusion and hallucinations. The patient is not nervous/anxious.    All other systems reviewed and are negative.       MEDICATIONS:    Scheduled Meds:  aspirin, 81 mg, Oral, Daily  budesonide, 0.5 mg, Nebulization, BID - RT  dilTIAZem CD, 180 mg, Oral, Daily  gabapentin, 900 mg, Oral, Nightly  guaiFENesin, 1,200 mg, Oral, Q PM  ipratropium-albuterol, 3 mL, Nebulization, 4x Daily -  "RT  methylPREDNISolone sodium succinate, 40 mg, Intravenous, Q12H  montelukast, 10 mg, Oral, Nightly  pantoprazole, 40 mg, Oral, QAM  rOPINIRole, 1 mg, Oral, BID  rosuvastatin, 20 mg, Oral, Daily  senna-docusate sodium, 2 tablet, Oral, BID  sodium chloride, 10 mL, Intravenous, Q12H  theophylline, 400 mg, Oral, Q24H       Continuous Infusions:      PRN Meds:  •  acetaminophen **OR** acetaminophen **OR** acetaminophen  •  albuterol  •  ALPRAZolam  •  aluminum-magnesium hydroxide-simethicone  •  senna-docusate sodium **AND** polyethylene glycol **AND** bisacodyl **AND** bisacodyl  •  ipratropium-albuterol  •  ondansetron  •  oxyCODONE  •  sodium chloride  •  sodium chloride  •  sodium chloride     ALLERGIES:  No Known Allergies    Objective    VITALS:   /77 (BP Location: Right arm, Patient Position: Lying)   Pulse 120   Temp 98.6 °F (37 °C) (Oral)   Resp 24   Ht 167.6 cm (66\")   Wt 85.3 kg (188 lb 0.8 oz)   SpO2 (!) 87%   BMI 30.35 kg/m²     PHYSICAL EXAM:  Physical Exam  Vitals and nursing note reviewed.   Constitutional:       General: She is not in acute distress.     Appearance: Normal appearance. She is well-developed. She is not diaphoretic.   HENT:      Head: Normocephalic and atraumatic.      Nose: Nose normal.      Mouth/Throat:      Mouth: Mucous membranes are moist.      Pharynx: Oropharynx is clear. No oropharyngeal exudate or posterior oropharyngeal erythema.      Comments: BiPAP mask  Eyes:      General: No scleral icterus.     Extraocular Movements: Extraocular movements intact.      Conjunctiva/sclera: Conjunctivae normal.      Pupils: Pupils are equal, round, and reactive to light.   Cardiovascular:      Rate and Rhythm: Regular rhythm. Tachycardia present.      Heart sounds: Normal heart sounds. No murmur heard.     Comments: Cardiac monitor leads  Pulmonary:      Effort: Pulmonary effort is normal. No respiratory distress.      Breath sounds: No wheezing or rales.      Comments: BiPAP.  " Decreased breath sounds bilaterally  Abdominal:      General: Bowel sounds are normal. There is no distension.      Palpations: Abdomen is soft. There is no mass.      Tenderness: There is no abdominal tenderness. There is no guarding.   Genitourinary:     Comments: Deferred   Musculoskeletal:         General: No swelling, tenderness or deformity. Normal range of motion.      Cervical back: Normal range of motion and neck supple.      Right lower leg: No edema.      Left lower leg: No edema.      Comments: LUE IV and left hand O2 monitor.   Lymphadenopathy:      Cervical: No cervical adenopathy.      Upper Body:      Right upper body: No supraclavicular adenopathy.      Left upper body: No supraclavicular adenopathy.   Skin:     General: Skin is warm and dry.      Coloration: Skin is not pale.      Findings: No bruising, erythema or rash.   Neurological:      General: No focal deficit present.      Mental Status: She is alert and oriented to person, place, and time.      Coordination: Coordination normal.   Psychiatric:         Mood and Affect: Mood normal.         Behavior: Behavior normal.         Thought Content: Thought content normal.           RECENT LABS:  Lab Results (last 24 hours)     Procedure Component Value Units Date/Time    Basic Metabolic Panel [[id number]]  (Abnormal) Collected: 01/02/23 0015    Specimen: Blood Updated: 01/02/23 0110     Glucose 133 mg/dL      BUN 32 mg/dL      Creatinine 0.82 mg/dL      Sodium 136 mmol/L      Potassium 4.5 mmol/L      Comment: Slight hemolysis detected by analyzer. Results may be affected.        Chloride 91 mmol/L      CO2 34.0 mmol/L      Calcium 9.6 mg/dL      BUN/Creatinine Ratio 39.0     Anion Gap 11.0 mmol/L      eGFR 75.6 mL/min/1.73      Comment: National Kidney Foundation and American Society of Nephrology (ASN) Task Force recommended calculation based on the Chronic Kidney Disease Epidemiology Collaboration (CKD-EPI) equation refit without adjustment for  race.       Narrative:      GFR Normal >60  Chronic Kidney Disease <60  Kidney Failure <15    The GFR formula is only valid for adults with stable renal function between ages 18 and 70.    Magnesium [[id number]]  (Abnormal) Collected: 01/02/23 0015    Specimen: Blood Updated: 01/02/23 0110     Magnesium 2.8 mg/dL     CBC & Differential [[id number]]  (Abnormal) Collected: 01/02/23 0015    Specimen: Blood Updated: 01/02/23 0103    Narrative:      The following orders were created for panel order CBC & Differential.  Procedure                               Abnormality         Status                     ---------                               -----------         ------                     CBC Auto Differential[[id number]]        Abnormal            Final result               Scan Slide[[id number]]                                                                    Please view results for these tests on the individual orders.    CBC Auto Differential [[id number]]  (Abnormal) Collected: 01/02/23 0015    Specimen: Blood Updated: 01/02/23 0103     WBC 16.80 10*3/mm3      RBC 4.45 10*6/mm3      Hemoglobin 12.2 g/dL      Hematocrit 38.7 %      MCV 86.8 fL      MCH 27.5 pg      MCHC 31.7 g/dL      RDW 15.0 %      RDW-SD 45.5 fl      MPV 9.5 fL      Platelets 38 10*3/mm3      Comment: Parameter reviewed in the past 30 days on 12.31.22 .         Neutrophil % 89.4 %      Lymphocyte % 5.1 %      Monocyte % 4.8 %      Eosinophil % 0.0 %      Basophil % 0.7 %      Neutrophils, Absolute 15.00 10*3/mm3      Lymphocytes, Absolute 0.90 10*3/mm3      Monocytes, Absolute 0.80 10*3/mm3      Eosinophils, Absolute 0.00 10*3/mm3      Basophils, Absolute 0.10 10*3/mm3      nRBC 0.0 /100 WBC     Lactic Acid, Plasma [[id number]]  (Normal) Collected: 01/01/23 1802    Specimen: Blood Updated: 01/01/23 1845     Lactate 1.9 mmol/L     Blood Culture - Blood, Arm, Right [[id number]] Collected: 01/01/23 1801    Specimen: Blood from Arm, Right Updated: 01/01/23  1812    Blood Culture - Blood, Hand, Left [[id number]] Collected: 01/01/23 1801    Specimen: Blood from Hand, Left Updated: 01/01/23 1812    Vitamin B12 [[id number]]  (Normal) Collected: 12/31/22 1257    Specimen: Blood Updated: 01/01/23 1329     Vitamin B-12 460 pg/mL     Narrative:      Results may be falsely increased if patient taking Biotin.      Folate [[id number]]  (Normal) Collected: 12/31/22 1257    Specimen: Blood Updated: 01/01/23 1329     Folate 11.70 ng/mL     Narrative:      Results may be falsely increased if patient taking Biotin.      Blood Culture - Blood, Arm, Left [[id number]]  (Normal) Collected: 12/31/22 1257    Specimen: Blood from Arm, Left Updated: 01/01/23 1317     Blood Culture No growth at 24 hours    Narrative:      Less than seven (7) mL's of blood was collected.  Insufficient quantity may yield false negative results.    Protime-INR [[id number]]  (Normal) Collected: 01/01/23 1204    Specimen: Blood Updated: 01/01/23 1223     Protime 10.3 Seconds      INR 1.00    aPTT [[id number]]  (Normal) Collected: 01/01/23 1204    Specimen: Blood Updated: 01/01/23 1223     PTT 24.5 seconds     NIALL by IFA, Reflex 9-biomarkers profile [[id number]] Collected: 01/01/23 1205    Specimen: Blood Updated: 01/01/23 1207    Cytomegalovirus Antibody, IgM [[id number]] Collected: 01/01/23 1205    Specimen: Blood Updated: 01/01/23 1207    Toni-Barr Virus VCA, IgM [[id number]] Collected: 01/01/23 1205    Specimen: Blood Updated: 01/01/23 1207    Blood Gas, Arterial - [[id number]]  (Abnormal) Collected: 01/01/23 0913    Specimen: Arterial Blood Updated: 01/01/23 0956     Site Right Brachial     Emile's Test Positive     pH, Arterial 7.334 pH units      pCO2, Arterial 76.3 mm Hg      pO2, Arterial 174.2 mm Hg      HCO3, Arterial 40.6 mmol/L      Base Excess, Arterial 11.5 mmol/L      Comment: Serial Number: [id number]:  [id number]        O2 Saturation, Arterial 99.4 %      CO2 Content 43.0 mmol/L      Barometric Pressure  for Blood Gas --     Comment: N/A        Modality Room Air     FIO2 21 %      Ventilator Mode ;NIV     PEEP 5     Hemodilution Yes          PENDING RESULTS: CMV/EBV IgMs, NIALL, blood cx    IMAGING REVIEWED:  CT Chest Without Contrast Diagnostic    Result Date: 12/31/2022  1. Findings of advanced centrilobular emphysema with central bronchial wall thickening which can be seen with chronic bronchitis or reactive airway disease. 2. Stable 5 mm pulmonary nodule within the right middle lobe.    Electronically Signed By-Kj Chavez MD On:12/31/2022 3:19 PM This report was finalized on [id number] by  Kj Chavez MD.      I have reviewed the patient's labs, imaging, reports, and other clinician documentation.    Assessment & Plan   ASSESSMENT:  1. Acute on chronic thrombocytopenia- intermittent mild thrombocytopenia noted dating back to 2018.  LFTs were okay. Respiratory viral panel was positive for coronavirus OC43 (not COVID-19).    Acute and chronic thrombocytopenia work-up sent with coags normal and no evidence of vitamin B12 or folate deficiencies.  Platelets continue to slowly improve. She is receiving Solu-Medrol for her respiratory symptoms which may be contributing to the improvement.  2. Coronavirus OC43 infection/acute on chronic respiratory failure with hypoxia/exacerbation of COPD/IRIS- received 1 dose of doxycycline in the ED.  On Solu-Medrol and supportive care.  She met criteria for sepsis trigger and blood cultures were sent 1/2.  Pulmonary managing.  3. Right middle lobe lung nodule- stable on CT this admit from June 2022 CT.  Plan to repeat in 6 months.    PLAN:  1. Follow CBC.  2. Await remaining thrombocytopenia work-up.  3. Transfuse platelets as needed anytime platelets less than 15,000 or with any active bleeding.  4. Follow lung nodule as outpatient with repeat CT chest in 6 months.  5. Outpatient platelet antibodies.        Note prepared by ADAN Rodriguez.  Patient seen and examined by  Sagar Perez MD.  Electronically signed by BERKLEY Carrion, 01/02/23, 11:09 AM EST.    I have personally performed a face-to-face diagnostic evaluation on this patient. I have performed a complete history and physical examination, reviewed laboratory studies, and radiographic examinations.  I have completed the majority and substantive portion of the medical decision making.  I have formulated the assessment on this patient and the plan of action as noted above. I have discussed the case with Simon Carter NP, have edited/reviewed the note, and agree with the care plan.  She is complaining of shortness of air.  On examination he is wearing BiPAP apparatus.  Platelet count is 38,000 today.  Work-up for thrombocytopenia is in progress and platelets are slowly improving likely acutely suppressed from viral infection.  She does have some chronic thrombocytopenia also and we will plan on checking platelet antibodies as outpatient as they are not available to be done in the hospital.        I discussed the patient's findings and my recommendations with patient.    Part of this note may be an electronic transcription/translation of spoken language to printed text using the Dragon Dictation System.    Electronically signed by Sagar Perez MD, 01/02/23, 12:38 PM EST.

## 2023-01-03 ENCOUNTER — APPOINTMENT (OUTPATIENT)
Dept: GENERAL RADIOLOGY | Facility: HOSPITAL | Age: 73
DRG: 865 | End: 2023-01-03
Payer: MEDICARE

## 2023-01-03 LAB
ANA SER QL IF: NEGATIVE
ANION GAP SERPL CALCULATED.3IONS-SCNC: 8 MMOL/L (ref 5–15)
BASOPHILS # BLD AUTO: 0 10*3/MM3 (ref 0–0.2)
BASOPHILS NFR BLD AUTO: 0.2 % (ref 0–1.5)
BUN SERPL-MCNC: 29 MG/DL (ref 8–23)
BUN/CREAT SERPL: 26.4 (ref 7–25)
CALCIUM SPEC-SCNC: 9.4 MG/DL (ref 8.6–10.5)
CHLORIDE SERPL-SCNC: 91 MMOL/L (ref 98–107)
CMV IGM SERPL IA-ACNC: <30 AU/ML (ref 0–29.9)
CO2 SERPL-SCNC: 38 MMOL/L (ref 22–29)
CREAT SERPL-MCNC: 1.1 MG/DL (ref 0.57–1)
DEPRECATED RDW RBC AUTO: 47.7 FL (ref 37–54)
EBV VCA IGM SER IA-ACNC: <36 U/ML (ref 0–35.9)
EGFRCR SERPLBLD CKD-EPI 2021: 53.2 ML/MIN/1.73
EOSINOPHIL # BLD AUTO: 0 10*3/MM3 (ref 0–0.4)
EOSINOPHIL NFR BLD AUTO: 0 % (ref 0.3–6.2)
ERYTHROCYTE [DISTWIDTH] IN BLOOD BY AUTOMATED COUNT: 15.1 % (ref 12.3–15.4)
GLUCOSE SERPL-MCNC: 151 MG/DL (ref 65–99)
HCT VFR BLD AUTO: 38.2 % (ref 34–46.6)
HGB BLD-MCNC: 12.4 G/DL (ref 12–15.9)
LAB AP CASE REPORT: NORMAL
LABORATORY COMMENT REPORT: NORMAL
LYMPHOCYTES # BLD AUTO: 0.7 10*3/MM3 (ref 0.7–3.1)
LYMPHOCYTES NFR BLD AUTO: 5.1 % (ref 19.6–45.3)
MAGNESIUM SERPL-MCNC: 2.3 MG/DL (ref 1.6–2.4)
MCH RBC QN AUTO: 27.6 PG (ref 26.6–33)
MCHC RBC AUTO-ENTMCNC: 32.4 G/DL (ref 31.5–35.7)
MCV RBC AUTO: 85.4 FL (ref 79–97)
MONOCYTES # BLD AUTO: 0.9 10*3/MM3 (ref 0.1–0.9)
MONOCYTES NFR BLD AUTO: 6.3 % (ref 5–12)
NEUTROPHILS NFR BLD AUTO: 12.7 10*3/MM3 (ref 1.7–7)
NEUTROPHILS NFR BLD AUTO: 88.4 % (ref 42.7–76)
NRBC BLD AUTO-RTO: 0.1 /100 WBC (ref 0–0.2)
PATH REPORT.FINAL DX SPEC: NORMAL
PLATELET # BLD AUTO: 51 10*3/MM3 (ref 140–450)
PMV BLD AUTO: 9.7 FL (ref 6–12)
POTASSIUM SERPL-SCNC: 4.4 MMOL/L (ref 3.5–5.2)
RBC # BLD AUTO: 4.47 10*6/MM3 (ref 3.77–5.28)
SODIUM SERPL-SCNC: 137 MMOL/L (ref 136–145)
WBC NRBC COR # BLD: 14.4 10*3/MM3 (ref 3.4–10.8)

## 2023-01-03 PROCEDURE — 94799 UNLISTED PULMONARY SVC/PX: CPT

## 2023-01-03 PROCEDURE — 97166 OT EVAL MOD COMPLEX 45 MIN: CPT

## 2023-01-03 PROCEDURE — 63710000001 PREDNISONE PER 5 MG: Performed by: INTERNAL MEDICINE

## 2023-01-03 PROCEDURE — 94660 CPAP INITIATION&MGMT: CPT

## 2023-01-03 PROCEDURE — 97110 THERAPEUTIC EXERCISES: CPT

## 2023-01-03 PROCEDURE — 71045 X-RAY EXAM CHEST 1 VIEW: CPT

## 2023-01-03 PROCEDURE — 94664 DEMO&/EVAL PT USE INHALER: CPT

## 2023-01-03 PROCEDURE — 63710000001 PREDNISONE PER 1 MG: Performed by: INTERNAL MEDICINE

## 2023-01-03 RX ORDER — METOPROLOL SUCCINATE 50 MG/1
50 TABLET, EXTENDED RELEASE ORAL 2 TIMES DAILY
Status: DISCONTINUED | OUTPATIENT
Start: 2023-01-03 | End: 2023-01-06 | Stop reason: HOSPADM

## 2023-01-03 RX ADMIN — PREDNISONE 30 MG: 20 TABLET ORAL at 09:25

## 2023-01-03 RX ADMIN — GABAPENTIN 900 MG: 300 CAPSULE ORAL at 20:01

## 2023-01-03 RX ADMIN — BUDESONIDE 0.5 MG: 0.5 INHALANT RESPIRATORY (INHALATION) at 20:03

## 2023-01-03 RX ADMIN — ROPINIROLE HYDROCHLORIDE 1 MG: 1 TABLET, FILM COATED ORAL at 18:00

## 2023-01-03 RX ADMIN — IPRATROPIUM BROMIDE AND ALBUTEROL SULFATE 3 ML: 2.5; .5 SOLUTION RESPIRATORY (INHALATION) at 16:28

## 2023-01-03 RX ADMIN — SENNOSIDES AND DOCUSATE SODIUM 2 TABLET: 50; 8.6 TABLET ORAL at 09:26

## 2023-01-03 RX ADMIN — THEOPHYLLINE ANHYDROUS 400 MG: 200 CAPSULE, EXTENDED RELEASE ORAL at 09:26

## 2023-01-03 RX ADMIN — METOPROLOL SUCCINATE 50 MG: 50 TABLET, EXTENDED RELEASE ORAL at 20:00

## 2023-01-03 RX ADMIN — IPRATROPIUM BROMIDE AND ALBUTEROL SULFATE 3 ML: 2.5; .5 SOLUTION RESPIRATORY (INHALATION) at 07:33

## 2023-01-03 RX ADMIN — IPRATROPIUM BROMIDE AND ALBUTEROL SULFATE 3 ML: 2.5; .5 SOLUTION RESPIRATORY (INHALATION) at 20:03

## 2023-01-03 RX ADMIN — DILTIAZEM HYDROCHLORIDE 180 MG: 180 CAPSULE, COATED, EXTENDED RELEASE ORAL at 09:25

## 2023-01-03 RX ADMIN — MONTELUKAST 10 MG: 10 TABLET, FILM COATED ORAL at 20:00

## 2023-01-03 RX ADMIN — IPRATROPIUM BROMIDE AND ALBUTEROL SULFATE 3 ML: 2.5; .5 SOLUTION RESPIRATORY (INHALATION) at 11:18

## 2023-01-03 RX ADMIN — BUDESONIDE 0.5 MG: 0.5 INHALANT RESPIRATORY (INHALATION) at 07:40

## 2023-01-03 RX ADMIN — ROSUVASTATIN 20 MG: 10 TABLET, FILM COATED ORAL at 09:25

## 2023-01-03 RX ADMIN — GUAIFENESIN 1200 MG: 600 TABLET, EXTENDED RELEASE ORAL at 17:59

## 2023-01-03 RX ADMIN — Medication 10 ML: at 09:26

## 2023-01-03 RX ADMIN — PANTOPRAZOLE SODIUM 40 MG: 40 TABLET, DELAYED RELEASE ORAL at 06:08

## 2023-01-03 RX ADMIN — ROPINIROLE HYDROCHLORIDE 1 MG: 1 TABLET, FILM COATED ORAL at 20:00

## 2023-01-03 RX ADMIN — ASPIRIN 81 MG: 81 TABLET, CHEWABLE ORAL at 09:25

## 2023-01-03 RX ADMIN — METOPROLOL SUCCINATE 50 MG: 50 TABLET, EXTENDED RELEASE ORAL at 12:20

## 2023-01-03 RX ADMIN — SENNOSIDES AND DOCUSATE SODIUM 2 TABLET: 50; 8.6 TABLET ORAL at 20:00

## 2023-01-03 RX ADMIN — Medication 10 ML: at 20:01

## 2023-01-03 NOTE — PROGRESS NOTES
Daily Progress Note        Acute on chronic respiratory failure with hypoxia and hypercapnia (HCC)    Mixed hyperlipidemia    Primary hypertension    Obstructive sleep apnea    Restless leg syndrome    COPD with acute exacerbation (HCC)    Obesity (BMI 30-39.9)    Chronic depression    GERD without esophagitis    Chronic respiratory failure with hypoxia and hypercapnia (HCC)    Chronic diastolic CHF (congestive heart failure) (HCC)    Coronavirus infection      Assessment    Coronavirus infection, OC43  Acute hypoxemic/hypercapnic respiratory failure: ABGs on admission pH 7.30, PCO2 79.9, PO2 93  Acute bronchitis  COPD with acute exacerbation  5 mm right middle lobe nodule  IRIS on home BiPAP    Thrombocytopenia  Chronic diastolic CHF  Essential hypertension  GERD   Obesity BMI of 30  RLS     Recommendations:    Due to chronic respiratory failure consequent to COPD, patient would benefit from a noninvasive home ventilator.  Bilevel therapy with and without a rate would be ineffective if patient requires a volume targeted mode.  Ventilation is required to decrease the work of breathing and improve pulmonary status, therefore decreasing hospital readmission.    Oxygen supplement and titration to maintain saturation 90 to 95%: Currently requiring 8L high flow,   -Adjusted AVAPS settings    Systemic steroids wean  Singulair  Mucinex twice daily  Theophylline  Bronchodilatores/Inhaled corticosteroids  DVT/GI prophylaxis  Check daily labs and correct electrolytes as needed     Patient will need repeated CT scan of the chest without contrast in 6 months to follow-up on the right middle lobe nodule          LOS: 3 days     Subjective         Objective     Vital signs for last 24 hours:  Vitals:    01/03/23 0249 01/03/23 0520 01/03/23 0525 01/03/23 0628   BP:  122/78 122/78 122/78   BP Location:   Right arm Right arm   Patient Position:   Lying Sitting   Pulse:  108 103 90   Resp: 18  21 20   Temp:   97.9 °F (36.6 °C)     TempSrc:   Axillary    SpO2:  96% 97% 96%   Weight:   81.8 kg (180 lb 5.4 oz)    Height:           Intake/Output last 3 shifts:  I/O last 3 completed shifts:  In: 700 [P.O.:700]  Out: 550 [Urine:550]  Intake/Output this shift:  No intake/output data recorded.      Radiology  Imaging Results (Last 24 Hours)     ** No results found for the last 24 hours. **          Labs:  Results from last 7 days   Lab Units 01/02/23  2344   WBC 10*3/mm3 14.40*   HEMOGLOBIN g/dL 12.4   HEMATOCRIT % 38.2   PLATELETS 10*3/mm3 51*     Results from last 7 days   Lab Units 01/02/23  2344 01/01/23  0706 12/31/22  1257   SODIUM mmol/L 137   < > 136   POTASSIUM mmol/L 4.4   < > 4.1   CHLORIDE mmol/L 91*   < > 89*   CO2 mmol/L 38.0*   < > 37.0*   BUN mg/dL 29*   < > 15   CREATININE mg/dL 1.10*   < > 0.84   CALCIUM mg/dL 9.4   < > 9.7   BILIRUBIN mg/dL  --   --  0.4   ALK PHOS U/L  --   --  78   ALT (SGPT) U/L  --   --  11   AST (SGOT) U/L  --   --  23   GLUCOSE mg/dL 151*   < > 163*    < > = values in this interval not displayed.     Results from last 7 days   Lab Units 01/01/23  0913   PH, ARTERIAL pH units 7.334*   PO2 ART mm Hg 174.2*   PCO2, ARTERIAL mm Hg 76.3*   HCO3 ART mmol/L 40.6*     Results from last 7 days   Lab Units 12/31/22  1257   ALBUMIN g/dL 4.1     Results from last 7 days   Lab Units 12/31/22  1257   TROPONIN T ng/mL <0.010         Results from last 7 days   Lab Units 01/02/23  2344   MAGNESIUM mg/dL 2.3     Results from last 7 days   Lab Units 01/01/23  1204   INR  1.00   APTT seconds 24.5               Meds:   SCHEDULE  aspirin, 81 mg, Oral, Daily  budesonide, 0.5 mg, Nebulization, BID - RT  dilTIAZem CD, 180 mg, Oral, Daily  gabapentin, 900 mg, Oral, Nightly  guaiFENesin, 1,200 mg, Oral, Q PM  ipratropium-albuterol, 3 mL, Nebulization, 4x Daily - RT  montelukast, 10 mg, Oral, Nightly  pantoprazole, 40 mg, Oral, QAM  predniSONE, 30 mg, Oral, Daily   Followed by  [START ON 1/5/2023] predniSONE, 20 mg, Oral, Daily    Followed by  [START ON 1/7/2023] predniSONE, 10 mg, Oral, Daily  rOPINIRole, 1 mg, Oral, BID  rosuvastatin, 20 mg, Oral, Daily  senna-docusate sodium, 2 tablet, Oral, BID  sodium chloride, 10 mL, Intravenous, Q12H  theophylline, 400 mg, Oral, Q24H      Infusions     PRNs  •  acetaminophen **OR** acetaminophen **OR** acetaminophen  •  albuterol  •  ALPRAZolam  •  aluminum-magnesium hydroxide-simethicone  •  senna-docusate sodium **AND** polyethylene glycol **AND** bisacodyl **AND** bisacodyl  •  ipratropium-albuterol  •  ondansetron  •  oxyCODONE  •  sodium chloride  •  sodium chloride  •  sodium chloride    Physical Exam:  Physical Exam  Vitals reviewed.   Cardiovascular:      Heart sounds: Murmur heard.   Pulmonary:      Breath sounds: Wheezing and rhonchi present.   Skin:     General: Skin is warm and dry.   Neurological:      Mental Status: She is alert.         ROS  Review of Systems    I have reviewed the patient's new clinical results.    Electronically signed by Hawa Sifuentes MD

## 2023-01-03 NOTE — CASE MANAGEMENT/SOCIAL WORK
Continued Stay Note   Basilio     Patient Name: Michelle Francis  MRN: [id number]  Today's Date: 1/3/2023    Admit Date: 12/31/2022    Plan: DC Plan: Declined SNF/HH. Anticipate routine home with spouse. Has home O2 and bipap through [facility].   Discharge Plan     Row Name 01/03/23 1552       Plan    Plan DC Plan: Declined SNF/HH. Anticipate routine home with spouse. Has home O2 and bipap through [facility].    Patient/Family in Agreement with Plan yes    Provided Post Acute Provider List? Refused    Plan Comments CM met with patient at bedside to discuss therapy recommendations. Patient declined SNF and HH at this time. Reported that she wanted to go home at dc and reported that she was not interested in working with therapy at home. Reported that she has her ’s assistance and a wheelchair at home if needed.              Met with patient in room wearing PPE: mask.      Maintained distance greater than six feet and spent less than 15 minutes in the room.      Eunice Quiroz RN     Office Phone: [phone]  Office Cell: [phone]

## 2023-01-03 NOTE — PLAN OF CARE
Goal Outcome Evaluation:      Pt currently up in chair on 6L HFNC with intermittent use of bipap. Home metoprolol dose restarted, pt's heart rate better controlled. VSS with no complaints at this time.    Problem: Adult Inpatient Plan of Care  Goal: Plan of Care Review  Outcome: Ongoing, Progressing

## 2023-01-03 NOTE — THERAPY EVALUATION
Patient Name: Michelle Francis  : 1950    MRN: [id number]                              Today's Date: 1/3/2023       Admit Date: 2022    Visit Dx:     ICD-10-CM ICD-9-CM   1. Coronavirus infection  B34.2 079.89   2. Acute exacerbation of chronic obstructive pulmonary disease (COPD) ([facility])  J44.1 491.21   3. Acute bronchitis, unspecified organism  J20.9 466.0   4. Thrombocytopenia, unspecified ([facility])  D69.6 287.5     Patient Active Problem List   Diagnosis   • Acute on chronic respiratory failure with hypoxia and hypercapnia (HCC)   • Mixed hyperlipidemia   • Primary hypertension   • Obstructive sleep apnea   • Restless leg syndrome   • Arthritis   • Osteoporosis   • COPD with acute exacerbation (HCC)   • Obesity (BMI 30-39.9)   • Tachycardia   • CKD (chronic kidney disease) stage 2, GFR 60-89 ml/min   • Chronic depression   • Chest pain, atypical   • Abdominal hernia   • Disorder of vitamin B12   • GERD without esophagitis   • Vitamin D deficiency   • Right lower lobe pneumonia   • Hyperglycemia   • Elevated AST (SGOT)   • Chronic respiratory failure with hypoxia and hypercapnia (HCC)   • Leg edema   • Acute congestive heart failure (HCC)   • Thrombocytopenia (HCC)   • Left lower lobe pneumonia   • Chronic diastolic CHF (congestive heart failure) ([facility])   • Coronavirus infection   • Pneumonia due to COVID-19 virus     Past Medical History:   Diagnosis Date   • Arthritis    • Colon polyps     Dr Bates   • COPD (chronic obstructive pulmonary disease) (HCC)    • History of emphysema    • Hyperlipidemia    • Hypertension    • Kidney disease    • Osteoporosis    • Pneumonia due to COVID-19 virus 2023   • Restless leg syndrome     sees Dr Seipel   • [facility] on auto bipap min 8 max, PS 2-8-Alcaraz's, nasal mask     Past Surgical History:   Procedure Laterality Date   • BRONCHOSCOPY N/A 2022    Procedure: BRONCHOSCOPY with left lower lobe wash;  Surgeon: Hawa Sifuentes MD;  Location:  [facility] ENDOSCOPY;  Service: Pulmonary;  Laterality: N/A;  pneumonia   • CARDIOVASCULAR STRESS TEST  2020   • CATARACT EXTRACTION      Dr Moyer   • CEREBRAL ANEURYSM REPAIR      Brain Aneurysm approx 2009, treated with stents and coils, Dr. Fraga   • COLON SURGERY      partial colectomy-2013 Dr Valladares- due to multiple large polyps   • ECHO - CONVERTED  2019   • EXPLORATORY LAPAROTOMY      lysis of intra abdominal adhesions, primary ventral hernia repair 08/01/2018 Dr West   • WRIST SURGERY      wrist fracture - Dr Patton      General Information     Row Name 01/03/23 1536          OT Time and Intention    Document Type evaluation  -MS     Mode of Treatment occupational therapy  -MS     Row Name 01/03/23 1536          General Information    Patient Profile Reviewed yes  -MS     Prior Level of Function min assist:;ADL's;mod assist:  Pt reports independence with toileting and min-mod A with dressing and bathing.  -MS     Existing Precautions/Restrictions oxygen therapy device and L/min;fall  -MS     Barriers to Rehab medically complex;previous functional deficit  -MS     Row Name 01/03/23 1536          Occupational Profile    Reason for Services/Referral (Occupational Profile) Pt is a 71 y/o female who presented to [facility] 12/31/22 with c/o dyspnea. PMHx significant for COPD and depression.  -MS     Successful Occupations (Occupational Profile) former employee of [employer]  -MS     Environmental Supports and Barriers (Occupational Profile) supportive family  -MS     Row Name 01/03/23 1536          Living Environment    People in Home spouse  -MS     Row Name 01/03/23 1536          Home Main Entrance    Number of Stairs, Main Entrance four  -MS     Stair Railings, Main Entrance railings safe and in good condition  -MS     Row Name 01/03/23 1536          Stairs Within Home, Primary    Number of Stairs, Within Home, Primary none  -MS     Row Name 01/03/23 1536          Cognition    Orientation Status (Cognition) oriented  x 4  -MS     Row Name 01/03/23 1536          Safety Issues, Functional Mobility    Impairments Affecting Function (Mobility) balance;endurance/activity tolerance;strength;shortness of breath;range of motion (ROM)  -MS           User Key  (r) = Recorded By, (t) = Taken By, (c) = Cosigned By    Initials Name Provider Type    Candace Lemus OT Occupational Therapist                 Mobility/ADL's     Row Name 01/03/23 1538          Bed Mobility    Comment, (Bed Mobility) Pt up in chair upon arrival and post session  -MS     Row Name 01/03/23 1538          Transfers    Transfers stand-sit transfer  -MS     Row Name 01/03/23 1538          Sit-Stand Transfer    Sit-Stand [place] (Transfers) contact guard  HHA  -MS     Row Name 01/03/23 1538          Stand-Sit Transfer    Comment, (Stand-Sit Transfer) Pt HR increased to 122 with standing therefore further activity was not progressed  -MS           User Key  (r) = Recorded By, (t) = Taken By, (c) = Cosigned By    Initials Name Provider Type    MS Ravi CandaceJAE Occupational Therapist               Obj/Interventions     Row Name 01/03/23 1539          Sensory Assessment (Somatosensory)    Sensory Assessment (Somatosensory) UE sensation intact  -MS     Row Name 01/03/23 1539          Vision Assessment/Intervention    Visual Impairment/Limitations WFL  -MS     Row Name 01/03/23 1539          Range of Motion Comprehensive    General Range of Motion bilateral upper extremity ROM WFL  -MS     Row Name 01/03/23 1539          Strength Comprehensive (MMT)    Comment, General Manual Muscle Testing (MMT) Assessment BUE grossly 3+/5  -MS     Row Name 01/03/23 1539          Balance    Balance Assessment sitting static balance;sitting dynamic balance;standing static balance;standing dynamic balance  -MS     Static Sitting Balance independent  -MS     Dynamic Sitting Balance independent  -MS     Position, Sitting Balance sitting in chair  -MS     Static Standing Balance  minimal assist  -MS     Dynamic Standing Balance minimal assist  -MS     Position/Device Used, Standing Balance supported  HHA  -MS           User Key  (r) = Recorded By, (t) = Taken By, (c) = Cosigned By    Initials Name Provider Type    Candace Lemus, OT Occupational Therapist               Goals/Plan     Row Name 01/03/23 1545          Bathing Goal 1 (OT)    Activity/Device (Bathing Goal 1, OT) bathing skills, all  -MS     [place] Level/Cues Needed (Bathing Goal 1, OT) minimum assist (75% or more patient effort)  -MS     Time Frame (Bathing Goal 1, OT) long term goal (LTG);2 weeks  -MS     Progress/Outcomes (Bathing Goal 1, OT) new goal  -MS     Row Name 01/03/23 1545          Dressing Goal 1 (OT)    Activity/Device (Dressing Goal 1, OT) dressing skills, all  -MS     Somers/Cues Needed (Dressing Goal 1, OT) minimum assist (75% or more patient effort)  -MS     Time Frame (Dressing Goal 1, OT) long term goal (LTG);2 weeks  -MS     Progress/Outcome (Dressing Goal 1, OT) new goal  -MS     Row Name 01/03/23 1545          Toileting Goal 1 (OT)    Activity/Device (Toileting Goal 1, OT) toileting skills, all  -MS     [place] Level/Cues Needed (Toileting Goal 1, OT) standby assist  -MS     Time Frame (Toileting Goal 1, OT) long term goal (LTG);2 weeks  -MS     Progress/Outcome (Toileting Goal 1, OT) new goal  -MS     Row Name 01/03/23 1545          Grooming Goal 1 (OT)    Activity/Device (Grooming Goal 1, OT) grooming skills, all  -MS     [place] (Grooming Goal 1, OT) standby assist  -MS     Time Frame (Grooming Goal 1, OT) long term goal (LTG);2 weeks  -MS     Progress/Outcome (Grooming Goal 1, OT) new goal  -MS     Row Name 01/03/23 1548          Therapy Assessment/Plan (OT)    Planned Therapy Interventions (OT) activity tolerance training;adaptive equipment training;BADL retraining;IADL retraining;patient/caregiver education/training;transfer/mobility retraining;strengthening  exercise;ROM/therapeutic exercise;occupation/activity based interventions;functional balance retraining  -MS           User Key  (r) = Recorded By, (t) = Taken By, (c) = Cosigned By    Initials Name Provider Type    MS aCndace Rodrigues, OT Occupational Therapist               Clinical Impression     Row Name 01/03/23 1530          Pain Assessment    Pretreatment Pain Rating 0/10 - no pain  -MS     Posttreatment Pain Rating 0/10 - no pain  -MS     Row Name 01/03/23 1538          Plan of Care Review    Plan of Care Reviewed With patient  -MS     Progress no change  -MS     Outcome Evaluation Pt is a 71 y/o female who presented to [facility] 12/31/22 with c/o dyspnea. PMHx significant for COPD and depression. Pt A&Ox4 this date. Pt reports living with  in [facility] with 4 TALIB. Pt reports  assists with bathing and dressing at baseline d/t SOB and fatigue. Pt reports independence with toileting. Pt does not use AD for mobility at baseline. Pt wears 5L home O2. This date pt sitting up in chair upon arrival. Pt comes to stand with CGA, however required min A for standing balance with HHA. Pt HR increased with standing, therefore was not progressed to further activity. Pt appears fatigued post transfers, indicating decreased activity tolerance needed for ADL routine. Pt is below baseline at this time, indicating further need for skilled OT intervention. Pt desires of having home health to assist with ADLs once d/c home. Pt likely to return safe with family assist and HHOT. OT to follow while admitted [facility].  -MS     Row Name 01/03/23 4716          Therapy Assessment/Plan (OT)    Rehab Potential (OT) good, to achieve stated therapy goals  -MS     Criteria for Skilled Therapeutic Interventions Met (OT) yes;meets criteria;skilled treatment is necessary  -MS     Therapy Frequency (OT) 5 times/wk  -MS     Predicted Duration of Therapy Intervention (OT) until d/c  -MS     Row Name 01/03/23 7132          Therapy Plan Review/Discharge  Plan (OT)    Anticipated Discharge Disposition (OT) home with assist;home with home health  -MS     Row Name 01/03/23 1539          Vital Signs    Pre Systolic BP Rehab 110  -MS     Pre Treatment Diastolic BP 56  -MS     Pretreatment Heart Rate (beats/min) 105  -MS     Intratreatment Heart Rate (beats/min) 122  -MS     Intratreatment Resp Rate (breaths/min) 115  -MS     Pre SpO2 (%) 96  -MS     Pre Patient Position Sitting  -MS     Intra Patient Position Standing  -MS     Post Patient Position Sitting  -MS     Row Name 01/03/23 1539          Positioning and Restraints    Pre-Treatment Position sitting in chair/recliner  -MS     Post Treatment Position chair  -MS     In Chair notified nsg;sitting;call light within reach;encouraged to call for assist  -MS           User Key  (r) = Recorded By, (t) = Taken By, (c) = Cosigned By    Initials Name Provider Type    Candace Lemus OT Occupational Therapist               Outcome Measures     Row Name 01/03/23 1547          How much help from another is currently needed...    Putting on and taking off regular lower body clothing? 2  -MS     Bathing (including washing, rinsing, and drying) 2  -MS     Toileting (which includes using toilet bed pan or urinal) 2  -MS     Putting on and taking off regular upper body clothing 2  -MS     Taking care of personal grooming (such as brushing teeth) 3  -MS     Eating meals 4  -MS     AM-PAC 6 Clicks Score (OT) 15  -MS     Row Name 01/03/23 1547          Functional Assessment    Outcome Measure Options AM-PAC 6 Clicks Daily Activity (OT)  -MS           User Key  (r) = Recorded By, (t) = Taken By, (c) = Cosigned By    Initials Name Provider Type    Candace Lemus OT Occupational Therapist                Occupational Therapy Education     Title: PT OT SLP Therapies (Done)     Topic: Occupational Therapy (Done)     Point: ADL training (Done)     Description:   Instruct learner(s) on proper safety adaptation and remediation techniques  during self care or transfers.   Instruct in proper use of assistive devices.              Learning Progress Summary           Patient Acceptance, E,TB, VU by MS at 1/3/2023 1548                   Point: Body mechanics (Done)     Description:   Instruct learner(s) on proper positioning and spine alignment during self-care, functional mobility activities and/or exercises.              Learning Progress Summary           Patient Acceptance, E,TB, VU by MS at 1/3/2023 1548                               User Key     Initials Effective Dates Name Provider Type Discipline    MS 07/13/22 -  Candace Rodrigues OT Occupational Therapist OT              OT Recommendation and Plan  Planned Therapy Interventions (OT): activity tolerance training, adaptive equipment training, BADL retraining, IADL retraining, patient/caregiver education/training, transfer/mobility retraining, strengthening exercise, ROM/therapeutic exercise, occupation/activity based interventions, functional balance retraining  Therapy Frequency (OT): 5 times/wk  Plan of Care Review  Plan of Care Reviewed With: patient  Progress: no change  Outcome Evaluation: Pt is a 73 y/o female who presented to [facility] 12/31/22 with c/o dyspnea. PMHx significant for COPD and depression. Pt A&Ox4 this date. Pt reports living with  in [facility] with 4 TALIB. Pt reports  assists with bathing and dressing at baseline d/t SOB and fatigue. Pt reports independence with toileting. Pt does not use AD for mobility at baseline. Pt wears 5L home O2. This date pt sitting up in chair upon arrival. Pt comes to stand with CGA, however required min A for standing balance with HHA. Pt HR increased with standing, therefore was not progressed to further activity. Pt appears fatigued post transfers, indicating decreased activity tolerance needed for ADL routine. Pt is below baseline at this time, indicating further need for skilled OT intervention. Pt desires of having home health to assist with  ADLs once d/c home. Pt likely to return safe with family assist and HHOT. OT to follow while admitted [facility].     Time Calculation:              Candace Rodrigues OT  1/3/2023

## 2023-01-03 NOTE — PLAN OF CARE
Goal Outcome Evaluation:  Plan of Care Reviewed With: patient           Outcome Evaluation: Patient alert and oriented, continues on O2@8HFliters while awake,patient currently on AVAPS, patient has rested well this shift, VSS.

## 2023-01-03 NOTE — PROGRESS NOTES
[facility] Medicine Services Daily Progress Note    Patient Name: Michelle Francis  : 1950  MRN: [id number]  Primary Care Physician:  Avani Montoya MD  Date of admission: 2022      Subjective      Chief Complaint: Admitted for worsening shortness of breath in the setting of chronic respiratory failure at home on 5 L of supplemental oxygen and CPAP    Did okay overnight.  Remained slightly tachycardic, so I restarted her home beta-blocker.  She is on 8 L nasal cannula.  Chest x-ray shows no changes.  Continues to use BiPAP while resting.  Bedside rounding completed with the nursing staff. No other acute concerns.     Objective      Vitals:   Temp:  [97.5 °F (36.4 °C)-98.9 °F (37.2 °C)] 97.9 °F (36.6 °C)  Heart Rate:  [] 116  Resp:  [18-25] 20  BP: (117-166)/(65-84) 166/84  Flow (L/min):  [8] 8    Physical Exam:  GEN: Elderly woman on BiPAP, no acute distress  HEENT: NCAT, PERRLA, dry mucous membranes  NECK: Supple, midline trachea  CARD: RRR, mildly tachycardic, no significant peripheral edema  PULM: Coarse bilateral breath sounds, occasional expiratory wheezing, diminished at the bases, non-distressed on BiPAP  ABD: soft, NTND, normoactive bowel sounds throughout  SKIN: See nursing notes for full skin survey  NEURO: Grossly intact, non-focal exam  PSYCH: Pleasant    Result Review    Result Review:  I have personally reviewed the results from the time of this admission to 1/3/2023 11:36 EST and agree with these findings:  [x]  Laboratory  [x]  Microbiology  [x]  Radiology  [x]  EKG/Telemetry   [x]  Cardiology/Vascular   []  Pathology  []  Old records  []  Other:      Wounds (last 24 hours)     LDA Wound     Row Name 23 0800 23 0400 23 0024       Wound 23 Right medial leg Skin Tear    Wound - Properties Group Placement Date: 23  -TJ Placement Time:   -TJ Present on Hospital Admission: Y  -TJ Side: Right  -TJ Orientation: medial  -TJ  Location: leg  -TJ Primary Wound Type: Skin tear  -TJ    Dressing Appearance -- open to air  -SM open to air  -SM    Closure Open to air  -MS Open to air  -SM Open to air  -SM    Drainage Amount none  -MS none  -SM none  -SM    Retired Wound - Properties Group Placement Date: 01/01/23  -TJ Placement Time: 2023 -TJ Present on Hospital Admission: Y  -TJ Side: Right  -TJ Orientation: medial  -TJ Location: leg  -TJ Primary Wound Type: Skin tear  -TJ    Retired Wound - Properties Group Date first assessed: 01/01/23  -TJ Time first assessed: 2023  -TJ Present on Hospital Admission: Y  -TJ Side: Right  -TJ Location: leg  -TJ Primary Wound Type: Skin tear  -TJ       Wound 01/01/23 2027 Right anterior ankle    Wound - Properties Group Placement Date: 01/01/23  -TJ Placement Time: 2027  -TJ Side: Right  -TJ Orientation: anterior  -TJ Location: ankle  -TJ    Dressing Appearance -- open to air  -SM open to air  -SM    Closure Open to air  -MS Open to air  -SM Open to air  -SM    Drainage Amount none  -MS none  -SM none  -SM    Retired Wound - Properties Group Placement Date: 01/01/23  -TJ Placement Time: 2027  -TJ Side: Right  -TJ Orientation: anterior  -TJ Location: ankle  -TJ    Retired Wound - Properties Group Date first assessed: 01/01/23  -TJ Time first assessed: 2027  -TJ Side: Right  -TJ Location: ankle  -TJ       Wound 01/01/23 2028 Right posterior plantar Blisters    Wound - Properties Group Placement Date: 01/01/23  -TJ Placement Time: 2028  -TJ Side: Right  -TJ Orientation: posterior  -TJ Location: plantar  -TJ Primary Wound Type: Blisters  -TJ    Dressing Appearance -- open to air  -SM open to air  -SM    Closure Open to air  -MS Open to air  -SM Open to air  -SM    Drainage Amount none  -MS none  -SM none  -SM    Retired Wound - Properties Group Placement Date: 01/01/23  -TJ Placement Time: 2028  -TJ Side: Right  -TJ Orientation: posterior  -TJ Location: plantar  -TJ Primary Wound Type: Blisters  -TJ    Retired  Wound - Properties Group Date first assessed: 01/01/23  -TJ Time first assessed: 2028  -TJ Side: Right  -TJ Location: plantar  -TJ Primary Wound Type: Blisters  -TJ       Wound 01/01/23 2034 Right distal leg    Wound - Properties Group Placement Date: 01/01/23  -TJ Placement Time: 2034  -TJ Present on Hospital Admission: Y  -TJ Side: Right  -TJ Orientation: distal  -TJ Location: leg  -TJ    Dressing Appearance -- open to air  -SM open to air  -SM    Closure Open to air  -MS Open to air  -SM Open to air  -SM    Drainage Amount none  -MS none  -SM none  -SM    Retired Wound - Properties Group Placement Date: 01/01/23  -TJ Placement Time: 2034  -TJ Present on Hospital Admission: Y  -TJ Side: Right  -TJ Orientation: distal  -TJ Location: leg  -TJ    Retired Wound - Properties Group Date first assessed: 01/01/23  -TJ Time first assessed: 2034  -TJ Present on Hospital Admission: Y  -TJ Side: Right  -TJ Location: leg  -TJ    Row Name 01/02/23 2000 01/02/23 1600 01/02/23 1200       Wound 01/01/23 2023 Right medial leg Skin Tear    Wound - Properties Group Placement Date: 01/01/23  -TJ Placement Time: 2023  -TJ Present on Hospital Admission: Y  -TJ Side: Right  -TJ Orientation: medial  -TJ Location: leg  -TJ Primary Wound Type: Skin tear  -TJ    Dressing Appearance open to air  -SM -- --    Closure Open to air  -SM Open to air  -AD Open to air  -AD    Drainage Amount none  -SM none  -AD none  -AD    Retired Wound - Properties Group Placement Date: 01/01/23  -TJ Placement Time: 2023  -TJ Present on Hospital Admission: Y  -TJ Side: Right  -TJ Orientation: medial  -TJ Location: leg  -TJ Primary Wound Type: Skin tear  -TJ    Retired Wound - Properties Group Date first assessed: 01/01/23  -TJ Time first assessed: 2023  -TJ Present on Hospital Admission: Y  -TJ Side: Right  -TJ Location: leg  -TJ Primary Wound Type: Skin tear  -TJ       Wound 01/01/23 2027 Right anterior ankle    Wound - Properties Group Placement Date: 01/01/23   -TJ Placement Time: 2027 -TJ Side: Right  -TJ Orientation: anterior  -TJ Location: ankle  -TJ    Dressing Appearance open to air  -SM -- --    Closure Open to air  -SM Open to air  -AD Open to air  -AD    Drainage Amount none  -SM none  -AD none  -AD    Retired Wound - Properties Group Placement Date: 01/01/23  -TJ Placement Time: 2027  -TJ Side: Right  -TJ Orientation: anterior  -TJ Location: ankle  -TJ    Retired Wound - Properties Group Date first assessed: 01/01/23  -TJ Time first assessed: 2027  -TJ Side: Right  -TJ Location: ankle  -TJ       Wound 01/01/23 2028 Right posterior plantar Blisters    Wound - Properties Group Placement Date: 01/01/23  -TJ Placement Time: 2028  -TJ Side: Right  -TJ Orientation: posterior  -TJ Location: plantar  -TJ Primary Wound Type: Blisters  -TJ    Dressing Appearance open to air  -SM -- --    Closure Open to air  -SM Open to air  -AD Open to air  -AD    Drainage Amount none  -SM none  -AD none  -AD    Retired Wound - Properties Group Placement Date: 01/01/23  -TJ Placement Time: 2028  -TJ Side: Right  -TJ Orientation: posterior  -TJ Location: plantar  -TJ Primary Wound Type: Blisters  -TJ    Retired Wound - Properties Group Date first assessed: 01/01/23  -TJ Time first assessed: 2028  -TJ Side: Right  -TJ Location: plantar  -TJ Primary Wound Type: Blisters  -TJ       Wound 01/01/23 2034 Right distal leg    Wound - Properties Group Placement Date: 01/01/23  -TJ Placement Time: 2034  -TJ Present on Hospital Admission: Y  -TJ Side: Right  -TJ Orientation: distal  -TJ Location: leg  -TJ    Dressing Appearance open to air  -SM -- --    Closure Open to air  -SM Open to air  -AD Open to air  -AD    Drainage Amount none  -SM none  -AD none  -AD    Retired Wound - Properties Group Placement Date: 01/01/23  -TJ Placement Time: 2034  -TJ Present on Hospital Admission: Y  -TJ Side: Right  -TJ Orientation: distal  -TJ Location: leg  -TJ    Retired Wound - Properties Group Date first  assessed: 01/01/23  -TJ Time first assessed: 2034  -TJ Present on Hospital Admission: Y  -TJ Side: Right  -TJ Location: leg  -TJ          User Key  (r) = Recorded By, (t) = Taken By, (c) = Cosigned By    Initials Name Provider Type    Janna Meehan, RN Registered Nurse    Ria Tejada RN Registered Nurse    Sarahi Campo RN Registered Nurse    Elle David RN Registered Nurse                  Assessment & Plan    From previous notes and with minor updates.    Brief Patient Summary:      Patient is a 72-year-old female with past medical history of for COPD, osteoarthritis, hypertension, hyperlipidemia, osteoporosis, restless leg syndrome and obstructive sleep apnea who presented to the emergency room because of her worsening shortness of breath.  Patient was seen in the emergency room and was noted to be wheezing and also with rhonchorous sounds.  Patient was diagnosed with coronavirus infection.  Patient was also noted to have a platelet of 17,000.  Patient was diagnosed with thrombocytopenia, acute respiratory failure with hypoxia, and a COPD with acute exacerbation.  Patient was recommended for admission for further treatment and management.  Doxycycline and prednisone were given in the emergency room.    aspirin, 81 mg, Oral, Daily  budesonide, 0.5 mg, Nebulization, BID - RT  dilTIAZem CD, 180 mg, Oral, Daily  gabapentin, 900 mg, Oral, Nightly  guaiFENesin, 1,200 mg, Oral, Q PM  ipratropium-albuterol, 3 mL, Nebulization, 4x Daily - RT  montelukast, 10 mg, Oral, Nightly  pantoprazole, 40 mg, Oral, QAM  predniSONE, 30 mg, Oral, Daily   Followed by  [START ON 1/5/2023] predniSONE, 20 mg, Oral, Daily   Followed by  [START ON 1/7/2023] predniSONE, 10 mg, Oral, Daily  rOPINIRole, 1 mg, Oral, BID  rosuvastatin, 20 mg, Oral, Daily  senna-docusate sodium, 2 tablet, Oral, BID  sodium chloride, 10 mL, Intravenous, Q12H  theophylline, 400 mg, Oral, Q24H             Active Hospital Problems:  Active  Hospital Problems    Diagnosis    • Coronavirus infection    • Chronic diastolic CHF (congestive heart failure) (HCC)    • Chronic respiratory failure with hypoxia and hypercapnia (HCC)    • Chronic depression    • COPD with acute exacerbation (HCC)    • Primary hypertension    • Restless leg syndrome    • Obstructive sleep apnea    • Mixed hyperlipidemia    • Obesity (BMI 30-39.9)    • Acute on chronic respiratory failure with hypoxia and hypercapnia (HCC)    • GERD without esophagitis      Plan:    -Continue appropriate patient's home medications for other chronic medical conditions.  -Treat coronavirus infection with supportive care.  -Treat COPD with acute exacerbation with a COPD protocol.  -Follow pulmonary recommendations.  -Currently on systemic steroids, Singulair, Mucinex, theophylline, and inhaled bronchodilators and corticosteroids.  -Treat acute on chronic respiratory failure with hypoxia with oxygen therapy.  -Treat GERD with Protonix.  -Restarted her beta-blocker today in addition to her home Cardizem for rate control.  This seems to be helping already.  -PT/OT    DVT prophylaxis:  Mechanical DVT prophylaxis orders are present.    CODE STATUS:    Level Of Support Discussed With: Patient  Code Status (Patient has no pulse and is not breathing): CPR (Attempt to Resuscitate)  Medical Interventions (Patient has pulse or is breathing): Full Support    Disposition: Anticipate discharge home once clinically improved over the course of the next few days    Electronically signed by Aristeo Patterson MD, 01/03/23, 11:36 EST.    [facility]ist Team

## 2023-01-03 NOTE — PLAN OF CARE
Goal Outcome Evaluation:  Plan of Care Reviewed With: patient        Progress: no change  Outcome Evaluation: Pt is a 71 y/o female who presented to [facility] 12/31/22 with c/o dyspnea. PMHx significant for COPD and depression. Pt A&Ox4 this date. Pt reports living with  in [facility] with 4 TALIB. Pt reports  assists with bathing and dressing at baseline d/t SOB and fatigue. Pt reports independence with toileting. Pt does not use AD for mobility at baseline. Pt wears 5L home O2. This date pt sitting up in chair upon arrival. Pt comes to stand with CGA, however required min A for standing balance with HHA. Pt HR increased with standing, therefore was not progressed to further activity. Pt appears fatigued post transfers, indicating decreased activity tolerance needed for ADL routine. Pt is below baseline at this time, indicating further need for skilled OT intervention. Pt desires of having home health to assist with ADLs once d/c home. Pt likely to return safe with family assist and HHOT. OT to follow while admitted [facility].

## 2023-01-03 NOTE — PLAN OF CARE
Goal Outcome Evaluation:       On initial attempt at 9:15, pt tachycardic w/ -145 bpm at rest, in bed. On second attempt,  bpm at rest and pt up in chair. Pt agreeable to seated therapeutic exercise. HR elevated to 120-135 bpm during seated therapeutic exercise and with tachypenia, Sp02 >89% on AVAPs throughout session, requires frequent rest breaks 2*/2 SOA. When not on AVAPs, pt requiring 8L high flow 02. Updated d/c recommendations to SNF 2*/2 cardiorespiratory status and poor activity tolerance. Continue seeing 5x/week at [facility] and progress as able. PPE: gloves, mask      Padmini Serrano, PT, DPT

## 2023-01-03 NOTE — THERAPY TREATMENT NOTE
"Subjective: On initial attempt at 9:15, pt tachycardic w/ -145 bpm at rest, in bed. On second attempt,  bpm at rest and pt up in chair. Pt agreeable to seated therapeutic exercise.    Objective:     Bed mobility - N/A or Not attempted.  Transfers - N/A or Not attempted.  Ambulation - N/A or Not attempted.     Seated therapeutic exercise: Pt completed 2x10 seated MIP, LAQ, hip abduction/adduction and heel/toe raises, requiring frequent rest breaks 2*/2 SOA.    Vitals: Tachycardic (resting  bpm at start of session, elevated to 120-135 bpm during seated therapeutic exercise and with tachypenia, Sp02 >89% on AVAPs throughout session, requires frequent rest breaks 2*/2 SOA)    Pain: 0 VAS       Education: Provided education on the importance of mobility in the acute care setting and Verbal/Tactile Cues    Assessment: On initial attempt at 9:15, pt tachycardic w/ -145 bpm at rest, in bed. On second attempt,  bpm at rest and pt up in chair. Pt agreeable to seated therapeutic exercise. HR elevated to 120-135 bpm during seated therapeutic exercise and with tachypenia, Sp02 >89% on AVAPs throughout session, requires frequent rest breaks 2*/2 SOA. When not on AVAPs, pt requiring 8L high flow 02. Updated d/c recommendations to SNF 2*/2 cardiorespiratory status and poor activity tolerance. Continue seeing 5x/week at [facility] and progress as able. PPE: gloves, mask    Plan/Recommendations:   Moderate Intensity Therapy recommended post-acute care. This is recommended as therapy feels the patient would require 3-4 days per week and wouldn't tolerate \"3 hour daily\" rehab intensity. SNF would be the preferred choice. If the patient does not agree to SNF, arrange HH or OP depending on home bound status. If patient is medically complex, consider LTACH.. Pt requires no DME at discharge.     Pt desires Skilled Rehab placement at discharge. Pt cooperative; agreeable to therapeutic recommendations and plan of care. "         Basic Mobility 6-click:  Rollin = Total, A lot = 2, A little = 3; 4 = None  Supine>Sit:   1 = Total, A lot = 2, A little = 3; 4 = None   Sit>Stand with arms:  1 = Total, A lot = 2, A little = 3; 4 = None  Bed>Chair:   1 = Total, A lot = 2, A little = 3; 4 = None  Ambulate in room:  1 = Total, A lot = 2, A little = 3; 4 = None  3-5 Steps with railin = Total, A lot = 2, A little = 3; 4 = None  Score: 15    Post-Tx Position: Up in Chair, Alarms activated and Call light and personal items within reach  PPE: gloves and surgical mask

## 2023-01-04 PROCEDURE — 94799 UNLISTED PULMONARY SVC/PX: CPT

## 2023-01-04 PROCEDURE — 97110 THERAPEUTIC EXERCISES: CPT

## 2023-01-04 PROCEDURE — 63710000001 PREDNISONE PER 5 MG: Performed by: INTERNAL MEDICINE

## 2023-01-04 PROCEDURE — 97116 GAIT TRAINING THERAPY: CPT

## 2023-01-04 PROCEDURE — 94660 CPAP INITIATION&MGMT: CPT

## 2023-01-04 PROCEDURE — 94664 DEMO&/EVAL PT USE INHALER: CPT

## 2023-01-04 PROCEDURE — 63710000001 PREDNISONE PER 1 MG: Performed by: INTERNAL MEDICINE

## 2023-01-04 PROCEDURE — 94761 N-INVAS EAR/PLS OXIMETRY MLT: CPT

## 2023-01-04 RX ADMIN — PREDNISONE 30 MG: 20 TABLET ORAL at 08:11

## 2023-01-04 RX ADMIN — SENNOSIDES AND DOCUSATE SODIUM 2 TABLET: 50; 8.6 TABLET ORAL at 08:12

## 2023-01-04 RX ADMIN — GABAPENTIN 900 MG: 300 CAPSULE ORAL at 20:57

## 2023-01-04 RX ADMIN — DILTIAZEM HYDROCHLORIDE 180 MG: 180 CAPSULE, COATED, EXTENDED RELEASE ORAL at 08:12

## 2023-01-04 RX ADMIN — GUAIFENESIN 1200 MG: 600 TABLET, EXTENDED RELEASE ORAL at 16:46

## 2023-01-04 RX ADMIN — BUDESONIDE 0.5 MG: 0.5 INHALANT RESPIRATORY (INHALATION) at 19:03

## 2023-01-04 RX ADMIN — ROPINIROLE HYDROCHLORIDE 1 MG: 1 TABLET, FILM COATED ORAL at 18:55

## 2023-01-04 RX ADMIN — PANTOPRAZOLE SODIUM 40 MG: 40 TABLET, DELAYED RELEASE ORAL at 06:20

## 2023-01-04 RX ADMIN — IPRATROPIUM BROMIDE AND ALBUTEROL SULFATE 3 ML: 2.5; .5 SOLUTION RESPIRATORY (INHALATION) at 07:22

## 2023-01-04 RX ADMIN — METOPROLOL SUCCINATE 50 MG: 50 TABLET, EXTENDED RELEASE ORAL at 20:57

## 2023-01-04 RX ADMIN — ASPIRIN 81 MG: 81 TABLET, CHEWABLE ORAL at 08:12

## 2023-01-04 RX ADMIN — BUDESONIDE 0.5 MG: 0.5 INHALANT RESPIRATORY (INHALATION) at 07:26

## 2023-01-04 RX ADMIN — ROSUVASTATIN 20 MG: 10 TABLET, FILM COATED ORAL at 08:12

## 2023-01-04 RX ADMIN — MONTELUKAST 10 MG: 10 TABLET, FILM COATED ORAL at 20:57

## 2023-01-04 RX ADMIN — ROPINIROLE HYDROCHLORIDE 1 MG: 1 TABLET, FILM COATED ORAL at 20:57

## 2023-01-04 RX ADMIN — METOPROLOL SUCCINATE 50 MG: 50 TABLET, EXTENDED RELEASE ORAL at 08:12

## 2023-01-04 RX ADMIN — Medication 10 ML: at 08:13

## 2023-01-04 RX ADMIN — Medication 10 ML: at 20:57

## 2023-01-04 RX ADMIN — THEOPHYLLINE ANHYDROUS 400 MG: 200 CAPSULE, EXTENDED RELEASE ORAL at 08:12

## 2023-01-04 RX ADMIN — SENNOSIDES AND DOCUSATE SODIUM 2 TABLET: 50; 8.6 TABLET ORAL at 20:58

## 2023-01-04 RX ADMIN — IPRATROPIUM BROMIDE AND ALBUTEROL SULFATE 3 ML: 2.5; .5 SOLUTION RESPIRATORY (INHALATION) at 19:03

## 2023-01-04 NOTE — PLAN OF CARE
Assessment: Michelle Francis presents with functional mobility impairments which indicate the need for skilled intervention. Tolerating session today without incident. Pt anxious this date, and SOA with minor mobility. Pt stood with CGA but limited to 5 feet ambulation, before pt returned self to sitting. In sitting pt performed pulmonary exercises with trunk flex/sb with good understanding. Will continue to follow and progress as tolerated

## 2023-01-04 NOTE — THERAPY TREATMENT NOTE
"Subjective: Pt agreeable to therapeutic plan of care.    Objective:     Bed mobility - N/A or Not attempted. in chair when PT entered  Transfers - CGA  Ambulation - 5 feet CGA reaching with BUE for support, will benefit from RWx    Vitals: Desaturates to 87% in standing    Pain: 0 VAS   Location: NA  Intervention for pain: N/A    Education: Provided education on the importance of mobility in the acute care setting, Verbal/Tactile Cues and Transfer Training    Assessment: Michelle Francis presents with functional mobility impairments which indicate the need for skilled intervention. Tolerating session today without incident. Pt anxious this date, and SOA with minor mobility. Pt stood with CGA but limited to 5 feet ambulation, before pt returned self to sitting. In sitting pt performed pulmonary exercises with trunk flex/sb with good understanding. Will continue to follow and progress as tolerated.     Plan/Recommendations:   Moderate Intensity Therapy recommended post-acute care. This is recommended as therapy feels the patient would require 3-4 days per week and wouldn't tolerate \"3 hour daily\" rehab intensity. SNF would be the preferred choice. If the patient does not agree to SNF, arrange HH or OP depending on home bound status. If patient is medically complex, consider LTACH.. Pt requires no DME at discharge.     Pt desires Home at discharge. Pt cooperative; agreeable to therapeutic recommendations and plan of care.         Basic Mobility 6-click:  Rollin = Total, A lot = 2, A little = 3; 4 = None  Supine>Sit:   1 = Total, A lot = 2, A little = 3; 4 = None   Sit>Stand with arms:  1 = Total, A lot = 2, A little = 3; 4 = None  Bed>Chair:   1 = Total, A lot = 2, A little = 3; 4 = None  Ambulate in room:  1 = Total, A lot = 2, A little = 3; 4 = None  3-5 Steps with railin = Total, A lot = 2, A little = 3; 4 = None  Score: 15    Modified Hema: N/A = No pre-op stroke/TIA    Post-Tx Position: Up in " Chair and Call light and personal items within reach  PPE: gloves and surgical mask

## 2023-01-04 NOTE — PLAN OF CARE
Problem: Adult Inpatient Plan of Care  Goal: Plan of Care Review  Outcome: Ongoing, Progressing  Flowsheets (Taken 1/4/2023 1553)  Progress: improving  Plan of Care Reviewed With: patient  Outcome Evaluation: Pt on home O2 of 5L. Pt able to tolerate being off Bipap more this shift.  Goal: Patient-Specific Goal (Individualized)  Outcome: Ongoing, Progressing  Goal: Absence of Hospital-Acquired Illness or Injury  Outcome: Ongoing, Progressing  Intervention: Identify and Manage Fall Risk  Recent Flowsheet Documentation  Taken 1/4/2023 1400 by Ria Maier RN  Safety Promotion/Fall Prevention: safety round/check completed  Taken 1/4/2023 1200 by Ria Maier RN  Safety Promotion/Fall Prevention:   safety round/check completed   nonskid shoes/slippers when out of bed  Taken 1/4/2023 1000 by Ria Maier RN  Safety Promotion/Fall Prevention:   safety round/check completed   nonskid shoes/slippers when out of bed  Taken 1/4/2023 0800 by Ria Maier RN  Safety Promotion/Fall Prevention:   safety round/check completed   nonskid shoes/slippers when out of bed  Intervention: Prevent and Manage VTE (Venous Thromboembolism) Risk  Recent Flowsheet Documentation  Taken 1/4/2023 1000 by Ria Maier RN  Activity Management: up in chair  Taken 1/4/2023 0800 by Ria Maier RN  Activity Management: up in chair  Goal: Optimal Comfort and Wellbeing  Outcome: Ongoing, Progressing  Goal: Readiness for Transition of Care  Outcome: Ongoing, Progressing     Problem: Fall Injury Risk  Goal: Absence of Fall and Fall-Related Injury  Outcome: Ongoing, Progressing  Intervention: Promote Injury-Free Environment  Recent Flowsheet Documentation  Taken 1/4/2023 1400 by Ria Maier RN  Safety Promotion/Fall Prevention: safety round/check completed  Taken 1/4/2023 1200 by Ria Maier RN  Safety Promotion/Fall Prevention:   safety round/check completed   nonskid shoes/slippers when out of bed  Taken 1/4/2023 1000 by Ria Maier  RN  Safety Promotion/Fall Prevention:   safety round/check completed   nonskid shoes/slippers when out of bed  Taken 1/4/2023 0800 by Ria Maier RN  Safety Promotion/Fall Prevention:   safety round/check completed   nonskid shoes/slippers when out of bed     Problem: Breathing Pattern Ineffective  Goal: Effective Breathing Pattern  Outcome: Ongoing, Progressing     Problem: Anxiety  Goal: Anxiety Reduction or Resolution  Outcome: Ongoing, Progressing   Goal Outcome Evaluation:  Plan of Care Reviewed With: patient        Progress: improving  Outcome Evaluation: Pt on home O2 of 5L. Pt able to tolerate being off Bipap more this shift.

## 2023-01-04 NOTE — PROGRESS NOTES
Daily Progress Note        Acute on chronic respiratory failure with hypoxia and hypercapnia (HCC)    Mixed hyperlipidemia    Primary hypertension    Obstructive sleep apnea    Restless leg syndrome    COPD with acute exacerbation (HCC)    Obesity (BMI 30-39.9)    Chronic depression    GERD without esophagitis    Chronic respiratory failure with hypoxia and hypercapnia (HCC)    Chronic diastolic CHF (congestive heart failure) (HCC)    Coronavirus infection      Assessment    Coronavirus infection, OC43  Acute hypoxemic/hypercapnic respiratory failure: ABGs on admission pH 7.30, PCO2 79.9, PO2 93  Acute bronchitis  COPD with acute exacerbation  5 mm right middle lobe nodule  IRIS on home BiPAP    Thrombocytopenia  Chronic diastolic CHF  Essential hypertension  GERD   Obesity BMI of 30  RLS     Recommendations:    Patient being set up with home Trilogy today by DME    Oxygen supplement and titration to maintain saturation 90 to 95%: Currently requiring 5L high flow,     Systemic steroids wean  Singulair  Mucinex twice daily  Theophylline  Bronchodilatores/Inhaled corticosteroids  DVT/GI prophylaxis  Check daily labs and correct electrolytes as needed     Patient will need repeated CT scan of the chest without contrast in 6 months to follow-up on the right middle lobe nodule          LOS: 4 days     Subjective         Objective     Vital signs for last 24 hours:  Vitals:    01/03/23 2012 01/03/23 2101 01/04/23 0205 01/04/23 0506   BP:  121/80 134/92 132/79   BP Location:  Right arm Right arm Right arm   Patient Position:  Sitting Lying Lying   Pulse: 95 98 91 77   Resp: 21 20 24 25   Temp:  97.9 °F (36.6 °C) 97.5 °F (36.4 °C) 97.5 °F (36.4 °C)   TempSrc:  Axillary Axillary Axillary   SpO2: 100% 98% 98% 98%   Weight:    84.8 kg (186 lb 15.2 oz)   Height:           Intake/Output last 3 shifts:  I/O last 3 completed shifts:  In: 2620 [P.O.:2620]  Out: 1350 [Urine:1350]  Intake/Output this shift:  No intake/output data  recorded.      Radiology  Imaging Results (Last 24 Hours)     Procedure Component Value Units Date/Time    XR Chest 1 View [[id number]] Collected: 01/03/23 0953     Updated: 01/03/23 0956    Narrative:      XR CHEST 1 VW    Date of Exam: 1/3/2023 8:53 AM EST    Indication: F/U respiratory failure.    Comparison: 6/6/2022    Findings:  Heart size and the patient is within normal limits. The vascular markings are prominent as they were previously. No focal consolidation is seen. Interstitial markings remain relatively prominent.      Impression:      Impression:    1. Continued evidence of passive congestion and perhaps mild interstitial edema which has not changed from the previous radiographs.    Electronically Signed: Geoffrey Pina    1/3/2023 9:54 AM EST    Workstation ID: [id number]          Labs:  Results from last 7 days   Lab Units 01/02/23 2344   WBC 10*3/mm3 14.40*   HEMOGLOBIN g/dL 12.4   HEMATOCRIT % 38.2   PLATELETS 10*3/mm3 51*     Results from last 7 days   Lab Units 01/02/23 2344 01/01/23  0706 12/31/22  1257   SODIUM mmol/L 137   < > 136   POTASSIUM mmol/L 4.4   < > 4.1   CHLORIDE mmol/L 91*   < > 89*   CO2 mmol/L 38.0*   < > 37.0*   BUN mg/dL 29*   < > 15   CREATININE mg/dL 1.10*   < > 0.84   CALCIUM mg/dL 9.4   < > 9.7   BILIRUBIN mg/dL  --   --  0.4   ALK PHOS U/L  --   --  78   ALT (SGPT) U/L  --   --  11   AST (SGOT) U/L  --   --  23   GLUCOSE mg/dL 151*   < > 163*    < > = values in this interval not displayed.     Results from last 7 days   Lab Units 01/01/23  0913   PH, ARTERIAL pH units 7.334*   PO2 ART mm Hg 174.2*   PCO2, ARTERIAL mm Hg 76.3*   HCO3 ART mmol/L 40.6*     Results from last 7 days   Lab Units 12/31/22  1257   ALBUMIN g/dL 4.1     Results from last 7 days   Lab Units 12/31/22  1257   TROPONIN T ng/mL <0.010     Results from last 7 days   Lab Units 01/01/23  1205   NIALL  Negative     Results from last 7 days   Lab Units 01/02/23  2344   MAGNESIUM mg/dL 2.3     Results from last 7  days   Lab Units 01/01/23  1204   INR  1.00   APTT seconds 24.5               Meds:   SCHEDULE  aspirin, 81 mg, Oral, Daily  budesonide, 0.5 mg, Nebulization, BID - RT  dilTIAZem CD, 180 mg, Oral, Daily  gabapentin, 900 mg, Oral, Nightly  guaiFENesin, 1,200 mg, Oral, Q PM  ipratropium-albuterol, 3 mL, Nebulization, 4x Daily - RT  metoprolol succinate XL, 50 mg, Oral, BID  montelukast, 10 mg, Oral, Nightly  pantoprazole, 40 mg, Oral, QAM  predniSONE, 30 mg, Oral, Daily   Followed by  [START ON 1/5/2023] predniSONE, 20 mg, Oral, Daily   Followed by  [START ON 1/7/2023] predniSONE, 10 mg, Oral, Daily  rOPINIRole, 1 mg, Oral, BID  rosuvastatin, 20 mg, Oral, Daily  senna-docusate sodium, 2 tablet, Oral, BID  sodium chloride, 10 mL, Intravenous, Q12H  theophylline, 400 mg, Oral, Q24H      Infusions     PRNs  •  acetaminophen **OR** acetaminophen **OR** acetaminophen  •  albuterol  •  ALPRAZolam  •  aluminum-magnesium hydroxide-simethicone  •  senna-docusate sodium **AND** polyethylene glycol **AND** bisacodyl **AND** bisacodyl  •  ipratropium-albuterol  •  ondansetron  •  oxyCODONE  •  sodium chloride  •  sodium chloride  •  sodium chloride    Physical Exam:  Physical Exam  Vitals reviewed.   Cardiovascular:      Heart sounds: Murmur heard.   Pulmonary:      Breath sounds: Wheezing and rhonchi present.   Skin:     General: Skin is warm and dry.   Neurological:      Mental Status: She is alert and oriented to person, place, and time.         ROS  Review of Systems    I have reviewed the patient's new clinical results.    Electronically signed by Hawa Sifuentes MD

## 2023-01-04 NOTE — PLAN OF CARE
Goal Outcome Evaluation:  Plan of Care Reviewed With: patient        Progress: improving  Outcome Evaluation: Patient alert and oriented, continues on O2@6HF while awake and AVAPS HS, patient voices no concerns this shift, patient has rested well this shft, VSS.

## 2023-01-04 NOTE — PROGRESS NOTES
Hematology/Oncology Inpatient Progress Note    PATIENT NAME: Michelle Francis  : 1950  MRN: [id number]    CHIEF COMPLAINT: Thrombocytopenia and COVID-19 infection    HISTORY OF PRESENT ILLNESS:    72 y.o. female admitted to [facility] PCU through the ED on 2022 where she reported acute on chronic shortness of air.  She stated she had a history of COPD with increasing cough and shortness of air that led her to come to the ED.  She denied any chest pain or fever.  She did report a sore throat.  In the ED CT chest without contrast showed advanced emphysema with central bronchial wall thickening and a stable 5 mm pulmonary nodule in right middle lobe lung.  CBC revealed WBC 10.4, hemoglobin 12.7, and platelets 17,000. Creatinine was normal at 0.84 and LFTs were not elevated.  Troponin and BNP were both normal.  Respiratory viral panel was positive for coronavirus OC43 and was otherwise negative.  She received doxycycline and Solu-Medrol 125 mg IV x1 followed by Solu-Medrol 40 mg IV every 12 hours initiated 2022  At time of consultation 2023 platelets were 26,000. Chart review showed intermittent mild thrombocytopenia since 2018 and her last available labs from 2022 revealed platelets 124,000.  Med list reflected outpatient low-dose aspirin, Plavix and omeprazole.     23  Hematology/Oncology was consulted by the hospitalist group for thrombocytopenia.     Past Medical History: COPD, IRIS, arthritis, HTN, HLD, restless leg syndrome, and CKD all of long duration.  Surgical History: Partial colectomy in  secondary to multiple large polyps/obstruction.  Cataract removal, cerebral aneurysm repair in .  Ventral hernia repair with removal of adhesions in 2018.  Wrist surgery secondary to fracture in the past.  Social History: She lives in [place], [place] with her spouse.  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  She stopped smoking years ago.  She denies alcohol  or recreational drug use.  Family History: Her sister had thyroid cancer.  Allergies: No known drug allergies.     PCP: Avani Montoya MD    INTERVAL HISTORY:  • 1/2/2023- platelets 38,000, WBC 16.8.  PTT 24.5 (24-31) and PT 10.3 (9.6-11.7).  Folate 11.7 (4.78-24.2) and vitamin B12 460 (211-946).  • 1/3/2023- NIALL negative, EBV IgM less than 36 (less than 36), CMV IgM less than 30 (less than 30).  Platelets 51,000.    History of present illness reviewed since last visit and changes noted on 01/03/23.    Subjective   She is feeling congested but breathing is somewhat better.    ROS:  Review of Systems   Constitutional: Negative for activity change, chills, fatigue, fever and unexpected weight change.   HENT: Positive for congestion. Negative for dental problem, hearing loss, mouth sores, nosebleeds, sore throat and trouble swallowing.    Eyes: Negative for photophobia and visual disturbance.   Respiratory: Positive for shortness of breath. Negative for cough and chest tightness.    Cardiovascular: Negative for chest pain, palpitations and leg swelling.   Gastrointestinal: Negative for abdominal distention, abdominal pain, blood in stool, constipation, diarrhea, nausea and vomiting.   Endocrine: Negative for cold intolerance and heat intolerance.   Genitourinary: Negative for decreased urine volume, difficulty urinating, dysuria, frequency, hematuria and urgency.   Musculoskeletal: Negative for arthralgias and gait problem.   Skin: Negative for rash and wound.   Neurological: Negative for dizziness, tremors, weakness, light-headedness, numbness and headaches.   Hematological: Negative for adenopathy. Does not bruise/bleed easily.   Psychiatric/Behavioral: Negative for confusion and hallucinations. The patient is not nervous/anxious.    All other systems reviewed and are negative.       MEDICATIONS:    Scheduled Meds:  aspirin, 81 mg, Oral, Daily  budesonide, 0.5 mg, Nebulization, BID - RT  dilTIAZem CD, 180 mg,  "Oral, Daily  gabapentin, 900 mg, Oral, Nightly  guaiFENesin, 1,200 mg, Oral, Q PM  ipratropium-albuterol, 3 mL, Nebulization, 4x Daily - RT  metoprolol succinate XL, 50 mg, Oral, BID  montelukast, 10 mg, Oral, Nightly  pantoprazole, 40 mg, Oral, QAM  predniSONE, 30 mg, Oral, Daily   Followed by  [START ON 1/5/2023] predniSONE, 20 mg, Oral, Daily   Followed by  [START ON 1/7/2023] predniSONE, 10 mg, Oral, Daily  rOPINIRole, 1 mg, Oral, BID  rosuvastatin, 20 mg, Oral, Daily  senna-docusate sodium, 2 tablet, Oral, BID  sodium chloride, 10 mL, Intravenous, Q12H  theophylline, 400 mg, Oral, Q24H       Continuous Infusions:      PRN Meds:  •  acetaminophen **OR** acetaminophen **OR** acetaminophen  •  albuterol  •  ALPRAZolam  •  aluminum-magnesium hydroxide-simethicone  •  senna-docusate sodium **AND** polyethylene glycol **AND** bisacodyl **AND** bisacodyl  •  ipratropium-albuterol  •  ondansetron  •  oxyCODONE  •  sodium chloride  •  sodium chloride  •  sodium chloride     ALLERGIES:  No Known Allergies    Objective    VITALS:   /70 (BP Location: Right leg, Patient Position: Sitting)   Pulse 105   Temp 98.6 °F (37 °C) (Axillary)   Resp 27   Ht 167.6 cm (66\")   Wt 81.8 kg (180 lb 5.4 oz)   SpO2 98%   BMI 29.11 kg/m²     PHYSICAL EXAM:  Physical Exam  Vitals and nursing note reviewed.   Constitutional:       General: She is not in acute distress.     Appearance: Normal appearance. She is well-developed. She is not diaphoretic.   HENT:      Head: Normocephalic and atraumatic.      Nose: Nose normal.      Comments: O2 by NC.     Mouth/Throat:      Mouth: Mucous membranes are moist.      Pharynx: Oropharynx is clear. No oropharyngeal exudate or posterior oropharyngeal erythema.      Comments: Dentures.  Eyes:      General: No scleral icterus.     Extraocular Movements: Extraocular movements intact.      Conjunctiva/sclera: Conjunctivae normal.      Pupils: Pupils are equal, round, and reactive to light. "   Cardiovascular:      Rate and Rhythm: Regular rhythm. Tachycardia present.      Heart sounds: Normal heart sounds. No murmur heard.     Comments: Cardiac monitor leads  Pulmonary:      Effort: No respiratory distress.      Breath sounds: Rhonchi present. No wheezing or rales.      Comments: Decreased breath sounds bilaterally  Abdominal:      General: Bowel sounds are normal. There is no distension.      Palpations: Abdomen is soft. There is no mass.      Tenderness: There is no abdominal tenderness. There is no guarding.   Genitourinary:     Comments: Deferred   Musculoskeletal:         General: No swelling, tenderness or deformity. Normal range of motion.      Cervical back: Normal range of motion and neck supple.      Right lower leg: No edema.      Left lower leg: No edema.      Comments: LUE IV and left hand O2 monitor.   Lymphadenopathy:      Cervical: No cervical adenopathy.      Upper Body:      Right upper body: No supraclavicular adenopathy.      Left upper body: No supraclavicular adenopathy.   Skin:     General: Skin is warm and dry.      Coloration: Skin is not pale.      Findings: Bruising (Upper extremities) present. No erythema or rash.   Neurological:      General: No focal deficit present.      Mental Status: She is alert and oriented to person, place, and time.      Coordination: Coordination normal.   Psychiatric:         Mood and Affect: Mood normal.         Behavior: Behavior normal.         Thought Content: Thought content normal.           RECENT LABS:  Lab Results (last 24 hours)     Procedure Component Value Units Date/Time    Blood Culture - Blood, Hand, Left [[id number]]  (Normal) Collected: 01/01/23 1801    Specimen: Blood from Hand, Left Updated: 01/03/23 1815     Blood Culture No growth at 2 days    Blood Culture - Blood, Arm, Right [[id number]]  (Normal) Collected: 01/01/23 1801    Specimen: Blood from Arm, Right Updated: 01/03/23 1815     Blood Culture No growth at 2 days    NIALL by  IFA, Reflex 9-biomarkers profile [[id number]] Collected: 01/01/23 1205    Specimen: Blood Updated: 01/03/23 1508     NIALL Negative     Comment:                                      Negative   <1:80                                       Borderline  1:80                                       Positive   >1:80  ICAP nomenclature: AC-0  For more information about Hep-2 cell patterns use  ANApatterns.org, the official website for the International  Consensus on Antinuclear Antibody (NIALL) Patterns (ICAP).        Please note Comment     Comment: NIALL Multiplex methodology was designed to detect up to 11 antibodies  of the 100+ antibodies that may be detected by NIALL IFA methodology.       Narrative:      Performed at:  [address]  [id number]  : Car Titus PhD, Phone:  [phone]    Blood Culture - Blood, Arm, Left [[id number]]  (Normal) Collected: 12/31/22 1257    Specimen: Blood from Arm, Left Updated: 01/03/23 1317     Blood Culture No growth at 3 days    Narrative:      Less than seven (7) mL's of blood was collected.  Insufficient quantity may yield false negative results.    Toni-Barr Virus VCA, IgM [[id number]] Collected: 01/01/23 1205    Specimen: Blood Updated: 01/03/23 1311     EBV VCA IgM <36.0 U/mL      Comment:                                  Negative        <36.0                                   Equivocal 36.0 - 43.9                                   Positive        >43.9       Narrative:      Performed at:  [address] [place] [address]  [id number]  : Car Titus PhD, Phone:  [phone]    Pathology Consultation [[id number]] Collected: 12/31/22 1257    Specimen: Blood, Venous Line Updated: 01/03/23 1112     Final Diagnosis --     Thrombocytopenia  No blasts identified       Case Report --     Surgical Pathology Report                         Case: [id number]                                  Authorizing Provider:   Derek Zhu MD       Collected:           12/31/2022 12:57 PM          Ordering Location:     [facility]       Received:            01/03/2023 08:47 AM                                 EMERGENCY DEPARTMENT                                                         Pathologist:           Raul Garrido MD                                                            Specimen:    Blood, Venous Line                                                                         Cytomegalovirus Antibody, IgM [[id number]] Collected: 01/01/23 1205    Specimen: Blood Updated: 01/03/23 0707     CMV IgM <30.0 AU/mL      Comment:                                 Negative         <30.0                                  Equivocal  30.0 - 34.9                                  Positive         >34.9  A positive result is generally indicative of acute  infection, reactivation or persistent IgM production.       Narrative:      Performed at:  [facility]  [id number]  : Car Titus PhD, Phone:  [phone]    Basic Metabolic Panel [[id number]]  (Abnormal) Collected: 01/02/23 2344    Specimen: Blood Updated: 01/03/23 0112     Glucose 151 mg/dL      BUN 29 mg/dL      Creatinine 1.10 mg/dL      Sodium 137 mmol/L      Potassium 4.4 mmol/L      Chloride 91 mmol/L      CO2 38.0 mmol/L      Calcium 9.4 mg/dL      BUN/Creatinine Ratio 26.4     Anion Gap 8.0 mmol/L      eGFR 53.2 mL/min/1.73      Comment: National Kidney Foundation and American Society of Nephrology (ASN) Task Force recommended calculation based on the Chronic Kidney Disease Epidemiology Collaboration (CKD-EPI) equation refit without adjustment for race.       Narrative:      GFR Normal >60  Chronic Kidney Disease <60  Kidney Failure <15    The GFR formula is only valid for adults with stable renal function between ages 18 and 70.    Magnesium [[id number]]  (Normal) Collected: 01/02/23 2344    Specimen: Blood Updated:  01/03/23 0112     Magnesium 2.3 mg/dL     CBC & Differential [[id number]]  (Abnormal) Collected: 01/02/23 2344    Specimen: Blood Updated: 01/03/23 0052    Narrative:      The following orders were created for panel order CBC & Differential.  Procedure                               Abnormality         Status                     ---------                               -----------         ------                     CBC Auto Differential[[id number]]        Abnormal            Final result                 Please view results for these tests on the individual orders.    CBC Auto Differential [[id number]]  (Abnormal) Collected: 01/02/23 2344    Specimen: Blood Updated: 01/03/23 0052     WBC 14.40 10*3/mm3      RBC 4.47 10*6/mm3      Hemoglobin 12.4 g/dL      Hematocrit 38.2 %      MCV 85.4 fL      MCH 27.6 pg      MCHC 32.4 g/dL      RDW 15.1 %      RDW-SD 47.7 fl      MPV 9.7 fL      Platelets 51 10*3/mm3      Neutrophil % 88.4 %      Lymphocyte % 5.1 %      Monocyte % 6.3 %      Eosinophil % 0.0 %      Basophil % 0.2 %      Neutrophils, Absolute 12.70 10*3/mm3      Lymphocytes, Absolute 0.70 10*3/mm3      Monocytes, Absolute 0.90 10*3/mm3      Eosinophils, Absolute 0.00 10*3/mm3      Basophils, Absolute 0.00 10*3/mm3      nRBC 0.1 /100 WBC           PENDING RESULTS: N/A    IMAGING REVIEWED:  XR Chest 1 View    Result Date: 1/3/2023  Impression: 1. Continued evidence of passive congestion and perhaps mild interstitial edema which has not changed from the previous radiographs. Electronically Signed: Geoffrey Pina  1/3/2023 9:54 AM EST  Workstation ID: [id number]      I have reviewed the patient's labs, imaging, reports, and other clinician documentation.    Assessment & Plan   ASSESSMENT:  1. Acute on chronic thrombocytopenia- intermittent mild thrombocytopenia noted dating back to 2018.  LFTs were okay. Respiratory viral panel was positive for coronavirus OC43 (not COVID-19).    Acute and chronic thrombocytopenia work-up sent  with coags normal and no evidence of vitamin B12 or folate deficiencies, CMV/EBV infection and NIALL is negative.  Platelets continue to slowly improve. She is receiving prednisone for her respiratory symptoms which may be contributing to the improvement, however, more likely that this was coronavirus induced.  2. Coronavirus OC43 infection/acute on chronic respiratory failure with hypoxia/exacerbation of COPD/IRIS- received 1 dose of doxycycline in the ED.  On prednisone and supportive care.  She met criteria for sepsis trigger and blood cultures were sent 1/2.  Pulmonary managing.  3. Right middle lobe lung nodule- stable on CT this admit from June 2022 CT.  Plan to repeat in 6 months.    PLAN:  1. Follow CBC.  2. Transfuse platelets as needed anytime platelets less than 15,000 or with any active bleeding.  3. Follow lung nodule as outpatient with repeat CT chest in 6 months.  4. Outpatient platelet antibodies.              I discussed the patient's findings and my recommendations with patient and spouse.    Part of this note may be an electronic transcription/translation of spoken language to printed text using the Dragon Dictation System.    Electronically signed by Sagar Perez MD, 01/03/23, 7:11 PM EST.

## 2023-01-05 ENCOUNTER — APPOINTMENT (OUTPATIENT)
Dept: GENERAL RADIOLOGY | Facility: HOSPITAL | Age: 73
DRG: 865 | End: 2023-01-05
Payer: MEDICARE

## 2023-01-05 LAB
BACTERIA SPEC AEROBE CULT: NORMAL
C3 FRG RBC-MCNC: ABNORMAL
DEPRECATED RDW RBC AUTO: 48.1 FL (ref 37–54)
ERYTHROCYTE [DISTWIDTH] IN BLOOD BY AUTOMATED COUNT: 15 % (ref 12.3–15.4)
HCT VFR BLD AUTO: 38.2 % (ref 34–46.6)
HGB BLD-MCNC: 12.5 G/DL (ref 12–15.9)
LARGE PLATELETS: ABNORMAL
LYMPHOCYTES # BLD MANUAL: 2 10*3/MM3 (ref 0.7–3.1)
LYMPHOCYTES NFR BLD MANUAL: 8 % (ref 5–12)
MCH RBC QN AUTO: 28.1 PG (ref 26.6–33)
MCHC RBC AUTO-ENTMCNC: 32.7 G/DL (ref 31.5–35.7)
MCV RBC AUTO: 86 FL (ref 79–97)
MONOCYTES # BLD: 0.8 10*3/MM3 (ref 0.1–0.9)
MYELOCYTES NFR BLD MANUAL: 2 % (ref 0–0)
NEUTROPHILS # BLD AUTO: 7 10*3/MM3 (ref 1.7–7)
NEUTROPHILS NFR BLD MANUAL: 69 % (ref 42.7–76)
NEUTS BAND NFR BLD MANUAL: 1 % (ref 0–5)
PLATELET # BLD AUTO: 77 10*3/MM3 (ref 140–450)
PMV BLD AUTO: 8.9 FL (ref 6–12)
RBC # BLD AUTO: 4.44 10*6/MM3 (ref 3.77–5.28)
SCAN SLIDE: NORMAL
SMALL PLATELETS BLD QL SMEAR: ABNORMAL
VARIANT LYMPHS NFR BLD MANUAL: 14 % (ref 19.6–45.3)
VARIANT LYMPHS NFR BLD MANUAL: 6 % (ref 0–5)
WBC MORPH BLD: NORMAL
WBC NRBC COR # BLD: 10 10*3/MM3 (ref 3.4–10.8)

## 2023-01-05 PROCEDURE — 94660 CPAP INITIATION&MGMT: CPT

## 2023-01-05 PROCEDURE — 25010000002 FUROSEMIDE PER 20 MG: Performed by: FAMILY MEDICINE

## 2023-01-05 PROCEDURE — 71045 X-RAY EXAM CHEST 1 VIEW: CPT

## 2023-01-05 PROCEDURE — 85025 COMPLETE CBC W/AUTO DIFF WBC: CPT | Performed by: INTERNAL MEDICINE

## 2023-01-05 PROCEDURE — 94664 DEMO&/EVAL PT USE INHALER: CPT

## 2023-01-05 PROCEDURE — 63710000001 PREDNISONE PER 1 MG: Performed by: INTERNAL MEDICINE

## 2023-01-05 PROCEDURE — 85007 BL SMEAR W/DIFF WBC COUNT: CPT | Performed by: INTERNAL MEDICINE

## 2023-01-05 PROCEDURE — 94799 UNLISTED PULMONARY SVC/PX: CPT

## 2023-01-05 RX ORDER — FUROSEMIDE 10 MG/ML
40 INJECTION INTRAMUSCULAR; INTRAVENOUS ONCE
Status: COMPLETED | OUTPATIENT
Start: 2023-01-05 | End: 2023-01-05

## 2023-01-05 RX ORDER — LORAZEPAM 0.5 MG/1
0.5 TABLET ORAL EVERY 6 HOURS PRN
Status: DISCONTINUED | OUTPATIENT
Start: 2023-01-05 | End: 2023-01-06 | Stop reason: HOSPADM

## 2023-01-05 RX ADMIN — SENNOSIDES AND DOCUSATE SODIUM 2 TABLET: 50; 8.6 TABLET ORAL at 21:24

## 2023-01-05 RX ADMIN — MONTELUKAST 10 MG: 10 TABLET, FILM COATED ORAL at 21:23

## 2023-01-05 RX ADMIN — PREDNISONE 20 MG: 20 TABLET ORAL at 08:52

## 2023-01-05 RX ADMIN — ROPINIROLE HYDROCHLORIDE 1 MG: 1 TABLET, FILM COATED ORAL at 18:20

## 2023-01-05 RX ADMIN — SENNOSIDES AND DOCUSATE SODIUM 2 TABLET: 50; 8.6 TABLET ORAL at 08:52

## 2023-01-05 RX ADMIN — IPRATROPIUM BROMIDE AND ALBUTEROL SULFATE 3 ML: 2.5; .5 SOLUTION RESPIRATORY (INHALATION) at 06:41

## 2023-01-05 RX ADMIN — Medication 10 ML: at 08:53

## 2023-01-05 RX ADMIN — ASPIRIN 81 MG: 81 TABLET, CHEWABLE ORAL at 08:52

## 2023-01-05 RX ADMIN — PANTOPRAZOLE SODIUM 40 MG: 40 TABLET, DELAYED RELEASE ORAL at 05:57

## 2023-01-05 RX ADMIN — METOPROLOL SUCCINATE 50 MG: 50 TABLET, EXTENDED RELEASE ORAL at 21:24

## 2023-01-05 RX ADMIN — IPRATROPIUM BROMIDE AND ALBUTEROL SULFATE 3 ML: 2.5; .5 SOLUTION RESPIRATORY (INHALATION) at 14:50

## 2023-01-05 RX ADMIN — ROPINIROLE HYDROCHLORIDE 1 MG: 1 TABLET, FILM COATED ORAL at 21:24

## 2023-01-05 RX ADMIN — IPRATROPIUM BROMIDE AND ALBUTEROL SULFATE 3 ML: 2.5; .5 SOLUTION RESPIRATORY (INHALATION) at 10:40

## 2023-01-05 RX ADMIN — FUROSEMIDE 40 MG: 10 INJECTION, SOLUTION INTRAMUSCULAR; INTRAVENOUS at 13:49

## 2023-01-05 RX ADMIN — DILTIAZEM HYDROCHLORIDE 180 MG: 180 CAPSULE, COATED, EXTENDED RELEASE ORAL at 08:52

## 2023-01-05 RX ADMIN — LORAZEPAM 0.5 MG: 0.5 TABLET ORAL at 18:20

## 2023-01-05 RX ADMIN — GABAPENTIN 900 MG: 300 CAPSULE ORAL at 21:23

## 2023-01-05 RX ADMIN — ROSUVASTATIN 20 MG: 10 TABLET, FILM COATED ORAL at 08:52

## 2023-01-05 RX ADMIN — IPRATROPIUM BROMIDE AND ALBUTEROL SULFATE 3 ML: 2.5; .5 SOLUTION RESPIRATORY (INHALATION) at 18:56

## 2023-01-05 RX ADMIN — THEOPHYLLINE ANHYDROUS 400 MG: 200 CAPSULE, EXTENDED RELEASE ORAL at 08:52

## 2023-01-05 RX ADMIN — GUAIFENESIN 1200 MG: 600 TABLET, EXTENDED RELEASE ORAL at 18:20

## 2023-01-05 RX ADMIN — BUDESONIDE 0.5 MG: 0.5 INHALANT RESPIRATORY (INHALATION) at 06:44

## 2023-01-05 RX ADMIN — METOPROLOL SUCCINATE 50 MG: 50 TABLET, EXTENDED RELEASE ORAL at 08:53

## 2023-01-05 RX ADMIN — Medication 10 ML: at 21:24

## 2023-01-05 RX ADMIN — BUDESONIDE 0.5 MG: 0.5 INHALANT RESPIRATORY (INHALATION) at 19:01

## 2023-01-05 NOTE — CASE MANAGEMENT/SOCIAL WORK
Continued Stay Note  [facility]     Patient Name: Michelle Francis  MRN: [id number]  Today's Date: 1/5/2023    Admit Date: 12/31/2022    Plan: D/C Plan: Declined SNF/Home Health. Anticipate routine home with spouse. Current home oxygen with [facility]. New home Trilogy with [facility], arranged 1/4.   Discharge Plan     Row Name 01/05/23 1339       Plan    Plan D/C Plan: Declined SNF/Home Health. Anticipate routine home with spouse. Current home oxygen with [facility]. New home Trilogy with [facility], arranged 1/4.    Plan Comments CM confirmed with [facility] liaison that patient's new home Trilogy was arranged 1/4, CM updated RN. CM/SW notified by oncology that patient requests assistance at home for bathing. SW met with patient at bedside and provided caregiver lists for private pay, patient declined home health services at this time. Barrier to D/C: 5L O2, CXR today, monitoring renal function.                    Expected Discharge Date and Time     Expected Discharge Date Expected Discharge Time    Jan 6, 2023         Phone communication or documentation only - no physical contact with patient or family.    RADHA WhitfieldN, RN    [address] [address]    Office: [phone]  Fax: [phone]

## 2023-01-05 NOTE — DISCHARGE PLACEMENT REQUEST
"Michelle Francis (72 y.o. Female)     Date of Birth   1950    Social Security Number       Address   [address] [place] [address] [place] [address]    Home Phone   [phone]    MRN   [id number]       Jainism   None    Marital Status                               Admission Date   12/31/22    Admission Type   Emergency    Admitting Provider   Aristeo Patterson MD    Attending Provider   Aristeo Patterson MD    Department, Room/Bed   [facility], 2119/1       Discharge Date       Discharge Disposition       Discharge Destination                               Attending Provider: Aristeo Patterson MD    Allergies: No Known Allergies    Isolation: Droplet   Infection: Other (01/02/23)   Code Status: CPR    Ht: 167.6 cm (66\")   Wt: 82.8 kg (182 lb 8.7 oz)    Admission Cmt: None   Principal Problem: None                Active Insurance as of 12/31/2022     Primary Coverage     Payor Plan Insurance Group Employer/Plan Group    HUMANA MEDICARE REPLACEMENT HUMANA MEDICARE REPLACEMENT [id number]     Payor Plan Address Payor Plan Phone Number Payor Plan Fax Number Effective Dates    [address] [address] [phone]  1/1/2018 - None Entered    [facility] [address]       Subscriber Name Subscriber Birth Date Member ID       MICHELLE FRANCIS 1950 [id number]                 Emergency Contacts      (Rel.) Home Phone Work Phone Mobile Phone    CHAUNCEY FRANCIS (Spouse) [phone] -- [phone]              "

## 2023-01-05 NOTE — PLAN OF CARE
Goal Outcome Evaluation:      Pt has been on 5L NC without use of bipap. Lasix IV given x1. Possible D/C tomorrow, VSS with no complaints at this time.    Problem: Adult Inpatient Plan of Care  Goal: Plan of Care Review  Outcome: Ongoing, Progressing

## 2023-01-05 NOTE — SIGNIFICANT NOTE
01/05/23 1536   OTHER   Discipline physical therapy assistant   Rehab Time/Intention   Session Not Performed patient/family declined treatment;other (see comments)  (Pt declined tx session expressing she is discharging tomorrow and doesn't feel like getting up at this moment; this PTA educated pt on importance of PT)   Recommendation   PT - Next Appointment 01/06/23

## 2023-01-05 NOTE — CASE MANAGEMENT/SOCIAL WORK
Social Work Assessment  [facility]     Patient Name: Michelle Francis  MRN: [id number]  Today's Date: 1/5/2023    Admit Date: 12/31/2022     Discharge Plan     Row Name 01/05/23 2745       Plan    Plan Comments Consulted by oncology regarding assistance for home with bathing. Met with patient at bedside, introduced self & reason for visit. Provided private pay caregivers list and reviewed with patient. Inquired about Medicaid payor and patient declines this. Patient confirms owning her own home, may have too many assets. Agreeable to a [facility] referral through [id number] about [place] eligibility and potential in-home supports if deemed eligible after assessment by agency. Declined interest in therapy home services.              Met with patient in room wearing PPE: mask.  Maintained distance greater than six feet and spent less than 15 minutes in the room.      CITLALLI Sam    Phone: [phone]  Cell: [phone]  Fax: [phone]  [email or web address]

## 2023-01-05 NOTE — PROGRESS NOTES
Hematology/Oncology Inpatient Progress Note    PATIENT NAME: Michelle Francis  : 1950  MRN: [id number]    CHIEF COMPLAINT: Thrombocytopenia and COVID-19 infection    HISTORY OF PRESENT ILLNESS:    72 y.o. female admitted to [facility] PCU through the ED on 2022 where she reported acute on chronic shortness of air.  She stated she had a history of COPD with increasing cough and shortness of air that led her to come to the ED.  She denied any chest pain or fever.  She did report a sore throat.  In the ED CT chest without contrast showed advanced emphysema with central bronchial wall thickening and a stable 5 mm pulmonary nodule in right middle lobe lung.  CBC revealed WBC 10.4, hemoglobin 12.7, and platelets 17,000. Creatinine was normal at 0.84 and LFTs were not elevated.  Troponin and BNP were both normal.  Respiratory viral panel was positive for coronavirus OC43 and was otherwise negative.  She received doxycycline and Solu-Medrol 125 mg IV x1 followed by Solu-Medrol 40 mg IV every 12 hours initiated 2022  At time of consultation 2023 platelets were 26,000. Chart review showed intermittent mild thrombocytopenia since 2018 and her last available labs from 2022 revealed platelets 124,000.  Med list reflected outpatient low-dose aspirin, Plavix and omeprazole.     23  Hematology/Oncology was consulted by the hospitalist group for thrombocytopenia.     Past Medical History: COPD, IRIS, arthritis, HTN, HLD, restless leg syndrome, and CKD all of long duration.  Surgical History: Partial colectomy in  secondary to multiple large polyps/obstruction.  Cataract removal, cerebral aneurysm repair in .  Ventral hernia repair with removal of adhesions in 2018.  Wrist surgery secondary to fracture in the past.  Social History: She lives in [place], [place] with her spouse.  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  She stopped smoking years ago.  She denies alcohol  or recreational drug use.  Family History: Her sister had thyroid cancer.  Allergies: No known drug allergies.     PCP: Avani Montoya MD    INTERVAL HISTORY:  • 1/2/2023- platelets 38,000, WBC 16.8.  PTT 24.5 (24-31) and PT 10.3 (9.6-11.7).  Folate 11.7 (4.78-24.2) and vitamin B12 460 (211-946).  • 1/3/2023- NIALL negative, EBV IgM less than 36 (less than 36), CMV IgM less than 30 (less than 30).  Platelets 51,000.  • 1/4/2023- no labs today.    History of present illness reviewed since last visit and changes noted on 01/04/23.    Subjective   She reports she still feels congested and has shortness of air.    ROS:  Review of Systems   Constitutional: Negative for activity change, chills, fatigue, fever and unexpected weight change.   HENT: Positive for congestion. Negative for dental problem, hearing loss, mouth sores, nosebleeds, sore throat and trouble swallowing.    Eyes: Negative for photophobia and visual disturbance.   Respiratory: Positive for shortness of breath. Negative for cough and chest tightness.    Cardiovascular: Negative for chest pain, palpitations and leg swelling.   Gastrointestinal: Negative for abdominal distention, abdominal pain, blood in stool, constipation, diarrhea, nausea and vomiting.   Endocrine: Negative for cold intolerance and heat intolerance.   Genitourinary: Negative for decreased urine volume, difficulty urinating, dysuria, enuresis, frequency, hematuria and urgency.   Musculoskeletal: Negative for arthralgias and gait problem.   Skin: Negative for rash and wound.   Neurological: Negative for dizziness, tremors, weakness, light-headedness, numbness and headaches.   Hematological: Negative for adenopathy. Does not bruise/bleed easily.   Psychiatric/Behavioral: Negative for confusion and hallucinations. The patient is not nervous/anxious.    All other systems reviewed and are negative.       MEDICATIONS:    Scheduled Meds:  aspirin, 81 mg, Oral, Daily  budesonide, 0.5 mg,  "Nebulization, BID - RT  dilTIAZem CD, 180 mg, Oral, Daily  gabapentin, 900 mg, Oral, Nightly  guaiFENesin, 1,200 mg, Oral, Q PM  ipratropium-albuterol, 3 mL, Nebulization, 4x Daily - RT  metoprolol succinate XL, 50 mg, Oral, BID  montelukast, 10 mg, Oral, Nightly  pantoprazole, 40 mg, Oral, QAM  [START ON 1/5/2023] predniSONE, 20 mg, Oral, Daily   Followed by  [START ON 1/7/2023] predniSONE, 10 mg, Oral, Daily  rOPINIRole, 1 mg, Oral, BID  rosuvastatin, 20 mg, Oral, Daily  senna-docusate sodium, 2 tablet, Oral, BID  sodium chloride, 10 mL, Intravenous, Q12H  theophylline, 400 mg, Oral, Q24H       Continuous Infusions:      PRN Meds:  •  acetaminophen **OR** acetaminophen **OR** acetaminophen  •  albuterol  •  ALPRAZolam  •  aluminum-magnesium hydroxide-simethicone  •  senna-docusate sodium **AND** polyethylene glycol **AND** bisacodyl **AND** bisacodyl  •  ipratropium-albuterol  •  ondansetron  •  oxyCODONE  •  sodium chloride  •  sodium chloride  •  sodium chloride     ALLERGIES:  No Known Allergies    Objective    VITALS:   /57 (BP Location: Right arm, Patient Position: Lying)   Pulse 90   Temp 95.9 °F (35.5 °C) (Oral)   Resp 18   Ht 167.6 cm (66\")   Wt 84.8 kg (186 lb 15.2 oz)   SpO2 91%   BMI 30.17 kg/m²     PHYSICAL EXAM:  Physical Exam  Vitals and nursing note reviewed.   Constitutional:       General: She is not in acute distress.     Appearance: Normal appearance. She is well-developed. She is not diaphoretic.   HENT:      Head: Normocephalic and atraumatic.      Right Ear: External ear normal.      Left Ear: External ear normal.      Nose: Nose normal.      Comments: O2 by NC.     Mouth/Throat:      Mouth: Mucous membranes are moist.      Pharynx: Oropharynx is clear. No oropharyngeal exudate or posterior oropharyngeal erythema.   Eyes:      General: No scleral icterus.     Extraocular Movements: Extraocular movements intact.      Conjunctiva/sclera: Conjunctivae normal.      Pupils: Pupils are " equal, round, and reactive to light.   Cardiovascular:      Rate and Rhythm: Normal rate and regular rhythm.      Heart sounds: Normal heart sounds. No murmur heard.     Comments: Cardiac monitor leads  Pulmonary:      Effort: No respiratory distress.      Breath sounds: No wheezing, rhonchi or rales.      Comments: Decreased breath sounds bilaterally  Abdominal:      General: Bowel sounds are normal. There is no distension.      Palpations: Abdomen is soft. There is no mass.      Tenderness: There is no abdominal tenderness. There is no guarding.      Comments: Truncal obesity   Genitourinary:     Comments: Deferred   Musculoskeletal:         General: No swelling, tenderness or deformity. Normal range of motion.      Cervical back: Normal range of motion and neck supple.      Right lower leg: No edema.      Left lower leg: No edema.      Comments: LUE IV and right hand O2 monitor.   Lymphadenopathy:      Cervical: No cervical adenopathy.      Upper Body:      Right upper body: No supraclavicular adenopathy.      Left upper body: No supraclavicular adenopathy.   Skin:     General: Skin is warm and dry.      Coloration: Skin is not pale.      Findings: No bruising, erythema or rash.   Neurological:      General: No focal deficit present.      Mental Status: She is alert and oriented to person, place, and time.      Coordination: Coordination normal.   Psychiatric:         Mood and Affect: Mood normal.         Behavior: Behavior normal.         Thought Content: Thought content normal.           RECENT LABS:  Lab Results (last 24 hours)     Procedure Component Value Units Date/Time    Blood Culture - Blood, Hand, Left [[id number]]  (Normal) Collected: 01/01/23 1801    Specimen: Blood from Hand, Left Updated: 01/04/23 1815     Blood Culture No growth at 3 days    Blood Culture - Blood, Arm, Right [[id number]]  (Normal) Collected: 01/01/23 1801    Specimen: Blood from Arm, Right Updated: 01/04/23 1815     Blood Culture No  growth at 3 days    Blood Culture - Blood, Arm, Left [[id number]]  (Normal) Collected: 12/31/22 1257    Specimen: Blood from Arm, Left Updated: 01/04/23 1317     Blood Culture No growth at 4 days    Narrative:      Less than seven (7) mL's of blood was collected.  Insufficient quantity may yield false negative results.          PENDING RESULTS: N/A    IMAGING REVIEWED:  XR Chest 1 View    Result Date: 1/3/2023  Impression: 1. Continued evidence of passive congestion and perhaps mild interstitial edema which has not changed from the previous radiographs. Electronically Signed: Geoffrey Pina  1/3/2023 9:54 AM EST  Workstation ID: [id number]      I have reviewed the patient's labs, imaging, reports, and other clinician documentation.    Assessment & Plan   ASSESSMENT:  1. Acute on chronic thrombocytopenia- intermittent mild thrombocytopenia noted dating back to 2018.  LFTs were okay. Respiratory viral panel was positive for coronavirus OC43 (not COVID-19).    Acute and chronic thrombocytopenia work-up sent with coags normal and no evidence of vitamin B12 or folate deficiencies, CMV/EBV infection and NIALL is negative.  Platelets continue to slowly improve. She is receiving prednisone for her respiratory symptoms which may be contributing to the improvement, however, more likely that this was coronavirus induced thrombocytopenia.  2. Coronavirus OC43 infection/acute on chronic respiratory failure with hypoxia/exacerbation of COPD/IRIS- received 1 dose of doxycycline in the ED.  On prednisone and supportive care.  She met criteria for sepsis trigger and blood cultures were sent 1/2.  Pulmonary managing.  3. Right middle lobe lung nodule- stable on CT this admit from June 2022 CT.  Plan to repeat in 6 months.    PLAN:  1. Follow CBC.  2. Follow lung nodule as outpatient with repeat CT chest in 6 months.  3. Outpatient platelet antibodies.        Note prepared by ADAN Rodriguez.  Patient seen and examined by Sagar Perez  MD.  Electronically signed by BERKLEY Carrion, 01/04/23, 8:25 PM EST.    I have personally performed a face-to-face diagnostic evaluation on this patient. I have performed a complete history and physical examination, reviewed laboratory studies, and radiographic examinations.  I have completed the majority and substantive portion of the medical decision making.  I have formulated the assessment on this patient and the plan of action as noted above. I have discussed the case with iSmon Carter NP, have edited/reviewed the note, and agree with the care plan.  Claims to be congested.  Still has shortness of air but improved.  Left arm IV and right hand O2 monitor are present.  Blood cultures have no growth so far.  She is recovering from a viral infection.  We will follow platelets till normalization.        I discussed the patient's findings and my recommendations with patient.    Part of this note may be an electronic transcription/translation of spoken language to printed text using the Dragon Dictation System.    Electronically signed by Sagar Perez MD, 01/04/23, 8:58 PM EST.

## 2023-01-05 NOTE — PLAN OF CARE
Goal Outcome Evaluation:  Plan of Care Reviewed With: patient        Progress: improving  Outcome Evaluation: Patient alert and oriented, continues on 5 liters via nc, AVAPS at night, patient has rested well this shift.

## 2023-01-05 NOTE — SIGNIFICANT NOTE
01/05/23 1515   OTHER   Discipline occupational therapist   Rehab Time/Intention   Session Not Performed other (see comments)  (Patient with physician. Will follow up as able.)   Recommendation   OT - Next Appointment 01/06/23

## 2023-01-05 NOTE — PROGRESS NOTES
Daily Progress Note        Acute on chronic respiratory failure with hypoxia and hypercapnia (HCC)    Mixed hyperlipidemia    Primary hypertension    Obstructive sleep apnea    Restless leg syndrome    COPD with acute exacerbation (HCC)    Obesity (BMI 30-39.9)    Chronic depression    GERD without esophagitis    Chronic respiratory failure with hypoxia and hypercapnia (HCC)    Chronic diastolic CHF (congestive heart failure) (HCC)    Coronavirus infection      Assessment    Coronavirus infection, OC43  Acute hypoxemic/hypercapnic respiratory failure: ABGs on admission pH 7.30, PCO2 79.9, PO2 93  Acute bronchitis  COPD with acute exacerbation  5 mm right middle lobe nodule  IRIS on home BiPAP    Thrombocytopenia  Chronic diastolic CHF  Essential hypertension  GERD   Obesity BMI of 30  RLS     Recommendations:     received Astra Resmed machine    Oxygen supplement and titration to maintain saturation 90 to 95%: Currently requiring 5L high flow,     Systemic steroids wean  Singulair  Mucinex twice daily  Theophylline  Bronchodilatores/Inhaled corticosteroids  DVT/GI prophylaxis  Check daily labs and correct electrolytes as needed     Patient will need repeated CT scan of the chest without contrast in 6 months to follow-up on the right middle lobe nodule          LOS: 5 days     Subjective         Objective     Vital signs for last 24 hours:  Vitals:    01/05/23 0800 01/05/23 0830 01/05/23 0845 01/05/23 0852   BP: 106/68 104/53 99/66 107/49   BP Location:    Right arm   Patient Position:    Sitting   Pulse: 95 95 92 97   Resp:       Temp:       TempSrc:       SpO2: 96% 95% 95% 93%   Weight:       Height:           Intake/Output last 3 shifts:  I/O last 3 completed shifts:  In: 1080 [P.O.:1080]  Out: 1200 [Urine:1200]  Intake/Output this shift:  I/O this shift:  In: 480 [P.O.:480]  Out: -       Radiology  Imaging Results (Last 24 Hours)     Procedure Component Value Units Date/Time    XR Chest 1 View [[id number]]  Collected: 01/05/23 0915     Updated: 01/05/23 0918    Narrative:      XR CHEST 1 VW    Date of Exam: 1/5/2023 8:56 AM EST    Indication: F/U respiratory failure.    Comparison: 1/3/2013    Findings:  Heart size within normal limits for technique. Interstitial thickening related to underlying emphysema. No superimposed consolidation. No pneumothorax or pleural effusion. Osseous structures intact.      Impression:      Impression:  1. No acute process.  2. Stable findings related to emphysema.    Electronically Signed: Shivam Mouser    1/5/2023 9:16 AM EST    Workstation ID: [id number]          Labs:  Results from last 7 days   Lab Units 01/02/23  2344   WBC 10*3/mm3 14.40*   HEMOGLOBIN g/dL 12.4   HEMATOCRIT % 38.2   PLATELETS 10*3/mm3 51*     Results from last 7 days   Lab Units 01/02/23  2344 01/01/23  0706 12/31/22  1257   SODIUM mmol/L 137   < > 136   POTASSIUM mmol/L 4.4   < > 4.1   CHLORIDE mmol/L 91*   < > 89*   CO2 mmol/L 38.0*   < > 37.0*   BUN mg/dL 29*   < > 15   CREATININE mg/dL 1.10*   < > 0.84   CALCIUM mg/dL 9.4   < > 9.7   BILIRUBIN mg/dL  --   --  0.4   ALK PHOS U/L  --   --  78   ALT (SGPT) U/L  --   --  11   AST (SGOT) U/L  --   --  23   GLUCOSE mg/dL 151*   < > 163*    < > = values in this interval not displayed.     Results from last 7 days   Lab Units 01/01/23  0913   PH, ARTERIAL pH units 7.334*   PO2 ART mm Hg 174.2*   PCO2, ARTERIAL mm Hg 76.3*   HCO3 ART mmol/L 40.6*     Results from last 7 days   Lab Units 12/31/22  1257   ALBUMIN g/dL 4.1     Results from last 7 days   Lab Units 12/31/22  1257   TROPONIN T ng/mL <0.010     Results from last 7 days   Lab Units 01/01/23  1205   NIALL  Negative     Results from last 7 days   Lab Units 01/02/23  2344   MAGNESIUM mg/dL 2.3     Results from last 7 days   Lab Units 01/01/23  1204   INR  1.00   APTT seconds 24.5               Meds:   SCHEDULE  aspirin, 81 mg, Oral, Daily  budesonide, 0.5 mg, Nebulization, BID - RT  dilTIAZem CD, 180 mg, Oral,  Daily  gabapentin, 900 mg, Oral, Nightly  guaiFENesin, 1,200 mg, Oral, Q PM  ipratropium-albuterol, 3 mL, Nebulization, 4x Daily - RT  metoprolol succinate XL, 50 mg, Oral, BID  montelukast, 10 mg, Oral, Nightly  pantoprazole, 40 mg, Oral, QAM  predniSONE, 20 mg, Oral, Daily   Followed by  [START ON 1/7/2023] predniSONE, 10 mg, Oral, Daily  rOPINIRole, 1 mg, Oral, BID  rosuvastatin, 20 mg, Oral, Daily  senna-docusate sodium, 2 tablet, Oral, BID  sodium chloride, 10 mL, Intravenous, Q12H  theophylline, 400 mg, Oral, Q24H      Infusions     PRNs  •  acetaminophen **OR** acetaminophen **OR** acetaminophen  •  albuterol  •  ALPRAZolam  •  aluminum-magnesium hydroxide-simethicone  •  senna-docusate sodium **AND** polyethylene glycol **AND** bisacodyl **AND** bisacodyl  •  ipratropium-albuterol  •  ondansetron  •  oxyCODONE  •  sodium chloride  •  sodium chloride  •  sodium chloride    Physical Exam:  Physical Exam  Vitals reviewed.   Cardiovascular:      Heart sounds: Murmur heard.   Pulmonary:      Breath sounds: Wheezing and rhonchi present.   Skin:     General: Skin is warm and dry.   Neurological:      Mental Status: She is alert and oriented to person, place, and time.         ROS  Review of Systems    I have reviewed the patient's new clinical results.    Electronically signed by Hawa Sifuentes MD

## 2023-01-05 NOTE — PROGRESS NOTES
Hematology/Oncology Inpatient Progress Note    PATIENT NAME: Michelle Francis  : 1950  MRN: [id number]    CHIEF COMPLAINT: Thrombocytopenia and COVID-19 infection    HISTORY OF PRESENT ILLNESS:    72 y.o. female admitted to [facility] PCU through the ED on 2022 where she reported acute on chronic shortness of air.  She stated she had a history of COPD with increasing cough and shortness of air that led her to come to the ED.  She denied any chest pain or fever.  She did report a sore throat.  In the ED CT chest without contrast showed advanced emphysema with central bronchial wall thickening and a stable 5 mm pulmonary nodule in right middle lobe lung.  CBC revealed WBC 10.4, hemoglobin 12.7, and platelets 17,000. Creatinine was normal at 0.84 and LFTs were not elevated.  Troponin and BNP were both normal.  Respiratory viral panel was positive for coronavirus OC43 and was otherwise negative.  She received doxycycline and Solu-Medrol 125 mg IV x1 followed by Solu-Medrol 40 mg IV every 12 hours initiated 2022  At time of consultation 2023 platelets were 26,000. Chart review showed intermittent mild thrombocytopenia since 2018 and her last available labs from 2022 revealed platelets 124,000.  Med list reflected outpatient low-dose aspirin, Plavix and omeprazole.     23  Hematology/Oncology was consulted by the hospitalist group for thrombocytopenia.     Past Medical History: COPD, IRIS, arthritis, HTN, HLD, restless leg syndrome, and CKD all of long duration.  Surgical History: Partial colectomy in  secondary to multiple large polyps/obstruction.  Cataract removal, cerebral aneurysm repair in .  Ventral hernia repair with removal of adhesions in 2018.  Wrist surgery secondary to fracture in the past.  Social History: She lives in [place], [place] with her spouse.  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  She stopped smoking years ago.  She denies alcohol  or recreational drug use.  Family History: Her sister had thyroid cancer.  Allergies: No known drug allergies.     PCP: Avani Montoya MD    INTERVAL HISTORY:  • 1/2/2023-platelets 38,000, WBC 16.8.  PTT 24.5 (24-31) and PT 10.3 (9.6-11.7).  Folate 11.7 (4.78-24.2) and vitamin B12 460 (211-946).  • 1/3/2023-NIALL negative, EBV IgM less than 36 (less than 36), CMV IgM less than 30 (less than 30).  Platelets 51,000.  • 1/4/2023-no labs today.  • 1/5/2023-platelets 77,000, WBC 10.0, hemoglobin 12.5.    History of present illness reviewed since last visit and changes noted on 01/05/23.      Subjective   She states that she cannot do physical therapy but wants assistance with bathing weekly at home.  She complains of some shortness of breath.    ROS:  Review of Systems   Constitutional: Negative for activity change, chills, fatigue, fever and unexpected weight change.   HENT: Negative for congestion, dental problem, hearing loss, mouth sores, nosebleeds, sore throat and trouble swallowing.    Eyes: Negative for photophobia and visual disturbance.   Respiratory: Positive for shortness of breath. Negative for apnea, cough and chest tightness.    Cardiovascular: Negative for chest pain, palpitations and leg swelling.   Gastrointestinal: Negative for abdominal distention, abdominal pain, blood in stool, constipation, diarrhea, nausea and vomiting.   Endocrine: Negative for cold intolerance and heat intolerance.   Genitourinary: Negative for decreased urine volume, difficulty urinating, dysuria, enuresis, frequency, hematuria and urgency.   Musculoskeletal: Negative for arthralgias and gait problem.   Skin: Negative for rash and wound.   Neurological: Negative for dizziness, tremors, weakness, light-headedness, numbness and headaches.   Hematological: Negative for adenopathy. Does not bruise/bleed easily.   Psychiatric/Behavioral: Negative for confusion and hallucinations. The patient is not nervous/anxious.    All other  "systems reviewed and are negative.       MEDICATIONS:    Scheduled Meds:  aspirin, 81 mg, Oral, Daily  budesonide, 0.5 mg, Nebulization, BID - RT  dilTIAZem CD, 180 mg, Oral, Daily  gabapentin, 900 mg, Oral, Nightly  guaiFENesin, 1,200 mg, Oral, Q PM  ipratropium-albuterol, 3 mL, Nebulization, 4x Daily - RT  metoprolol succinate XL, 50 mg, Oral, BID  montelukast, 10 mg, Oral, Nightly  pantoprazole, 40 mg, Oral, QAM  predniSONE, 20 mg, Oral, Daily   Followed by  [START ON 1/7/2023] predniSONE, 10 mg, Oral, Daily  rOPINIRole, 1 mg, Oral, BID  rosuvastatin, 20 mg, Oral, Daily  senna-docusate sodium, 2 tablet, Oral, BID  sodium chloride, 10 mL, Intravenous, Q12H  theophylline, 400 mg, Oral, Q24H       Continuous Infusions:      PRN Meds:  •  acetaminophen **OR** acetaminophen **OR** acetaminophen  •  albuterol  •  ALPRAZolam  •  aluminum-magnesium hydroxide-simethicone  •  senna-docusate sodium **AND** polyethylene glycol **AND** bisacodyl **AND** bisacodyl  •  ipratropium-albuterol  •  ondansetron  •  oxyCODONE  •  sodium chloride  •  sodium chloride  •  sodium chloride     ALLERGIES:  No Known Allergies    Objective    VITALS:   /49 (BP Location: Right arm, Patient Position: Sitting)   Pulse 97   Temp 97.6 °F (36.4 °C) (Axillary)   Resp 20   Ht 167.6 cm (66\")   Wt 82.8 kg (182 lb 8.7 oz)   SpO2 93%   BMI 29.46 kg/m²     PHYSICAL EXAM:  Physical Exam  Vitals and nursing note reviewed.   Constitutional:       General: She is not in acute distress.     Appearance: Normal appearance. She is well-developed. She is not diaphoretic.   HENT:      Head: Normocephalic and atraumatic.      Right Ear: External ear normal.      Left Ear: External ear normal.      Nose: Nose normal.      Comments: O2 by NC.     Mouth/Throat:      Mouth: Mucous membranes are moist.      Pharynx: Oropharynx is clear. No oropharyngeal exudate or posterior oropharyngeal erythema.      Comments: Dental fillings  Eyes:      General: No " scleral icterus.     Extraocular Movements: Extraocular movements intact.      Conjunctiva/sclera: Conjunctivae normal.      Pupils: Pupils are equal, round, and reactive to light.   Cardiovascular:      Rate and Rhythm: Normal rate and regular rhythm.      Heart sounds: Normal heart sounds. No murmur heard.     Comments: Cardiac monitor leads  Pulmonary:      Effort: No respiratory distress.      Breath sounds: No stridor. No wheezing, rhonchi or rales.      Comments: Decreased breath sounds bilaterally  Abdominal:      General: Bowel sounds are normal. There is no distension.      Palpations: Abdomen is soft. There is no mass.      Tenderness: There is no abdominal tenderness. There is no guarding.      Comments: Truncal obesity   Genitourinary:     Comments: Deferred   Musculoskeletal:         General: No swelling, tenderness or deformity. Normal range of motion.      Cervical back: Normal range of motion and neck supple.      Right lower leg: No edema.      Left lower leg: No edema.      Comments: BUE IV and right hand O2 monitor.   Lymphadenopathy:      Cervical: No cervical adenopathy.      Upper Body:      Right upper body: No supraclavicular adenopathy.      Left upper body: No supraclavicular adenopathy.   Skin:     General: Skin is warm and dry.      Coloration: Skin is not pale.      Findings: No bruising, erythema or rash.   Neurological:      General: No focal deficit present.      Mental Status: She is alert and oriented to person, place, and time.      Coordination: Coordination normal.   Psychiatric:         Mood and Affect: Mood normal.         Behavior: Behavior normal.         Thought Content: Thought content normal.           RECENT LABS:  Lab Results (last 24 hours)     Procedure Component Value Units Date/Time    CBC & Differential [[id number]] Collected: 01/05/23 0821    Specimen: Blood Updated: 01/05/23 1004    Narrative:      The following orders were created for panel order CBC &  Differential.  Procedure                               Abnormality         Status                     ---------                               -----------         ------                     CBC Auto Differential[[id number]]                            In process                   Please view results for these tests on the individual orders.    CBC Auto Differential [[id number]] Collected: 01/05/23 0821    Specimen: Blood Updated: 01/05/23 1004    Blood Culture - Blood, Hand, Left [[id number]]  (Normal) Collected: 01/01/23 1801    Specimen: Blood from Hand, Left Updated: 01/04/23 1815     Blood Culture No growth at 3 days    Blood Culture - Blood, Arm, Right [[id number]]  (Normal) Collected: 01/01/23 1801    Specimen: Blood from Arm, Right Updated: 01/04/23 1815     Blood Culture No growth at 3 days    Blood Culture - Blood, Arm, Left [[id number]]  (Normal) Collected: 12/31/22 1257    Specimen: Blood from Arm, Left Updated: 01/04/23 1317     Blood Culture No growth at 4 days    Narrative:      Less than seven (7) mL's of blood was collected.  Insufficient quantity may yield false negative results.          PENDING RESULTS: N/A    IMAGING REVIEWED:  XR Chest 1 View    Result Date: 1/5/2023  Impression: 1. No acute process. 2. Stable findings related to emphysema. Electronically Signed: Shivam Lechuga  1/5/2023 9:16 AM EST  Workstation ID: [id number]      I have reviewed the patient's labs, imaging, reports, and other clinician documentation.    Assessment & Plan   ASSESSMENT:  1. Acute on chronic thrombocytopenia- intermittent mild thrombocytopenia noted dating back to 2018.  LFTs were okay. Respiratory viral panel was positive for coronavirus OC43 (not COVID-19).    Acute and chronic thrombocytopenia work-up sent with coags normal and no evidence of vitamin B12 or folate deficiencies, CMV/EBV infection and NIALL is negative.  Platelets continue to slowly improve. She is receiving prednisone for her respiratory symptoms which may  be contributing to the improvement, however, more likely that this was coronavirus induced thrombocytopenia.  2. Coronavirus OC43 infection/acute on chronic respiratory failure with hypoxia/exacerbation of COPD/IRIS- received 1 dose of doxycycline in the ED.  On prednisone and supportive care.  She met criteria for sepsis trigger and blood cultures were sent 1/2.  Pulmonary managing.  3. Right middle lobe lung nodule- stable on CT this admit from June 2022 CT.  Plan to repeat in 6 months.    PLAN:  1. Follow CBC.  2. Will contact  to ensure they are aware of patient's wishes for home care.  3. Follow lung nodule as outpatient with repeat CT chest in 6 months.  4. Outpatient platelet antibodies.        Note prepared by ADAN Rodriguez.  Patient seen and examined by Sagar Perez MD.  Electronically signed by BERKLEY Carrion, 01/05/23, 10:55 AM EST.    I have personally performed a face-to-face diagnostic evaluation on this patient. I have performed a complete history and physical examination, reviewed laboratory studies, and radiographic examinations.  I have completed the majority and substantive portion of the medical decision making.  I have formulated the assessment on this patient and the plan of action as noted above. I have discussed the case with Simon Carter NP, have edited/reviewed the note, and agree with the care plan.  Patient is saying that she cannot tolerate physical therapy.  She however would like to have someone come to the house once a week at least to give her a bath as this is very difficult on her own.  On examination, she has bilateral upper extremity IVs present.  Labs are significant for further improvement in platelet count.  She seems to be recovering from a viral infection.  Will let her wishes known to the .        I discussed the patient's findings and my recommendations with patient.    Part of this note may be an electronic transcription/translation of  spoken language to printed text using the Dragon Dictation System.    Electronically signed by Sagar Perez MD, 01/05/23, 6:05 PM EST.

## 2023-01-05 NOTE — PROGRESS NOTES
[facility] Medicine Services Daily Progress Note    Patient Name: Michelle Francis  : 1950  MRN: [id number]  Primary Care Physician:  Avani Montoya MD  Date of admission: 2022      Subjective      Chief Complaint: Admitted for worsening shortness of breath in the setting of chronic respiratory failure at home on 5 L of supplemental oxygen and CPAP    Did okay again overnight.  HR better on her home beta-blocker.  Back down to baseline of 5L nasal cannula but still SOB. Continues to use BiPAP while resting.  Bedside rounding completed with the nursing staff. No other acute concerns.     Objective      Vitals:   Temp:  [95.9 °F (35.5 °C)-97.9 °F (36.6 °C)] 95.9 °F (35.5 °C)  Heart Rate:  [] 90  Resp:  [18-25] 18  BP: (120-148)/(57-92) 120/57  Flow (L/min):  [4-8] 5    Physical Exam:  GEN: Elderly woman on BiPAP, no acute distress  HEENT: NCAT, PERRLA, dry mucous membranes  NECK: Supple, midline trachea  CARD: RRR, mildly tachycardic, no significant peripheral edema  PULM: Coarse bilateral breath sounds, occasional expiratory wheezing, diminished at the bases, non-distressed, currently on NC  ABD: soft, NTND, normoactive bowel sounds throughout  SKIN: See nursing notes for full skin survey  NEURO: Grossly intact, non-focal exam  PSYCH: Pleasant    Result Review    Result Review:  I have personally reviewed the results from the time of this admission to 2023 19:05 EST and agree with these findings:  [x]  Laboratory  [x]  Microbiology  [x]  Radiology  [x]  EKG/Telemetry   [x]  Cardiology/Vascular   []  Pathology  []  Old records  []  Other:      Wounds (last 24 hours)     LDA Wound     Row Name 23 1600 23 1200 23 0800       Wound 23 Right medial leg Skin Tear    Wound - Properties Group Placement Date: 23  -TJ Placement Time:   -TJ Present on Hospital Admission: Y  -TJ Side: Right  -TJ Orientation: medial  -TJ Location: leg  -TJ  Primary Wound Type: Skin tear  -TJ    Closure Open to air  -AD Open to air  -AD Open to air  -AD    Drainage Amount none  -AD none  -AD none  -AD    Retired Wound - Properties Group Placement Date: 01/01/23  -TJ Placement Time: 2023  -TJ Present on Hospital Admission: Y  -TJ Side: Right  -TJ Orientation: medial  -TJ Location: leg  -TJ Primary Wound Type: Skin tear  -TJ    Retired Wound - Properties Group Date first assessed: 01/01/23  -TJ Time first assessed: 2023  -TJ Present on Hospital Admission: Y  -TJ Side: Right  -TJ Location: leg  -TJ Primary Wound Type: Skin tear  -TJ       Wound 01/01/23 2027 Right anterior ankle    Wound - Properties Group Placement Date: 01/01/23  -TJ Placement Time: 2027 -TJ Side: Right  -TJ Orientation: anterior  -TJ Location: ankle  -TJ    Closure Open to air  -AD Open to air  -AD Open to air  -AD    Drainage Amount none  -AD none  -AD none  -AD    Retired Wound - Properties Group Placement Date: 01/01/23  -TJ Placement Time: 2027 -TJ Side: Right  -TJ Orientation: anterior  -TJ Location: ankle  -TJ    Retired Wound - Properties Group Date first assessed: 01/01/23  -TJ Time first assessed: 2027  -TJ Side: Right  -TJ Location: ankle  -TJ       Wound 01/01/23 2028 Right posterior plantar Blisters    Wound - Properties Group Placement Date: 01/01/23  -TJ Placement Time: 2028 -TJ Side: Right  -TJ Orientation: posterior  -TJ Location: plantar  -TJ Primary Wound Type: Blisters  -TJ    Closure Open to air  -AD Open to air  -AD Open to air  -AD    Drainage Amount none  -AD none  -AD none  -AD    Retired Wound - Properties Group Placement Date: 01/01/23  -TJ Placement Time: 2028  -TJ Side: Right  -TJ Orientation: posterior  -TJ Location: plantar  -TJ Primary Wound Type: Blisters  -TJ    Retired Wound - Properties Group Date first assessed: 01/01/23  -TJ Time first assessed: 2028  -TJ Side: Right  -TJ Location: plantar  -TJ Primary Wound Type: Blisters  -TJ       Wound 01/01/23 2034 Right  distal leg    Wound - Properties Group Placement Date: 01/01/23  -TJ Placement Time: 2034  -TJ Present on Hospital Admission: Y  -TJ Side: Right  -TJ Orientation: distal  -TJ Location: leg  -TJ    Closure Open to air  -AD Open to air  -AD Open to air  -AD    Drainage Amount none  -AD none  -AD none  -AD    Retired Wound - Properties Group Placement Date: 01/01/23  -TJ Placement Time: 2034  -TJ Present on Hospital Admission: Y  -TJ Side: Right  -TJ Orientation: distal  -TJ Location: leg  -TJ    Retired Wound - Properties Group Date first assessed: 01/01/23  -TJ Time first assessed: 2034  -TJ Present on Hospital Admission: Y  -TJ Side: Right  -TJ Location: leg  -TJ    Row Name 01/04/23 0414 01/04/23 0013 01/03/23 2000       Wound 01/01/23 2023 Right medial leg Skin Tear    Wound - Properties Group Placement Date: 01/01/23  -TJ Placement Time: 2023  -TJ Present on Hospital Admission: Y  -TJ Side: Right  -TJ Orientation: medial  -TJ Location: leg  -TJ Primary Wound Type: Skin tear  -TJ    Dressing Appearance open to air  -SM open to air  -SM open to air  -SM    Closure Open to air  -SM Open to air  -SM Open to air  -SM    Drainage Amount none  -SM none  -SM none  -SM    Retired Wound - Properties Group Placement Date: 01/01/23  -TJ Placement Time: 2023  -TJ Present on Hospital Admission: Y  -TJ Side: Right  -TJ Orientation: medial  -TJ Location: leg  -TJ Primary Wound Type: Skin tear  -TJ    Retired Wound - Properties Group Date first assessed: 01/01/23  -TJ Time first assessed: 2023  -TJ Present on Hospital Admission: Y  -TJ Side: Right  -TJ Location: leg  -TJ Primary Wound Type: Skin tear  -TJ       Wound 01/01/23 2027 Right anterior ankle    Wound - Properties Group Placement Date: 01/01/23  -TJ Placement Time: 2027  -TJ Side: Right  -TJ Orientation: anterior  -TJ Location: ankle  -TJ    Dressing Appearance open to air  -SM open to air  -SM open to air  -SM    Closure Open to air  -SM Open to air  -SM Open to air   -SM    Drainage Amount none  -SM none  -SM none  -SM    Retired Wound - Properties Group Placement Date: 01/01/23  -TJ Placement Time: 2027  -TJ Side: Right  -TJ Orientation: anterior  -TJ Location: ankle  -TJ    Retired Wound - Properties Group Date first assessed: 01/01/23  -TJ Time first assessed: 2027  -TJ Side: Right  -TJ Location: ankle  -TJ       Wound 01/01/23 2028 Right posterior plantar Blisters    Wound - Properties Group Placement Date: 01/01/23  -TJ Placement Time: 2028  -TJ Side: Right  -TJ Orientation: posterior  -TJ Location: plantar  -TJ Primary Wound Type: Blisters  -TJ    Dressing Appearance open to air  -SM open to air  -SM open to air  -SM    Closure Open to air  -SM Open to air  -SM Open to air  -SM    Drainage Amount none  -SM none  -SM none  -SM    Retired Wound - Properties Group Placement Date: 01/01/23  -TJ Placement Time: 2028 -TJ Side: Right  -TJ Orientation: posterior  -TJ Location: plantar  -TJ Primary Wound Type: Blisters  -TJ    Retired Wound - Properties Group Date first assessed: 01/01/23  -TJ Time first assessed: 2028  -TJ Side: Right  -TJ Location: plantar  -TJ Primary Wound Type: Blisters  -TJ       Wound 01/01/23 2034 Right distal leg    Wound - Properties Group Placement Date: 01/01/23  -TJ Placement Time: 2034 -TJ Present on Hospital Admission: Y  -TJ Side: Right  -TJ Orientation: distal  -TJ Location: leg  -TJ    Dressing Appearance open to air  -SM open to air  -SM open to air  -SM    Closure Open to air  -SM Open to air  -SM Open to air  -SM    Drainage Amount none  -SM none  -SM none  -SM    Retired Wound - Properties Group Placement Date: 01/01/23  -TJ Placement Time: 2034  -TJ Present on Hospital Admission: Y  -TJ Side: Right  -TJ Orientation: distal  -TJ Location: leg  -TJ    Retired Wound - Properties Group Date first assessed: 01/01/23  -TJ Time first assessed: 2034  -TJ Present on Hospital Admission: Y  -TJ Side: Right  -TJ Location: leg  -TJ          User Key   (r) = Recorded By, (t) = Taken By, (c) = Cosigned By    Initials Name Provider Type    Janna Meehan, RN Registered Nurse    Ria Tejada RN Registered Nurse    Sarahi Campo RN Registered Nurse                  Assessment & Plan    From previous notes and with minor updates.    Brief Patient Summary:      Patient is a 72-year-old female with past medical history of for COPD, osteoarthritis, hypertension, hyperlipidemia, osteoporosis, restless leg syndrome and obstructive sleep apnea who presented to the emergency room because of her worsening shortness of breath.  Patient was seen in the emergency room and was noted to be wheezing and also with rhonchorous sounds.  Patient was diagnosed with coronavirus infection.  Patient was also noted to have a platelet of 17,000.  Patient was diagnosed with thrombocytopenia, acute respiratory failure with hypoxia, and a COPD with acute exacerbation.  Patient was recommended for admission for further treatment and management.  Doxycycline and prednisone were given in the emergency room.    aspirin, 81 mg, Oral, Daily  budesonide, 0.5 mg, Nebulization, BID - RT  dilTIAZem CD, 180 mg, Oral, Daily  gabapentin, 900 mg, Oral, Nightly  guaiFENesin, 1,200 mg, Oral, Q PM  ipratropium-albuterol, 3 mL, Nebulization, 4x Daily - RT  metoprolol succinate XL, 50 mg, Oral, BID  montelukast, 10 mg, Oral, Nightly  pantoprazole, 40 mg, Oral, QAM  [START ON 1/5/2023] predniSONE, 20 mg, Oral, Daily   Followed by  [START ON 1/7/2023] predniSONE, 10 mg, Oral, Daily  rOPINIRole, 1 mg, Oral, BID  rosuvastatin, 20 mg, Oral, Daily  senna-docusate sodium, 2 tablet, Oral, BID  sodium chloride, 10 mL, Intravenous, Q12H  theophylline, 400 mg, Oral, Q24H             Active Hospital Problems:  Active Hospital Problems    Diagnosis    • Coronavirus infection    • Chronic diastolic CHF (congestive heart failure) (HCC)    • Chronic respiratory failure with hypoxia and hypercapnia (HCC)    • Chronic  depression    • COPD with acute exacerbation (HCC)    • Primary hypertension    • Restless leg syndrome    • Obstructive sleep apnea    • Mixed hyperlipidemia    • Obesity (BMI 30-39.9)    • Acute on chronic respiratory failure with hypoxia and hypercapnia (HCC)    • GERD without esophagitis      Plan:    -Continue appropriate patient's home medications for other chronic medical conditions.  -Treat coronavirus infection with supportive care.  -Treat COPD with acute exacerbation with COPD protocol.  -Follow pulmonary recommendations.  -Remains on systemic steroids, Singulair, Mucinex, theophylline, and inhaled bronchodilators and corticosteroids.  -Baseline O2/PPV use but still more symptomatic than baseline  -Treat GERD with Protonix.  -Continue home BB/Cardizem for rate control.  -PT/OT    DVT prophylaxis:  Mechanical DVT prophylaxis orders are present.    CODE STATUS:    Level Of Support Discussed With: Patient  Code Status (Patient has no pulse and is not breathing): CPR (Attempt to Resuscitate)  Medical Interventions (Patient has pulse or is breathing): Full Support    Disposition: Anticipate discharge home once clinically improved over the course of the next few days    Electronically signed by Aristeo Patterson MD, 01/04/23, 19:05 EST.    [facility]

## 2023-01-06 ENCOUNTER — READMISSION MANAGEMENT (OUTPATIENT)
Dept: CALL CENTER | Facility: HOSPITAL | Age: 73
End: 2023-01-06
Payer: MEDICARE

## 2023-01-06 VITALS
HEIGHT: 66 IN | HEART RATE: 83 BPM | BODY MASS INDEX: 28.24 KG/M2 | RESPIRATION RATE: 16 BRPM | TEMPERATURE: 97.7 F | WEIGHT: 175.71 LBS | DIASTOLIC BLOOD PRESSURE: 64 MMHG | OXYGEN SATURATION: 100 % | SYSTOLIC BLOOD PRESSURE: 122 MMHG

## 2023-01-06 PROBLEM — F06.4 ANXIETY DISORDER DUE TO MEDICAL CONDITION: Chronic | Status: ACTIVE | Noted: 2023-01-06

## 2023-01-06 PROBLEM — J96.21 ACUTE ON CHRONIC RESPIRATORY FAILURE WITH HYPOXIA AND HYPERCAPNIA: Status: RESOLVED | Noted: 2019-11-11 | Resolved: 2023-01-06

## 2023-01-06 PROBLEM — J96.22 ACUTE ON CHRONIC RESPIRATORY FAILURE WITH HYPOXIA AND HYPERCAPNIA (HCC): Status: RESOLVED | Noted: 2019-11-11 | Resolved: 2023-01-06

## 2023-01-06 LAB
BACTERIA SPEC AEROBE CULT: NORMAL
BACTERIA SPEC AEROBE CULT: NORMAL
DEPRECATED RDW RBC AUTO: 46.4 FL (ref 37–54)
ERYTHROCYTE [DISTWIDTH] IN BLOOD BY AUTOMATED COUNT: 14.8 % (ref 12.3–15.4)
HCT VFR BLD AUTO: 36.4 % (ref 34–46.6)
HGB BLD-MCNC: 11.7 G/DL (ref 12–15.9)
LYMPHOCYTES # BLD MANUAL: 2.52 10*3/MM3 (ref 0.7–3.1)
LYMPHOCYTES NFR BLD MANUAL: 3 % (ref 5–12)
MCH RBC QN AUTO: 27.5 PG (ref 26.6–33)
MCHC RBC AUTO-ENTMCNC: 32.2 G/DL (ref 31.5–35.7)
MCV RBC AUTO: 85.3 FL (ref 79–97)
MONOCYTES # BLD: 0.38 10*3/MM3 (ref 0.1–0.9)
MYELOCYTES NFR BLD MANUAL: 3 % (ref 0–0)
NEUTROPHILS # BLD AUTO: 9.32 10*3/MM3 (ref 1.7–7)
NEUTROPHILS NFR BLD MANUAL: 74 % (ref 42.7–76)
PLATELET # BLD AUTO: 95 10*3/MM3 (ref 140–450)
PMV BLD AUTO: 8.5 FL (ref 6–12)
RBC # BLD AUTO: 4.26 10*6/MM3 (ref 3.77–5.28)
RBC MORPH BLD: NORMAL
SCAN SLIDE: NORMAL
SMALL PLATELETS BLD QL SMEAR: ABNORMAL
VARIANT LYMPHS NFR BLD MANUAL: 20 % (ref 19.6–45.3)
WBC MORPH BLD: NORMAL
WBC NRBC COR # BLD: 12.6 10*3/MM3 (ref 3.4–10.8)

## 2023-01-06 PROCEDURE — 85025 COMPLETE CBC W/AUTO DIFF WBC: CPT | Performed by: INTERNAL MEDICINE

## 2023-01-06 PROCEDURE — 94799 UNLISTED PULMONARY SVC/PX: CPT

## 2023-01-06 PROCEDURE — 94664 DEMO&/EVAL PT USE INHALER: CPT

## 2023-01-06 PROCEDURE — 94761 N-INVAS EAR/PLS OXIMETRY MLT: CPT

## 2023-01-06 PROCEDURE — 85007 BL SMEAR W/DIFF WBC COUNT: CPT | Performed by: INTERNAL MEDICINE

## 2023-01-06 PROCEDURE — 94660 CPAP INITIATION&MGMT: CPT

## 2023-01-06 PROCEDURE — 63710000001 PREDNISONE PER 1 MG: Performed by: INTERNAL MEDICINE

## 2023-01-06 RX ORDER — LORAZEPAM 0.5 MG/1
0.5 TABLET ORAL EVERY 6 HOURS PRN
Qty: 12 TABLET | Refills: 0 | Status: SHIPPED | OUTPATIENT
Start: 2023-01-06

## 2023-01-06 RX ADMIN — PANTOPRAZOLE SODIUM 40 MG: 40 TABLET, DELAYED RELEASE ORAL at 08:51

## 2023-01-06 RX ADMIN — ROSUVASTATIN 20 MG: 10 TABLET, FILM COATED ORAL at 08:52

## 2023-01-06 RX ADMIN — Medication 10 ML: at 08:52

## 2023-01-06 RX ADMIN — LORAZEPAM 0.5 MG: 0.5 TABLET ORAL at 11:21

## 2023-01-06 RX ADMIN — ASPIRIN 81 MG: 81 TABLET, CHEWABLE ORAL at 08:52

## 2023-01-06 RX ADMIN — THEOPHYLLINE ANHYDROUS 400 MG: 200 CAPSULE, EXTENDED RELEASE ORAL at 08:52

## 2023-01-06 RX ADMIN — BUDESONIDE 0.5 MG: 0.5 INHALANT RESPIRATORY (INHALATION) at 07:00

## 2023-01-06 RX ADMIN — SENNOSIDES AND DOCUSATE SODIUM 2 TABLET: 50; 8.6 TABLET ORAL at 08:52

## 2023-01-06 RX ADMIN — IPRATROPIUM BROMIDE AND ALBUTEROL SULFATE 3 ML: 2.5; .5 SOLUTION RESPIRATORY (INHALATION) at 10:49

## 2023-01-06 RX ADMIN — METOPROLOL SUCCINATE 50 MG: 50 TABLET, EXTENDED RELEASE ORAL at 08:52

## 2023-01-06 RX ADMIN — PREDNISONE 20 MG: 20 TABLET ORAL at 08:52

## 2023-01-06 RX ADMIN — IPRATROPIUM BROMIDE AND ALBUTEROL SULFATE 3 ML: 2.5; .5 SOLUTION RESPIRATORY (INHALATION) at 06:56

## 2023-01-06 RX ADMIN — DILTIAZEM HYDROCHLORIDE 180 MG: 180 CAPSULE, COATED, EXTENDED RELEASE ORAL at 08:52

## 2023-01-06 NOTE — PLAN OF CARE
Goal Outcome Evaluation:  Plan of Care Reviewed With: patient   Patient slept well tonight; has worn hospital bipap all night; respirations easy and unlabored; no complaints of pain; will continue to monitor patient.     Progress: improving

## 2023-01-06 NOTE — PROGRESS NOTES
[facility] Medicine Services Daily Progress Note    Patient Name: Michelle Francis  : 1950  MRN: [id number]  Primary Care Physician:  Avani Montoya MD  Date of admission: 2022      Subjective      Chief Complaint: Admitted for worsening shortness of breath in the setting of chronic respiratory failure at home on 5 L of supplemental oxygen and CPAP    Did okay again overnight.  HR better on her home beta-blocker.  Back down to baseline of 5L nasal cannula but still somewhat SOB. Continues to use BiPAP while resting. Her anxiety is kicking in a bit. She is frustrated about her overall condition and health. Friend is at the bedside. Patient is hoping maybe something to help with anxiety/panic that she experiences at times. Bedside rounding completed with the nursing staff. No other acute concerns.     Objective      Vitals:   Temp:  [97.2 °F (36.2 °C)-98.1 °F (36.7 °C)] 97.7 °F (36.5 °C)  Heart Rate:  [] 100  Resp:  [18-24] 22  BP: ()/(49-71) 146/53  Flow (L/min):  [5] 5    Physical Exam:  GEN: Elderly woman on BiPAP, no acute distress  HEENT: NCAT, PERRLA, dry mucous membranes  NECK: Supple, midline trachea  CARD: RRR, tachycardia has resolved. No significant peripheral edema  PULM: Coarse bilateral breath sounds, occasional expiratory wheezing, diminished at the bases, non-distressed, currently on NC  ABD: soft, NTND, normoactive bowel sounds throughout  SKIN: See nursing notes for full skin survey  NEURO: Grossly intact, non-focal exam  PSYCH: Pleasant    Result Review    Result Review:  I have personally reviewed the results from the time of this admission to 2023 21:18 EST and agree with these findings:  [x]  Laboratory  [x]  Microbiology  [x]  Radiology  [x]  EKG/Telemetry   [x]  Cardiology/Vascular   []  Pathology  []  Old records  []  Other:      Wounds (last 24 hours)     LDA Wound     Row Name 23 0852 23 0419 23 0000       Wound 23  2023 Right medial leg Skin Tear    Wound - Properties Group Placement Date: 01/01/23  -TJ Placement Time: 2023  -TJ Present on Hospital Admission: Y  -TJ Side: Right  -TJ Orientation: medial  -TJ Location: leg  -TJ Primary Wound Type: Skin tear  -TJ    Dressing Appearance -- open to air  -SM open to air  -SM    Closure Open to air  -MS Open to air  -SM Open to air  -SM    Drainage Amount none  -MS none  -SM none  -SM    Retired Wound - Properties Group Placement Date: 01/01/23  -TJ Placement Time: 2023  -TJ Present on Hospital Admission: Y  -TJ Side: Right  -TJ Orientation: medial  -TJ Location: leg  -TJ Primary Wound Type: Skin tear  -TJ    Retired Wound - Properties Group Date first assessed: 01/01/23  -TJ Time first assessed: 2023  -TJ Present on Hospital Admission: Y  -TJ Side: Right  -TJ Location: leg  -TJ Primary Wound Type: Skin tear  -TJ       Wound 01/01/23 2027 Right anterior ankle    Wound - Properties Group Placement Date: 01/01/23  -TJ Placement Time: 2027  -TJ Side: Right  -TJ Orientation: anterior  -TJ Location: ankle  -TJ    Dressing Appearance -- open to air  -SM open to air  -SM    Closure Open to air  -MS Open to air  -SM Open to air  -SM    Drainage Amount none  -MS none  -SM none  -SM    Retired Wound - Properties Group Placement Date: 01/01/23  -TJ Placement Time: 2027  -TJ Side: Right  -TJ Orientation: anterior  -TJ Location: ankle  -TJ    Retired Wound - Properties Group Date first assessed: 01/01/23  -TJ Time first assessed: 2027  -TJ Side: Right  -TJ Location: ankle  -TJ       Wound 01/01/23 2028 Right posterior plantar Blisters    Wound - Properties Group Placement Date: 01/01/23  -TJ Placement Time: 2028  -TJ Side: Right  -TJ Orientation: posterior  -TJ Location: plantar  -TJ Primary Wound Type: Blisters  -TJ    Dressing Appearance -- open to air  -SM open to air  -SM    Closure Open to air  -MS Open to air  -SM Open to air  -SM    Drainage Amount none  -MS none  -SM none  -SM    Retired  Wound - Properties Group Placement Date: 01/01/23  -TJ Placement Time: 2028  -TJ Side: Right  -TJ Orientation: posterior  -TJ Location: plantar  -TJ Primary Wound Type: Blisters  -TJ    Retired Wound - Properties Group Date first assessed: 01/01/23  -TJ Time first assessed: 2028  -TJ Side: Right  -TJ Location: plantar  -TJ Primary Wound Type: Blisters  -TJ       Wound 01/01/23 2034 Right distal leg    Wound - Properties Group Placement Date: 01/01/23  -TJ Placement Time: 2034  -TJ Present on Hospital Admission: Y  -TJ Side: Right  -TJ Orientation: distal  -TJ Location: leg  -TJ    Dressing Appearance -- open to air  -SM open to air  -SM    Closure Open to air  -MS Open to air  -SM Open to air  -SM    Drainage Amount none  -MS none  -SM none  -SM    Retired Wound - Properties Group Placement Date: 01/01/23  -TJ Placement Time: 2034  -TJ Present on Hospital Admission: Y  -TJ Side: Right  -TJ Orientation: distal  -TJ Location: leg  -TJ    Retired Wound - Properties Group Date first assessed: 01/01/23  -TJ Time first assessed: 2034  -TJ Present on Hospital Admission: Y  -TJ Side: Right  -TJ Location: leg  -TJ          User Key  (r) = Recorded By, (t) = Taken By, (c) = Cosigned By    Initials Name Provider Type    Janna Meehan RN Registered Nurse    Sarahi Campo RN Registered Nurse    Elle David RN Registered Nurse                  Assessment & Plan    From previous notes and with minor updates.    Brief Patient Summary:    Patient is a 72-year-old female with past medical history of for COPD, osteoarthritis, hypertension, hyperlipidemia, osteoporosis, restless leg syndrome and obstructive sleep apnea who presented to the emergency room because of her worsening shortness of breath.  Patient was seen in the emergency room and was noted to be wheezing and also with rhonchorous sounds.  Patient was diagnosed with coronavirus infection.  Patient was also noted to have a platelet of 17,000.  Patient was  diagnosed with thrombocytopenia, acute respiratory failure with hypoxia, and a COPD with acute exacerbation.  Patient was recommended for admission for further treatment and management.  Doxycycline and prednisone were given in the emergency room.    aspirin, 81 mg, Oral, Daily  budesonide, 0.5 mg, Nebulization, BID - RT  dilTIAZem CD, 180 mg, Oral, Daily  gabapentin, 900 mg, Oral, Nightly  guaiFENesin, 1,200 mg, Oral, Q PM  ipratropium-albuterol, 3 mL, Nebulization, 4x Daily - RT  metoprolol succinate XL, 50 mg, Oral, BID  montelukast, 10 mg, Oral, Nightly  pantoprazole, 40 mg, Oral, QAM  predniSONE, 20 mg, Oral, Daily   Followed by  [START ON 1/7/2023] predniSONE, 10 mg, Oral, Daily  rOPINIRole, 1 mg, Oral, BID  rosuvastatin, 20 mg, Oral, Daily  senna-docusate sodium, 2 tablet, Oral, BID  sodium chloride, 10 mL, Intravenous, Q12H  theophylline, 400 mg, Oral, Q24H             Active Hospital Problems:  Active Hospital Problems    Diagnosis    • Coronavirus infection    • Chronic diastolic CHF (congestive heart failure) (HCC)    • Chronic respiratory failure with hypoxia and hypercapnia (HCC)    • Chronic depression    • COPD with acute exacerbation (HCC)    • Primary hypertension    • Restless leg syndrome    • Obstructive sleep apnea    • Mixed hyperlipidemia    • Obesity (BMI 30-39.9)    • Acute on chronic respiratory failure with hypoxia and hypercapnia (HCC)    • GERD without esophagitis      Plan:    -Continue appropriate patient's home medications for other chronic medical conditions.  -Treat coronavirus infection with supportive care.  -Treat COPD with acute exacerbation with COPD protocol.  -Follow pulmonary recommendations.  -Remains on systemic steroids, Singulair, Mucinex, theophylline, and inhaled bronchodilators and corticosteroids.  -Baseline O2/PPV use but still more symptomatic than baseline  -She does have chronic CHF, so I will give her a dose of lasix today to see if this helps, although she is  not in acute CHF exacerbation. We can also trial some Ativan for her anxiety.  -Treat GERD with Protonix.  -Continue home BB/Cardizem for rate control.  -PT/OT    DVT prophylaxis:  Mechanical DVT prophylaxis orders are present.    CODE STATUS:    Level Of Support Discussed With: Patient  Code Status (Patient has no pulse and is not breathing): CPR (Attempt to Resuscitate)  Medical Interventions (Patient has pulse or is breathing): Full Support    Disposition: Anticipate discharge home likely tomorrow if she remains on her home level of respiratory support overnight.    Electronically signed by Aristeo Patterson MD, 01/05/23, 21:18 EST.    [facility] Hospitalist Team

## 2023-01-06 NOTE — SIGNIFICANT NOTE
01/06/23 1038   OTHER   Discipline physical therapist   Rehab Time/Intention   Session Not Performed other (see comments)  (to d/c PT to f/u if not)   Recommendation   PT - Next Appointment 01/07/23

## 2023-01-06 NOTE — CASE MANAGEMENT/SOCIAL WORK
Case Management Discharge Note      Final Note: Home with spouse    Provided Post Acute Provider List?: Refused         Transportation Services  Private: Car    Final Discharge Disposition Code: 01 - home or self-care    Discharge Planning Assessment  [facility]     Patient Name: Michelle Francis  MRN: [id number]  Today's Date: 1/6/2023    Admit Date: 12/31/2022    Plan: D/C Plan: Declined SNF/Home Health. Anticipate routine home with spouse. Current home oxygen with [facility]. New home Tr[facility] with [facility], arranged 1/4.         Discharge Plan     Row Name 01/06/23 1150       Plan    Plan D/C Plan: Declined SNF/Home Health. Anticipate routine home with spouse. Current home oxygen with [facility]. New home Tr[facility] with [facility], arranged 1/4.    Patient/Family in Agreement with Plan yes    Plan Comments  spoke to patient and spouse at bedside wearing mask and keeping distance greater than 6 feet and spent less than 15 minutes in room. Patient declines home health services. IMM letter reviewed with patient and spouse, verbal consent and copy left at bedside.    Final Discharge Disposition Code 01 - home or self-care    Final Note Home with spouse                   Expected Discharge Date and Time     Expected Discharge Date Expected Discharge Time    Jan 6, 2023              Patient Forms     Row Name 01/06/23 1152       Patient Forms    Important Message from Medicare (IMM) Delivered  1/6/23    Delivered to Patient    Method of delivery In person  reviewed with patient, verbal consent and copy left at bedside              Met with patient in room wearing PPE: mask.     Maintained distance greater than six feet and spent less than 15 minutes in the room.      AMANUEL Whitfield, RN    [address] [address]    Office: [phone]  Fax: [phone]

## 2023-01-06 NOTE — PROGRESS NOTES
Daily Progress Note        Acute on chronic respiratory failure with hypoxia and hypercapnia (HCC)    Mixed hyperlipidemia    Primary hypertension    Obstructive sleep apnea    Restless leg syndrome    COPD with acute exacerbation (HCC)    Obesity (BMI 30-39.9)    Chronic depression    GERD without esophagitis    Chronic respiratory failure with hypoxia and hypercapnia (HCC)    Chronic diastolic CHF (congestive heart failure) (HCC)    Coronavirus infection      Assessment    Coronavirus infection, OC43  Acute hypoxemic/hypercapnic respiratory failure: ABGs on admission pH 7.30, PCO2 79.9, PO2 93  Acute bronchitis  COPD with acute exacerbation  5 mm right middle lobe nodule  IRIS on home BiPAP    Thrombocytopenia  Chronic diastolic CHF  Essential hypertension  GERD   Obesity BMI of 30  RLS     Recommendations:     received [employer] machine    Oxygen supplement and titration to maintain saturation 90 to 95%: Currently requiring 4L high flow,     Systemic steroids wean  Singulair  Mucinex twice daily  Theophylline  Bronchodilatores/Inhaled corticosteroids  DVT/GI prophylaxis  Check daily labs and correct electrolytes as needed     Patient will need repeated CT scan of the chest without contrast in 6 months to follow-up on the right middle lobe nodule          LOS: 6 days     Subjective         Objective     Vital signs for last 24 hours:  Vitals:    01/06/23 0656 01/06/23 0659 01/06/23 0700 01/06/23 0703   BP:       BP Location:       Patient Position:       Pulse: 78 84 82 80   Resp: 16 16 18 18   Temp:       TempSrc:       SpO2: 98% 96% 98% 98%   Weight:       Height:           Intake/Output last 3 shifts:  I/O last 3 completed shifts:  In: 1560 [P.O.:1560]  Out: 1900 [Urine:1900]  Intake/Output this shift:  No intake/output data recorded.      Radiology  Imaging Results (Last 24 Hours)     Procedure Component Value Units Date/Time    XR Chest 1 View [[id number]] Collected: 01/05/23 0915     Updated: 01/05/23 0918     Narrative:      XR CHEST 1 VW    Date of Exam: 1/5/2023 8:56 AM EST    Indication: F/U respiratory failure.    Comparison: 1/3/2013    Findings:  Heart size within normal limits for technique. Interstitial thickening related to underlying emphysema. No superimposed consolidation. No pneumothorax or pleural effusion. Osseous structures intact.      Impression:      Impression:  1. No acute process.  2. Stable findings related to emphysema.    Electronically Signed: Shivam Castañedar    1/5/2023 9:16 AM EST    Workstation ID: [id number]          Labs:  Results from last 7 days   Lab Units 01/06/23  0110   WBC 10*3/mm3 12.60*   HEMOGLOBIN g/dL 11.7*   HEMATOCRIT % 36.4   PLATELETS 10*3/mm3 95*     Results from last 7 days   Lab Units 01/02/23  2344 01/01/23  0706 12/31/22  1257   SODIUM mmol/L 137   < > 136   POTASSIUM mmol/L 4.4   < > 4.1   CHLORIDE mmol/L 91*   < > 89*   CO2 mmol/L 38.0*   < > 37.0*   BUN mg/dL 29*   < > 15   CREATININE mg/dL 1.10*   < > 0.84   CALCIUM mg/dL 9.4   < > 9.7   BILIRUBIN mg/dL  --   --  0.4   ALK PHOS U/L  --   --  78   ALT (SGPT) U/L  --   --  11   AST (SGOT) U/L  --   --  23   GLUCOSE mg/dL 151*   < > 163*    < > = values in this interval not displayed.     Results from last 7 days   Lab Units 01/01/23  0913   PH, ARTERIAL pH units 7.334*   PO2 ART mm Hg 174.2*   PCO2, ARTERIAL mm Hg 76.3*   HCO3 ART mmol/L 40.6*     Results from last 7 days   Lab Units 12/31/22  1257   ALBUMIN g/dL 4.1     Results from last 7 days   Lab Units 12/31/22  1257   TROPONIN T ng/mL <0.010     Results from last 7 days   Lab Units 01/01/23  1205   NIALL  Negative     Results from last 7 days   Lab Units 01/02/23  2344   MAGNESIUM mg/dL 2.3     Results from last 7 days   Lab Units 01/01/23  1204   INR  1.00   APTT seconds 24.5               Meds:   SCHEDULE  aspirin, 81 mg, Oral, Daily  budesonide, 0.5 mg, Nebulization, BID - RT  dilTIAZem CD, 180 mg, Oral, Daily  gabapentin, 900 mg, Oral, Nightly  guaiFENesin, 1,200  mg, Oral, Q PM  ipratropium-albuterol, 3 mL, Nebulization, 4x Daily - RT  metoprolol succinate XL, 50 mg, Oral, BID  montelukast, 10 mg, Oral, Nightly  pantoprazole, 40 mg, Oral, QAM  predniSONE, 20 mg, Oral, Daily   Followed by  [START ON 1/7/2023] predniSONE, 10 mg, Oral, Daily  rOPINIRole, 1 mg, Oral, BID  rosuvastatin, 20 mg, Oral, Daily  senna-docusate sodium, 2 tablet, Oral, BID  sodium chloride, 10 mL, Intravenous, Q12H  theophylline, 400 mg, Oral, Q24H      Infusions     PRNs  •  acetaminophen **OR** acetaminophen **OR** acetaminophen  •  albuterol  •  ALPRAZolam  •  aluminum-magnesium hydroxide-simethicone  •  senna-docusate sodium **AND** polyethylene glycol **AND** bisacodyl **AND** bisacodyl  •  ipratropium-albuterol  •  LORazepam  •  ondansetron  •  oxyCODONE  •  sodium chloride  •  sodium chloride  •  sodium chloride    Physical Exam:  Physical Exam  Vitals reviewed.   Cardiovascular:      Heart sounds: Murmur heard.   Pulmonary:      Breath sounds: Wheezing and rhonchi present.   Skin:     General: Skin is warm and dry.   Neurological:      Mental Status: She is alert and oriented to person, place, and time.         ROS  Review of Systems    I have reviewed the patient's new clinical results.    Electronically signed by Hawa Sifuentes MD

## 2023-01-06 NOTE — PROGRESS NOTES
Hematology/Oncology Inpatient Progress Note    PATIENT NAME: Michelle Francis  : 1950  MRN: [id number]    CHIEF COMPLAINT: Thrombocytopenia and COVID-19 infection    HISTORY OF PRESENT ILLNESS:    72 y.o. female admitted to [facility] through the ED on 2022 where she reported acute on chronic shortness of air.  She stated she had a history of COPD with increasing cough and shortness of air that led her to come to the ED.  She denied any chest pain or fever.  She did report a sore throat.  In the ED CT chest without contrast showed advanced emphysema with central bronchial wall thickening and a stable 5 mm pulmonary nodule in right middle lobe lung.  CBC revealed WBC 10.4, hemoglobin 12.7, and platelets 17,000. Creatinine was normal at 0.84 and LFTs were not elevated.  Troponin and BNP were both normal.  Respiratory viral panel was positive for coronavirus OC43 and was otherwise negative.  She received doxycycline and Solu-Medrol 125 mg IV x1 followed by Solu-Medrol 40 mg IV every 12 hours initiated 2022  At time of consultation 2023 platelets were 26,000. Chart review showed intermittent mild thrombocytopenia since 2018 and her last available labs from 2022 revealed platelets 124,000.  Med list reflected outpatient low-dose aspirin, Plavix and omeprazole.     23  Hematology/Oncology was consulted by the hospitalist group for thrombocytopenia.     Past Medical History: COPD, IRIS, arthritis, HTN, HLD, restless leg syndrome, and CKD all of long duration.  Surgical History: Partial colectomy in  secondary to multiple large polyps/obstruction.  Cataract removal, cerebral aneurysm repair in .  Ventral hernia repair with removal of adhesions in 2018.  Wrist surgery secondary to fracture in the past.  Social History: She lives in [place], [place] with her spouse.  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  She stopped smoking years ago.  She denies alcohol  or recreational drug use.  Family History: Her sister had thyroid cancer.  Allergies: No known drug allergies.     PCP: Avani Montoya MD    INTERVAL HISTORY:  • 1/2/2023-platelets 38,000, WBC 16.8.  PTT 24.5 (24-31) and PT 10.3 (9.6-11.7).  Folate 11.7 (4.78-24.2) and vitamin B12 460 (211-946).  • 1/3/2023-NIALL negative, EBV IgM less than 36 (less than 36), CMV IgM less than 30 (less than 30).  Platelets 51,000.  • 1/4/2023-no labs today.  • 1/5/2023-platelets 77,000, WBC 10.0, hemoglobin 12.5.  • 1/6/2023- WBC 12.6, hemoglobin 11.7, platelet count 95,000.    History of present illness reviewed since last visit and changes noted on 01/06/23.      Subjective   She is complaining of shortness of air.  She reports the  informed her there is no help available for her at home.    ROS:  Review of Systems   Constitutional: Negative for activity change, chills, fatigue, fever and unexpected weight change.   HENT: Negative for congestion, dental problem, hearing loss, mouth sores, nosebleeds, sore throat and trouble swallowing.    Eyes: Negative for photophobia and visual disturbance.   Respiratory: Positive for shortness of breath. Negative for apnea, cough and chest tightness.    Cardiovascular: Negative for chest pain, palpitations and leg swelling.   Gastrointestinal: Negative for abdominal distention, abdominal pain, blood in stool, constipation, diarrhea, nausea and vomiting.   Endocrine: Negative for cold intolerance, heat intolerance and polydipsia.   Genitourinary: Negative for decreased urine volume, difficulty urinating, dysuria, enuresis, frequency, hematuria and urgency.   Musculoskeletal: Negative for arthralgias and gait problem.   Skin: Negative for rash and wound.   Neurological: Negative for dizziness, tremors, weakness, light-headedness, numbness and headaches.   Hematological: Negative for adenopathy. Does not bruise/bleed easily.   Psychiatric/Behavioral: Negative for confusion and  "hallucinations. The patient is not nervous/anxious.    All other systems reviewed and are negative.       MEDICATIONS:    Scheduled Meds:     Continuous Infusions:  No current facility-administered medications for this encounter.     PRN Meds:       ALLERGIES:  No Known Allergies    Objective    VITALS:   /64 (BP Location: Right arm, Patient Position: Lying)   Pulse 83   Temp 97.7 °F (36.5 °C) (Oral)   Resp 16   Ht 167.6 cm (66\")   Wt 79.7 kg (175 lb 11.3 oz) Comment: bed zeroed prior to weighing  SpO2 100%   BMI 28.36 kg/m²     PHYSICAL EXAM:  Physical Exam  Vitals and nursing note reviewed.   Constitutional:       General: She is not in acute distress.     Appearance: Normal appearance. She is well-developed. She is not diaphoretic.   HENT:      Head: Normocephalic and atraumatic.      Right Ear: External ear normal.      Left Ear: External ear normal.      Nose: Nose normal.      Comments: O2 by NC.     Mouth/Throat:      Mouth: Mucous membranes are moist.      Pharynx: Oropharynx is clear. No oropharyngeal exudate or posterior oropharyngeal erythema.      Comments: Poor dentition  Eyes:      General: No scleral icterus.     Extraocular Movements: Extraocular movements intact.      Conjunctiva/sclera: Conjunctivae normal.      Pupils: Pupils are equal, round, and reactive to light.   Cardiovascular:      Rate and Rhythm: Normal rate and regular rhythm.      Heart sounds: Normal heart sounds. No murmur heard.     Comments: Cardiac monitor leads  Pulmonary:      Effort: No respiratory distress.      Breath sounds: No stridor. No wheezing, rhonchi or rales.   Chest:      Chest wall: No tenderness.   Abdominal:      General: Bowel sounds are normal. There is no distension.      Palpations: Abdomen is soft. There is no mass.      Tenderness: There is no abdominal tenderness. There is no guarding.      Comments: Truncal obesity   Genitourinary:     Comments: Deferred   Musculoskeletal:         General: No " swelling, tenderness or deformity. Normal range of motion.      Cervical back: Normal range of motion and neck supple.      Right lower leg: No edema.      Left lower leg: No edema.      Comments: BUE IV and right hand O2 monitor.   Lymphadenopathy:      Cervical: No cervical adenopathy.      Upper Body:      Right upper body: No supraclavicular adenopathy.      Left upper body: No supraclavicular adenopathy.   Skin:     General: Skin is warm and dry.      Coloration: Skin is not pale.      Findings: No bruising, erythema or rash.   Neurological:      General: No focal deficit present.      Mental Status: She is alert and oriented to person, place, and time.      Coordination: Coordination normal.   Psychiatric:         Mood and Affect: Mood normal.         Behavior: Behavior normal.         Thought Content: Thought content normal.           RECENT LABS:  Lab Results (last 24 hours)     Procedure Component Value Units Date/Time    Manual Differential [[id number]]  (Abnormal) Collected: 01/06/23 0110    Specimen: Blood Updated: 01/06/23 0212     Neutrophil % 74.0 %      Lymphocyte % 20.0 %      Monocyte % 3.0 %      Myelocyte % 3.0 %      Neutrophils Absolute 9.32 10*3/mm3      Lymphocytes Absolute 2.52 10*3/mm3      Monocytes Absolute 0.38 10*3/mm3      RBC Morphology Normal     WBC Morphology Normal     Platelet Estimate Decreased    CBC & Differential [[id number]]  (Abnormal) Collected: 01/06/23 0110    Specimen: Blood Updated: 01/06/23 0212    Narrative:      The following orders were created for panel order CBC & Differential.  Procedure                               Abnormality         Status                     ---------                               -----------         ------                     CBC Auto Differential[[id number]]        Abnormal            Final result               Scan Slide[[id number]]                                       Final result                 Please view results for these tests on the  individual orders.    Scan Slide [[id number]] Collected: 01/06/23 0110    Specimen: Blood Updated: 01/06/23 0212     Scan Slide --     Comment: See Manual Differential Results       CBC Auto Differential [[id number]]  (Abnormal) Collected: 01/06/23 0110    Specimen: Blood Updated: 01/06/23 0212     WBC 12.60 10*3/mm3      RBC 4.26 10*6/mm3      Hemoglobin 11.7 g/dL      Hematocrit 36.4 %      MCV 85.3 fL      MCH 27.5 pg      MCHC 32.2 g/dL      RDW 14.8 %      RDW-SD 46.4 fl      MPV 8.5 fL      Platelets 95 10*3/mm3     Narrative:      The previously reported component NRBC is no longer being reported. Previous result was 0.1 /100 WBC (Reference Range: 0.0-0.2 /100 WBC) on 1/6/2023 at 0138 EST.    Blood Culture - Blood, Hand, Left [[id number]]  (Normal) Collected: 01/01/23 1801    Specimen: Blood from Hand, Left Updated: 01/05/23 1815     Blood Culture No growth at 4 days    Blood Culture - Blood, Arm, Right [[id number]]  (Normal) Collected: 01/01/23 1801    Specimen: Blood from Arm, Right Updated: 01/05/23 1815     Blood Culture No growth at 4 days    Blood Culture - Blood, Arm, Left [[id number]]  (Normal) Collected: 12/31/22 1257    Specimen: Blood from Arm, Left Updated: 01/05/23 1316     Blood Culture No growth at 5 days    Narrative:      Less than seven (7) mL's of blood was collected.  Insufficient quantity may yield false negative results.          PENDING RESULTS: N/A    IMAGING REVIEWED:  XR Chest 1 View    Result Date: 1/5/2023  Impression: 1. No acute process. 2. Stable findings related to emphysema. Electronically Signed: Shivam Lechuga  1/5/2023 9:16 AM EST  Workstation ID: [id number]      I have reviewed the patient's labs, imaging, reports, and other clinician documentation.    Assessment & Plan   ASSESSMENT:  1. Acute on chronic thrombocytopenia- intermittent mild thrombocytopenia noted dating back to 2018.  LFTs were okay. Respiratory viral panel was positive for coronavirus OC43 (not COVID-19).    Acute  and chronic thrombocytopenia work-up sent with coags normal and no evidence of vitamin B12 or folate deficiencies, CMV/EBV infection and NIALL is negative.  Platelets continue to slowly improve. She is receiving prednisone for her respiratory symptoms which may be contributing to the improvement, however, more likely that this was coronavirus induced thrombocytopenia.  2. Coronavirus OC43 infection/acute on chronic respiratory failure with hypoxia/exacerbation of COPD/IRIS- received 1 dose of doxycycline in the ED.  On prednisone and supportive care.  She met criteria for sepsis trigger and blood cultures were sent 1/2.  Pulmonary managing.  3. Right middle lobe lung nodule- stable on CT this admit from June 2022 CT.  Plan to repeat in 6 months.  4. Conditioning-patient reported she could not tolerate PT.   discussed lifespan referral to inquire about Medicaid eligibility for potential in-home supports if deemed eligible after assessment was made by the agency.   notes state patient declined home health.    PLAN:  1. Follow CBC.  2. Follow lung nodule as outpatient with repeat CT chest in 6 months.  3. Outpatient platelet antibodies.        Note prepared by ADAN Rodriguez.  Patient seen and examined by Sagar Perez MD.  Electronically signed by BERKLEY Carrion, 01/06/23, 4:32 PM EST.    I have personally performed a face-to-face diagnostic evaluation on this patient. I have performed a complete history and physical examination, reviewed laboratory studies, and radiographic examinations.  I have completed the majority and substantive portion of the medical decision making.  I have formulated the assessment on this patient and the plan of action as noted above. I have discussed the case with Simon Carter NP, have edited/reviewed the note, and agree with the care plan.   The patient still has shortness of air.  On examination, she has decreased breath sounds.  Labs are significant for  improving platelet count.  She is being prepared for discharge.  We will schedule outpatient follow-up with platelet antibody checked.    I discussed the patient's findings and my recommendations with patient.    Part of this note may be an electronic transcription/translation of spoken language to printed text using the Dragon Dictation System.    Electronically signed by Sagar Perez MD, 01/06/23, 6:05 PM EST.

## 2023-01-06 NOTE — DISCHARGE SUMMARY
[facility]  Discharge Summary    Date of Service: 2023  Patient Name: Michelle Francis  : 1950  MRN: [id number]    Date of Admission: 2022  Discharge Diagnosis: COPD exacerbation/coronarvirus (non-COVID19) infection  Date of Discharge:  2023  Primary Care Physician: Avani Montoya MD      Presenting Problem:   Thrombocytopenia, unspecified (HCC) [D69.6]  Acute exacerbation of chronic obstructive pulmonary disease (COPD) (HCC) [J44.1]  Coronavirus infection [B34.2]  Acute bronchitis, unspecified organism [J20.9]    Active and Resolved Hospital Problems:  Active Hospital Problems    Diagnosis POA   • Anxiety disorder due to medical condition [F06.4] Yes   • Coronavirus infection [B34.2] Yes   • Chronic diastolic CHF (congestive heart failure) (HCC) [I50.32] Yes   • Chronic respiratory failure with hypoxia and hypercapnia (HCC) [J96.11, J96.12] Yes   • Chronic depression [F32.A] Yes   • COPD with acute exacerbation (HCC) [J44.1] Yes   • Primary hypertension [I10] Yes   • Restless leg syndrome [G25.81] Yes   • Obstructive sleep apnea [G47.33] Yes   • Mixed hyperlipidemia [E78.2] Yes   • Obesity (BMI 30-39.9) [E66.9] Yes   • GERD without esophagitis [K21.9] Yes      Resolved Hospital Problems    Diagnosis POA   • Acute on chronic respiratory failure with hypoxia and hypercapnia (HCC) [J96.21, J96.22] Yes       Hospital Course     HPI:  Michelle Francis is a 72 y.o. female who presented to [facility] on 2022 complaining of shortness of breath.  Patient is a 72-year-old female with past medical history of for COPD, osteoarthritis, hypertension, hyperlipidemia, osteoporosis, restless leg syndrome and obstructive sleep apnea who presented to the emergency room because of her worsening shortness of breath.  Patient was seen in the emergency room and was noted to be wheezing and also with rhonchorous sounds.  Patient was diagnosed with coronavirus  infection.  Patient was also noted to have a platelet of 17,000.  Patient was diagnosed with thrombocytopenia, acute respiratory failure with hypoxia, and a COPD with acute exacerbation.  Patient was recommended for admission for further treatment and management.  Doxycycline and prednisone were given in the emergency room.     Patient reported no fever or chills, no headache or visual obscurations, no hot or cold intolerance, no chest pain or abdominal pain and no leg swelling or leg pain.    Hospital course:  She was admitted in consultation with pulmonology and oncology for antibiotics and aggressive pulmonary toilet.  She was able to wean down to her home level of oxygen use over the course of several days.  She continues to use positive pressure ventilation at night as she does at home.  There was no evidence of acute heart failure exacerbation.  She declined skilled rehab but was agreeable to going home with her spouse.  She did not want home health either.  Oxygen and ventilator support was arranged through [place] and she discharged home today.  She will follow-up in the outpatient setting with oncology for her thrombocytopenia.    Day of Discharge     Vital Signs:  Temp:  [97.3 °F (36.3 °C)-98.1 °F (36.7 °C)] 97.3 °F (36.3 °C)  Heart Rate:  [] 80  Resp:  [12-26] 18  BP: ()/(49-76) 104/62  Flow (L/min):  [4-5] 4    Physical Exam:  GEN: Elderly woman, no acute distress  HEENT: NCAT, PERRLA, dry mucous membranes  NECK: Supple, midline trachea  CARD: RRR, tachycardia has resolved. No significant peripheral edema  PULM: Coarse bilateral breath sounds, occasional expiratory wheezing, diminished at the bases, non-distressed, currently on NC  ABD: soft, NTND, normoactive bowel sounds throughout  SKIN: See nursing notes for full skin survey  NEURO: Grossly intact, non-focal exam  PSYCH: Pleasant    Pertinent  and/or Most Recent Results     LAB RESULTS:      Lab 01/06/23  0110 01/05/23  1006 01/02/23  2831  01/02/23  0015 01/01/23  1802 01/01/23  1204 01/01/23  0706 12/31/22  1259 12/31/22  1257   WBC 12.60* 10.00 14.40* 16.80*  --   --  10.50  --  10.40   HEMOGLOBIN 11.7* 12.5 12.4 12.2  --   --  12.1  --  12.7   HEMATOCRIT 36.4 38.2 38.2 38.7  --   --  38.5  --  40.1   PLATELETS 95* 77* 51* 38*  --   --  26*  --  17*   NEUTROS ABS 9.32* 7.00 12.70* 15.00*  --   --  9.40*  --  9.20*   LYMPHS ABS  --   --  0.70 0.90  --   --  0.70  --  0.60*   MONOS ABS  --   --  0.90 0.80  --   --  0.40  --  0.50   EOS ABS  --   --  0.00 0.00  --   --  0.00  --  0.00   MCV 85.3 86.0 85.4 86.8  --   --  86.8  --  86.6   LACTATE  --   --   --   --  1.9  --   --  1.5  --    PROTIME  --   --   --   --   --  10.3  --   --   --    APTT  --   --   --   --   --  24.5  --   --   --          Lab 01/02/23  2344 01/02/23  0015 01/01/23  0706 12/31/22  1257   SODIUM 137 136 140 136   POTASSIUM 4.4 4.5 4.3 4.1   CHLORIDE 91* 91* 92* 89*   CO2 38.0* 34.0* 38.0* 37.0*   ANION GAP 8.0 11.0 10.0 10.0   BUN 29* 32* 24* 15   CREATININE 1.10* 0.82 0.86 0.84   EGFR 53.2* 75.6 71.4 73.5   GLUCOSE 151* 133* 155* 163*   CALCIUM 9.4 9.6 9.6 9.7   MAGNESIUM 2.3 2.8* 2.5*  --          Lab 12/31/22  1257   TOTAL PROTEIN 6.5   ALBUMIN 4.1   GLOBULIN 2.4   ALT (SGPT) 11   AST (SGOT) 23   BILIRUBIN 0.4   ALK PHOS 78         Lab 01/01/23  1204 12/31/22  1257   PROBNP  --  196.0   TROPONIN T  --  <0.010   PROTIME 10.3  --    INR 1.00  --              Lab 12/31/22  1257   FOLATE 11.70   VITAMIN B 12 460         Lab 01/01/23  0913 12/31/22  1910   PH, ARTERIAL 7.334* 7.305*   PCO2, ARTERIAL 76.3* 79.9*   PO2 .2* 93.0   O2 SATURATION ART 99.4* 95.7   FIO2 21 80   HCO3 ART 40.6* 39.7*   BASE EXCESS ART 11.5* 9.6*     Brief Urine Lab Results  (Last result in the past 365 days)      Color   Clarity   Blood   Leuk Est   Nitrite   Protein   CREAT   Urine HCG        06/08/22 0505 Yellow   Cloudy   Negative   Negative   Negative   Negative               Microbiology  Results (last 10 days)     Procedure Component Value - Date/Time    Blood Culture - Blood, Hand, Left [[id number]]  (Normal) Collected: 01/01/23 1801    Lab Status: Preliminary result Specimen: Blood from Hand, Left Updated: 01/05/23 1815     Blood Culture No growth at 4 days    Blood Culture - Blood, Arm, Right [[id number]]  (Normal) Collected: 01/01/23 1801    Lab Status: Preliminary result Specimen: Blood from Arm, Right Updated: 01/05/23 1815     Blood Culture No growth at 4 days    Respiratory Panel PCR w/COVID-19(SARS-CoV-2) ZOILA/RAFAELA/OSCAR/PAD/COR/MAD/RASHAD In-House, NP Swab in UTM/VTM, 3-4 HR TAT - Swab, Nasopharynx [[id number]]  (Abnormal) Collected: 12/31/22 1313    Lab Status: Final result Specimen: Swab from Nasopharynx Updated: 12/31/22 1410     ADENOVIRUS, PCR Not Detected     Coronavirus 229E Not Detected     Coronavirus HKU1 Not Detected     Coronavirus NL63 Not Detected     Coronavirus OC43 Detected     COVID19 Not Detected     Human Metapneumovirus Not Detected     Human Rhinovirus/Enterovirus Not Detected     Influenza A PCR Not Detected     Influenza B PCR Not Detected     Parainfluenza Virus 1 Not Detected     Parainfluenza Virus 2 Not Detected     Parainfluenza Virus 3 Not Detected     Parainfluenza Virus 4 Not Detected     RSV, PCR Not Detected     Bordetella pertussis pcr Not Detected     Bordetella parapertussis PCR Not Detected     Chlamydophila pneumoniae PCR Not Detected     Mycoplasma pneumo by PCR Not Detected    Narrative:      In the setting of a positive respiratory panel with a viral infection PLUS a negative procalcitonin without other underlying concern for bacterial infection, consider observing off antibiotics or discontinuation of antibiotics and continue supportive care. If the respiratory panel is positive for atypical bacterial infection (Bordetella pertussis, Chlamydophila pneumoniae, or Mycoplasma pneumoniae), consider antibiotic de-escalation to target atypical bacterial  infection.    Blood Culture - Blood, Arm, Left [[id number]]  (Normal) Collected: 12/31/22 1257    Lab Status: Final result Specimen: Blood from Arm, Left Updated: 01/05/23 1316     Blood Culture No growth at 5 days    Narrative:      Less than seven (7) mL's of blood was collected.  Insufficient quantity may yield false negative results.          CT Chest Without Contrast Diagnostic    Result Date: 12/31/2022  Impression: 1. Findings of advanced centrilobular emphysema with central bronchial wall thickening which can be seen with chronic bronchitis or reactive airway disease. 2. Stable 5 mm pulmonary nodule within the right middle lobe.    Electronically Signed By-Kj Chavez MD On:12/31/2022 3:19 PM This report was finalized on [id number] by  Kj Chavez MD.    XR Chest 1 View    Result Date: 1/5/2023  Impression: Impression: 1. No acute process. 2. Stable findings related to emphysema. Electronically Signed: Shivam Castañedamonica  1/5/2023 9:16 AM EST  Workstation ID: [id number]    XR Chest 1 View    Result Date: 1/3/2023  Impression: Impression: 1. Continued evidence of passive congestion and perhaps mild interstitial edema which has not changed from the previous radiographs. Electronically Signed: Geoffrey Pina  1/3/2023 9:54 AM EST  Workstation ID: [id number]              Results for orders placed during the hospital encounter of 06/05/22    Adult Transthoracic Echo Complete W/ Cont if Necessary Per Protocol    Interpretation Summary  · Left ventricular systolic function is normal.  · Left ventricular ejection fraction is 55 to 60%  · Left ventricular wall thickness is consistent with borderline concentric hypertrophy.  · Left ventricular diastolic function is consistent with (grade I) impaired relaxation.      Labs Pending at Discharge:  Pending Labs     Order Current Status    Blood Culture - Blood, Arm, Right Preliminary result    Blood Culture - Blood, Hand, Left Preliminary result          Procedures  Performed           Consults:   Consults     Date and Time Order Name Status Description    12/31/2022  5:11 PM Hematology & Oncology Inpatient Consult Completed     12/31/2022  5:08 PM Inpatient Pulmonology Consult Completed             Discharge Details        Discharge Medications      New Medications      Instructions Start Date   LORazepam 0.5 MG tablet  Commonly known as: ATIVAN   0.5 mg, Oral, Every 6 Hours PRN         Continue These Medications      Instructions Start Date   albuterol sulfate  (90 Base) MCG/ACT inhaler  Commonly known as: PROVENTIL HFA;VENTOLIN HFA;PROAIR HFA   1 puff, Inhalation, Every 4 Hours PRN, Q4-6H      aspirin 81 MG chewable tablet   81 mg, Oral, Daily      azithromycin 250 MG tablet  Commonly known as: ZITHROMAX   250 mg, Oral, 3 Times Weekly, Monday , Wednesday and Friday      Breztri Aerosphere 160-9-4.8 MCG/ACT aerosol inhaler  Generic drug: Budeson-Glycopyrrol-Formoterol   2 puffs, Inhalation, 2 Times Daily      budesonide 0.5 MG/2ML nebulizer solution  Commonly known as: PULMICORT   0.5 mg, Every 4 Hours PRN      calcium carbonate 600 MG tablet  Commonly known as: OS-BILLIE   600 mg, Oral, 2 Times Daily      dilTIAZem  MG 24 hr capsule  Commonly known as: Cardizem CD   180 mg, Oral, Daily      esomeprazole 40 MG capsule  Commonly known as: nexIUM   TAKE 1 CAPSULE BY MOUTH EVERY DAY      furosemide 80 MG tablet  Commonly known as: LASIX   80 mg, Oral, Every Morning      furosemide 40 MG tablet  Commonly known as: LASIX   40 mg, Oral, Every Evening      gabapentin 300 MG capsule  Commonly known as: NEURONTIN   900 mg, Oral, Nightly      guaiFENesin 600 MG 12 hr tablet  Commonly known as: MUCINEX   1,200 mg, Oral, Every Evening      ipratropium-albuterol 0.5-2.5 mg/3 ml nebulizer  Commonly known as: DUO-NEB   3 mL, Nebulization, Every 4 Hours PRN, Every 4-6h PRN       metoprolol succinate XL 50 MG 24 hr tablet  Commonly known as: TOPROL-XL   TAKE ONE TABLET BY MOUTH  TWICE DAILY      montelukast 10 MG tablet  Commonly known as: SINGULAIR   10 mg, Oral, Nightly      potassium chloride 20 MEQ CR tablet  Commonly known as: K-DUR,KLOR-CON   40 mEq, Oral, Every Morning      potassium chloride 20 MEQ CR tablet  Commonly known as: K-DUR,KLOR-CON   20 mEq, Oral, Every Evening      predniSONE 10 MG tablet  Commonly known as: DELTASONE   10 mg, Oral, Daily      risedronate 35 MG tablet  Commonly known as: ACTONEL   35 mg, Oral, Every 7 Days, On Thursday       rOPINIRole 1 MG tablet  Commonly known as: REQUIP   1 mg, Oral, 2 Times Daily, At 19:00 and 21:00       rosuvastatin 20 MG tablet  Commonly known as: CRESTOR   20 mg, Oral, Daily      theophylline 400 MG 24 hr tablet  Commonly known as: UNIPHYL   400 mg, Oral, Daily             No Known Allergies      Discharge Disposition:   Home or Self Care    Diet:  Hospital:  Diet Order   Procedures   • Diet: Regular/House Diet; Texture: Regular Texture (IDDSI 7); Fluid Consistency: Thin (IDDSI 0)         Discharge Activity:   Activity Instructions     Activity as Tolerated              CODE STATUS:  Code Status and Medical Interventions:   Ordered at: 12/31/22 1721     Level Of Support Discussed With:    Patient     Code Status (Patient has no pulse and is not breathing):    CPR (Attempt to Resuscitate)     Medical Interventions (Patient has pulse or is breathing):    Full Support         Future Appointments   Date Time Provider Department Center   6/15/2023  1:45 PM Ryne Staton MD [facility]       Additional Instructions for the Follow-ups that You Need to Schedule     Call MD With Problems / Concerns   As directed      Instructions: PCM    Order Comments: Instructions: PCM          Discharge Follow-up with PCP   As directed       Currently Documented PCP:    Avani Montoya MD    PCP Phone Number:    [phone]     Follow Up Details: within one week of discharge         Discharge Follow-up with Specialty: Cardiology and  pulmonology follow up as previously scheduled   As directed      Specialty: Cardiology and pulmonology follow up as previously scheduled             Time spent on Discharge including face to face service:  25 minutes     Signature: Electronically signed by Aristeo Patterson MD, 01/06/23, 08:37 EST.  [facility] Hospitalist Team

## 2023-01-07 NOTE — OUTREACH NOTE
Prep Survey    Flowsheet Row Responses   [facility]? [facility]   Is LACE score < 7 ? No   Eligibility Readm Mgmt   Discharge diagnosis Acute on chronic respiratory failure with hypoxia and hypercapnia COVID and COPD   Does the patient have one of the following disease processes/diagnoses(primary or secondary)? COPD   Does the patient have Home health ordered? Yes   What is the Home health agency?  declined HH   Is there a DME ordered? Yes   What DME was ordered? [employer] with [employer]   Prep survey completed? Yes          CANDACE POWER - Registered Nurse

## 2023-01-09 ENCOUNTER — READMISSION MANAGEMENT (OUTPATIENT)
Dept: CALL CENTER | Facility: HOSPITAL | Age: 73
End: 2023-01-09
Payer: MEDICARE

## 2023-01-09 NOTE — OUTREACH NOTE
COPD/PN Week 1 Survey    Flowsheet Row Responses   [facility] patient discharged from? [facility]   Does the patient have one of the following disease processes/diagnoses(primary or secondary)? COPD   Week 1 attempt successful? Yes   Call start time 1219   Call end time 1223   Discharge diagnosis Acute on chronic respiratory failure with hypoxia and hypercapnia,  COVID,  and COPD   Meds reviewed with patient/caregiver? Yes   Does the patient have all medications ordered at discharge? Yes   Is the patient taking all medications as directed (includes completed medication regime)? No   Does the patient have a primary care provider?  Yes   Does the patient have an appointment with their PCP or specialist within 7 days of discharge? Yes   Comments regarding PCP 1/10/22   Has the patient kept scheduled appointments due by today? N/A   Pulse Ox monitoring Intermittent   O2 Sat comments 97% O2 5 L of O2   Psychosocial issues? No   Did the patient receive a copy of their discharge instructions? Yes   Nursing interventions Reviewed instructions with patient   What is the patient's perception of their health status since discharge? Improving   Are the patient's immunizations up to date?  Yes   Is the patient/caregiver able to teach back the hierarchy of who to call/visit for symptoms/problems? PCP, Specialist, Home health nurse, Urgent Care, ED, 911 Yes   Is the patient able to teach back COPD zones? No   Nursing interventions Education provided on various zones   Patient reports what zone on this call? Green Zone   Green Zone Reports doing well, Breathing without shortness of breath, Usual amounts of cough and phlegm/mucous, Slept well last night, Appetite is good   Green Zone interventions: Use oxygen as prescribed, Take daily medications, At all times avoid cigarette smoking, vaping and inhaled irritants   Week 1 call completed? Yes   Is the patient interested in additional calls from an ambulatory ?  NOTE:   applies to high risk patients requiring additional follow-up. No          AYDEN ALVARENGA - Registered Nurse

## 2023-05-05 ENCOUNTER — TRANSCRIBE ORDERS (OUTPATIENT)
Dept: HOME HEALTH SERVICES | Facility: HOME HEALTHCARE | Age: 73
End: 2023-05-05
Payer: MEDICARE

## 2023-05-05 ENCOUNTER — HOME HEALTH ADMISSION (OUTPATIENT)
Dept: HOME HEALTH SERVICES | Facility: HOME HEALTHCARE | Age: 73
End: 2023-05-05
Payer: MEDICARE

## 2023-05-05 DIAGNOSIS — J44.9 OBSTRUCTIVE CHRONIC BRONCHITIS WITHOUT EXACERBATION: Primary | ICD-10-CM

## 2023-06-14 NOTE — PROGRESS NOTES
Date of Office Visit: 06/15/2023  Encounter Provider: Dr. Ryne Staton  Place of Service: [facility] CARDIOLOGY [place]  Patient Name: Michelle Francis  :1950  Avani Montoya MD    Chief Complaint   Patient presents with    Rapid Heart Rate    Hypertension    Hyperlipidemia    Congestive Heart Failure    Follow-up     History of Present Illness:    I am pleased to see Mrs. Francis in my office today as a follow-up.    As you know, patient is 71 years old white female whose past medical history significant for hypertension, hyperlipidemia, COPD, who came today for follow-up.    In 2019, patient was admitted at [facility] with symptom of COPD exacerbation.  Patient also complained of chest discomfort.  Echocardiogram showed EF was 60 to 65%.  No significant valvular heart disease noted.  In 2020, patient underwent stress test which was negative for ischemia or myocardial infarction.    In 2022, patient had echocardiogram and EF of 60 to 65% and no significant valvular heart disease noted.    In 2022, patient was admitted at [facility] with shortness of breath and's COPD exacerbation.  Patient was subsequently discharged home    Patient came today for follow-up.  Patient denies any symptom of chest pain or tightness or heaviness.  No orthopnea PND no syncope or presyncope.  Patient is limited in her activity.  She is almost wheelchair-bound.  Patient is on home oxygen.  She has advanced COPD.    At this stage, patient is doing fairly well from a cardiovascular standpoint.  However she has advanced COPD and her functional status is extremely poor.  Patient is in volume overload.  Patient has bilateral leg edema.  I will start metolazone 2.5 mg .  Salt and water restriction recommended    Repeat lipid panel testing through PCP office      Past Medical History:   Diagnosis Date    Anxiety disorder due to medical condition  01/06/2023    Arthritis     CHF (congestive heart failure)     Colon polyps     Dr Bates    COPD (chronic obstructive pulmonary disease)     History of emphysema     Hyperlipidemia     Hypertension     Kidney disease     Osteoporosis     Pneumonia due to COVID-19 virus 01/02/2023    Restless leg syndrome     sees Dr Seipel    Sleep apnea     [facility] 2014, ahi 5.9, on auto bipap min 8 max, PS 2-8-Alcaraz's, nasal mask         Past Surgical History:   Procedure Laterality Date    BRONCHOSCOPY N/A 6/8/2022    Procedure: BRONCHOSCOPY with left lower lobe wash;  Surgeon: Hawa Sifuentes MD;  Location: [facility] ENDOSCOPY;  Service: Pulmonary;  Laterality: N/A;  pneumonia    CARDIOVASCULAR STRESS TEST  2020    CATARACT EXTRACTION      Dr Moyer    CEREBRAL ANEURYSM REPAIR      Brain Aneurysm approx 2009, treated with stents and coils, Dr. Fraga    COLON SURGERY      partial colectomy-2013 Dr Valladares- due to multiple large polyps    ECHO - CONVERTED  2019    EXPLORATORY LAPAROTOMY      lysis of intra abdominal adhesions, primary ventral hernia repair 08/01/2018 Dr Brown    WRIST SURGERY      wrist fracture - Dr Patton           Current Outpatient Medications:     albuterol sulfate  (90 Base) MCG/ACT inhaler, Inhale 1 puff Every 4 (Four) Hours As Needed for Wheezing or Shortness of Air. Q4-6H, Disp: , Rfl:     aspirin 81 MG chewable tablet, Chew 1 tablet Daily., Disp: , Rfl:     azithromycin (ZITHROMAX) 250 MG tablet, Take 1 tablet by mouth 3 (Three) Times a Week. Monday , Wednesday and Friday, Disp: , Rfl:     Breztri Aerosphere 160-9-4.8 MCG/ACT aerosol inhaler, Inhale 2 puffs 2 (Two) Times a Day., Disp: , Rfl:     budesonide (PULMICORT) 0.5 MG/2ML nebulizer solution, 2 mL Every 4 (Four) Hours As Needed., Disp: , Rfl:     calcium carbonate (OS-BILLIE) 600 MG tablet, Take 1 tablet by mouth 2 (Two) Times a Day., Disp: , Rfl:     dilTIAZem CD (Cardizem CD) 180 MG 24 hr capsule, Take 1 capsule by mouth Daily., Disp: 90 capsule, Rfl:  1    esomeprazole (nexIUM) 40 MG capsule, TAKE 1 CAPSULE BY MOUTH EVERY DAY, Disp: 90 capsule, Rfl: 3    furosemide (LASIX) 80 MG tablet, Take 1 tablet by mouth 2 (Two) Times a Day., Disp: , Rfl:     gabapentin (NEURONTIN) 300 MG capsule, Take 3 capsules by mouth Every Night., Disp: , Rfl:     guaiFENesin (MUCINEX) 600 MG 12 hr tablet, Take 2 tablets by mouth Every Evening., Disp: , Rfl:     ipratropium-albuterol (DUO-NEB) 0.5-2.5 mg/3 ml nebulizer, Take 3 mL by nebulization Every 4 (Four) Hours As Needed for Wheezing or Shortness of Air. Every 4-6h PRN, Disp: , Rfl:     LORazepam (ATIVAN) 0.5 MG tablet, Take 1 tablet by mouth Every 6 (Six) Hours As Needed for Anxiety., Disp: 12 tablet, Rfl: 0    metoprolol succinate XL (TOPROL-XL) 50 MG 24 hr tablet, TAKE ONE TABLET BY MOUTH TWICE DAILY, Disp: 180 tablet, Rfl: 0    montelukast (SINGULAIR) 10 MG tablet, Take 1 tablet by mouth Every Night., Disp: 30 tablet, Rfl: 0    potassium chloride (K-DUR,KLOR-CON) 20 MEQ CR tablet, Take 2 tablets by mouth 2 (Two) Times a Day., Disp: , Rfl:     predniSONE (DELTASONE) 10 MG tablet, Take 1 tablet by mouth Daily., Disp: , Rfl:     risedronate (ACTONEL) 35 MG tablet, Take 1 tablet by mouth Every 7 (Seven) Days. On Thursday, Disp: , Rfl:     rOPINIRole (REQUIP) 1 MG tablet, Take 1 tablet by mouth 2 (Two) Times a Day. At 19:00 and 21:00, Disp: , Rfl:     rosuvastatin (CRESTOR) 20 MG tablet, Take 1 tablet by mouth Daily., Disp: , Rfl:     theophylline (UNIPHYL) 400 MG 24 hr tablet, Take 1 tablet by mouth Daily., Disp: , Rfl:       Social History     Socioeconomic History    Marital status:    Tobacco Use    Smoking status: Former     Packs/day: 1.00     Types: Cigarettes     Quit date:      Years since quittin.4    Smokeless tobacco: Never   Vaping Use    Vaping Use: Never used   Substance and Sexual Activity    Alcohol use: Yes    Drug use: No    Sexual activity: Defer         Review of Systems   Constitutional:  "Negative for chills and fever.   HENT:  Negative for ear discharge and nosebleeds.    Eyes:  Negative for discharge and redness.   Cardiovascular:  Positive for leg swelling and palpitations. Negative for chest pain, orthopnea, paroxysmal nocturnal dyspnea and syncope.   Respiratory:  Positive for shortness of breath. Negative for cough and wheezing.    Endocrine: Negative for heat intolerance.   Skin:  Negative for rash.   Musculoskeletal:  Positive for arthritis and joint pain. Negative for myalgias.   Gastrointestinal:  Negative for abdominal pain, melena, nausea and vomiting.   Genitourinary:  Negative for dysuria and hematuria.   Neurological:  Negative for dizziness, light-headedness, numbness and tremors.   Psychiatric/Behavioral:  Negative for depression. The patient is not nervous/anxious.      Procedures    Procedures    No orders to display           Objective:    /61 (BP Location: Right arm, Patient Position: Sitting, Cuff Size: Large Adult)   Pulse 85   Ht 167.6 cm (65.98\")   Wt 79.4 kg (175 lb)   SpO2 98%   BMI 28.26 kg/m²         Constitutional:       Appearance: Well-developed.   Eyes:      General: No scleral icterus.        Right eye: No discharge.   HENT:      Head: Normocephalic and atraumatic.   Neck:      Thyroid: No thyromegaly.      Lymphadenopathy: No cervical adenopathy.   Pulmonary:      Effort: Pulmonary effort is normal. No respiratory distress.      Breath sounds: Normal breath sounds. No wheezing. No rales.   Cardiovascular:      Normal rate. Regular rhythm.      No gallop.    Edema:     Peripheral edema present.  Abdominal:      Tenderness: There is no abdominal tenderness.   Skin:     Findings: No erythema or rash.   Neurological:      Mental Status: Alert and oriented to person, place, and time.           Assessment:       Diagnosis Plan   1. Mixed hyperlipidemia        2. Primary hypertension        3. Tachycardia        4. Chronic diastolic CHF (congestive heart " failure)                 Plan:       MDM:    1.  Chronic diastolic heart failure:    Possibly patient has right heart failure.  Patient has advanced COPD.  Add metolazone 2.5 mg Monday Wednesday Friday.  Decrease salt and water restriction    2.  Hypertension:    Blood pressure is controlled current treatment would be continued    3.  Sinus tachycardia:    Patient is on metoprolol and it is controlling the heart rate.  Patient is also on Cardizem    4.  COPD:    Patient has severely advanced COPD.  Patient follows with Dr. Sifuentes

## 2023-06-15 ENCOUNTER — OFFICE VISIT (OUTPATIENT)
Dept: CARDIOLOGY | Facility: CLINIC | Age: 73
End: 2023-06-15
Payer: MEDICARE

## 2023-06-15 VITALS
DIASTOLIC BLOOD PRESSURE: 61 MMHG | SYSTOLIC BLOOD PRESSURE: 118 MMHG | HEART RATE: 85 BPM | HEIGHT: 66 IN | WEIGHT: 175 LBS | OXYGEN SATURATION: 98 % | BODY MASS INDEX: 28.12 KG/M2

## 2023-06-15 DIAGNOSIS — I10 PRIMARY HYPERTENSION: ICD-10-CM

## 2023-06-15 DIAGNOSIS — R00.0 TACHYCARDIA: ICD-10-CM

## 2023-06-15 DIAGNOSIS — I50.32 CHRONIC DIASTOLIC CHF (CONGESTIVE HEART FAILURE): ICD-10-CM

## 2023-06-15 DIAGNOSIS — E78.2 MIXED HYPERLIPIDEMIA: Primary | ICD-10-CM

## 2023-06-15 RX ORDER — METOLAZONE 2.5 MG/1
2.5 TABLET ORAL 3 TIMES WEEKLY
Qty: 60 TABLET | Refills: 0 | Status: SHIPPED | OUTPATIENT
Start: 2023-06-16

## 2023-06-29 NOTE — PROGRESS NOTES
Case Management Discharge Note      Final Note: Home with [facility]             Final Discharge Disposition Code: 06 - home with home health care   Use Enhanced Medication Counseling?: No

## (undated) DEVICE — BAPTIST FLOYD BRONCHOSCOPY: Brand: MEDLINE INDUSTRIES, INC.